# Patient Record
Sex: MALE | Race: WHITE | Employment: OTHER | URBAN - METROPOLITAN AREA
[De-identification: names, ages, dates, MRNs, and addresses within clinical notes are randomized per-mention and may not be internally consistent; named-entity substitution may affect disease eponyms.]

---

## 2017-01-12 RX ORDER — ZOLPIDEM TARTRATE 10 MG/1
TABLET ORAL
Qty: 90 TABLET | Refills: 0 | OUTPATIENT
Start: 2017-01-12

## 2017-01-26 DIAGNOSIS — F51.01 PRIMARY INSOMNIA: ICD-10-CM

## 2017-01-26 DIAGNOSIS — I25.10 CORONARY ARTERY DISEASE INVOLVING NATIVE CORONARY ARTERY OF NATIVE HEART WITHOUT ANGINA PECTORIS: ICD-10-CM

## 2017-01-26 RX ORDER — ISOSORBIDE MONONITRATE 30 MG/1
TABLET, EXTENDED RELEASE ORAL
Qty: 90 TABLET | Refills: 3 | Status: ON HOLD | OUTPATIENT
Start: 2017-01-26 | End: 2017-04-23 | Stop reason: HOSPADM

## 2017-02-15 ENCOUNTER — OFFICE VISIT (OUTPATIENT)
Dept: NEUROLOGY | Age: 72
End: 2017-02-15

## 2017-02-15 VITALS
BODY MASS INDEX: 32.5 KG/M2 | SYSTOLIC BLOOD PRESSURE: 138 MMHG | WEIGHT: 227 LBS | HEIGHT: 70 IN | OXYGEN SATURATION: 95 % | DIASTOLIC BLOOD PRESSURE: 89 MMHG | HEART RATE: 74 BPM

## 2017-02-15 DIAGNOSIS — E78.2 MIXED HYPERLIPIDEMIA: ICD-10-CM

## 2017-02-15 DIAGNOSIS — E11.42 DIABETIC POLYNEUROPATHY ASSOCIATED WITH TYPE 2 DIABETES MELLITUS (HCC): ICD-10-CM

## 2017-02-15 DIAGNOSIS — I10 ESSENTIAL HYPERTENSION: ICD-10-CM

## 2017-02-15 DIAGNOSIS — G25.81 RESTLESS LEGS SYNDROME (RLS): Primary | ICD-10-CM

## 2017-02-15 DIAGNOSIS — F51.01 PRIMARY INSOMNIA: ICD-10-CM

## 2017-02-15 PROCEDURE — 99214 OFFICE O/P EST MOD 30 MIN: CPT | Performed by: PSYCHIATRY & NEUROLOGY

## 2017-02-15 RX ORDER — PRAMIPEXOLE DIHYDROCHLORIDE 1 MG/1
1 TABLET ORAL NIGHTLY
Qty: 90 TABLET | Refills: 2 | Status: ON HOLD | OUTPATIENT
Start: 2017-02-15 | End: 2017-04-23

## 2017-02-16 ENCOUNTER — OFFICE VISIT (OUTPATIENT)
Dept: FAMILY MEDICINE CLINIC | Age: 72
End: 2017-02-16

## 2017-02-16 VITALS
BODY MASS INDEX: 32.5 KG/M2 | HEART RATE: 64 BPM | WEIGHT: 227 LBS | HEIGHT: 70 IN | SYSTOLIC BLOOD PRESSURE: 120 MMHG | OXYGEN SATURATION: 96 % | DIASTOLIC BLOOD PRESSURE: 78 MMHG | TEMPERATURE: 97.4 F

## 2017-02-16 DIAGNOSIS — J20.9 ACUTE BRONCHITIS, UNSPECIFIED ORGANISM: Primary | ICD-10-CM

## 2017-02-16 DIAGNOSIS — R45.89 DEPRESSED AFFECT: ICD-10-CM

## 2017-02-16 PROCEDURE — 99213 OFFICE O/P EST LOW 20 MIN: CPT | Performed by: FAMILY MEDICINE

## 2017-02-16 RX ORDER — AZITHROMYCIN 250 MG/1
TABLET, FILM COATED ORAL
Qty: 6 TABLET | Refills: 0 | Status: SHIPPED | OUTPATIENT
Start: 2017-02-16 | End: 2017-02-26

## 2017-02-16 ASSESSMENT — ENCOUNTER SYMPTOMS
GASTROINTESTINAL NEGATIVE: 1
COUGH: 1

## 2017-02-20 DIAGNOSIS — J30.9 ALLERGIC RHINITIS: ICD-10-CM

## 2017-02-20 RX ORDER — MONTELUKAST SODIUM 10 MG/1
TABLET ORAL
Qty: 90 TABLET | Refills: 3 | Status: ON HOLD | OUTPATIENT
Start: 2017-02-20 | End: 2017-04-23 | Stop reason: HOSPADM

## 2017-02-20 RX ORDER — MESALAMINE 1.2 G/1
TABLET, DELAYED RELEASE ORAL
Qty: 270 TABLET | Refills: 1 | Status: SHIPPED | OUTPATIENT
Start: 2017-02-20 | End: 2018-02-01 | Stop reason: SDUPTHER

## 2017-03-03 RX ORDER — ZOLPIDEM TARTRATE 10 MG/1
TABLET ORAL
Qty: 90 TABLET | Refills: 0 | Status: SHIPPED | OUTPATIENT
Start: 2017-03-03 | End: 2017-07-27 | Stop reason: SDUPTHER

## 2017-04-17 DIAGNOSIS — E11.9 CONTROLLED TYPE 2 DIABETES MELLITUS WITHOUT COMPLICATION, WITHOUT LONG-TERM CURRENT USE OF INSULIN (HCC): ICD-10-CM

## 2017-04-17 DIAGNOSIS — R45.89 DEPRESSED AFFECT: Primary | ICD-10-CM

## 2017-04-17 PROBLEM — G25.81 RESTLESS LEGS SYNDROME (RLS): Chronic | Status: ACTIVE | Noted: 2017-02-15
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
## 2017-04-17 PROBLEM — F32.A DEPRESSION: Chronic | Status: ACTIVE | Noted: 2017-02-16

## 2017-04-17 PROBLEM — S22.089A T12 VERTEBRAL FRACTURE (HCC): Status: ACTIVE | Noted: 2017-04-17
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
## 2017-04-17 RX ORDER — SERTRALINE HYDROCHLORIDE 100 MG/1
100 TABLET, FILM COATED ORAL DAILY
Qty: 30 TABLET | Refills: 3 | Status: ON HOLD | OUTPATIENT
Start: 2017-04-17 | End: 2017-04-23

## 2017-04-18 ENCOUNTER — TELEPHONE (OUTPATIENT)
Dept: ORTHOPEDIC SURGERY | Age: 72
End: 2017-04-18

## 2017-04-18 PROBLEM — Z87.891 FORMER SMOKER: Chronic | Status: ACTIVE | Noted: 2017-04-18

## 2017-04-18 PROBLEM — J96.01 ACUTE HYPOXEMIC RESPIRATORY FAILURE (HCC): Status: ACTIVE | Noted: 2017-04-18

## 2017-04-25 ENCOUNTER — TELEPHONE (OUTPATIENT)
Dept: FAMILY MEDICINE CLINIC | Age: 72
End: 2017-04-25

## 2017-04-27 ENCOUNTER — OFFICE VISIT (OUTPATIENT)
Dept: FAMILY MEDICINE CLINIC | Age: 72
End: 2017-04-27

## 2017-04-27 VITALS
BODY MASS INDEX: 32.28 KG/M2 | HEART RATE: 65 BPM | SYSTOLIC BLOOD PRESSURE: 140 MMHG | DIASTOLIC BLOOD PRESSURE: 80 MMHG | OXYGEN SATURATION: 98 % | WEIGHT: 225 LBS

## 2017-04-27 DIAGNOSIS — I50.9 ACUTE CONGESTIVE HEART FAILURE, UNSPECIFIED CONGESTIVE HEART FAILURE TYPE: ICD-10-CM

## 2017-04-27 DIAGNOSIS — J43.9 PULMONARY EMPHYSEMA, UNSPECIFIED EMPHYSEMA TYPE (HCC): Primary | ICD-10-CM

## 2017-04-27 DIAGNOSIS — S22.081A: ICD-10-CM

## 2017-04-27 PROCEDURE — 99214 OFFICE O/P EST MOD 30 MIN: CPT | Performed by: FAMILY MEDICINE

## 2017-04-27 RX ORDER — OXYCODONE AND ACETAMINOPHEN 10; 325 MG/1; MG/1
1 TABLET ORAL EVERY 4 HOURS PRN
Qty: 100 TABLET | Refills: 0 | Status: SHIPPED | OUTPATIENT
Start: 2017-04-27 | End: 2017-05-04

## 2017-04-27 RX ORDER — CYCLOBENZAPRINE HCL 10 MG
10 TABLET ORAL 3 TIMES DAILY PRN
Qty: 90 TABLET | Refills: 2 | Status: SHIPPED | OUTPATIENT
Start: 2017-04-27 | End: 2017-12-14 | Stop reason: SDUPTHER

## 2017-04-27 ASSESSMENT — ENCOUNTER SYMPTOMS
BACK PAIN: 1
GASTROINTESTINAL NEGATIVE: 1
SHORTNESS OF BREATH: 1

## 2017-05-01 ENCOUNTER — TELEPHONE (OUTPATIENT)
Dept: FAMILY MEDICINE CLINIC | Age: 72
End: 2017-05-01

## 2017-05-01 DIAGNOSIS — E11.9 CONTROLLED TYPE 2 DIABETES MELLITUS WITHOUT COMPLICATION, WITHOUT LONG-TERM CURRENT USE OF INSULIN (HCC): ICD-10-CM

## 2017-05-01 RX ORDER — GLUCOSAM/CHON-MSM1/C/MANG/BOSW 500-416.6
1 TABLET ORAL DAILY
Qty: 100 EACH | Refills: 3 | Status: SHIPPED | OUTPATIENT
Start: 2017-05-01 | End: 2017-05-01 | Stop reason: SDUPTHER

## 2017-05-01 RX ORDER — GLUCOSAM/CHON-MSM1/C/MANG/BOSW 500-416.6
1 TABLET ORAL DAILY
Qty: 100 EACH | Refills: 3 | Status: SHIPPED | OUTPATIENT
Start: 2017-05-01

## 2017-05-23 ENCOUNTER — TELEPHONE (OUTPATIENT)
Dept: FAMILY MEDICINE CLINIC | Age: 72
End: 2017-05-23

## 2017-05-25 ENCOUNTER — TELEPHONE (OUTPATIENT)
Dept: FAMILY MEDICINE CLINIC | Age: 72
End: 2017-05-25

## 2017-06-01 DIAGNOSIS — S22.081D CLOSED STABLE BURST FRACTURE OF TWELFTH THORACIC VERTEBRA WITH ROUTINE HEALING, SUBSEQUENT ENCOUNTER: Primary | ICD-10-CM

## 2017-06-02 DIAGNOSIS — J44.9 COPD, SEVERE (HCC): Primary | Chronic | ICD-10-CM

## 2017-06-02 DIAGNOSIS — E11.9 CONTROLLED TYPE 2 DIABETES MELLITUS WITHOUT COMPLICATION, WITHOUT LONG-TERM CURRENT USE OF INSULIN (HCC): ICD-10-CM

## 2017-06-02 DIAGNOSIS — K21.9 GASTROESOPHAGEAL REFLUX DISEASE WITHOUT ESOPHAGITIS: ICD-10-CM

## 2017-06-02 DIAGNOSIS — J30.89 PERENNIAL ALLERGIC RHINITIS, UNSPECIFIED ALLERGIC RHINITIS TRIGGER: ICD-10-CM

## 2017-06-02 RX ORDER — MONTELUKAST SODIUM 10 MG/1
10 TABLET ORAL NIGHTLY
Qty: 90 TABLET | Refills: 3 | Status: SHIPPED | OUTPATIENT
Start: 2017-06-02 | End: 2018-02-01 | Stop reason: SDUPTHER

## 2017-06-02 RX ORDER — GUAIFENESIN 600 MG/1
1200 TABLET, EXTENDED RELEASE ORAL 2 TIMES DAILY
Qty: 60 TABLET | Refills: 3 | Status: SHIPPED | OUTPATIENT
Start: 2017-06-02 | End: 2017-11-27

## 2017-06-02 RX ORDER — OMEPRAZOLE 40 MG/1
40 CAPSULE, DELAYED RELEASE ORAL DAILY
Qty: 90 CAPSULE | Refills: 3 | Status: SHIPPED | OUTPATIENT
Start: 2017-06-02 | End: 2018-05-16 | Stop reason: SDUPTHER

## 2017-06-14 ENCOUNTER — HOSPITAL ENCOUNTER (OUTPATIENT)
Dept: OTHER | Age: 72
Discharge: HOME | End: 2017-06-14
Attending: NEUROLOGICAL SURGERY | Admitting: NEUROLOGICAL SURGERY

## 2017-06-14 DIAGNOSIS — S22.081D CLOSED STABLE BURST FRACTURE OF ELEVENTH THORACIC VERTEBRA WITH ROUTINE HEALING, SUBSEQUENT ENCOUNTER: ICD-10-CM

## 2017-06-19 ENCOUNTER — HOSPITAL ENCOUNTER (OUTPATIENT)
Dept: OTHER | Age: 72
Discharge: HOME | End: 2017-06-19
Attending: NEUROLOGICAL SURGERY | Admitting: NEUROLOGICAL SURGERY

## 2017-06-19 DIAGNOSIS — E11.9 CONTROLLED TYPE 2 DIABETES MELLITUS WITHOUT COMPLICATION, WITHOUT LONG-TERM CURRENT USE OF INSULIN (HCC): ICD-10-CM

## 2017-06-19 DIAGNOSIS — S22.081D: ICD-10-CM

## 2017-06-22 DIAGNOSIS — E11.9 CONTROLLED TYPE 2 DIABETES MELLITUS WITHOUT COMPLICATION, WITHOUT LONG-TERM CURRENT USE OF INSULIN (HCC): ICD-10-CM

## 2017-06-27 ENCOUNTER — OFFICE VISIT (OUTPATIENT)
Dept: FAMILY MEDICINE CLINIC | Age: 72
End: 2017-06-27

## 2017-06-27 VITALS
WEIGHT: 217 LBS | SYSTOLIC BLOOD PRESSURE: 124 MMHG | DIASTOLIC BLOOD PRESSURE: 70 MMHG | BODY MASS INDEX: 31.07 KG/M2 | HEIGHT: 70 IN | OXYGEN SATURATION: 97 % | HEART RATE: 68 BPM

## 2017-06-27 DIAGNOSIS — I48.3 TYPICAL ATRIAL FLUTTER (HCC): ICD-10-CM

## 2017-06-27 DIAGNOSIS — I20.0 INTERMEDIATE CORONARY SYNDROME (HCC): ICD-10-CM

## 2017-06-27 DIAGNOSIS — R07.81 RIB PAIN ON RIGHT SIDE: Primary | ICD-10-CM

## 2017-06-27 DIAGNOSIS — Z87.19 HISTORY OF PANCREATITIS: ICD-10-CM

## 2017-06-27 LAB
A/G RATIO: 1.4 (ref 1.1–2.2)
ALBUMIN SERPL-MCNC: 4.6 G/DL (ref 3.4–5)
ALP BLD-CCNC: 93 U/L (ref 40–129)
ALT SERPL-CCNC: 15 U/L (ref 10–40)
ANION GAP SERPL CALCULATED.3IONS-SCNC: 18 MMOL/L (ref 3–16)
AST SERPL-CCNC: 33 U/L (ref 15–37)
BILIRUB SERPL-MCNC: 0.9 MG/DL (ref 0–1)
BUN BLDV-MCNC: 23 MG/DL (ref 7–20)
CALCIUM SERPL-MCNC: 10.3 MG/DL (ref 8.3–10.6)
CHLORIDE BLD-SCNC: 99 MMOL/L (ref 99–110)
CO2: 20 MMOL/L (ref 21–32)
CREAT SERPL-MCNC: 1.1 MG/DL (ref 0.8–1.3)
GFR AFRICAN AMERICAN: >60
GFR NON-AFRICAN AMERICAN: >60
GLOBULIN: 3.3 G/DL
GLUCOSE BLD-MCNC: 101 MG/DL (ref 70–99)
LIPASE: 42 U/L (ref 13–60)
POTASSIUM SERPL-SCNC: 5 MMOL/L (ref 3.5–5.1)
SODIUM BLD-SCNC: 137 MMOL/L (ref 136–145)
TOTAL PROTEIN: 7.9 G/DL (ref 6.4–8.2)

## 2017-06-27 PROCEDURE — 99213 OFFICE O/P EST LOW 20 MIN: CPT | Performed by: FAMILY MEDICINE

## 2017-06-27 ASSESSMENT — ENCOUNTER SYMPTOMS: ABDOMINAL PAIN: 0

## 2017-07-12 ENCOUNTER — OFFICE VISIT (OUTPATIENT)
Dept: FAMILY MEDICINE CLINIC | Age: 72
End: 2017-07-12

## 2017-07-12 VITALS
HEIGHT: 70 IN | WEIGHT: 212 LBS | SYSTOLIC BLOOD PRESSURE: 135 MMHG | OXYGEN SATURATION: 97 % | HEART RATE: 69 BPM | DIASTOLIC BLOOD PRESSURE: 80 MMHG | BODY MASS INDEX: 30.35 KG/M2

## 2017-07-12 DIAGNOSIS — R41.3 SHORT-TERM MEMORY LOSS: ICD-10-CM

## 2017-07-12 DIAGNOSIS — F51.01 PRIMARY INSOMNIA: Primary | ICD-10-CM

## 2017-07-12 LAB
BASOPHILS ABSOLUTE: 0 K/UL (ref 0–0.2)
BASOPHILS RELATIVE PERCENT: 0.5 %
EOSINOPHILS ABSOLUTE: 0.2 K/UL (ref 0–0.6)
EOSINOPHILS RELATIVE PERCENT: 4.1 %
FOLATE: 16.38 NG/ML (ref 4.78–24.2)
HCT VFR BLD CALC: 42.4 % (ref 40.5–52.5)
HEMOGLOBIN: 14.2 G/DL (ref 13.5–17.5)
LYMPHOCYTES ABSOLUTE: 1.5 K/UL (ref 1–5.1)
LYMPHOCYTES RELATIVE PERCENT: 28.1 %
MCH RBC QN AUTO: 31.2 PG (ref 26–34)
MCHC RBC AUTO-ENTMCNC: 33.4 G/DL (ref 31–36)
MCV RBC AUTO: 93.4 FL (ref 80–100)
MONOCYTES ABSOLUTE: 0.5 K/UL (ref 0–1.3)
MONOCYTES RELATIVE PERCENT: 8.7 %
NEUTROPHILS ABSOLUTE: 3.2 K/UL (ref 1.7–7.7)
NEUTROPHILS RELATIVE PERCENT: 58.6 %
PDW BLD-RTO: 17.2 % (ref 12.4–15.4)
PLATELET # BLD: 170 K/UL (ref 135–450)
PMV BLD AUTO: 8.6 FL (ref 5–10.5)
RBC # BLD: 4.54 M/UL (ref 4.2–5.9)
TSH SERPL DL<=0.05 MIU/L-ACNC: 1.07 UIU/ML (ref 0.27–4.2)
VITAMIN B-12: 297 PG/ML (ref 211–911)
WBC # BLD: 5.5 K/UL (ref 4–11)

## 2017-07-12 PROCEDURE — 99214 OFFICE O/P EST MOD 30 MIN: CPT | Performed by: FAMILY MEDICINE

## 2017-07-12 PROCEDURE — 36415 COLL VENOUS BLD VENIPUNCTURE: CPT | Performed by: FAMILY MEDICINE

## 2017-07-12 ASSESSMENT — ENCOUNTER SYMPTOMS
RESPIRATORY NEGATIVE: 1
GASTROINTESTINAL NEGATIVE: 1

## 2017-07-18 ENCOUNTER — HOSPITAL ENCOUNTER (OUTPATIENT)
Dept: OTHER | Age: 72
Discharge: HOME | End: 2017-07-18
Attending: NEUROLOGICAL SURGERY | Admitting: NEUROLOGICAL SURGERY

## 2017-07-18 ENCOUNTER — HOSPITAL ENCOUNTER (OUTPATIENT)
Dept: MRI IMAGING | Age: 72
Discharge: HOME | End: 2017-07-18
Attending: FAMILY MEDICINE | Admitting: FAMILY MEDICINE

## 2017-07-18 DIAGNOSIS — S22.081D BURST FRACTURE OF T12 VERTEBRA WITH ROUTINE HEALING: ICD-10-CM

## 2017-07-18 DIAGNOSIS — R41.3 SHORT-TERM MEMORY LOSS: ICD-10-CM

## 2017-07-18 DIAGNOSIS — R41.3 OTHER AMNESIA: ICD-10-CM

## 2017-07-18 LAB
AMPHETAMINE SCREEN, URINE: ABNORMAL
BARBITURATE SCREEN URINE: ABNORMAL
BENZODIAZEPINE SCREEN, URINE: ABNORMAL
CANNABINOID SCREEN URINE: ABNORMAL
COCAINE METABOLITE SCREEN URINE: ABNORMAL
Lab: ABNORMAL
METHADONE SCREEN, URINE: ABNORMAL
OPIATE SCREEN URINE: ABNORMAL
OXYCODONE URINE: POSITIVE
PH UA: 7
PHENCYCLIDINE SCREEN URINE: ABNORMAL
PROPOXYPHENE SCREEN: ABNORMAL

## 2017-07-27 DIAGNOSIS — R45.89 DEPRESSED AFFECT: ICD-10-CM

## 2017-07-27 RX ORDER — ZOLPIDEM TARTRATE 10 MG/1
TABLET ORAL
Qty: 90 TABLET | Refills: 0 | Status: SHIPPED | OUTPATIENT
Start: 2017-07-27 | End: 2017-10-20 | Stop reason: SDUPTHER

## 2017-07-27 RX ORDER — SERTRALINE HYDROCHLORIDE 100 MG/1
TABLET, FILM COATED ORAL
Qty: 90 TABLET | Refills: 3 | Status: SHIPPED | OUTPATIENT
Start: 2017-07-27 | End: 2018-05-16 | Stop reason: SDUPTHER

## 2017-08-08 ENCOUNTER — TELEPHONE (OUTPATIENT)
Dept: PULMONOLOGY | Age: 72
End: 2017-08-08

## 2017-09-14 RX ORDER — DONEPEZIL HYDROCHLORIDE 5 MG/1
5 TABLET, FILM COATED ORAL NIGHTLY
Qty: 30 TABLET | Refills: 0 | Status: SHIPPED | OUTPATIENT
Start: 2017-09-14 | End: 2017-12-14 | Stop reason: SDUPTHER

## 2017-09-20 RX ORDER — ATORVASTATIN CALCIUM 10 MG/1
10 TABLET, FILM COATED ORAL NIGHTLY
Qty: 90 TABLET | Refills: 1 | Status: SHIPPED | OUTPATIENT
Start: 2017-09-20 | End: 2017-12-14 | Stop reason: SDUPTHER

## 2017-09-27 ENCOUNTER — OFFICE VISIT (OUTPATIENT)
Dept: NEUROLOGY | Age: 72
End: 2017-09-27

## 2017-09-27 VITALS
SYSTOLIC BLOOD PRESSURE: 90 MMHG | WEIGHT: 207 LBS | DIASTOLIC BLOOD PRESSURE: 58 MMHG | HEIGHT: 70 IN | HEART RATE: 116 BPM | BODY MASS INDEX: 29.63 KG/M2 | OXYGEN SATURATION: 95 %

## 2017-09-27 DIAGNOSIS — I10 ESSENTIAL HYPERTENSION: ICD-10-CM

## 2017-09-27 DIAGNOSIS — E11.42 DIABETIC POLYNEUROPATHY ASSOCIATED WITH TYPE 2 DIABETES MELLITUS (HCC): ICD-10-CM

## 2017-09-27 DIAGNOSIS — F34.1 DYSTHYMIA: ICD-10-CM

## 2017-09-27 DIAGNOSIS — E78.5 DYSLIPIDEMIA: ICD-10-CM

## 2017-09-27 DIAGNOSIS — R41.3 MEMORY LOSS: ICD-10-CM

## 2017-09-27 DIAGNOSIS — F51.01 PRIMARY INSOMNIA: ICD-10-CM

## 2017-09-27 DIAGNOSIS — G25.81 RESTLESS LEGS SYNDROME (RLS): Primary | ICD-10-CM

## 2017-09-27 PROCEDURE — 99214 OFFICE O/P EST MOD 30 MIN: CPT | Performed by: PSYCHIATRY & NEUROLOGY

## 2017-09-27 ASSESSMENT — ENCOUNTER SYMPTOMS
BACK PAIN: 1
HEMOPTYSIS: 0
VOMITING: 0
DOUBLE VISION: 0
NAUSEA: 0
BLURRED VISION: 0
PHOTOPHOBIA: 0
COUGH: 0

## 2017-09-28 ENCOUNTER — OFFICE VISIT (OUTPATIENT)
Dept: FAMILY MEDICINE CLINIC | Age: 72
End: 2017-09-28

## 2017-09-28 VITALS
BODY MASS INDEX: 29.27 KG/M2 | TEMPERATURE: 97.8 F | WEIGHT: 204 LBS | DIASTOLIC BLOOD PRESSURE: 62 MMHG | HEART RATE: 102 BPM | OXYGEN SATURATION: 96 % | SYSTOLIC BLOOD PRESSURE: 110 MMHG

## 2017-09-28 DIAGNOSIS — R19.7 DIARRHEA, UNSPECIFIED TYPE: Primary | ICD-10-CM

## 2017-09-28 LAB — GLUCOSE BLD-MCNC: 115 MG/DL

## 2017-09-28 PROCEDURE — 99213 OFFICE O/P EST LOW 20 MIN: CPT | Performed by: FAMILY MEDICINE

## 2017-09-28 PROCEDURE — 82962 GLUCOSE BLOOD TEST: CPT | Performed by: FAMILY MEDICINE

## 2017-09-29 ASSESSMENT — ENCOUNTER SYMPTOMS
VOMITING: 0
RECTAL PAIN: 0
ANAL BLEEDING: 0
BLOATING: 0
ABDOMINAL PAIN: 0
ABDOMINAL DISTENTION: 0
CONSTIPATION: 0
DIARRHEA: 1
NAUSEA: 0
BLOOD IN STOOL: 0

## 2017-10-11 ENCOUNTER — OFFICE VISIT (OUTPATIENT)
Dept: PULMONOLOGY | Age: 72
End: 2017-10-11

## 2017-10-11 VITALS
OXYGEN SATURATION: 97 % | HEART RATE: 77 BPM | BODY MASS INDEX: 29.78 KG/M2 | HEIGHT: 70 IN | WEIGHT: 208 LBS | DIASTOLIC BLOOD PRESSURE: 68 MMHG | RESPIRATION RATE: 16 BRPM | TEMPERATURE: 98 F | SYSTOLIC BLOOD PRESSURE: 110 MMHG

## 2017-10-11 DIAGNOSIS — J44.9 STAGE 3 SEVERE COPD BY GOLD CLASSIFICATION (HCC): Primary | ICD-10-CM

## 2017-10-11 DIAGNOSIS — R06.02 SOB (SHORTNESS OF BREATH): ICD-10-CM

## 2017-10-11 PROCEDURE — 99204 OFFICE O/P NEW MOD 45 MIN: CPT | Performed by: INTERNAL MEDICINE

## 2017-10-11 NOTE — PROGRESS NOTES
Prescriptions:     atorvastatin (LIPITOR) 10 MG tablet, Take 1 tablet by mouth nightly, Disp: 90 tablet, Rfl: 1    donepezil (ARICEPT) 5 MG tablet, Take 1 tablet by mouth nightly, Disp: 30 tablet, Rfl: 0    sertraline (ZOLOFT) 100 MG tablet, TAKE 1 TABLET EVERY DAY, Disp: 90 tablet, Rfl: 3    zolpidem (AMBIEN) 10 MG tablet, TAKE 1 TABLET EVERY NIGHT AT BEDTIME AS NEEDED FOR SLEEP, Disp: 90 tablet, Rfl: 0    glucose blood VI test strips (ASCENSIA AUTODISC VI;ONE TOUCH ULTRA TEST VI) strip, [employer] True Metrix Air Test Strips. FSBS daily.  DX E11.9, Disp: 100 strip, Rfl: 3    montelukast (SINGULAIR) 10 MG tablet, Take 1 tablet by mouth nightly, Disp: 90 tablet, Rfl: 3    guaiFENesin (MUCINEX) 600 MG extended release tablet, Take 2 tablets by mouth 2 times daily, Disp: 60 tablet, Rfl: 3    metFORMIN (GLUCOPHAGE) 500 MG tablet, Take 1 tablet by mouth 2 times daily, Disp: 180 tablet, Rfl: 3    omeprazole (PRILOSEC) 40 MG delayed release capsule, Take 1 capsule by mouth daily, Disp: 90 capsule, Rfl: 3    TRUEPLUS LANCETS 28G MISC, 1 each by Does not apply route daily E11.9 Test FBS daily--NEED 33 GAUGE, Disp: 100 each, Rfl: 3    cyclobenzaprine (FLEXERIL) 10 MG tablet, Take 1 tablet by mouth 3 times daily as needed for Muscle spasms, Disp: 90 tablet, Rfl: 2    b complex vitamins capsule, Take 1 capsule by mouth daily, Disp: 30 capsule, Rfl: 3    albuterol sulfate (PROAIR RESPICLICK) 447 (90 BASE) MCG/ACT aerosol powder inhalation, 2 puffs q4-6 hrs prn SOB,wheezing, Disp: 1 Inhaler, Rfl: 5    amLODIPine (NORVASC) 5 MG tablet, TAKE ONE TABLET BY MOUTH DAILY, Disp: 90 tablet, Rfl: 1    aspirin (ASPIRIN CHILDRENS) 81 MG chewable tablet, Take 1 tablet by mouth daily, Disp: 30 tablet, Rfl: 0    budesonide-formoterol (SYMBICORT) 160-4.5 MCG/ACT AERO, Inhale 2 puffs into the lungs 2 times daily, Disp: 1 Inhaler, Rfl: 3    cholestyramine (QUESTRAN) 4 G packet, Take 1 packet by mouth daily, Disp: 90 packet, Rfl: No lower extremity edema. Skin: Warm and dry. No nodules on exposed extremities. No rash on exposed extremities. Musc: No clubbing. No cyanosis. No synovitis or joint deformity in digits. Psych: Oriented x 3. Mood and affect normal. Intact judgment and insight. LABS:  The recent relevant labs were reviewed    PFTs 8/16/17  FVC  (%) FEV1 1.35 (42%) FEV1/FVC ratio 43   TLC  (%)  RV  (176%)   DLCO (70%) Bronchodilator response: +    IMAGING:  I personally reviewed and interpreted the following imaging today in the office:   CXR:   Chest CT:    ASSESSMENT:  · Severe COPD  · SOB  · H/o aflutter and diastolic CHF    PLAN:   · Pulmonary rehab referral discussed but pt is not interested  · Start Anoro (combined LABA/LAMA)  · Continue PRN albuterol  The Pneumovax 23 today. The Flu Vaccine today  F/u in 2-3 months.

## 2017-10-20 DIAGNOSIS — I10 ESSENTIAL HYPERTENSION: Chronic | ICD-10-CM

## 2017-10-23 RX ORDER — COLESEVELAM HYDROCHLORIDE 625 MG/1
TABLET, FILM COATED ORAL
Qty: 540 TABLET | Refills: 0 | Status: SHIPPED | OUTPATIENT
Start: 2017-10-23 | End: 2018-02-06 | Stop reason: SDUPTHER

## 2017-10-23 RX ORDER — ZOLPIDEM TARTRATE 10 MG/1
TABLET ORAL
Qty: 90 TABLET | Refills: 0 | Status: SHIPPED | OUTPATIENT
Start: 2017-10-23 | End: 2018-02-01 | Stop reason: SDUPTHER

## 2017-10-23 RX ORDER — AMLODIPINE BESYLATE 5 MG/1
TABLET ORAL
Qty: 90 TABLET | Refills: 0 | Status: SHIPPED | OUTPATIENT
Start: 2017-10-23 | End: 2018-02-06 | Stop reason: SDUPTHER

## 2017-11-06 ENCOUNTER — HOSPITAL ENCOUNTER (OUTPATIENT)
Dept: OTHER | Age: 72
Discharge: HOME | End: 2017-11-30
Attending: INTERNAL MEDICINE | Admitting: INTERNAL MEDICINE

## 2017-11-22 ENCOUNTER — HOSPITAL ENCOUNTER (OUTPATIENT)
Dept: CT IMAGING | Age: 72
Discharge: HOME | End: 2017-11-22
Attending: NEUROLOGICAL SURGERY | Admitting: NEUROLOGICAL SURGERY

## 2017-11-22 DIAGNOSIS — S22.081D: ICD-10-CM

## 2017-11-22 DIAGNOSIS — S22.081D BURST FRACTURE OF T12 VERTEBRA WITH ROUTINE HEALING: ICD-10-CM

## 2017-11-27 ENCOUNTER — OFFICE VISIT (OUTPATIENT)
Dept: FAMILY MEDICINE CLINIC | Age: 72
End: 2017-11-27

## 2017-11-27 VITALS
HEIGHT: 70 IN | TEMPERATURE: 97.4 F | HEART RATE: 72 BPM | OXYGEN SATURATION: 96 % | BODY MASS INDEX: 30.06 KG/M2 | DIASTOLIC BLOOD PRESSURE: 76 MMHG | WEIGHT: 210 LBS | SYSTOLIC BLOOD PRESSURE: 134 MMHG

## 2017-11-27 DIAGNOSIS — F33.42 RECURRENT MAJOR DEPRESSIVE EPISODES, IN FULL REMISSION (HCC): ICD-10-CM

## 2017-11-27 DIAGNOSIS — Z23 FLU VACCINE NEED: ICD-10-CM

## 2017-11-27 DIAGNOSIS — R35.1 NOCTURIA: ICD-10-CM

## 2017-11-27 DIAGNOSIS — R10.9 FLANK PAIN: ICD-10-CM

## 2017-11-27 DIAGNOSIS — F33.0 MILD EPISODE OF RECURRENT MAJOR DEPRESSIVE DISORDER (HCC): Chronic | ICD-10-CM

## 2017-11-27 DIAGNOSIS — I10 ESSENTIAL HYPERTENSION: ICD-10-CM

## 2017-11-27 DIAGNOSIS — R41.3 SHORT-TERM MEMORY LOSS: ICD-10-CM

## 2017-11-27 DIAGNOSIS — E11.9 CONTROLLED TYPE 2 DIABETES MELLITUS WITHOUT COMPLICATION, WITHOUT LONG-TERM CURRENT USE OF INSULIN (HCC): Primary | Chronic | ICD-10-CM

## 2017-11-27 LAB
BILIRUBIN, POC: NORMAL
BLOOD URINE, POC: NORMAL
CLARITY, POC: CLEAR
COLOR, POC: YELLOW
CREATININE URINE POCT: 300
GLUCOSE URINE, POC: 250
HBA1C MFR BLD: 5.5 %
KETONES, POC: NORMAL
LEUKOCYTE EST, POC: NORMAL
MICROALBUMIN/CREAT 24H UR: 80 MG/G{CREAT}
MICROALBUMIN/CREAT UR-RTO: NORMAL
NITRITE, POC: NORMAL
PH, POC: 5
PROTEIN, POC: NORMAL
SPECIFIC GRAVITY, POC: 1.02
UROBILINOGEN, POC: 0.2

## 2017-11-27 PROCEDURE — 90662 IIV NO PRSV INCREASED AG IM: CPT | Performed by: NURSE PRACTITIONER

## 2017-11-27 PROCEDURE — 99213 OFFICE O/P EST LOW 20 MIN: CPT | Performed by: NURSE PRACTITIONER

## 2017-11-27 PROCEDURE — 83036 HEMOGLOBIN GLYCOSYLATED A1C: CPT | Performed by: NURSE PRACTITIONER

## 2017-11-27 PROCEDURE — 81002 URINALYSIS NONAUTO W/O SCOPE: CPT | Performed by: NURSE PRACTITIONER

## 2017-11-27 PROCEDURE — G0008 ADMIN INFLUENZA VIRUS VAC: HCPCS | Performed by: NURSE PRACTITIONER

## 2017-11-27 PROCEDURE — 3017F COLORECTAL CA SCREEN DOC REV: CPT | Performed by: NURSE PRACTITIONER

## 2017-11-27 PROCEDURE — 3044F HG A1C LEVEL LT 7.0%: CPT | Performed by: NURSE PRACTITIONER

## 2017-11-27 PROCEDURE — 4040F PNEUMOC VAC/ADMIN/RCVD: CPT | Performed by: NURSE PRACTITIONER

## 2017-11-27 PROCEDURE — 1036F TOBACCO NON-USER: CPT | Performed by: NURSE PRACTITIONER

## 2017-11-27 PROCEDURE — G8484 FLU IMMUNIZE NO ADMIN: HCPCS | Performed by: NURSE PRACTITIONER

## 2017-11-27 PROCEDURE — 82044 UR ALBUMIN SEMIQUANTITATIVE: CPT | Performed by: NURSE PRACTITIONER

## 2017-11-27 PROCEDURE — 1123F ACP DISCUSS/DSCN MKR DOCD: CPT | Performed by: NURSE PRACTITIONER

## 2017-11-27 PROCEDURE — G8427 DOCREV CUR MEDS BY ELIG CLIN: HCPCS | Performed by: NURSE PRACTITIONER

## 2017-11-27 PROCEDURE — G8417 CALC BMI ABV UP PARAM F/U: HCPCS | Performed by: NURSE PRACTITIONER

## 2017-11-27 PROCEDURE — G8598 ASA/ANTIPLAT THER USED: HCPCS | Performed by: NURSE PRACTITIONER

## 2017-11-27 RX ORDER — TAMSULOSIN HYDROCHLORIDE 0.4 MG/1
0.4 CAPSULE ORAL DAILY
Qty: 30 CAPSULE | Refills: 3 | Status: SHIPPED | OUTPATIENT
Start: 2017-11-27 | End: 2018-02-01 | Stop reason: SDUPTHER

## 2017-11-27 RX ORDER — TAMSULOSIN HYDROCHLORIDE 0.4 MG/1
0.4 CAPSULE ORAL DAILY
Qty: 30 CAPSULE | Refills: 3 | Status: SHIPPED | OUTPATIENT
Start: 2017-11-27 | End: 2017-11-27

## 2017-11-27 ASSESSMENT — ENCOUNTER SYMPTOMS
CHEST TIGHTNESS: 0
VOMITING: 0
BACK PAIN: 0
COUGH: 0
NAUSEA: 0

## 2017-11-27 ASSESSMENT — PATIENT HEALTH QUESTIONNAIRE - PHQ9
SUM OF ALL RESPONSES TO PHQ QUESTIONS 1-9: 1
SUM OF ALL RESPONSES TO PHQ9 QUESTIONS 1 & 2: 1
2. FEELING DOWN, DEPRESSED OR HOPELESS: 0
1. LITTLE INTEREST OR PLEASURE IN DOING THINGS: 1

## 2017-11-28 PROBLEM — R35.1 NOCTURIA: Status: ACTIVE | Noted: 2017-11-28

## 2017-11-28 PROBLEM — J96.01 ACUTE HYPOXEMIC RESPIRATORY FAILURE (HCC): Status: RESOLVED | Noted: 2017-04-18 | Resolved: 2017-11-28

## 2017-11-28 NOTE — PROGRESS NOTES
visit:    Controlled type 2 diabetes mellitus without complication, without long-term current use of insulin ([facility])  -      DIABETES FOOT EXAM  -     POCT glycosylated hemoglobin (Hb A1C)  -     POCT microalbumin    Flank pain  -     POCT Urinalysis no Micro    Flu vaccine need  -     INFLUENZA, HIGH DOSE, 65 YRS +, IM, PF, PREFILL SYR, 0.5ML (FLUZONE HD)    Nocturia  -     Discontinue: tamsulosin (FLOMAX) 0.4 MG capsule; Take 1 capsule by mouth daily    Recurrent major depressive episodes, in full remission ([facility] [address]    Other orders  -     tamsulosin (FLOMAX) 0.4 MG capsule;  Take 1 capsule by mouth daily

## 2017-12-01 ENCOUNTER — HOSPITAL ENCOUNTER (OUTPATIENT)
Dept: OTHER | Age: 72
Discharge: HOME | End: 2017-12-31
Attending: INTERNAL MEDICINE | Admitting: INTERNAL MEDICINE

## 2017-12-11 ENCOUNTER — OFFICE VISIT (OUTPATIENT)
Dept: CARDIOLOGY CLINIC | Age: 72
End: 2017-12-11

## 2017-12-11 VITALS
SYSTOLIC BLOOD PRESSURE: 108 MMHG | HEART RATE: 95 BPM | HEIGHT: 70 IN | BODY MASS INDEX: 30.21 KG/M2 | DIASTOLIC BLOOD PRESSURE: 68 MMHG | WEIGHT: 211 LBS

## 2017-12-11 DIAGNOSIS — I10 ESSENTIAL HYPERTENSION: Chronic | ICD-10-CM

## 2017-12-11 DIAGNOSIS — I25.89 CARDIAC MICROVASCULAR DISEASE: ICD-10-CM

## 2017-12-11 DIAGNOSIS — E11.9 CONTROLLED TYPE 2 DIABETES MELLITUS WITHOUT COMPLICATION, WITHOUT LONG-TERM CURRENT USE OF INSULIN (HCC): ICD-10-CM

## 2017-12-11 DIAGNOSIS — R06.09 DYSPNEA ON EXERTION: ICD-10-CM

## 2017-12-11 DIAGNOSIS — E78.2 MIXED HYPERLIPIDEMIA: Chronic | ICD-10-CM

## 2017-12-11 DIAGNOSIS — R42 POSTURAL DIZZINESS: Primary | ICD-10-CM

## 2017-12-11 PROCEDURE — 3017F COLORECTAL CA SCREEN DOC REV: CPT | Performed by: INTERNAL MEDICINE

## 2017-12-11 PROCEDURE — G8598 ASA/ANTIPLAT THER USED: HCPCS | Performed by: INTERNAL MEDICINE

## 2017-12-11 PROCEDURE — 3044F HG A1C LEVEL LT 7.0%: CPT | Performed by: INTERNAL MEDICINE

## 2017-12-11 PROCEDURE — G8484 FLU IMMUNIZE NO ADMIN: HCPCS | Performed by: INTERNAL MEDICINE

## 2017-12-11 PROCEDURE — G8417 CALC BMI ABV UP PARAM F/U: HCPCS | Performed by: INTERNAL MEDICINE

## 2017-12-11 PROCEDURE — 1123F ACP DISCUSS/DSCN MKR DOCD: CPT | Performed by: INTERNAL MEDICINE

## 2017-12-11 PROCEDURE — 1036F TOBACCO NON-USER: CPT | Performed by: INTERNAL MEDICINE

## 2017-12-11 PROCEDURE — G8427 DOCREV CUR MEDS BY ELIG CLIN: HCPCS | Performed by: INTERNAL MEDICINE

## 2017-12-11 PROCEDURE — 4040F PNEUMOC VAC/ADMIN/RCVD: CPT | Performed by: INTERNAL MEDICINE

## 2017-12-11 PROCEDURE — 99213 OFFICE O/P EST LOW 20 MIN: CPT | Performed by: INTERNAL MEDICINE

## 2017-12-11 NOTE — LETTER
[address] [facility] - [address] [place] [address]  Phone: [phone]  Fax: [phone]    Morris Alegria MD        December 18, 2017     Luz Escobedo DO  [address] [place] [address]    Patient: Aga Scruggs  MR Number: [id number]  YOB: 1945  Date of Visit: 12/11/2017    Dear Dr. Juanjose Love: Thank you for the request for consultation for David Lugo to me for the evaluation of dizziness. Below are the relevant portions of my assessment and plan of care. Assessment:  - Postural dizziness likely due to autonomic dysfunction exacerbated by oral nitrates  - Microvascular angina  - Dyspnea due to severe COPD, he had normal EF and filling pressure on cath  - Hypertension. BP: (102-108)/(64-68)    - Hyperlipidemia  - Diabetes     Recommendation:  - I will dicontinue Imdur as it may be exacerbating his dizziness. If that does not help, he will need to consult ENT. He will continue Norvasc and Lisinopril for microvascular angina (ACEI have shown to be helpful in microvascular angina). - He will continue moderate dose Lipitor for underlying CAD. His LDL is at goal. Will repeat his lipids. - I will see him back in one 1 year. If you have questions, please do not hesitate to call me. I look forward to following [facility] along with you.     Sincerely,        Morris Alegria MD

## 2017-12-11 NOTE — PROGRESS NOTES
Take 1 tablet by mouth nightly 30 tablet 0    sertraline (ZOLOFT) 100 MG tablet TAKE 1 TABLET EVERY DAY 90 tablet 3    glucose blood VI test strips (ASCENSIA AUTODISC VI;ONE TOUCH ULTRA TEST VI) strip [employer] True Metrix Air Test Strips. FSBS daily. DX E11.9 100 strip 3    montelukast (SINGULAIR) 10 MG tablet Take 1 tablet by mouth nightly 90 tablet 3    metFORMIN (GLUCOPHAGE) 500 MG tablet Take 1 tablet by mouth 2 times daily 180 tablet 3    omeprazole (PRILOSEC) 40 MG delayed release capsule Take 1 capsule by mouth daily 90 capsule 3    TRUEPLUS LANCETS 28G MISC 1 each by Does not apply route daily E11.9 Test FBS daily--NEED 33 GAUGE 100 each 3    cyclobenzaprine (FLEXERIL) 10 MG tablet Take 1 tablet by mouth 3 times daily as needed for Muscle spasms 90 tablet 2    b complex vitamins capsule Take 1 capsule by mouth daily 30 capsule 3    aspirin (ASPIRIN CHILDRENS) 81 MG chewable tablet Take 1 tablet by mouth daily 30 tablet 0    ferrous sulfate 325 (65 FE) MG tablet Take 1 tablet by mouth 2 times daily (with meals) 180 tablet 3    isosorbide mononitrate (IMDUR) 30 MG extended release tablet Take 1 tablet by mouth daily 30 tablet 3    lisinopril (PRINIVIL;ZESTRIL) 20 MG tablet Take 1 tablet by mouth daily 90 tablet 3    metoprolol tartrate (LOPRESSOR) 25 MG tablet Take 0.5 tablets by mouth 2 times daily 60 tablet 3    pramipexole (MIRAPEX) 1 MG tablet Take 1 tablet by mouth nightly 90 tablet 2    LIALDA 1.2 G EC tablet TAKE 1 TABLET THREE TIMES DAILY BY MOUTH 270 tablet 1     No current facility-administered medications for this visit. ROS:  14 point ROS completed and pertinent positives are mentioned in HPI.     Vital Signs:                                                 /68 (Site: Left Arm, Position: Standing)   Pulse 95   Ht 5' 10\" (1.778 m)   Wt 211 lb (95.7 kg)   BMI 30.28 kg/m²  Weight: 211 lb (95.7 kg)       Respiratory:  · Resp Assessment: Normal respiratory effort  · Resp Auscultation: Clear to auscultation bilaterally     Cardiovascular:  · Auscultation: regular rhythm and normal rate, normal S1S2, no murmur, rub or gallop  · JVP:  normal  · Extremities: No Edema  · No bruit     Abdomen:  · Soft, non-tender  · Normal bowel sounds  · No masses  · No organomegaly     Extremities:  ·  No Cyanosis or Clubbing  · No edema  · No deformities of hands or feet     Neurological/Psychiatric:  · Oriented to time, place, and person  · Non-anxious    Skin Warm and dry. Normal turgor. No rash or lesions. Lab Data:  CBC:   Lab Results   Component Value Date    WBC 5.5 07/12/2017    HGB 14.2 07/12/2017    HCT 42.4 07/12/2017    MCV 93.4 07/12/2017     07/12/2017     BMP:   Lab Results   Component Value Date     06/27/2017    K 5.0 06/27/2017    CL 99 06/27/2017    CO2 20 06/27/2017    PHOS 2.4 04/23/2017    BUN 23 06/27/2017    CREATININE 1.1 06/27/2017    CALCIUM 10.3 06/27/2017    GFRAA >60 06/27/2017    MG 1.90 04/23/2017     LFTS:   Lab Results   Component Value Date    ALT 15 06/27/2017    AST 33 06/27/2017    ALKPHOS 93 06/27/2017    PROT 7.9 06/27/2017    AGRATIO 1.4 06/27/2017    BILITOT 0.9 06/27/2017     PT/INR:   No components found for: PTPATIENT,  PTINR  LIPID PANEL:   No components found for: CHLPL  Lab Results   Component Value Date    TRIG 212 (H) 12/31/2015    TRIG 167 (H) 04/26/2015     Lab Results   Component Value Date    HDL 44 12/31/2015    HDL 41 04/26/2015     Lab Results   Component Value Date    LDLCALC 24 12/31/2015    [address] 91 04/26/2015     TSH:   Lab Results   Component Value Date    TSH 1.07 07/12/2017     FREET4:   No results found for: Minus Montrell:   No components found for: FREET3  BNP:   No results found for: BNP    Data Review:  I have reviewed the below testing personally and my interpretation is below. EKG on April 25 revealed sinus rhythm with no acute ST changes.       Chest x-ray from April 25 revealed no gross acute cardiopulmonary disease. There were multiple dense pulmonary nodules. CT abdomen on April 26 revealed hepatic steatosis with findings suggestive of  chronic pancreatitis as well as diverticulosis. Cardiac cath: 04/27/15  IMPRESSION:  1. Mild coronary artery disease of the mid left anterior descending and  luminal irregularities of circumflex and right coronary artery. 2.  Normal left ventricular systolic function with an ejection fraction of  55% to 60%. 3.  Normal left ventricular end-diastolic pressure, 6 mmHg. RECOMMENDATIONS:  The patient has no epicardial disease that can explain his  chest pain. He still may have underlying small vessel disease versus  endothelial dysfunction responsible for his chest pain as his pain improved  with sublingual nitroglycerin. We will therefore start him on oral  nitrates. He will continue with asa, statin and beta blocker therapy. Assessment:  - Postural dizziness likely due to autonomic dysfunction exacerbated by oral nitrates  - Microvascular angina  - Dyspnea due to severe COPD, he had normal EF and filling pressure on cath  - Hypertension. BP: (102-108)/(64-68)    - Hyperlipidemia  - Diabetes    Recommendation:  - I will dicontinue Imdur as it may be exacerbating his dizziness. If that does not help, he will need to consult ENT. He will continue Norvasc and Lisinopril for microvascular angina (ACEI have shown to be helpful in microvascular angina). - He will continue moderate dose Lipitor for underlying CAD. His LDL is at goal. Will repeat his lipids. - I will see him back in one 1 year. If you have questions, please do not hesitate to call me. I look forward to following [facility] along with you.       Michelle Godfrey MD, [facility] [place],  [facility]  [phone] [facility] office  [phone] [facility]  12/11/2017 4:24 PM

## 2017-12-14 DIAGNOSIS — I10 ESSENTIAL HYPERTENSION: Chronic | ICD-10-CM

## 2017-12-14 RX ORDER — CYCLOBENZAPRINE HCL 10 MG
10 TABLET ORAL 3 TIMES DAILY PRN
Qty: 270 TABLET | Refills: 0 | Status: SHIPPED | OUTPATIENT
Start: 2017-12-14 | End: 2018-02-01 | Stop reason: SDUPTHER

## 2017-12-14 RX ORDER — LISINOPRIL 20 MG/1
20 TABLET ORAL DAILY
Qty: 90 TABLET | Refills: 0 | Status: SHIPPED | OUTPATIENT
Start: 2017-12-14 | End: 2018-05-16 | Stop reason: SDUPTHER

## 2017-12-14 RX ORDER — DONEPEZIL HYDROCHLORIDE 5 MG/1
5 TABLET, FILM COATED ORAL NIGHTLY
Qty: 90 TABLET | Refills: 0 | Status: SHIPPED | OUTPATIENT
Start: 2017-12-14 | End: 2018-02-01 | Stop reason: SDUPTHER

## 2017-12-14 RX ORDER — ATORVASTATIN CALCIUM 10 MG/1
10 TABLET, FILM COATED ORAL NIGHTLY
Qty: 90 TABLET | Refills: 0 | Status: SHIPPED | OUTPATIENT
Start: 2017-12-14 | End: 2018-02-01 | Stop reason: SDUPTHER

## 2017-12-18 NOTE — COMMUNICATION BODY
Assessment:  - Postural dizziness likely due to autonomic dysfunction exacerbated by oral nitrates  - Microvascular angina  - Dyspnea due to severe COPD, he had normal EF and filling pressure on cath  - Hypertension. BP: (102-108)/(64-68)    - Hyperlipidemia  - Diabetes     Recommendation:  - I will dicontinue Imdur as it may be exacerbating his dizziness. If that does not help, he will need to consult ENT. He will continue Norvasc and Lisinopril for microvascular angina (ACEI have shown to be helpful in microvascular angina). - He will continue moderate dose Lipitor for underlying CAD. His LDL is at goal. Will repeat his lipids. - I will see him back in one 1 year.

## 2018-01-01 ENCOUNTER — APPOINTMENT (OUTPATIENT)
Dept: GENERAL RADIOLOGY | Age: 73
DRG: 308 | End: 2018-01-01
Payer: MEDICARE

## 2018-01-01 ENCOUNTER — TELEPHONE (OUTPATIENT)
Dept: CARDIOLOGY CLINIC | Age: 73
End: 2018-01-01

## 2018-01-01 ENCOUNTER — CARE COORDINATION (OUTPATIENT)
Dept: CASE MANAGEMENT | Age: 73
End: 2018-01-01

## 2018-01-01 ENCOUNTER — OFFICE VISIT (OUTPATIENT)
Dept: CARDIOLOGY CLINIC | Age: 73
End: 2018-01-01
Payer: MEDICARE

## 2018-01-01 ENCOUNTER — CARE COORDINATION (OUTPATIENT)
Dept: CARE COORDINATION | Age: 73
End: 2018-01-01

## 2018-01-01 ENCOUNTER — HOSPITAL ENCOUNTER (OUTPATIENT)
Age: 73
Discharge: HOME | DRG: 308 | End: 2018-12-20
Payer: MEDICARE

## 2018-01-01 ENCOUNTER — HOSPITAL ENCOUNTER (INPATIENT)
Age: 73
LOS: 7 days | Discharge: HOME | DRG: 308 | End: 2018-12-28
Attending: EMERGENCY MEDICINE | Admitting: INTERNAL MEDICINE
Payer: MEDICARE

## 2018-01-01 VITALS
OXYGEN SATURATION: 97 % | WEIGHT: 221.4 LBS | HEIGHT: 70 IN | HEART RATE: 79 BPM | DIASTOLIC BLOOD PRESSURE: 50 MMHG | BODY MASS INDEX: 31.7 KG/M2 | SYSTOLIC BLOOD PRESSURE: 108 MMHG

## 2018-01-01 VITALS
HEIGHT: 70 IN | WEIGHT: 215.9 LBS | DIASTOLIC BLOOD PRESSURE: 69 MMHG | SYSTOLIC BLOOD PRESSURE: 127 MMHG | HEART RATE: 93 BPM | BODY MASS INDEX: 30.91 KG/M2 | RESPIRATION RATE: 18 BRPM | OXYGEN SATURATION: 94 % | TEMPERATURE: 97.5 F

## 2018-01-01 VITALS
DIASTOLIC BLOOD PRESSURE: 68 MMHG | OXYGEN SATURATION: 97 % | SYSTOLIC BLOOD PRESSURE: 104 MMHG | WEIGHT: 227 LBS | HEIGHT: 70 IN | BODY MASS INDEX: 32.5 KG/M2 | HEART RATE: 65 BPM

## 2018-01-01 DIAGNOSIS — I50.32 CHRONIC DIASTOLIC HEART FAILURE (HCC): Primary | ICD-10-CM

## 2018-01-01 DIAGNOSIS — I50.32 CHRONIC DIASTOLIC HEART FAILURE (HCC): ICD-10-CM

## 2018-01-01 DIAGNOSIS — D50.9 IRON DEFICIENCY ANEMIA, UNSPECIFIED IRON DEFICIENCY ANEMIA TYPE: ICD-10-CM

## 2018-01-01 DIAGNOSIS — E87.1 HYPONATREMIA: ICD-10-CM

## 2018-01-01 DIAGNOSIS — I48.0 PAROXYSMAL ATRIAL FIBRILLATION (HCC): ICD-10-CM

## 2018-01-01 DIAGNOSIS — R00.1 BRADYCARDIA: ICD-10-CM

## 2018-01-01 DIAGNOSIS — E78.00 PURE HYPERCHOLESTEROLEMIA: Chronic | ICD-10-CM

## 2018-01-01 DIAGNOSIS — I95.9 HYPOTENSION, UNSPECIFIED HYPOTENSION TYPE: ICD-10-CM

## 2018-01-01 DIAGNOSIS — I25.10 CORONARY ARTERY DISEASE INVOLVING NATIVE CORONARY ARTERY OF NATIVE HEART WITHOUT ANGINA PECTORIS: Chronic | ICD-10-CM

## 2018-01-01 DIAGNOSIS — D64.9 ANEMIA, UNSPECIFIED TYPE: ICD-10-CM

## 2018-01-01 DIAGNOSIS — J44.9 COPD, SEVERE (HCC): Chronic | ICD-10-CM

## 2018-01-01 DIAGNOSIS — N17.9 AKI (ACUTE KIDNEY INJURY) (HCC): ICD-10-CM

## 2018-01-01 DIAGNOSIS — I10 ESSENTIAL HYPERTENSION: ICD-10-CM

## 2018-01-01 DIAGNOSIS — E87.5 HYPERKALEMIA: Primary | ICD-10-CM

## 2018-01-01 DIAGNOSIS — R06.02 SHORTNESS OF BREATH: ICD-10-CM

## 2018-01-01 LAB
A/G RATIO: 1 (ref 1.1–2.2)
A/G RATIO: 1.1 (ref 1.1–2.2)
A/G RATIO: 1.2 (ref 1.1–2.2)
A/G RATIO: 1.2 (ref 1.1–2.2)
A/G RATIO: 1.3 (ref 1.1–2.2)
A/G RATIO: 1.4 (ref 1.1–2.2)
A/G RATIO: 1.4 (ref 1.1–2.2)
ABO/RH: NORMAL
ALBUMIN SERPL-MCNC: 3 G/DL (ref 3.4–5)
ALBUMIN SERPL-MCNC: 3 G/DL (ref 3.4–5)
ALBUMIN SERPL-MCNC: 3.1 G/DL (ref 3.4–5)
ALBUMIN SERPL-MCNC: 3.2 G/DL (ref 3.4–5)
ALBUMIN SERPL-MCNC: 3.2 G/DL (ref 3.4–5)
ALBUMIN SERPL-MCNC: 3.3 G/DL (ref 3.4–5)
ALBUMIN SERPL-MCNC: 3.4 G/DL (ref 3.4–5)
ALBUMIN SERPL-MCNC: 3.5 G/DL (ref 3.4–5)
ALBUMIN SERPL-MCNC: 3.5 G/DL (ref 3.4–5)
ALBUMIN SERPL-MCNC: 3.8 G/DL (ref 3.4–5)
ALBUMIN SERPL-MCNC: 3.9 G/DL (ref 3.4–5)
ALBUMIN SERPL-MCNC: 3.9 G/DL (ref 3.4–5)
ALBUMIN SERPL-MCNC: 4 G/DL (ref 3.4–5)
ALP BLD-CCNC: 54 U/L (ref 40–129)
ALP BLD-CCNC: 55 U/L (ref 40–129)
ALP BLD-CCNC: 58 U/L (ref 40–129)
ALP BLD-CCNC: 59 U/L (ref 40–129)
ALP BLD-CCNC: 59 U/L (ref 40–129)
ALP BLD-CCNC: 60 U/L (ref 40–129)
ALP BLD-CCNC: 60 U/L (ref 40–129)
ALP BLD-CCNC: 61 U/L (ref 40–129)
ALP BLD-CCNC: 62 U/L (ref 40–129)
ALP BLD-CCNC: 62 U/L (ref 40–129)
ALP BLD-CCNC: 65 U/L (ref 40–129)
ALP BLD-CCNC: 66 U/L (ref 40–129)
ALP BLD-CCNC: 71 U/L (ref 40–129)
ALP BLD-CCNC: 73 U/L (ref 40–129)
ALP BLD-CCNC: 88 U/L (ref 40–129)
ALT SERPL-CCNC: 6 U/L (ref 10–40)
ALT SERPL-CCNC: 6 U/L (ref 10–40)
ALT SERPL-CCNC: 7 U/L (ref 10–40)
ALT SERPL-CCNC: 8 U/L (ref 10–40)
ALT SERPL-CCNC: 9 U/L (ref 10–40)
ALT SERPL-CCNC: 9 U/L (ref 10–40)
AMIODARONE LEVEL: 0.3 UG/ML (ref 0.5–2)
ANION GAP SERPL CALCULATED.3IONS-SCNC: 10 MMOL/L (ref 3–16)
ANION GAP SERPL CALCULATED.3IONS-SCNC: 11 MMOL/L (ref 3–16)
ANION GAP SERPL CALCULATED.3IONS-SCNC: 12 MMOL/L (ref 3–16)
ANION GAP SERPL CALCULATED.3IONS-SCNC: 12 MMOL/L (ref 3–16)
ANION GAP SERPL CALCULATED.3IONS-SCNC: 13 MMOL/L (ref 3–16)
ANION GAP SERPL CALCULATED.3IONS-SCNC: 14 MMOL/L (ref 3–16)
ANION GAP SERPL CALCULATED.3IONS-SCNC: 15 MMOL/L (ref 3–16)
ANION GAP SERPL CALCULATED.3IONS-SCNC: 7 MMOL/L (ref 3–16)
ANION GAP SERPL CALCULATED.3IONS-SCNC: 8 MMOL/L (ref 3–16)
ANION GAP SERPL CALCULATED.3IONS-SCNC: 9 MMOL/L (ref 3–16)
ANION GAP SERPL CALCULATED.3IONS-SCNC: 9 MMOL/L (ref 3–16)
ANTIBODY SCREEN: NORMAL
APTT: 25.3 SEC (ref 26–36)
APTT: 28.3 SEC (ref 26–36)
APTT: 29.1 SEC (ref 26–36)
APTT: 29.3 SEC (ref 26–36)
AST SERPL-CCNC: 18 U/L (ref 15–37)
AST SERPL-CCNC: 18 U/L (ref 15–37)
AST SERPL-CCNC: 19 U/L (ref 15–37)
AST SERPL-CCNC: 19 U/L (ref 15–37)
AST SERPL-CCNC: 20 U/L (ref 15–37)
AST SERPL-CCNC: 20 U/L (ref 15–37)
AST SERPL-CCNC: 21 U/L (ref 15–37)
AST SERPL-CCNC: 22 U/L (ref 15–37)
AST SERPL-CCNC: 22 U/L (ref 15–37)
AST SERPL-CCNC: 24 U/L (ref 15–37)
AST SERPL-CCNC: 24 U/L (ref 15–37)
AST SERPL-CCNC: 27 U/L (ref 15–37)
AST SERPL-CCNC: 28 U/L (ref 15–37)
BACTERIA: ABNORMAL /HPF
BACTERIA: ABNORMAL /HPF
BASE EXCESS ARTERIAL: 0 (ref -3–3)
BASE EXCESS ARTERIAL: 1.6 MMOL/L (ref -3–3)
BASE EXCESS ARTERIAL: 5.7 MMOL/L (ref -3–3)
BASOPHILS ABSOLUTE: 0 K/UL (ref 0–0.2)
BASOPHILS RELATIVE PERCENT: 0.3 %
BASOPHILS RELATIVE PERCENT: 0.4 %
BASOPHILS RELATIVE PERCENT: 0.5 %
BILIRUB SERPL-MCNC: 0.5 MG/DL (ref 0–1)
BILIRUB SERPL-MCNC: 0.6 MG/DL (ref 0–1)
BILIRUB SERPL-MCNC: 0.7 MG/DL (ref 0–1)
BILIRUB SERPL-MCNC: 0.8 MG/DL (ref 0–1)
BILIRUB SERPL-MCNC: 0.8 MG/DL (ref 0–1)
BILIRUBIN URINE: NEGATIVE
BILIRUBIN URINE: NEGATIVE
BLOOD BANK DISPENSE STATUS: NORMAL
BLOOD BANK DISPENSE STATUS: NORMAL
BLOOD BANK PRODUCT CODE: NORMAL
BLOOD BANK PRODUCT CODE: NORMAL
BLOOD CULTURE, ROUTINE: NORMAL
BLOOD, URINE: ABNORMAL
BLOOD, URINE: NEGATIVE
BPU ID: NORMAL
BPU ID: NORMAL
BUN BLDV-MCNC: 14 MG/DL (ref 7–20)
BUN BLDV-MCNC: 14 MG/DL (ref 7–20)
BUN BLDV-MCNC: 15 MG/DL (ref 7–20)
BUN BLDV-MCNC: 15 MG/DL (ref 7–20)
BUN BLDV-MCNC: 16 MG/DL (ref 7–20)
BUN BLDV-MCNC: 16 MG/DL (ref 7–20)
BUN BLDV-MCNC: 17 MG/DL (ref 7–20)
BUN BLDV-MCNC: 17 MG/DL (ref 7–20)
BUN BLDV-MCNC: 18 MG/DL (ref 7–20)
BUN BLDV-MCNC: 18 MG/DL (ref 7–20)
BUN BLDV-MCNC: 19 MG/DL (ref 7–20)
BUN BLDV-MCNC: 22 MG/DL (ref 7–20)
BUN BLDV-MCNC: 26 MG/DL (ref 7–20)
BUN BLDV-MCNC: 29 MG/DL (ref 7–20)
BUN BLDV-MCNC: 30 MG/DL (ref 7–20)
BUN BLDV-MCNC: 35 MG/DL (ref 7–20)
BUN BLDV-MCNC: 43 MG/DL (ref 7–20)
BUN BLDV-MCNC: 47 MG/DL (ref 7–20)
BUN BLDV-MCNC: 53 MG/DL (ref 7–20)
BUN BLDV-MCNC: 56 MG/DL (ref 7–20)
CALCIUM IONIZED: 1.09 MMOL/L (ref 1.12–1.32)
CALCIUM IONIZED: 1.15 MMOL/L (ref 1.12–1.32)
CALCIUM IONIZED: 1.16 MMOL/L (ref 1.12–1.32)
CALCIUM IONIZED: 1.17 MMOL/L (ref 1.12–1.32)
CALCIUM IONIZED: 1.17 MMOL/L (ref 1.12–1.32)
CALCIUM IONIZED: 1.18 MMOL/L (ref 1.12–1.32)
CALCIUM IONIZED: 1.19 MMOL/L (ref 1.12–1.32)
CALCIUM IONIZED: 1.21 MMOL/L (ref 1.12–1.32)
CALCIUM IONIZED: 1.23 MMOL/L (ref 1.12–1.32)
CALCIUM SERPL-MCNC: 10 MG/DL (ref 8.3–10.6)
CALCIUM SERPL-MCNC: 10.2 MG/DL (ref 8.3–10.6)
CALCIUM SERPL-MCNC: 10.3 MG/DL (ref 8.3–10.6)
CALCIUM SERPL-MCNC: 11.2 MG/DL (ref 8.3–10.6)
CALCIUM SERPL-MCNC: 7.7 MG/DL (ref 8.3–10.6)
CALCIUM SERPL-MCNC: 7.8 MG/DL (ref 8.3–10.6)
CALCIUM SERPL-MCNC: 8.4 MG/DL (ref 8.3–10.6)
CALCIUM SERPL-MCNC: 8.6 MG/DL (ref 8.3–10.6)
CALCIUM SERPL-MCNC: 8.7 MG/DL (ref 8.3–10.6)
CALCIUM SERPL-MCNC: 8.7 MG/DL (ref 8.3–10.6)
CALCIUM SERPL-MCNC: 8.8 MG/DL (ref 8.3–10.6)
CALCIUM SERPL-MCNC: 8.8 MG/DL (ref 8.3–10.6)
CALCIUM SERPL-MCNC: 8.9 MG/DL (ref 8.3–10.6)
CALCIUM SERPL-MCNC: 9 MG/DL (ref 8.3–10.6)
CALCIUM SERPL-MCNC: 9.2 MG/DL (ref 8.3–10.6)
CALCIUM SERPL-MCNC: 9.4 MG/DL (ref 8.3–10.6)
CALCIUM SERPL-MCNC: 9.7 MG/DL (ref 8.3–10.6)
CARBOXYHEMOGLOBIN ARTERIAL: 0.4 % (ref 0–1.5)
CARBOXYHEMOGLOBIN ARTERIAL: 0.8 % (ref 0–1.5)
CHLORIDE BLD-SCNC: 100 MMOL/L (ref 99–110)
CHLORIDE BLD-SCNC: 100 MMOL/L (ref 99–110)
CHLORIDE BLD-SCNC: 101 MMOL/L (ref 99–110)
CHLORIDE BLD-SCNC: 102 MMOL/L (ref 99–110)
CHLORIDE BLD-SCNC: 102 MMOL/L (ref 99–110)
CHLORIDE BLD-SCNC: 95 MMOL/L (ref 99–110)
CHLORIDE BLD-SCNC: 97 MMOL/L (ref 99–110)
CHLORIDE BLD-SCNC: 98 MMOL/L (ref 99–110)
CHLORIDE BLD-SCNC: 99 MMOL/L (ref 99–110)
CLARITY: CLEAR
CLARITY: CLEAR
CO2: 14 MMOL/L (ref 21–32)
CO2: 15 MMOL/L (ref 21–32)
CO2: 17 MMOL/L (ref 21–32)
CO2: 18 MMOL/L (ref 21–32)
CO2: 22 MMOL/L (ref 21–32)
CO2: 24 MMOL/L (ref 21–32)
CO2: 25 MMOL/L (ref 21–32)
CO2: 25 MMOL/L (ref 21–32)
CO2: 27 MMOL/L (ref 21–32)
CO2: 28 MMOL/L (ref 21–32)
CO2: 29 MMOL/L (ref 21–32)
CO2: 29 MMOL/L (ref 21–32)
CO2: 31 MMOL/L (ref 21–32)
CO2: 31 MMOL/L (ref 21–32)
CO2: 32 MMOL/L (ref 21–32)
COLOR: YELLOW
COLOR: YELLOW
CREAT SERPL-MCNC: 0.7 MG/DL (ref 0.8–1.3)
CREAT SERPL-MCNC: 0.8 MG/DL (ref 0.8–1.3)
CREAT SERPL-MCNC: 0.9 MG/DL (ref 0.8–1.3)
CREAT SERPL-MCNC: 1 MG/DL (ref 0.8–1.3)
CREAT SERPL-MCNC: 1.2 MG/DL (ref 0.8–1.3)
CREAT SERPL-MCNC: 1.5 MG/DL (ref 0.8–1.3)
CREAT SERPL-MCNC: 1.6 MG/DL (ref 0.8–1.3)
CREAT SERPL-MCNC: 1.7 MG/DL (ref 0.8–1.3)
CREAT SERPL-MCNC: 1.8 MG/DL (ref 0.8–1.3)
CREAT SERPL-MCNC: 2 MG/DL (ref 0.8–1.3)
CREAT SERPL-MCNC: 2.5 MG/DL (ref 0.8–1.3)
CREAT SERPL-MCNC: 3.3 MG/DL (ref 0.8–1.3)
CREAT SERPL-MCNC: 3.4 MG/DL (ref 0.8–1.3)
CULTURE, BLOOD 2: NORMAL
DES-AMIOD: 0.4 UG/ML
DESCRIPTION BLOOD BANK: NORMAL
DESCRIPTION BLOOD BANK: NORMAL
EKG ATRIAL RATE: 133 BPM
EKG ATRIAL RATE: 33 BPM
EKG DIAGNOSIS: NORMAL
EKG DIAGNOSIS: NORMAL
EKG P AXIS: 91 DEGREES
EKG P-R INTERVAL: 186 MS
EKG Q-T INTERVAL: 328 MS
EKG Q-T INTERVAL: 414 MS
EKG QRS DURATION: 90 MS
EKG QRS DURATION: 94 MS
EKG QTC CALCULATION (BAZETT): 306 MS
EKG QTC CALCULATION (BAZETT): 453 MS
EKG R AXIS: 33 DEGREES
EKG R AXIS: 79 DEGREES
EKG T AXIS: 62 DEGREES
EKG T AXIS: 77 DEGREES
EKG VENTRICULAR RATE: 115 BPM
EKG VENTRICULAR RATE: 33 BPM
EOSINOPHILS ABSOLUTE: 0 K/UL (ref 0–0.6)
EOSINOPHILS ABSOLUTE: 0.1 K/UL (ref 0–0.6)
EOSINOPHILS ABSOLUTE: 0.1 K/UL (ref 0–0.6)
EOSINOPHILS RELATIVE PERCENT: 0.3 %
EOSINOPHILS RELATIVE PERCENT: 1 %
EOSINOPHILS RELATIVE PERCENT: 1.5 %
FERRITIN: 18.8 NG/ML (ref 30–400)
GFR AFRICAN AMERICAN: 22
GFR AFRICAN AMERICAN: 22
GFR AFRICAN AMERICAN: 31
GFR AFRICAN AMERICAN: 40
GFR AFRICAN AMERICAN: 45
GFR AFRICAN AMERICAN: 48
GFR AFRICAN AMERICAN: 51
GFR AFRICAN AMERICAN: 55
GFR AFRICAN AMERICAN: >60
GFR NON-AFRICAN AMERICAN: 18
GFR NON-AFRICAN AMERICAN: 18
GFR NON-AFRICAN AMERICAN: 25
GFR NON-AFRICAN AMERICAN: 33
GFR NON-AFRICAN AMERICAN: 37
GFR NON-AFRICAN AMERICAN: 40
GFR NON-AFRICAN AMERICAN: 42
GFR NON-AFRICAN AMERICAN: 46
GFR NON-AFRICAN AMERICAN: 59
GFR NON-AFRICAN AMERICAN: >60
GLOBULIN: 2.5 G/DL
GLOBULIN: 2.5 G/DL
GLOBULIN: 2.6 G/DL
GLOBULIN: 2.6 G/DL
GLOBULIN: 2.7 G/DL
GLOBULIN: 2.8 G/DL
GLOBULIN: 2.8 G/DL
GLOBULIN: 2.9 G/DL
GLOBULIN: 3.4 G/DL
GLOBULIN: 3.5 G/DL
GLUCOSE BLD-MCNC: 100 MG/DL (ref 70–99)
GLUCOSE BLD-MCNC: 100 MG/DL (ref 70–99)
GLUCOSE BLD-MCNC: 103 MG/DL (ref 70–99)
GLUCOSE BLD-MCNC: 103 MG/DL (ref 70–99)
GLUCOSE BLD-MCNC: 104 MG/DL (ref 70–99)
GLUCOSE BLD-MCNC: 105 MG/DL (ref 70–99)
GLUCOSE BLD-MCNC: 105 MG/DL (ref 70–99)
GLUCOSE BLD-MCNC: 107 MG/DL (ref 70–99)
GLUCOSE BLD-MCNC: 108 MG/DL (ref 70–99)
GLUCOSE BLD-MCNC: 109 MG/DL (ref 70–99)
GLUCOSE BLD-MCNC: 109 MG/DL (ref 70–99)
GLUCOSE BLD-MCNC: 110 MG/DL (ref 70–99)
GLUCOSE BLD-MCNC: 113 MG/DL (ref 70–99)
GLUCOSE BLD-MCNC: 114 MG/DL (ref 70–99)
GLUCOSE BLD-MCNC: 114 MG/DL (ref 70–99)
GLUCOSE BLD-MCNC: 121 MG/DL (ref 70–99)
GLUCOSE BLD-MCNC: 124 MG/DL (ref 70–99)
GLUCOSE BLD-MCNC: 124 MG/DL (ref 70–99)
GLUCOSE BLD-MCNC: 127 MG/DL (ref 70–99)
GLUCOSE BLD-MCNC: 127 MG/DL (ref 70–99)
GLUCOSE BLD-MCNC: 130 MG/DL (ref 70–99)
GLUCOSE BLD-MCNC: 131 MG/DL (ref 70–99)
GLUCOSE BLD-MCNC: 132 MG/DL (ref 70–99)
GLUCOSE BLD-MCNC: 132 MG/DL (ref 70–99)
GLUCOSE BLD-MCNC: 137 MG/DL (ref 70–99)
GLUCOSE BLD-MCNC: 138 MG/DL (ref 70–99)
GLUCOSE BLD-MCNC: 140 MG/DL (ref 70–99)
GLUCOSE BLD-MCNC: 144 MG/DL (ref 70–99)
GLUCOSE BLD-MCNC: 144 MG/DL (ref 70–99)
GLUCOSE BLD-MCNC: 147 MG/DL (ref 70–99)
GLUCOSE BLD-MCNC: 148 MG/DL (ref 70–99)
GLUCOSE BLD-MCNC: 148 MG/DL (ref 70–99)
GLUCOSE BLD-MCNC: 151 MG/DL (ref 70–99)
GLUCOSE BLD-MCNC: 153 MG/DL (ref 70–99)
GLUCOSE BLD-MCNC: 154 MG/DL (ref 70–99)
GLUCOSE BLD-MCNC: 191 MG/DL (ref 70–99)
GLUCOSE BLD-MCNC: 93 MG/DL (ref 70–99)
GLUCOSE URINE: NEGATIVE MG/DL
GLUCOSE URINE: NEGATIVE MG/DL
HBV SURFACE AB TITR SER: >1000 MIU/ML
HCO3 ARTERIAL: 25.3 MMOL/L (ref 21–29)
HCO3 ARTERIAL: 26.9 MMOL/L (ref 21–29)
HCO3 ARTERIAL: 30.8 MMOL/L (ref 21–29)
HCT VFR BLD CALC: 19.2 % (ref 40.5–52.5)
HCT VFR BLD CALC: 21.6 % (ref 40.5–52.5)
HCT VFR BLD CALC: 22 % (ref 40.5–52.5)
HCT VFR BLD CALC: 22.1 % (ref 40.5–52.5)
HCT VFR BLD CALC: 23.5 % (ref 40.5–52.5)
HCT VFR BLD CALC: 25.4 % (ref 40.5–52.5)
HCT VFR BLD CALC: 26.2 % (ref 40.5–52.5)
HCT VFR BLD CALC: 26.2 % (ref 40.5–52.5)
HCT VFR BLD CALC: 26.6 % (ref 40.5–52.5)
HCT VFR BLD CALC: 29.8 % (ref 40.5–52.5)
HCT VFR BLD CALC: 31.7 % (ref 40.5–52.5)
HEMOGLOBIN, ART, EXTENDED: 6.6 G/DL (ref 13.5–17.5)
HEMOGLOBIN, ART, EXTENDED: 7.7 G/DL (ref 13.5–17.5)
HEMOGLOBIN: 6 G/DL (ref 13.5–17.5)
HEMOGLOBIN: 6.9 G/DL (ref 13.5–17.5)
HEMOGLOBIN: 7 G/DL (ref 13.5–17.5)
HEMOGLOBIN: 7 G/DL (ref 13.5–17.5)
HEMOGLOBIN: 7.4 G/DL (ref 13.5–17.5)
HEMOGLOBIN: 8 G/DL (ref 13.5–17.5)
HEMOGLOBIN: 8.3 G/DL (ref 13.5–17.5)
HEMOGLOBIN: 8.5 G/DL (ref 13.5–17.5)
HEMOGLOBIN: 8.6 G/DL (ref 13.5–17.5)
HEMOGLOBIN: 9.1 G/DL (ref 13.5–17.5)
HEMOGLOBIN: 9.8 G/DL (ref 13.5–17.5)
HEPATITIS B SURFACE ANTIGEN INTERPRETATION: NORMAL
HEPATITIS C ANTIBODY INTERPRETATION: NORMAL
IRON SATURATION: 5 % (ref 20–50)
IRON: 31 UG/DL (ref 59–158)
KETONES, URINE: ABNORMAL MG/DL
KETONES, URINE: NEGATIVE MG/DL
LACTIC ACID: 1.2 MMOL/L (ref 0.4–2)
LEUKOCYTE ESTERASE, URINE: ABNORMAL
LEUKOCYTE ESTERASE, URINE: NEGATIVE
LV EF: 55 %
LVEF MODALITY: NORMAL
LYMPHOCYTES ABSOLUTE: 0.8 K/UL (ref 1–5.1)
LYMPHOCYTES ABSOLUTE: 0.9 K/UL (ref 1–5.1)
LYMPHOCYTES ABSOLUTE: 1.3 K/UL (ref 1–5.1)
LYMPHOCYTES RELATIVE PERCENT: 13.9 %
LYMPHOCYTES RELATIVE PERCENT: 17 %
LYMPHOCYTES RELATIVE PERCENT: 8.8 %
MAGNESIUM: 1.5 MG/DL (ref 1.8–2.4)
MAGNESIUM: 1.6 MG/DL (ref 1.8–2.4)
MAGNESIUM: 1.7 MG/DL (ref 1.8–2.4)
MAGNESIUM: 1.7 MG/DL (ref 1.8–2.4)
MAGNESIUM: 1.8 MG/DL (ref 1.8–2.4)
MAGNESIUM: 1.9 MG/DL (ref 1.8–2.4)
MAGNESIUM: 2 MG/DL (ref 1.8–2.4)
MAGNESIUM: 2.1 MG/DL (ref 1.8–2.4)
MAGNESIUM: 2.1 MG/DL (ref 1.8–2.4)
MAGNESIUM: 2.3 MG/DL (ref 1.8–2.4)
MCH RBC QN AUTO: 24.2 PG (ref 26–34)
MCH RBC QN AUTO: 24.4 PG (ref 26–34)
MCH RBC QN AUTO: 24.4 PG (ref 26–34)
MCH RBC QN AUTO: 24.5 PG (ref 26–34)
MCH RBC QN AUTO: 24.6 PG (ref 26–34)
MCH RBC QN AUTO: 24.8 PG (ref 26–34)
MCH RBC QN AUTO: 25.2 PG (ref 26–34)
MCH RBC QN AUTO: 25.5 PG (ref 26–34)
MCH RBC QN AUTO: 25.8 PG (ref 26–34)
MCH RBC QN AUTO: 26.1 PG (ref 26–34)
MCH RBC QN AUTO: 26.2 PG (ref 26–34)
MCHC RBC AUTO-ENTMCNC: 30.5 G/DL (ref 31–36)
MCHC RBC AUTO-ENTMCNC: 30.8 G/DL (ref 31–36)
MCHC RBC AUTO-ENTMCNC: 31.4 G/DL (ref 31–36)
MCHC RBC AUTO-ENTMCNC: 31.4 G/DL (ref 31–36)
MCHC RBC AUTO-ENTMCNC: 31.5 G/DL (ref 31–36)
MCHC RBC AUTO-ENTMCNC: 31.5 G/DL (ref 31–36)
MCHC RBC AUTO-ENTMCNC: 31.8 G/DL (ref 31–36)
MCHC RBC AUTO-ENTMCNC: 31.9 G/DL (ref 31–36)
MCHC RBC AUTO-ENTMCNC: 32.8 G/DL (ref 31–36)
MCV RBC AUTO: 77.7 FL (ref 80–100)
MCV RBC AUTO: 77.9 FL (ref 80–100)
MCV RBC AUTO: 78.3 FL (ref 80–100)
MCV RBC AUTO: 78.5 FL (ref 80–100)
MCV RBC AUTO: 78.6 FL (ref 80–100)
MCV RBC AUTO: 79.2 FL (ref 80–100)
MCV RBC AUTO: 79.9 FL (ref 80–100)
MCV RBC AUTO: 80 FL (ref 80–100)
MCV RBC AUTO: 80.3 FL (ref 80–100)
MCV RBC AUTO: 80.8 FL (ref 80–100)
MCV RBC AUTO: 82.2 FL (ref 80–100)
METHEMOGLOBIN ARTERIAL: 0.6 %
METHEMOGLOBIN ARTERIAL: 1.1 %
MICROSCOPIC EXAMINATION: YES
MICROSCOPIC EXAMINATION: YES
MONOCYTES ABSOLUTE: 0.5 K/UL (ref 0–1.3)
MONOCYTES ABSOLUTE: 0.7 K/UL (ref 0–1.3)
MONOCYTES ABSOLUTE: 0.7 K/UL (ref 0–1.3)
MONOCYTES RELATIVE PERCENT: 7.6 %
MONOCYTES RELATIVE PERCENT: 8 %
MONOCYTES RELATIVE PERCENT: 8.5 %
NEUTROPHILS ABSOLUTE: 5 K/UL (ref 1.7–7.7)
NEUTROPHILS ABSOLUTE: 5.7 K/UL (ref 1.7–7.7)
NEUTROPHILS ABSOLUTE: 8 K/UL (ref 1.7–7.7)
NEUTROPHILS RELATIVE PERCENT: 73.1 %
NEUTROPHILS RELATIVE PERCENT: 76.1 %
NEUTROPHILS RELATIVE PERCENT: 83 %
NITRITE, URINE: NEGATIVE
NITRITE, URINE: NEGATIVE
O2 CONTENT ARTERIAL: 10 ML/DL
O2 CONTENT ARTERIAL: 9 ML/DL
O2 SAT, ARTERIAL: 91 % (ref 93–100)
O2 SAT, ARTERIAL: 92.4 %
O2 SAT, ARTERIAL: 94.9 %
O2 THERAPY: ABNORMAL
O2 THERAPY: ABNORMAL
ORGANISM: ABNORMAL
PCO2 ARTERIAL: 44.3 MM HG (ref 35–45)
PCO2 ARTERIAL: 46.1 MMHG (ref 35–45)
PCO2 ARTERIAL: 49 MMHG (ref 35–45)
PDW BLD-RTO: 22.2 % (ref 12.4–15.4)
PDW BLD-RTO: 22.3 % (ref 12.4–15.4)
PDW BLD-RTO: 22.4 % (ref 12.4–15.4)
PDW BLD-RTO: 22.5 % (ref 12.4–15.4)
PDW BLD-RTO: 22.6 % (ref 12.4–15.4)
PDW BLD-RTO: 22.7 % (ref 12.4–15.4)
PDW BLD-RTO: 22.8 % (ref 12.4–15.4)
PDW BLD-RTO: 23 % (ref 12.4–15.4)
PDW BLD-RTO: 23.1 % (ref 12.4–15.4)
PDW BLD-RTO: 23.1 % (ref 12.4–15.4)
PDW BLD-RTO: 23.5 % (ref 12.4–15.4)
PERFORMED ON: ABNORMAL
PH ARTERIAL: 7.37 (ref 7.35–7.45)
PH ARTERIAL: 7.38 (ref 7.35–7.45)
PH ARTERIAL: 7.42 (ref 7.35–7.45)
PH UA: 5
PH UA: 7
PH VENOUS: 7.24 (ref 7.35–7.45)
PH VENOUS: 7.26 (ref 7.35–7.45)
PH VENOUS: 7.26 (ref 7.35–7.45)
PH VENOUS: 7.37 (ref 7.35–7.45)
PH VENOUS: 7.39 (ref 7.35–7.45)
PH VENOUS: 7.41 (ref 7.35–7.45)
PH VENOUS: 7.42 (ref 7.35–7.45)
PH VENOUS: 7.43 (ref 7.35–7.45)
PH VENOUS: 7.43 (ref 7.35–7.45)
PH VENOUS: 7.45 (ref 7.35–7.45)
PH VENOUS: 7.46 (ref 7.35–7.45)
PHOSPHORUS: 1.6 MG/DL (ref 2.5–4.9)
PHOSPHORUS: 1.7 MG/DL (ref 2.5–4.9)
PHOSPHORUS: 1.8 MG/DL (ref 2.5–4.9)
PHOSPHORUS: 1.9 MG/DL (ref 2.5–4.9)
PHOSPHORUS: 2.3 MG/DL (ref 2.5–4.9)
PHOSPHORUS: 2.4 MG/DL (ref 2.5–4.9)
PHOSPHORUS: 2.4 MG/DL (ref 2.5–4.9)
PHOSPHORUS: 2.5 MG/DL (ref 2.5–4.9)
PHOSPHORUS: 2.5 MG/DL (ref 2.5–4.9)
PHOSPHORUS: 2.7 MG/DL (ref 2.5–4.9)
PHOSPHORUS: 2.9 MG/DL (ref 2.5–4.9)
PHOSPHORUS: 3.3 MG/DL (ref 2.5–4.9)
PLATELET # BLD: 100 K/UL (ref 135–450)
PLATELET # BLD: 102 K/UL (ref 135–450)
PLATELET # BLD: 125 K/UL (ref 135–450)
PLATELET # BLD: 129 K/UL (ref 135–450)
PLATELET # BLD: 148 K/UL (ref 135–450)
PLATELET # BLD: 153 K/UL (ref 135–450)
PLATELET # BLD: 155 K/UL (ref 135–450)
PLATELET # BLD: 233 K/UL (ref 135–450)
PLATELET # BLD: 235 K/UL (ref 135–450)
PLATELET # BLD: 85 K/UL (ref 135–450)
PLATELET # BLD: 89 K/UL (ref 135–450)
PLATELET SLIDE REVIEW: ABNORMAL
PMV BLD AUTO: 7.3 FL (ref 5–10.5)
PMV BLD AUTO: 7.7 FL (ref 5–10.5)
PMV BLD AUTO: 7.8 FL (ref 5–10.5)
PMV BLD AUTO: 7.8 FL (ref 5–10.5)
PMV BLD AUTO: 8.1 FL (ref 5–10.5)
PMV BLD AUTO: 8.2 FL (ref 5–10.5)
PMV BLD AUTO: 8.5 FL (ref 5–10.5)
PO2 ARTERIAL: 64.2 MM HG (ref 75–108)
PO2 ARTERIAL: 68.8 MMHG (ref 75–108)
PO2 ARTERIAL: 81.3 MMHG (ref 75–108)
POC SAMPLE TYPE: ABNORMAL
POTASSIUM REFLEX MAGNESIUM: 3.8 MMOL/L (ref 3.5–5.1)
POTASSIUM REFLEX MAGNESIUM: 5 MMOL/L (ref 3.5–5.1)
POTASSIUM REFLEX MAGNESIUM: 5 MMOL/L (ref 3.5–5.1)
POTASSIUM SERPL-SCNC: 3.3 MMOL/L (ref 3.5–5.1)
POTASSIUM SERPL-SCNC: 3.3 MMOL/L (ref 3.5–5.1)
POTASSIUM SERPL-SCNC: 3.8 MMOL/L (ref 3.5–5.1)
POTASSIUM SERPL-SCNC: 3.9 MMOL/L (ref 3.5–5.1)
POTASSIUM SERPL-SCNC: 3.9 MMOL/L (ref 3.5–5.1)
POTASSIUM SERPL-SCNC: 4 MMOL/L (ref 3.5–5.1)
POTASSIUM SERPL-SCNC: 4.1 MMOL/L (ref 3.5–5.1)
POTASSIUM SERPL-SCNC: 4.3 MMOL/L (ref 3.5–5.1)
POTASSIUM SERPL-SCNC: 4.7 MMOL/L (ref 3.5–5.1)
POTASSIUM SERPL-SCNC: 4.8 MMOL/L (ref 3.5–5.1)
POTASSIUM SERPL-SCNC: 5.1 MMOL/L (ref 3.5–5.1)
POTASSIUM SERPL-SCNC: 5.4 MMOL/L (ref 3.5–5.1)
POTASSIUM SERPL-SCNC: 5.4 MMOL/L (ref 3.5–5.1)
POTASSIUM SERPL-SCNC: 5.9 MMOL/L (ref 3.5–5.1)
POTASSIUM SERPL-SCNC: 6.4 MMOL/L (ref 3.5–5.1)
POTASSIUM SERPL-SCNC: 6.9 MMOL/L (ref 3.5–5.1)
POTASSIUM SERPL-SCNC: 7.2 MMOL/L (ref 3.5–5.1)
PRO-BNP: 122 PG/ML (ref 0–124)
PRO-BNP: 191 PG/ML (ref 0–124)
PROTEIN UA: 30 MG/DL
PROTEIN UA: NEGATIVE MG/DL
RBC # BLD: 2.45 M/UL (ref 4.2–5.9)
RBC # BLD: 2.72 M/UL (ref 4.2–5.9)
RBC # BLD: 2.75 M/UL (ref 4.2–5.9)
RBC # BLD: 2.81 M/UL (ref 4.2–5.9)
RBC # BLD: 3.02 M/UL (ref 4.2–5.9)
RBC # BLD: 3.19 M/UL (ref 4.2–5.9)
RBC # BLD: 3.26 M/UL (ref 4.2–5.9)
RBC # BLD: 3.28 M/UL (ref 4.2–5.9)
RBC # BLD: 3.29 M/UL (ref 4.2–5.9)
RBC # BLD: 3.73 M/UL (ref 4.2–5.9)
RBC # BLD: 4.04 M/UL (ref 4.2–5.9)
RBC UA: >100 /HPF (ref 0–2)
RBC UA: ABNORMAL /HPF (ref 0–2)
SLIDE REVIEW: ABNORMAL
SODIUM BLD-SCNC: 128 MMOL/L (ref 136–145)
SODIUM BLD-SCNC: 130 MMOL/L (ref 136–145)
SODIUM BLD-SCNC: 132 MMOL/L (ref 136–145)
SODIUM BLD-SCNC: 134 MMOL/L (ref 136–145)
SODIUM BLD-SCNC: 135 MMOL/L (ref 136–145)
SODIUM BLD-SCNC: 136 MMOL/L (ref 136–145)
SODIUM BLD-SCNC: 137 MMOL/L (ref 136–145)
SODIUM BLD-SCNC: 137 MMOL/L (ref 136–145)
SPECIFIC GRAVITY UA: 1.01
SPECIFIC GRAVITY UA: 1.02
TCO2 ARTERIAL: 27 MMOL/L
TCO2 ARTERIAL: 28.3 MMOL/L
TCO2 ARTERIAL: 32.3 MMOL/L
TOTAL IRON BINDING CAPACITY: 578 UG/DL (ref 260–445)
TOTAL PROTEIN: 5.7 G/DL (ref 6.4–8.2)
TOTAL PROTEIN: 5.8 G/DL (ref 6.4–8.2)
TOTAL PROTEIN: 5.9 G/DL (ref 6.4–8.2)
TOTAL PROTEIN: 6 G/DL (ref 6.4–8.2)
TOTAL PROTEIN: 6.2 G/DL (ref 6.4–8.2)
TOTAL PROTEIN: 6.2 G/DL (ref 6.4–8.2)
TOTAL PROTEIN: 6.6 G/DL (ref 6.4–8.2)
TOTAL PROTEIN: 6.8 G/DL (ref 6.4–8.2)
TOTAL PROTEIN: 7.3 G/DL (ref 6.4–8.2)
TOTAL PROTEIN: 7.5 G/DL (ref 6.4–8.2)
TROPONIN: 0.01 NG/ML
URINE CULTURE, ROUTINE: ABNORMAL
URINE CULTURE, ROUTINE: ABNORMAL
URINE REFLEX TO CULTURE: YES
URINE TYPE: ABNORMAL
URINE TYPE: ABNORMAL
UROBILINOGEN, URINE: 0.2 E.U./DL
UROBILINOGEN, URINE: 0.2 E.U./DL
WBC # BLD: 5.8 K/UL (ref 4–11)
WBC # BLD: 5.9 K/UL (ref 4–11)
WBC # BLD: 5.9 K/UL (ref 4–11)
WBC # BLD: 6.1 K/UL (ref 4–11)
WBC # BLD: 6.1 K/UL (ref 4–11)
WBC # BLD: 6.6 K/UL (ref 4–11)
WBC # BLD: 7.4 K/UL (ref 4–11)
WBC # BLD: 7.7 K/UL (ref 4–11)
WBC # BLD: 7.8 K/UL (ref 4–11)
WBC # BLD: 9.7 K/UL (ref 4–11)
WBC # BLD: 9.9 K/UL (ref 4–11)
WBC UA: ABNORMAL /HPF (ref 0–5)
WBC UA: ABNORMAL /HPF (ref 0–5)

## 2018-01-01 PROCEDURE — G8978 MOBILITY CURRENT STATUS: HCPCS

## 2018-01-01 PROCEDURE — 6360000002 HC RX W HCPCS

## 2018-01-01 PROCEDURE — 6370000000 HC RX 637 (ALT 250 FOR IP): Performed by: INTERNAL MEDICINE

## 2018-01-01 PROCEDURE — 94664 DEMO&/EVAL PT USE INHALER: CPT

## 2018-01-01 PROCEDURE — 85027 COMPLETE CBC AUTOMATED: CPT

## 2018-01-01 PROCEDURE — 6360000002 HC RX W HCPCS: Performed by: INTERNAL MEDICINE

## 2018-01-01 PROCEDURE — 6370000000 HC RX 637 (ALT 250 FOR IP): Performed by: EMERGENCY MEDICINE

## 2018-01-01 PROCEDURE — 6360000002 HC RX W HCPCS: Performed by: EMERGENCY MEDICINE

## 2018-01-01 PROCEDURE — 51702 INSERT TEMP BLADDER CATH: CPT

## 2018-01-01 PROCEDURE — G8598 ASA/ANTIPLAT THER USED: HCPCS | Performed by: NURSE PRACTITIONER

## 2018-01-01 PROCEDURE — 2700000000 HC OXYGEN THERAPY PER DAY

## 2018-01-01 PROCEDURE — 80048 BASIC METABOLIC PNL TOTAL CA: CPT

## 2018-01-01 PROCEDURE — 94640 AIRWAY INHALATION TREATMENT: CPT

## 2018-01-01 PROCEDURE — 37799 UNLISTED PX VASCULAR SURGERY: CPT

## 2018-01-01 PROCEDURE — 99152 MOD SED SAME PHYS/QHP 5/>YRS: CPT | Performed by: INTERNAL MEDICINE

## 2018-01-01 PROCEDURE — 6370000000 HC RX 637 (ALT 250 FOR IP): Performed by: NURSE PRACTITIONER

## 2018-01-01 PROCEDURE — 92953 TEMPORARY EXTERNAL PACING: CPT

## 2018-01-01 PROCEDURE — 2580000003 HC RX 258: Performed by: INTERNAL MEDICINE

## 2018-01-01 PROCEDURE — 93010 ELECTROCARDIOGRAM REPORT: CPT | Performed by: INTERNAL MEDICINE

## 2018-01-01 PROCEDURE — 82728 ASSAY OF FERRITIN: CPT

## 2018-01-01 PROCEDURE — 84100 ASSAY OF PHOSPHORUS: CPT

## 2018-01-01 PROCEDURE — 99291 CRITICAL CARE FIRST HOUR: CPT | Performed by: INTERNAL MEDICINE

## 2018-01-01 PROCEDURE — 83550 IRON BINDING TEST: CPT

## 2018-01-01 PROCEDURE — 85730 THROMBOPLASTIN TIME PARTIAL: CPT

## 2018-01-01 PROCEDURE — 2500000003 HC RX 250 WO HCPCS

## 2018-01-01 PROCEDURE — G8988 SELF CARE GOAL STATUS: HCPCS

## 2018-01-01 PROCEDURE — 99292 CRITICAL CARE ADDL 30 MIN: CPT | Performed by: INTERNAL MEDICINE

## 2018-01-01 PROCEDURE — 82330 ASSAY OF CALCIUM: CPT

## 2018-01-01 PROCEDURE — G8484 FLU IMMUNIZE NO ADMIN: HCPCS | Performed by: INTERNAL MEDICINE

## 2018-01-01 PROCEDURE — 85025 COMPLETE CBC W/AUTO DIFF WBC: CPT

## 2018-01-01 PROCEDURE — 83735 ASSAY OF MAGNESIUM: CPT

## 2018-01-01 PROCEDURE — 86923 COMPATIBILITY TEST ELECTRIC: CPT

## 2018-01-01 PROCEDURE — 2060000000 HC ICU INTERMEDIATE R&B

## 2018-01-01 PROCEDURE — 80053 COMPREHEN METABOLIC PANEL: CPT

## 2018-01-01 PROCEDURE — 2000000000 HC ICU R&B

## 2018-01-01 PROCEDURE — 81001 URINALYSIS AUTO W/SCOPE: CPT

## 2018-01-01 PROCEDURE — 5A1223Z PERFORMANCE OF CARDIAC PACING, CONTINUOUS: ICD-10-PCS | Performed by: INTERNAL MEDICINE

## 2018-01-01 PROCEDURE — 83540 ASSAY OF IRON: CPT

## 2018-01-01 PROCEDURE — 96374 THER/PROPH/DIAG INJ IV PUSH: CPT

## 2018-01-01 PROCEDURE — 36415 COLL VENOUS BLD VENIPUNCTURE: CPT

## 2018-01-01 PROCEDURE — 93306 TTE W/DOPPLER COMPLETE: CPT

## 2018-01-01 PROCEDURE — 99214 OFFICE O/P EST MOD 30 MIN: CPT | Performed by: NURSE PRACTITIONER

## 2018-01-01 PROCEDURE — G8979 MOBILITY GOAL STATUS: HCPCS

## 2018-01-01 PROCEDURE — 82947 ASSAY GLUCOSE BLOOD QUANT: CPT

## 2018-01-01 PROCEDURE — G8417 CALC BMI ABV UP PARAM F/U: HCPCS | Performed by: NURSE PRACTITIONER

## 2018-01-01 PROCEDURE — 94761 N-INVAS EAR/PLS OXIMETRY MLT: CPT

## 2018-01-01 PROCEDURE — 93005 ELECTROCARDIOGRAM TRACING: CPT | Performed by: NURSE PRACTITIONER

## 2018-01-01 PROCEDURE — 1036F TOBACCO NON-USER: CPT | Performed by: NURSE PRACTITIONER

## 2018-01-01 PROCEDURE — 97162 PT EVAL MOD COMPLEX 30 MIN: CPT

## 2018-01-01 PROCEDURE — 84484 ASSAY OF TROPONIN QUANT: CPT

## 2018-01-01 PROCEDURE — C1769 GUIDE WIRE: HCPCS

## 2018-01-01 PROCEDURE — 2500000003 HC RX 250 WO HCPCS: Performed by: INTERNAL MEDICINE

## 2018-01-01 PROCEDURE — 97530 THERAPEUTIC ACTIVITIES: CPT

## 2018-01-01 PROCEDURE — 2580000003 HC RX 258

## 2018-01-01 PROCEDURE — 80299 QUANTITATIVE ASSAY DRUG: CPT

## 2018-01-01 PROCEDURE — 2580000003 HC RX 258: Performed by: NURSE PRACTITIONER

## 2018-01-01 PROCEDURE — G8926 SPIRO NO PERF OR DOC: HCPCS | Performed by: INTERNAL MEDICINE

## 2018-01-01 PROCEDURE — 71045 X-RAY EXAM CHEST 1 VIEW: CPT

## 2018-01-01 PROCEDURE — 33210 INSERT ELECTRD/PM CATH SNGL: CPT | Performed by: INTERNAL MEDICINE

## 2018-01-01 PROCEDURE — 99291 CRITICAL CARE FIRST HOUR: CPT

## 2018-01-01 PROCEDURE — 6360000002 HC RX W HCPCS: Performed by: NURSE PRACTITIONER

## 2018-01-01 PROCEDURE — 2500000003 HC RX 250 WO HCPCS: Performed by: NURSE PRACTITIONER

## 2018-01-01 PROCEDURE — 3017F COLORECTAL CA SCREEN DOC REV: CPT | Performed by: INTERNAL MEDICINE

## 2018-01-01 PROCEDURE — G8987 SELF CARE CURRENT STATUS: HCPCS

## 2018-01-01 PROCEDURE — 36592 COLLECT BLOOD FROM PICC: CPT

## 2018-01-01 PROCEDURE — 90945 DIALYSIS ONE EVALUATION: CPT

## 2018-01-01 PROCEDURE — 99214 OFFICE O/P EST MOD 30 MIN: CPT | Performed by: INTERNAL MEDICINE

## 2018-01-01 PROCEDURE — 99232 SBSQ HOSP IP/OBS MODERATE 35: CPT | Performed by: INTERNAL MEDICINE

## 2018-01-01 PROCEDURE — 86706 HEP B SURFACE ANTIBODY: CPT

## 2018-01-01 PROCEDURE — APPNB60 APP NON BILLABLE TIME 46-60 MINS: Performed by: NURSE PRACTITIONER

## 2018-01-01 PROCEDURE — 3017F COLORECTAL CA SCREEN DOC REV: CPT | Performed by: NURSE PRACTITIONER

## 2018-01-01 PROCEDURE — G8427 DOCREV CUR MEDS BY ELIG CLIN: HCPCS | Performed by: INTERNAL MEDICINE

## 2018-01-01 PROCEDURE — 1101F PT FALLS ASSESS-DOCD LE1/YR: CPT | Performed by: INTERNAL MEDICINE

## 2018-01-01 PROCEDURE — 87340 HEPATITIS B SURFACE AG IA: CPT

## 2018-01-01 PROCEDURE — 97166 OT EVAL MOD COMPLEX 45 MIN: CPT

## 2018-01-01 PROCEDURE — 99233 SBSQ HOSP IP/OBS HIGH 50: CPT | Performed by: INTERNAL MEDICINE

## 2018-01-01 PROCEDURE — 82803 BLOOD GASES ANY COMBINATION: CPT

## 2018-01-01 PROCEDURE — 3023F SPIROM DOC REV: CPT | Performed by: INTERNAL MEDICINE

## 2018-01-01 PROCEDURE — 99232 SBSQ HOSP IP/OBS MODERATE 35: CPT | Performed by: NURSE PRACTITIONER

## 2018-01-01 PROCEDURE — 83880 ASSAY OF NATRIURETIC PEPTIDE: CPT

## 2018-01-01 PROCEDURE — 36620 INSERTION CATHETER ARTERY: CPT

## 2018-01-01 PROCEDURE — 1101F PT FALLS ASSESS-DOCD LE1/YR: CPT | Performed by: NURSE PRACTITIONER

## 2018-01-01 PROCEDURE — 74018 RADEX ABDOMEN 1 VIEW: CPT

## 2018-01-01 PROCEDURE — 51798 US URINE CAPACITY MEASURE: CPT

## 2018-01-01 PROCEDURE — 1200000000 HC SEMI PRIVATE

## 2018-01-01 PROCEDURE — 97116 GAIT TRAINING THERAPY: CPT

## 2018-01-01 PROCEDURE — 36430 TRANSFUSION BLD/BLD COMPNT: CPT

## 2018-01-01 PROCEDURE — 96375 TX/PRO/DX INJ NEW DRUG ADDON: CPT

## 2018-01-01 PROCEDURE — 5A1D90Z PERFORMANCE OF URINARY FILTRATION, CONTINUOUS, GREATER THAN 18 HOURS PER DAY: ICD-10-PCS | Performed by: INTERNAL MEDICINE

## 2018-01-01 PROCEDURE — 97535 SELF CARE MNGMENT TRAINING: CPT

## 2018-01-01 PROCEDURE — 86901 BLOOD TYPING SEROLOGIC RH(D): CPT

## 2018-01-01 PROCEDURE — 87077 CULTURE AEROBIC IDENTIFY: CPT

## 2018-01-01 PROCEDURE — G8417 CALC BMI ABV UP PARAM F/U: HCPCS | Performed by: INTERNAL MEDICINE

## 2018-01-01 PROCEDURE — 2580000003 HC RX 258: Performed by: PEDIATRICS

## 2018-01-01 PROCEDURE — 2580000003 HC RX 258: Performed by: EMERGENCY MEDICINE

## 2018-01-01 PROCEDURE — 71046 X-RAY EXAM CHEST 2 VIEWS: CPT

## 2018-01-01 PROCEDURE — 86900 BLOOD TYPING SEROLOGIC ABO: CPT

## 2018-01-01 PROCEDURE — 1123F ACP DISCUSS/DSCN MKR DOCD: CPT | Performed by: NURSE PRACTITIONER

## 2018-01-01 PROCEDURE — 86803 HEPATITIS C AB TEST: CPT

## 2018-01-01 PROCEDURE — 2720000010 HC SURG SUPPLY STERILE

## 2018-01-01 PROCEDURE — 87186 SC STD MICRODIL/AGAR DIL: CPT

## 2018-01-01 PROCEDURE — 96361 HYDRATE IV INFUSION ADD-ON: CPT

## 2018-01-01 PROCEDURE — 2500000003 HC RX 250 WO HCPCS: Performed by: PEDIATRICS

## 2018-01-01 PROCEDURE — 96376 TX/PRO/DX INJ SAME DRUG ADON: CPT

## 2018-01-01 PROCEDURE — 86850 RBC ANTIBODY SCREEN: CPT

## 2018-01-01 PROCEDURE — 87086 URINE CULTURE/COLONY COUNT: CPT

## 2018-01-01 PROCEDURE — 4040F PNEUMOC VAC/ADMIN/RCVD: CPT | Performed by: NURSE PRACTITIONER

## 2018-01-01 PROCEDURE — 99233 SBSQ HOSP IP/OBS HIGH 50: CPT | Performed by: NURSE PRACTITIONER

## 2018-01-01 PROCEDURE — 93000 ELECTROCARDIOGRAM COMPLETE: CPT | Performed by: NURSE PRACTITIONER

## 2018-01-01 PROCEDURE — 87040 BLOOD CULTURE FOR BACTERIA: CPT

## 2018-01-01 PROCEDURE — P9016 RBC LEUKOCYTES REDUCED: HCPCS

## 2018-01-01 PROCEDURE — 02HV33Z INSERTION OF INFUSION DEVICE INTO SUPERIOR VENA CAVA, PERCUTANEOUS APPROACH: ICD-10-PCS | Performed by: NURSE PRACTITIONER

## 2018-01-01 PROCEDURE — 83605 ASSAY OF LACTIC ACID: CPT

## 2018-01-01 PROCEDURE — G8484 FLU IMMUNIZE NO ADMIN: HCPCS | Performed by: NURSE PRACTITIONER

## 2018-01-01 PROCEDURE — C1894 INTRO/SHEATH, NON-LASER: HCPCS

## 2018-01-01 PROCEDURE — 36556 INSERT NON-TUNNEL CV CATH: CPT | Performed by: NURSE PRACTITIONER

## 2018-01-01 PROCEDURE — G8427 DOCREV CUR MEDS BY ELIG CLIN: HCPCS | Performed by: NURSE PRACTITIONER

## 2018-01-01 RX ORDER — 0.9 % SODIUM CHLORIDE 0.9 %
1000 INTRAVENOUS SOLUTION INTRAVENOUS ONCE
Status: COMPLETED | OUTPATIENT
Start: 2018-01-01 | End: 2018-01-01

## 2018-01-01 RX ORDER — IPRATROPIUM BROMIDE AND ALBUTEROL SULFATE 2.5; .5 MG/3ML; MG/3ML
1 SOLUTION RESPIRATORY (INHALATION) ONCE
Status: DISCONTINUED | OUTPATIENT
Start: 2018-01-01 | End: 2018-01-01

## 2018-01-01 RX ORDER — NICOTINE POLACRILEX 4 MG
15 LOZENGE BUCCAL PRN
Status: DISCONTINUED | OUTPATIENT
Start: 2018-01-01 | End: 2018-01-01 | Stop reason: HOSPADM

## 2018-01-01 RX ORDER — SPIRONOLACTONE 25 MG/1
25 TABLET ORAL DAILY
Qty: 90 TABLET | Refills: 3 | Status: SHIPPED | OUTPATIENT
Start: 2018-01-01 | End: 2018-01-01 | Stop reason: SDUPTHER

## 2018-01-01 RX ORDER — SODIUM CHLORIDE 0.9 % (FLUSH) 0.9 %
10 SYRINGE (ML) INJECTION EVERY 12 HOURS SCHEDULED
Status: DISCONTINUED | OUTPATIENT
Start: 2018-01-01 | End: 2018-01-01 | Stop reason: HOSPADM

## 2018-01-01 RX ORDER — ATROPINE SULFATE 0.4 MG/ML
0.4 AMPUL (ML) INJECTION ONCE
Status: COMPLETED | OUTPATIENT
Start: 2018-01-01 | End: 2018-01-01

## 2018-01-01 RX ORDER — SODIUM CHLORIDE 9 MG/ML
INJECTION, SOLUTION INTRAVENOUS
Status: COMPLETED
Start: 2018-01-01 | End: 2018-01-01

## 2018-01-01 RX ORDER — HEPARIN SODIUM 5000 [USP'U]/ML
2000 INJECTION, SOLUTION INTRAVENOUS; SUBCUTANEOUS ONCE
Status: COMPLETED | OUTPATIENT
Start: 2018-01-01 | End: 2018-01-01

## 2018-01-01 RX ORDER — HEPARIN SODIUM 5000 [USP'U]/ML
5000 INJECTION, SOLUTION INTRAVENOUS; SUBCUTANEOUS EVERY 8 HOURS SCHEDULED
Status: DISCONTINUED | OUTPATIENT
Start: 2018-01-01 | End: 2018-01-01

## 2018-01-01 RX ORDER — FERROUS SULFATE 325(65) MG
325 TABLET ORAL
Qty: 90 TABLET | Refills: 2 | Status: SHIPPED | OUTPATIENT
Start: 2018-01-01 | End: 2019-01-01 | Stop reason: SDUPTHER

## 2018-01-01 RX ORDER — ALBUTEROL SULFATE 2.5 MG/3ML
2.5 SOLUTION RESPIRATORY (INHALATION) EVERY 6 HOURS PRN
Status: DISCONTINUED | OUTPATIENT
Start: 2018-01-01 | End: 2018-01-01

## 2018-01-01 RX ORDER — SODIUM POLYSTYRENE SULFONATE 15 G/60ML
15 SUSPENSION ORAL; RECTAL ONCE
Status: DISCONTINUED | OUTPATIENT
Start: 2018-01-01 | End: 2018-01-01

## 2018-01-01 RX ORDER — ONDANSETRON 2 MG/ML
4 INJECTION INTRAMUSCULAR; INTRAVENOUS ONCE
Status: COMPLETED | OUTPATIENT
Start: 2018-01-01 | End: 2018-01-01

## 2018-01-01 RX ORDER — ATROPINE SULFATE 0.1 MG/ML
INJECTION INTRAVENOUS
Status: DISPENSED
Start: 2018-01-01 | End: 2018-01-01

## 2018-01-01 RX ORDER — METOPROLOL SUCCINATE 25 MG/1
25 TABLET, EXTENDED RELEASE ORAL DAILY
Qty: 30 TABLET | Refills: 3 | Status: ON HOLD | OUTPATIENT
Start: 2018-01-01 | End: 2018-01-01 | Stop reason: HOSPADM

## 2018-01-01 RX ORDER — SODIUM CHLORIDE 9 MG/ML
INJECTION, SOLUTION INTRAVENOUS CONTINUOUS
Status: DISCONTINUED | OUTPATIENT
Start: 2018-01-01 | End: 2018-01-01

## 2018-01-01 RX ORDER — HYDROXYZINE HYDROCHLORIDE 10 MG/1
25 TABLET, FILM COATED ORAL ONCE
Status: COMPLETED | OUTPATIENT
Start: 2018-01-01 | End: 2018-01-01

## 2018-01-01 RX ORDER — OXYCODONE AND ACETAMINOPHEN 10; 325 MG/1; MG/1
1 TABLET ORAL EVERY 6 HOURS PRN
Status: DISCONTINUED | OUTPATIENT
Start: 2018-01-01 | End: 2018-01-01 | Stop reason: HOSPADM

## 2018-01-01 RX ORDER — DEXTROSE MONOHYDRATE 25 G/50ML
12.5 INJECTION, SOLUTION INTRAVENOUS PRN
Status: DISCONTINUED | OUTPATIENT
Start: 2018-01-01 | End: 2018-01-01 | Stop reason: HOSPADM

## 2018-01-01 RX ORDER — QUETIAPINE FUMARATE 25 MG/1
25 TABLET, FILM COATED ORAL ONCE
Status: DISCONTINUED | OUTPATIENT
Start: 2018-01-01 | End: 2018-01-01

## 2018-01-01 RX ORDER — MAGNESIUM SULFATE 1 G/100ML
1 INJECTION INTRAVENOUS ONCE
Status: COMPLETED | OUTPATIENT
Start: 2018-01-01 | End: 2018-01-01

## 2018-01-01 RX ORDER — 0.9 % SODIUM CHLORIDE 0.9 %
250 INTRAVENOUS SOLUTION INTRAVENOUS ONCE
Status: COMPLETED | OUTPATIENT
Start: 2018-01-01 | End: 2018-01-01

## 2018-01-01 RX ORDER — POTASSIUM CHLORIDE 29.8 MG/ML
20 INJECTION INTRAVENOUS PRN
Status: DISCONTINUED | OUTPATIENT
Start: 2018-01-01 | End: 2018-01-01 | Stop reason: HOSPADM

## 2018-01-01 RX ORDER — ONDANSETRON 2 MG/ML
4 INJECTION INTRAMUSCULAR; INTRAVENOUS EVERY 6 HOURS PRN
Status: DISCONTINUED | OUTPATIENT
Start: 2018-01-01 | End: 2018-01-01 | Stop reason: HOSPADM

## 2018-01-01 RX ORDER — PHENTOLAMINE MESYLATE 5 MG/1
5 INJECTION INTRAMUSCULAR; INTRAVENOUS ONCE
Status: COMPLETED | OUTPATIENT
Start: 2018-01-01 | End: 2018-01-01

## 2018-01-01 RX ORDER — FUROSEMIDE 40 MG/1
30 TABLET ORAL DAILY
Qty: 30 TABLET | Refills: 3 | Status: SHIPPED | OUTPATIENT
Start: 2018-01-01 | End: 2019-01-01 | Stop reason: SDUPTHER

## 2018-01-01 RX ORDER — SPIRONOLACTONE 25 MG/1
25 TABLET ORAL DAILY
Qty: 90 TABLET | Refills: 3 | Status: SHIPPED | OUTPATIENT
Start: 2018-01-01 | End: 2018-01-01 | Stop reason: SINTOL

## 2018-01-01 RX ORDER — DILTIAZEM HYDROCHLORIDE 120 MG/1
120 CAPSULE, COATED, EXTENDED RELEASE ORAL DAILY
Status: DISCONTINUED | OUTPATIENT
Start: 2018-01-01 | End: 2018-01-01 | Stop reason: HOSPADM

## 2018-01-01 RX ORDER — SENNA PLUS 8.6 MG/1
2 TABLET ORAL 2 TIMES DAILY PRN
Status: DISCONTINUED | OUTPATIENT
Start: 2018-01-01 | End: 2018-01-01 | Stop reason: HOSPADM

## 2018-01-01 RX ORDER — DILTIAZEM HYDROCHLORIDE 5 MG/ML
10 INJECTION INTRAVENOUS ONCE
Status: COMPLETED | OUTPATIENT
Start: 2018-01-01 | End: 2018-01-01

## 2018-01-01 RX ORDER — IPRATROPIUM BROMIDE AND ALBUTEROL SULFATE 2.5; .5 MG/3ML; MG/3ML
1 SOLUTION RESPIRATORY (INHALATION)
Status: DISCONTINUED | OUTPATIENT
Start: 2018-01-01 | End: 2018-01-01 | Stop reason: HOSPADM

## 2018-01-01 RX ORDER — MESALAMINE 400 MG/1
400 CAPSULE, DELAYED RELEASE ORAL 3 TIMES DAILY
Status: DISCONTINUED | OUTPATIENT
Start: 2018-01-01 | End: 2018-01-01 | Stop reason: HOSPADM

## 2018-01-01 RX ORDER — MAGNESIUM SULFATE 1 G/100ML
1 INJECTION INTRAVENOUS PRN
Status: DISCONTINUED | OUTPATIENT
Start: 2018-01-01 | End: 2018-01-01 | Stop reason: HOSPADM

## 2018-01-01 RX ORDER — DEXTROSE MONOHYDRATE 50 MG/ML
100 INJECTION, SOLUTION INTRAVENOUS PRN
Status: DISCONTINUED | OUTPATIENT
Start: 2018-01-01 | End: 2018-01-01 | Stop reason: HOSPADM

## 2018-01-01 RX ORDER — MONTELUKAST SODIUM 10 MG/1
10 TABLET ORAL NIGHTLY
Status: DISCONTINUED | OUTPATIENT
Start: 2018-01-01 | End: 2018-01-01 | Stop reason: HOSPADM

## 2018-01-01 RX ORDER — DILTIAZEM HYDROCHLORIDE 60 MG/1
30 TABLET, FILM COATED ORAL EVERY 6 HOURS SCHEDULED
Status: DISCONTINUED | OUTPATIENT
Start: 2018-01-01 | End: 2018-01-01

## 2018-01-01 RX ORDER — 0.9 % SODIUM CHLORIDE 0.9 %
250 INTRAVENOUS SOLUTION INTRAVENOUS ONCE
Status: DISCONTINUED | OUTPATIENT
Start: 2018-01-01 | End: 2018-01-01

## 2018-01-01 RX ORDER — DOPAMINE HYDROCHLORIDE 160 MG/100ML
5 INJECTION, SOLUTION INTRAVENOUS CONTINUOUS
Status: DISCONTINUED | OUTPATIENT
Start: 2018-01-01 | End: 2018-01-01

## 2018-01-01 RX ORDER — SODIUM CHLORIDE 9 MG/ML
INJECTION, SOLUTION INTRAVENOUS
Status: DISPENSED
Start: 2018-01-01 | End: 2018-01-01

## 2018-01-01 RX ORDER — DEXTROSE MONOHYDRATE 25 G/50ML
50 INJECTION, SOLUTION INTRAVENOUS ONCE
Status: COMPLETED | OUTPATIENT
Start: 2018-01-01 | End: 2018-01-01

## 2018-01-01 RX ORDER — 0.9 % SODIUM CHLORIDE 0.9 %
500 INTRAVENOUS SOLUTION INTRAVENOUS ONCE
Status: COMPLETED | OUTPATIENT
Start: 2018-01-01 | End: 2018-01-01

## 2018-01-01 RX ORDER — OXYCODONE AND ACETAMINOPHEN 10; 325 MG/1; MG/1
1 TABLET ORAL EVERY 4 HOURS PRN
Status: DISCONTINUED | OUTPATIENT
Start: 2018-01-01 | End: 2018-01-01 | Stop reason: HOSPADM

## 2018-01-01 RX ORDER — PRAMIPEXOLE DIHYDROCHLORIDE 1 MG/1
1 TABLET ORAL NIGHTLY
Status: DISCONTINUED | OUTPATIENT
Start: 2018-01-01 | End: 2018-01-01 | Stop reason: HOSPADM

## 2018-01-01 RX ORDER — CALCIUM GLUCONATE 94 MG/ML
1 INJECTION, SOLUTION INTRAVENOUS ONCE
Status: COMPLETED | OUTPATIENT
Start: 2018-01-01 | End: 2018-01-01

## 2018-01-01 RX ORDER — CALCIUM GLUCONATE 94 MG/ML
2 INJECTION, SOLUTION INTRAVENOUS ONCE
Status: COMPLETED | OUTPATIENT
Start: 2018-01-01 | End: 2018-01-01

## 2018-01-01 RX ORDER — SODIUM CHLORIDE 0.9 % (FLUSH) 0.9 %
10 SYRINGE (ML) INJECTION PRN
Status: DISCONTINUED | OUTPATIENT
Start: 2018-01-01 | End: 2018-01-01 | Stop reason: HOSPADM

## 2018-01-01 RX ORDER — DILTIAZEM HYDROCHLORIDE 120 MG/1
120 CAPSULE, COATED, EXTENDED RELEASE ORAL DAILY
Qty: 30 CAPSULE | Refills: 3 | Status: SHIPPED | OUTPATIENT
Start: 2018-01-01 | End: 2019-01-01 | Stop reason: SDUPTHER

## 2018-01-01 RX ORDER — ONDANSETRON 2 MG/ML
INJECTION INTRAMUSCULAR; INTRAVENOUS
Status: COMPLETED
Start: 2018-01-01 | End: 2018-01-01

## 2018-01-01 RX ORDER — 0.9 % SODIUM CHLORIDE 0.9 %
250 INTRAVENOUS SOLUTION INTRAVENOUS ONCE
Status: DISCONTINUED | OUTPATIENT
Start: 2018-01-01 | End: 2018-01-01 | Stop reason: HOSPADM

## 2018-01-01 RX ORDER — DONEPEZIL HYDROCHLORIDE 5 MG/1
10 TABLET, FILM COATED ORAL NIGHTLY
Status: DISCONTINUED | OUTPATIENT
Start: 2018-01-01 | End: 2018-01-01 | Stop reason: HOSPADM

## 2018-01-01 RX ORDER — POTASSIUM CHLORIDE 20 MEQ/1
40 TABLET, EXTENDED RELEASE ORAL ONCE
Status: COMPLETED | OUTPATIENT
Start: 2018-01-01 | End: 2018-01-01

## 2018-01-01 RX ORDER — CIPROFLOXACIN 500 MG/1
500 TABLET, FILM COATED ORAL 2 TIMES DAILY
Qty: 10 TABLET | Refills: 0 | Status: SHIPPED | OUTPATIENT
Start: 2018-01-01 | End: 2019-01-01

## 2018-01-01 RX ORDER — FINASTERIDE 5 MG/1
5 TABLET, FILM COATED ORAL DAILY
Status: DISCONTINUED | OUTPATIENT
Start: 2018-01-01 | End: 2018-01-01 | Stop reason: HOSPADM

## 2018-01-01 RX ORDER — LORAZEPAM 2 MG/ML
1 INJECTION INTRAMUSCULAR EVERY 4 HOURS PRN
Status: DISCONTINUED | OUTPATIENT
Start: 2018-01-01 | End: 2018-01-01 | Stop reason: HOSPADM

## 2018-01-01 RX ORDER — CYCLOBENZAPRINE HCL 10 MG
10 TABLET ORAL 3 TIMES DAILY PRN
Status: DISCONTINUED | OUTPATIENT
Start: 2018-01-01 | End: 2018-01-01 | Stop reason: HOSPADM

## 2018-01-01 RX ORDER — CALCIUM GLUCONATE 94 MG/ML
1 INJECTION, SOLUTION INTRAVENOUS ONCE
Status: DISCONTINUED | OUTPATIENT
Start: 2018-01-01 | End: 2018-01-01 | Stop reason: SDUPTHER

## 2018-01-01 RX ORDER — FUROSEMIDE 40 MG/1
40 TABLET ORAL DAILY
Status: DISCONTINUED | OUTPATIENT
Start: 2018-01-01 | End: 2018-01-01 | Stop reason: HOSPADM

## 2018-01-01 RX ORDER — FERROUS GLUCONATE 324(37.5)
324 TABLET ORAL 2 TIMES DAILY
Status: DISCONTINUED | OUTPATIENT
Start: 2018-01-01 | End: 2018-01-01 | Stop reason: HOSPADM

## 2018-01-01 RX ORDER — 0.9 % SODIUM CHLORIDE 0.9 %
VIAL (ML) INJECTION
Status: COMPLETED
Start: 2018-01-01 | End: 2018-01-01

## 2018-01-01 RX ORDER — FUROSEMIDE 10 MG/ML
40 INJECTION INTRAMUSCULAR; INTRAVENOUS 2 TIMES DAILY
Status: DISCONTINUED | OUTPATIENT
Start: 2018-01-01 | End: 2018-01-01

## 2018-01-01 RX ORDER — FUROSEMIDE 40 MG/1
40 TABLET ORAL 2 TIMES DAILY
Status: DISCONTINUED | OUTPATIENT
Start: 2018-01-01 | End: 2018-01-01

## 2018-01-01 RX ORDER — HEPARIN SODIUM 1000 [USP'U]/ML
1000 INJECTION, SOLUTION INTRAVENOUS; SUBCUTANEOUS ONCE
Status: COMPLETED | OUTPATIENT
Start: 2018-01-01 | End: 2018-01-01

## 2018-01-01 RX ADMIN — ONDANSETRON 4 MG: 2 INJECTION INTRAMUSCULAR; INTRAVENOUS at 12:03

## 2018-01-01 RX ADMIN — OXYCODONE HYDROCHLORIDE AND ACETAMINOPHEN 1 TABLET: 10; 325 TABLET ORAL at 23:58

## 2018-01-01 RX ADMIN — DILTIAZEM HYDROCHLORIDE 10 MG: 5 INJECTION INTRAVENOUS at 07:10

## 2018-01-01 RX ADMIN — FINASTERIDE 5 MG: 5 TABLET, FILM COATED ORAL at 08:46

## 2018-01-01 RX ADMIN — MAGNESIUM SULFATE HEPTAHYDRATE 1 G: 1 INJECTION, SOLUTION INTRAVENOUS at 09:23

## 2018-01-01 RX ADMIN — Medication: at 22:10

## 2018-01-01 RX ADMIN — LORAZEPAM 1 MG: 2 INJECTION, SOLUTION INTRAMUSCULAR; INTRAVENOUS at 03:14

## 2018-01-01 RX ADMIN — PRAMIPEXOLE DIHYDROCHLORIDE 1 MG: 1 TABLET ORAL at 20:45

## 2018-01-01 RX ADMIN — FERROUS GLUCONATE TAB 324 MG (37.5 MG ELEMENTAL IRON) 324 MG: 324 (37.5 FE) TAB at 08:51

## 2018-01-01 RX ADMIN — IPRATROPIUM BROMIDE AND ALBUTEROL SULFATE 1 AMPULE: .5; 3 SOLUTION RESPIRATORY (INHALATION) at 11:29

## 2018-01-01 RX ADMIN — DONEPEZIL HYDROCHLORIDE 10 MG: 5 TABLET, FILM COATED ORAL at 21:23

## 2018-01-01 RX ADMIN — DILTIAZEM HYDROCHLORIDE 30 MG: 60 TABLET, FILM COATED ORAL at 05:25

## 2018-01-01 RX ADMIN — HEPARIN SODIUM: 1000 INJECTION INTRAVENOUS; SUBCUTANEOUS at 00:05

## 2018-01-01 RX ADMIN — INSULIN LISPRO 1 UNITS: 100 INJECTION, SOLUTION INTRAVENOUS; SUBCUTANEOUS at 18:24

## 2018-01-01 RX ADMIN — Medication 10 ML: at 08:47

## 2018-01-01 RX ADMIN — SODIUM CHLORIDE: 9 INJECTION, SOLUTION INTRAVENOUS at 23:13

## 2018-01-01 RX ADMIN — DONEPEZIL HYDROCHLORIDE 10 MG: 5 TABLET, FILM COATED ORAL at 20:45

## 2018-01-01 RX ADMIN — IPRATROPIUM BROMIDE AND ALBUTEROL SULFATE 1 AMPULE: .5; 3 SOLUTION RESPIRATORY (INHALATION) at 12:03

## 2018-01-01 RX ADMIN — Medication 10 ML: at 09:54

## 2018-01-01 RX ADMIN — OXYCODONE HYDROCHLORIDE AND ACETAMINOPHEN 1 TABLET: 10; 325 TABLET ORAL at 19:50

## 2018-01-01 RX ADMIN — SENNOSIDES 17.2 MG: 8.6 TABLET, FILM COATED ORAL at 16:50

## 2018-01-01 RX ADMIN — HEPARIN SODIUM 2000 UNITS: 5000 INJECTION INTRAVENOUS; SUBCUTANEOUS at 23:31

## 2018-01-01 RX ADMIN — MESALAMINE 400 MG: 400 CAPSULE, DELAYED RELEASE ORAL at 20:45

## 2018-01-01 RX ADMIN — CALCIUM GLUCONATE 1 G: 94 INJECTION, SOLUTION INTRAVENOUS at 11:47

## 2018-01-01 RX ADMIN — FERROUS GLUCONATE TAB 324 MG (37.5 MG ELEMENTAL IRON) 324 MG: 324 (37.5 FE) TAB at 22:19

## 2018-01-01 RX ADMIN — MUPIROCIN: 20 OINTMENT TOPICAL at 20:46

## 2018-01-01 RX ADMIN — Medication: at 08:49

## 2018-01-01 RX ADMIN — DONEPEZIL HYDROCHLORIDE 10 MG: 5 TABLET, FILM COATED ORAL at 20:46

## 2018-01-01 RX ADMIN — ATROPINE SULFATE 0.4 MG: 0.4 INJECTION, SOLUTION INTRAMUSCULAR; INTRAVENOUS; SUBCUTANEOUS at 11:56

## 2018-01-01 RX ADMIN — Medication: at 11:49

## 2018-01-01 RX ADMIN — Medication: at 20:02

## 2018-01-01 RX ADMIN — SODIUM CHLORIDE 1000 ML: 9 INJECTION, SOLUTION INTRAVENOUS at 08:17

## 2018-01-01 RX ADMIN — Medication 10 ML: at 22:39

## 2018-01-01 RX ADMIN — MESALAMINE 400 MG: 400 CAPSULE, DELAYED RELEASE ORAL at 10:24

## 2018-01-01 RX ADMIN — MESALAMINE 400 MG: 400 CAPSULE, DELAYED RELEASE ORAL at 10:02

## 2018-01-01 RX ADMIN — SODIUM CHLORIDE: 9 INJECTION, SOLUTION INTRAVENOUS at 14:29

## 2018-01-01 RX ADMIN — FERROUS GLUCONATE TAB 324 MG (37.5 MG ELEMENTAL IRON) 324 MG: 324 (37.5 FE) TAB at 20:30

## 2018-01-01 RX ADMIN — Medication: at 03:50

## 2018-01-01 RX ADMIN — DEXTROSE MONOHYDRATE 50 ML: 25 INJECTION, SOLUTION INTRAVENOUS at 12:24

## 2018-01-01 RX ADMIN — OXYCODONE HYDROCHLORIDE AND ACETAMINOPHEN 1 TABLET: 10; 325 TABLET ORAL at 23:31

## 2018-01-01 RX ADMIN — SERTRALINE HYDROCHLORIDE 100 MG: 50 TABLET ORAL at 10:24

## 2018-01-01 RX ADMIN — FERROUS GLUCONATE TAB 324 MG (37.5 MG ELEMENTAL IRON) 324 MG: 324 (37.5 FE) TAB at 20:56

## 2018-01-01 RX ADMIN — CALCIUM GLUCONATE 1 G: 98 INJECTION, SOLUTION INTRAVENOUS at 18:46

## 2018-01-01 RX ADMIN — Medication 10 ML: at 10:23

## 2018-01-01 RX ADMIN — SODIUM CHLORIDE: 9 INJECTION, SOLUTION INTRAVENOUS at 23:14

## 2018-01-01 RX ADMIN — SODIUM CHLORIDE 250 ML: 9 INJECTION, SOLUTION INTRAVENOUS at 15:54

## 2018-01-01 RX ADMIN — DILTIAZEM HYDROCHLORIDE 30 MG: 60 TABLET, FILM COATED ORAL at 23:32

## 2018-01-01 RX ADMIN — MAGNESIUM SULFATE HEPTAHYDRATE 1 G: 1 INJECTION, SOLUTION INTRAVENOUS at 09:12

## 2018-01-01 RX ADMIN — MESALAMINE 400 MG: 400 CAPSULE, DELAYED RELEASE ORAL at 22:26

## 2018-01-01 RX ADMIN — IPRATROPIUM BROMIDE AND ALBUTEROL SULFATE 1 AMPULE: .5; 3 SOLUTION RESPIRATORY (INHALATION) at 09:16

## 2018-01-01 RX ADMIN — OXYCODONE HYDROCHLORIDE AND ACETAMINOPHEN 1 TABLET: 10; 325 TABLET ORAL at 10:49

## 2018-01-01 RX ADMIN — MESALAMINE 400 MG: 400 CAPSULE, DELAYED RELEASE ORAL at 15:51

## 2018-01-01 RX ADMIN — FERROUS GLUCONATE TAB 324 MG (37.5 MG ELEMENTAL IRON) 324 MG: 324 (37.5 FE) TAB at 22:32

## 2018-01-01 RX ADMIN — MESALAMINE 400 MG: 400 CAPSULE, DELAYED RELEASE ORAL at 09:39

## 2018-01-01 RX ADMIN — FUROSEMIDE 40 MG: 10 INJECTION, SOLUTION INTRAMUSCULAR; INTRAVENOUS at 17:33

## 2018-01-01 RX ADMIN — Medication: at 17:25

## 2018-01-01 RX ADMIN — FUROSEMIDE 40 MG: 10 INJECTION, SOLUTION INTRAMUSCULAR; INTRAVENOUS at 18:30

## 2018-01-01 RX ADMIN — LORAZEPAM 1 MG: 2 INJECTION, SOLUTION INTRAMUSCULAR; INTRAVENOUS at 23:20

## 2018-01-01 RX ADMIN — IPRATROPIUM BROMIDE AND ALBUTEROL SULFATE 1 AMPULE: .5; 3 SOLUTION RESPIRATORY (INHALATION) at 11:48

## 2018-01-01 RX ADMIN — Medication 10 ML: at 20:56

## 2018-01-01 RX ADMIN — CYCLOBENZAPRINE HYDROCHLORIDE 10 MG: 10 TABLET, FILM COATED ORAL at 23:58

## 2018-01-01 RX ADMIN — SODIUM GLYCOLATE 6 MMOL: 216 INJECTION, SOLUTION INTRAVENOUS at 09:07

## 2018-01-01 RX ADMIN — Medication: at 22:33

## 2018-01-01 RX ADMIN — OXYCODONE HYDROCHLORIDE AND ACETAMINOPHEN 1 TABLET: 10; 325 TABLET ORAL at 04:28

## 2018-01-01 RX ADMIN — MAGNESIUM SULFATE HEPTAHYDRATE 1 G: 1 INJECTION, SOLUTION INTRAVENOUS at 16:18

## 2018-01-01 RX ADMIN — Medication 10 ML: at 10:28

## 2018-01-01 RX ADMIN — OXYCODONE HYDROCHLORIDE AND ACETAMINOPHEN 1 TABLET: 10; 325 TABLET ORAL at 22:32

## 2018-01-01 RX ADMIN — MUPIROCIN: 20 OINTMENT TOPICAL at 20:51

## 2018-01-01 RX ADMIN — IPRATROPIUM BROMIDE AND ALBUTEROL SULFATE 1 AMPULE: .5; 3 SOLUTION RESPIRATORY (INHALATION) at 08:26

## 2018-01-01 RX ADMIN — MAGNESIUM SULFATE HEPTAHYDRATE 1 G: 1 INJECTION, SOLUTION INTRAVENOUS at 10:25

## 2018-01-01 RX ADMIN — LORAZEPAM 1 MG: 2 INJECTION, SOLUTION INTRAMUSCULAR; INTRAVENOUS at 21:36

## 2018-01-01 RX ADMIN — DILTIAZEM HYDROCHLORIDE 5 MG/HR: 5 INJECTION INTRAVENOUS at 07:22

## 2018-01-01 RX ADMIN — INSULIN LISPRO 1 UNITS: 100 INJECTION, SOLUTION INTRAVENOUS; SUBCUTANEOUS at 18:39

## 2018-01-01 RX ADMIN — INSULIN HUMAN 5 UNITS: 100 INJECTION, SOLUTION PARENTERAL at 12:25

## 2018-01-01 RX ADMIN — PRAMIPEXOLE DIHYDROCHLORIDE 1 MG: 1 TABLET ORAL at 22:26

## 2018-01-01 RX ADMIN — DILTIAZEM HYDROCHLORIDE 30 MG: 60 TABLET, FILM COATED ORAL at 05:42

## 2018-01-01 RX ADMIN — DILTIAZEM HYDROCHLORIDE 30 MG: 60 TABLET, FILM COATED ORAL at 01:08

## 2018-01-01 RX ADMIN — Medication 10 ML: at 20:46

## 2018-01-01 RX ADMIN — SERTRALINE HYDROCHLORIDE 100 MG: 50 TABLET ORAL at 09:08

## 2018-01-01 RX ADMIN — MONTELUKAST SODIUM 10 MG: 10 TABLET, COATED ORAL at 20:45

## 2018-01-01 RX ADMIN — DILTIAZEM HYDROCHLORIDE 30 MG: 60 TABLET, FILM COATED ORAL at 12:33

## 2018-01-01 RX ADMIN — SERTRALINE HYDROCHLORIDE 100 MG: 50 TABLET ORAL at 08:29

## 2018-01-01 RX ADMIN — FERROUS GLUCONATE TAB 324 MG (37.5 MG ELEMENTAL IRON) 324 MG: 324 (37.5 FE) TAB at 12:18

## 2018-01-01 RX ADMIN — MUPIROCIN: 20 OINTMENT TOPICAL at 09:52

## 2018-01-01 RX ADMIN — Medication 10 ML: at 08:29

## 2018-01-01 RX ADMIN — OXYCODONE HYDROCHLORIDE AND ACETAMINOPHEN 1 TABLET: 10; 325 TABLET ORAL at 23:10

## 2018-01-01 RX ADMIN — Medication 10 ML: at 21:00

## 2018-01-01 RX ADMIN — Medication 10 ML: at 21:35

## 2018-01-01 RX ADMIN — FINASTERIDE 5 MG: 5 TABLET, FILM COATED ORAL at 09:40

## 2018-01-01 RX ADMIN — SERTRALINE HYDROCHLORIDE 100 MG: 50 TABLET ORAL at 09:53

## 2018-01-01 RX ADMIN — SERTRALINE HYDROCHLORIDE 100 MG: 50 TABLET ORAL at 10:50

## 2018-01-01 RX ADMIN — HEPARIN SODIUM 3400 UNITS: 1000 INJECTION INTRAVENOUS; SUBCUTANEOUS at 16:51

## 2018-01-01 RX ADMIN — CYCLOBENZAPRINE HYDROCHLORIDE 10 MG: 10 TABLET, FILM COATED ORAL at 23:31

## 2018-01-01 RX ADMIN — SODIUM BICARBONATE 50 MEQ: 84 INJECTION INTRAVENOUS at 12:24

## 2018-01-01 RX ADMIN — MAGNESIUM SULFATE HEPTAHYDRATE 1 G: 1 INJECTION, SOLUTION INTRAVENOUS at 18:42

## 2018-01-01 RX ADMIN — Medication 10 ML: at 08:30

## 2018-01-01 RX ADMIN — PRAMIPEXOLE DIHYDROCHLORIDE 1 MG: 1 TABLET ORAL at 20:55

## 2018-01-01 RX ADMIN — OXYCODONE HYDROCHLORIDE AND ACETAMINOPHEN 1 TABLET: 10; 325 TABLET ORAL at 10:12

## 2018-01-01 RX ADMIN — FERROUS GLUCONATE TAB 324 MG (37.5 MG ELEMENTAL IRON) 324 MG: 324 (37.5 FE) TAB at 20:45

## 2018-01-01 RX ADMIN — MONTELUKAST SODIUM 10 MG: 10 TABLET, COATED ORAL at 20:30

## 2018-01-01 RX ADMIN — Medication 10 ML: at 22:05

## 2018-01-01 RX ADMIN — MESALAMINE 400 MG: 400 CAPSULE, DELAYED RELEASE ORAL at 09:08

## 2018-01-01 RX ADMIN — CYCLOBENZAPRINE HYDROCHLORIDE 10 MG: 10 TABLET, FILM COATED ORAL at 08:29

## 2018-01-01 RX ADMIN — IPRATROPIUM BROMIDE AND ALBUTEROL SULFATE 1 AMPULE: .5; 3 SOLUTION RESPIRATORY (INHALATION) at 15:52

## 2018-01-01 RX ADMIN — Medication 10 ML: at 23:14

## 2018-01-01 RX ADMIN — INSULIN LISPRO 1 UNITS: 100 INJECTION, SOLUTION INTRAVENOUS; SUBCUTANEOUS at 17:33

## 2018-01-01 RX ADMIN — SODIUM BICARBONATE: 84 INJECTION, SOLUTION INTRAVENOUS at 18:18

## 2018-01-01 RX ADMIN — IPRATROPIUM BROMIDE AND ALBUTEROL SULFATE 1 AMPULE: .5; 3 SOLUTION RESPIRATORY (INHALATION) at 15:24

## 2018-01-01 RX ADMIN — OXYCODONE HYDROCHLORIDE AND ACETAMINOPHEN 1 TABLET: 10; 325 TABLET ORAL at 07:08

## 2018-01-01 RX ADMIN — IPRATROPIUM BROMIDE AND ALBUTEROL SULFATE 1 AMPULE: .5; 3 SOLUTION RESPIRATORY (INHALATION) at 19:27

## 2018-01-01 RX ADMIN — INSULIN LISPRO 1 UNITS: 100 INJECTION, SOLUTION INTRAVENOUS; SUBCUTANEOUS at 23:31

## 2018-01-01 RX ADMIN — SODIUM CHLORIDE 250 ML: 9 INJECTION, SOLUTION INTRAVENOUS at 09:53

## 2018-01-01 RX ADMIN — FINASTERIDE 5 MG: 5 TABLET, FILM COATED ORAL at 09:52

## 2018-01-01 RX ADMIN — SERTRALINE HYDROCHLORIDE 100 MG: 50 TABLET ORAL at 08:46

## 2018-01-01 RX ADMIN — LORAZEPAM 1 MG: 2 INJECTION, SOLUTION INTRAMUSCULAR; INTRAVENOUS at 04:28

## 2018-01-01 RX ADMIN — MONTELUKAST SODIUM 10 MG: 10 TABLET, COATED ORAL at 21:23

## 2018-01-01 RX ADMIN — Medication 10 ML: at 10:56

## 2018-01-01 RX ADMIN — DONEPEZIL HYDROCHLORIDE 10 MG: 5 TABLET, FILM COATED ORAL at 20:30

## 2018-01-01 RX ADMIN — Medication: at 18:18

## 2018-01-01 RX ADMIN — SODIUM GLYCOLATE 12 MMOL: 216 INJECTION, SOLUTION INTRAVENOUS at 20:30

## 2018-01-01 RX ADMIN — OXYCODONE HYDROCHLORIDE AND ACETAMINOPHEN 1 TABLET: 10; 325 TABLET ORAL at 19:44

## 2018-01-01 RX ADMIN — LORAZEPAM 1 MG: 2 INJECTION, SOLUTION INTRAMUSCULAR; INTRAVENOUS at 04:13

## 2018-01-01 RX ADMIN — SODIUM CHLORIDE 1000 ML: 9 INJECTION, SOLUTION INTRAVENOUS at 11:43

## 2018-01-01 RX ADMIN — Medication: at 04:36

## 2018-01-01 RX ADMIN — IPRATROPIUM BROMIDE AND ALBUTEROL SULFATE 1 AMPULE: .5; 3 SOLUTION RESPIRATORY (INHALATION) at 20:37

## 2018-01-01 RX ADMIN — MESALAMINE 400 MG: 400 CAPSULE, DELAYED RELEASE ORAL at 13:59

## 2018-01-01 RX ADMIN — IPRATROPIUM BROMIDE AND ALBUTEROL SULFATE 1 AMPULE: .5; 3 SOLUTION RESPIRATORY (INHALATION) at 11:22

## 2018-01-01 RX ADMIN — CYCLOBENZAPRINE HYDROCHLORIDE 10 MG: 10 TABLET, FILM COATED ORAL at 19:50

## 2018-01-01 RX ADMIN — FERROUS GLUCONATE TAB 324 MG (37.5 MG ELEMENTAL IRON) 324 MG: 324 (37.5 FE) TAB at 10:02

## 2018-01-01 RX ADMIN — MUPIROCIN: 20 OINTMENT TOPICAL at 08:46

## 2018-01-01 RX ADMIN — Medication: at 02:26

## 2018-01-01 RX ADMIN — CALCIUM GLUCONATE 2 G: 98 INJECTION, SOLUTION INTRAVENOUS at 12:30

## 2018-01-01 RX ADMIN — POTASSIUM CHLORIDE 40 MEQ: 20 TABLET, EXTENDED RELEASE ORAL at 10:50

## 2018-01-01 RX ADMIN — OXYCODONE HYDROCHLORIDE AND ACETAMINOPHEN 1 TABLET: 10; 325 TABLET ORAL at 04:13

## 2018-01-01 RX ADMIN — PRAMIPEXOLE DIHYDROCHLORIDE 1 MG: 1 TABLET ORAL at 21:23

## 2018-01-01 RX ADMIN — Medication 10 ML: at 09:10

## 2018-01-01 RX ADMIN — FERROUS GLUCONATE TAB 324 MG (37.5 MG ELEMENTAL IRON) 324 MG: 324 (37.5 FE) TAB at 10:16

## 2018-01-01 RX ADMIN — IPRATROPIUM BROMIDE AND ALBUTEROL SULFATE 1 AMPULE: .5; 3 SOLUTION RESPIRATORY (INHALATION) at 19:17

## 2018-01-01 RX ADMIN — HEPARIN SODIUM 5000 UNITS: 5000 INJECTION INTRAVENOUS; SUBCUTANEOUS at 06:34

## 2018-01-01 RX ADMIN — MONTELUKAST SODIUM 10 MG: 10 TABLET, COATED ORAL at 22:26

## 2018-01-01 RX ADMIN — SODIUM CHLORIDE: 900 INJECTION, SOLUTION INTRAVENOUS at 06:41

## 2018-01-01 RX ADMIN — Medication: at 09:50

## 2018-01-01 RX ADMIN — IPRATROPIUM BROMIDE AND ALBUTEROL SULFATE 1 AMPULE: .5; 3 SOLUTION RESPIRATORY (INHALATION) at 07:18

## 2018-01-01 RX ADMIN — MESALAMINE 400 MG: 400 CAPSULE, DELAYED RELEASE ORAL at 15:20

## 2018-01-01 RX ADMIN — INSULIN LISPRO 1 UNITS: 100 INJECTION, SOLUTION INTRAVENOUS; SUBCUTANEOUS at 12:28

## 2018-01-01 RX ADMIN — SODIUM CHLORIDE 1000 ML: 9 INJECTION, SOLUTION INTRAVENOUS at 16:29

## 2018-01-01 RX ADMIN — SODIUM CHLORIDE 250 ML: 9 INJECTION, SOLUTION INTRAVENOUS at 08:46

## 2018-01-01 RX ADMIN — MESALAMINE 400 MG: 400 CAPSULE, DELAYED RELEASE ORAL at 10:49

## 2018-01-01 RX ADMIN — CYCLOBENZAPRINE HYDROCHLORIDE 10 MG: 10 TABLET, FILM COATED ORAL at 09:53

## 2018-01-01 RX ADMIN — Medication: at 03:42

## 2018-01-01 RX ADMIN — MUPIROCIN: 20 OINTMENT TOPICAL at 22:25

## 2018-01-01 RX ADMIN — IPRATROPIUM BROMIDE AND ALBUTEROL SULFATE 1 AMPULE: .5; 3 SOLUTION RESPIRATORY (INHALATION) at 15:40

## 2018-01-01 RX ADMIN — IPRATROPIUM BROMIDE AND ALBUTEROL SULFATE 1 AMPULE: .5; 3 SOLUTION RESPIRATORY (INHALATION) at 16:06

## 2018-01-01 RX ADMIN — Medication 10 ML: at 09:46

## 2018-01-01 RX ADMIN — Medication 10 ML: at 08:48

## 2018-01-01 RX ADMIN — OXYCODONE HYDROCHLORIDE AND ACETAMINOPHEN 1 TABLET: 10; 325 TABLET ORAL at 21:03

## 2018-01-01 RX ADMIN — OXYCODONE HYDROCHLORIDE AND ACETAMINOPHEN 1 TABLET: 10; 325 TABLET ORAL at 13:32

## 2018-01-01 RX ADMIN — MUPIROCIN: 20 OINTMENT TOPICAL at 08:29

## 2018-01-01 RX ADMIN — FERROUS GLUCONATE TAB 324 MG (37.5 MG ELEMENTAL IRON) 324 MG: 324 (37.5 FE) TAB at 10:23

## 2018-01-01 RX ADMIN — CYCLOBENZAPRINE HYDROCHLORIDE 10 MG: 10 TABLET, FILM COATED ORAL at 10:57

## 2018-01-01 RX ADMIN — HEPARIN SODIUM: 1000 INJECTION INTRAVENOUS; SUBCUTANEOUS at 19:32

## 2018-01-01 RX ADMIN — FERROUS GLUCONATE TAB 324 MG (37.5 MG ELEMENTAL IRON) 324 MG: 324 (37.5 FE) TAB at 08:29

## 2018-01-01 RX ADMIN — FINASTERIDE 5 MG: 5 TABLET, FILM COATED ORAL at 09:08

## 2018-01-01 RX ADMIN — HEPARIN SODIUM 5000 UNITS: 5000 INJECTION INTRAVENOUS; SUBCUTANEOUS at 20:30

## 2018-01-01 RX ADMIN — MESALAMINE 400 MG: 400 CAPSULE, DELAYED RELEASE ORAL at 13:32

## 2018-01-01 RX ADMIN — HYDROXYZINE HYDROCHLORIDE 25 MG: 10 TABLET, FILM COATED ORAL at 21:02

## 2018-01-01 RX ADMIN — OXYCODONE HYDROCHLORIDE AND ACETAMINOPHEN 1 TABLET: 10; 325 TABLET ORAL at 13:59

## 2018-01-01 RX ADMIN — IPRATROPIUM BROMIDE AND ALBUTEROL SULFATE 1 AMPULE: .5; 3 SOLUTION RESPIRATORY (INHALATION) at 20:18

## 2018-01-01 RX ADMIN — Medication 10 ML: at 10:31

## 2018-01-01 RX ADMIN — POTASSIUM CHLORIDE 20 MEQ: 400 INJECTION, SOLUTION INTRAVENOUS at 18:31

## 2018-01-01 RX ADMIN — HEPARIN SODIUM: 1000 INJECTION INTRAVENOUS; SUBCUTANEOUS at 09:17

## 2018-01-01 RX ADMIN — DONEPEZIL HYDROCHLORIDE 10 MG: 5 TABLET, FILM COATED ORAL at 20:55

## 2018-01-01 RX ADMIN — MESALAMINE 400 MG: 400 CAPSULE, DELAYED RELEASE ORAL at 20:46

## 2018-01-01 RX ADMIN — FINASTERIDE 5 MG: 5 TABLET, FILM COATED ORAL at 12:17

## 2018-01-01 RX ADMIN — MAGNESIUM HYDROXIDE 30 ML: 400 SUSPENSION ORAL at 08:29

## 2018-01-01 RX ADMIN — HEPARIN SODIUM 5000 UNITS: 5000 INJECTION INTRAVENOUS; SUBCUTANEOUS at 06:33

## 2018-01-01 RX ADMIN — FUROSEMIDE 40 MG: 10 INJECTION, SOLUTION INTRAMUSCULAR; INTRAVENOUS at 09:08

## 2018-01-01 RX ADMIN — MESALAMINE 400 MG: 400 CAPSULE, DELAYED RELEASE ORAL at 20:56

## 2018-01-01 RX ADMIN — MONTELUKAST SODIUM 10 MG: 10 TABLET, COATED ORAL at 20:46

## 2018-01-01 RX ADMIN — Medication 10 ML: at 22:02

## 2018-01-01 RX ADMIN — DOPAMINE HYDROCHLORIDE IN DEXTROSE 5 MCG/KG/MIN: 1.6 INJECTION, SOLUTION INTRAVENOUS at 13:54

## 2018-01-01 RX ADMIN — SODIUM GLYCOLATE 12 MMOL: 216 INJECTION, SOLUTION INTRAVENOUS at 01:38

## 2018-01-01 RX ADMIN — IPRATROPIUM BROMIDE AND ALBUTEROL SULFATE 1 AMPULE: .5; 3 SOLUTION RESPIRATORY (INHALATION) at 09:46

## 2018-01-01 RX ADMIN — MESALAMINE 400 MG: 400 CAPSULE, DELAYED RELEASE ORAL at 16:34

## 2018-01-01 RX ADMIN — PHENTOLAMINE MESYLATE 5 MG: 5 INJECTION, POWDER, FOR SOLUTION INTRAMUSCULAR; INTRAVENOUS at 15:59

## 2018-01-01 RX ADMIN — Medication: at 07:12

## 2018-01-01 RX ADMIN — MESALAMINE 400 MG: 400 CAPSULE, DELAYED RELEASE ORAL at 08:29

## 2018-01-01 RX ADMIN — IPRATROPIUM BROMIDE AND ALBUTEROL SULFATE 1 AMPULE: .5; 3 SOLUTION RESPIRATORY (INHALATION) at 11:53

## 2018-01-01 RX ADMIN — SODIUM CHLORIDE 20 ML: 9 INJECTION, SOLUTION INTRAMUSCULAR; INTRAVENOUS; SUBCUTANEOUS at 17:04

## 2018-01-01 RX ADMIN — DILTIAZEM HYDROCHLORIDE 30 MG: 60 TABLET, FILM COATED ORAL at 18:17

## 2018-01-01 RX ADMIN — OXYCODONE HYDROCHLORIDE AND ACETAMINOPHEN 1 TABLET: 10; 325 TABLET ORAL at 16:08

## 2018-01-01 RX ADMIN — SODIUM GLYCOLATE 12 MMOL: 216 INJECTION, SOLUTION INTRAVENOUS at 06:55

## 2018-01-01 RX ADMIN — POTASSIUM CHLORIDE 20 MEQ: 400 INJECTION, SOLUTION INTRAVENOUS at 19:54

## 2018-01-01 RX ADMIN — DOPAMINE HYDROCHLORIDE IN DEXTROSE 10 MCG/KG/MIN: 1.6 INJECTION, SOLUTION INTRAVENOUS at 02:38

## 2018-01-01 RX ADMIN — SODIUM CHLORIDE 1000 ML: 9 INJECTION, SOLUTION INTRAVENOUS at 12:31

## 2018-01-01 RX ADMIN — CYCLOBENZAPRINE HYDROCHLORIDE 10 MG: 10 TABLET, FILM COATED ORAL at 16:08

## 2018-01-01 RX ADMIN — FERROUS GLUCONATE TAB 324 MG (37.5 MG ELEMENTAL IRON) 324 MG: 324 (37.5 FE) TAB at 23:11

## 2018-01-01 RX ADMIN — MESALAMINE 400 MG: 400 CAPSULE, DELAYED RELEASE ORAL at 08:51

## 2018-01-01 RX ADMIN — NOREPINEPHRINE BITARTRATE 2 MCG/MIN: 1 INJECTION INTRAVENOUS at 16:14

## 2018-01-01 RX ADMIN — CEFTRIAXONE SODIUM 1 G: 1 INJECTION, POWDER, FOR SOLUTION INTRAMUSCULAR; INTRAVENOUS at 17:01

## 2018-01-01 RX ADMIN — PRAMIPEXOLE DIHYDROCHLORIDE 1 MG: 1 TABLET ORAL at 20:46

## 2018-01-01 RX ADMIN — Medication 10 ML: at 09:45

## 2018-01-01 RX ADMIN — FUROSEMIDE 40 MG: 10 INJECTION, SOLUTION INTRAMUSCULAR; INTRAVENOUS at 18:24

## 2018-01-01 RX ADMIN — MESALAMINE 400 MG: 400 CAPSULE, DELAYED RELEASE ORAL at 22:32

## 2018-01-01 RX ADMIN — FUROSEMIDE 40 MG: 40 TABLET ORAL at 10:30

## 2018-01-01 RX ADMIN — Medication: at 05:54

## 2018-01-01 RX ADMIN — INSULIN LISPRO 1 UNITS: 100 INJECTION, SOLUTION INTRAVENOUS; SUBCUTANEOUS at 17:09

## 2018-01-01 RX ADMIN — MESALAMINE 400 MG: 400 CAPSULE, DELAYED RELEASE ORAL at 22:19

## 2018-01-01 RX ADMIN — Medication: at 10:02

## 2018-01-01 RX ADMIN — Medication: at 10:08

## 2018-01-01 RX ADMIN — FERROUS GLUCONATE TAB 324 MG (37.5 MG ELEMENTAL IRON) 324 MG: 324 (37.5 FE) TAB at 22:26

## 2018-01-01 RX ADMIN — SODIUM CHLORIDE: 9 INJECTION, SOLUTION INTRAVENOUS at 21:49

## 2018-01-01 RX ADMIN — DILTIAZEM HYDROCHLORIDE 30 MG: 60 TABLET, FILM COATED ORAL at 18:24

## 2018-01-01 RX ADMIN — Medication: at 00:46

## 2018-01-01 RX ADMIN — FUROSEMIDE 40 MG: 10 INJECTION, SOLUTION INTRAMUSCULAR; INTRAVENOUS at 10:50

## 2018-01-01 RX ADMIN — DOPAMINE HYDROCHLORIDE IN DEXTROSE 10 MCG/KG/MIN: 1.6 INJECTION, SOLUTION INTRAVENOUS at 19:23

## 2018-01-01 RX ADMIN — FINASTERIDE 5 MG: 5 TABLET, FILM COATED ORAL at 10:23

## 2018-01-01 RX ADMIN — OXYCODONE HYDROCHLORIDE AND ACETAMINOPHEN 1 TABLET: 10; 325 TABLET ORAL at 09:53

## 2018-01-01 RX ADMIN — FINASTERIDE 5 MG: 5 TABLET, FILM COATED ORAL at 08:29

## 2018-01-01 RX ADMIN — HEPARIN SODIUM 5000 UNITS: 5000 INJECTION INTRAVENOUS; SUBCUTANEOUS at 20:45

## 2018-01-01 RX ADMIN — ALBUTEROL SULFATE 10 MG: 2.5 SOLUTION RESPIRATORY (INHALATION) at 11:26

## 2018-01-01 RX ADMIN — MESALAMINE 400 MG: 400 CAPSULE, DELAYED RELEASE ORAL at 20:30

## 2018-01-01 RX ADMIN — PRAMIPEXOLE DIHYDROCHLORIDE 1 MG: 1 TABLET ORAL at 20:30

## 2018-01-01 RX ADMIN — IPRATROPIUM BROMIDE AND ALBUTEROL SULFATE 1 AMPULE: .5; 3 SOLUTION RESPIRATORY (INHALATION) at 08:42

## 2018-01-01 RX ADMIN — FUROSEMIDE 40 MG: 40 TABLET ORAL at 09:40

## 2018-01-01 RX ADMIN — OXYCODONE HYDROCHLORIDE AND ACETAMINOPHEN 1 TABLET: 10; 325 TABLET ORAL at 00:14

## 2018-01-01 RX ADMIN — MUPIROCIN: 20 OINTMENT TOPICAL at 09:09

## 2018-01-01 RX ADMIN — SODIUM CHLORIDE 500 ML: 9 INJECTION, SOLUTION INTRAVENOUS at 03:45

## 2018-01-01 RX ADMIN — IPRATROPIUM BROMIDE AND ALBUTEROL SULFATE 1 AMPULE: .5; 3 SOLUTION RESPIRATORY (INHALATION) at 20:09

## 2018-01-01 RX ADMIN — IPRATROPIUM BROMIDE AND ALBUTEROL SULFATE 1 AMPULE: .5; 3 SOLUTION RESPIRATORY (INHALATION) at 16:33

## 2018-01-01 RX ADMIN — MUPIROCIN: 20 OINTMENT TOPICAL at 22:34

## 2018-01-01 RX ADMIN — SODIUM CHLORIDE: 9 INJECTION, SOLUTION INTRAVENOUS at 02:59

## 2018-01-01 RX ADMIN — SERTRALINE HYDROCHLORIDE 100 MG: 50 TABLET ORAL at 09:39

## 2018-01-01 RX ADMIN — Medication 50 MEQ: at 12:24

## 2018-01-01 RX ADMIN — Medication 10 ML: at 09:09

## 2018-01-01 RX ADMIN — Medication 10 ML: at 21:23

## 2018-01-01 RX ADMIN — CEFTRIAXONE SODIUM 1 G: 1 INJECTION, POWDER, FOR SOLUTION INTRAMUSCULAR; INTRAVENOUS at 16:16

## 2018-01-01 RX ADMIN — OXYCODONE HYDROCHLORIDE AND ACETAMINOPHEN 1 TABLET: 10; 325 TABLET ORAL at 20:58

## 2018-01-01 RX ADMIN — CYCLOBENZAPRINE HYDROCHLORIDE 10 MG: 10 TABLET, FILM COATED ORAL at 23:10

## 2018-01-01 RX ADMIN — FERROUS GLUCONATE TAB 324 MG (37.5 MG ELEMENTAL IRON) 324 MG: 324 (37.5 FE) TAB at 09:39

## 2018-01-01 RX ADMIN — POTASSIUM CHLORIDE 40 MEQ: 20 TABLET, EXTENDED RELEASE ORAL at 10:25

## 2018-01-01 RX ADMIN — ATROPINE SULFATE 0.4 MG: 0.4 INJECTION, SOLUTION INTRAMUSCULAR; INTRAVENOUS; SUBCUTANEOUS at 11:42

## 2018-01-01 RX ADMIN — MONTELUKAST SODIUM 10 MG: 10 TABLET, COATED ORAL at 20:56

## 2018-01-01 RX ADMIN — DILTIAZEM HYDROCHLORIDE 30 MG: 60 TABLET, FILM COATED ORAL at 12:17

## 2018-01-01 RX ADMIN — DILTIAZEM HYDROCHLORIDE 30 MG: 60 TABLET, FILM COATED ORAL at 12:06

## 2018-01-01 ASSESSMENT — PAIN SCALES - GENERAL
PAINLEVEL_OUTOF10: 9
PAINLEVEL_OUTOF10: 9
PAINLEVEL_OUTOF10: 2
PAINLEVEL_OUTOF10: 8
PAINLEVEL_OUTOF10: 0
PAINLEVEL_OUTOF10: 8
PAINLEVEL_OUTOF10: 5
PAINLEVEL_OUTOF10: 2
PAINLEVEL_OUTOF10: 8
PAINLEVEL_OUTOF10: 0
PAINLEVEL_OUTOF10: 8
PAINLEVEL_OUTOF10: 8
PAINLEVEL_OUTOF10: 0
PAINLEVEL_OUTOF10: 8
PAINLEVEL_OUTOF10: 0
PAINLEVEL_OUTOF10: 9
PAINLEVEL_OUTOF10: 7
PAINLEVEL_OUTOF10: 8
PAINLEVEL_OUTOF10: 0
PAINLEVEL_OUTOF10: 8
PAINLEVEL_OUTOF10: 0
PAINLEVEL_OUTOF10: 0
PAINLEVEL_OUTOF10: 6
PAINLEVEL_OUTOF10: 8
PAINLEVEL_OUTOF10: 0
PAINLEVEL_OUTOF10: 8
PAINLEVEL_OUTOF10: 0
PAINLEVEL_OUTOF10: 7
PAINLEVEL_OUTOF10: 0
PAINLEVEL_OUTOF10: 8
PAINLEVEL_OUTOF10: 7
PAINLEVEL_OUTOF10: 8
PAINLEVEL_OUTOF10: 0
PAINLEVEL_OUTOF10: 8
PAINLEVEL_OUTOF10: 8
PAINLEVEL_OUTOF10: 7
PAINLEVEL_OUTOF10: 8

## 2018-01-01 ASSESSMENT — PAIN DESCRIPTION - PAIN TYPE
TYPE: CHRONIC PAIN
TYPE_2: ACUTE PAIN
TYPE: CHRONIC PAIN

## 2018-01-01 ASSESSMENT — PAIN DESCRIPTION - ORIENTATION
ORIENTATION: MID
ORIENTATION: LOWER
ORIENTATION: LOWER
ORIENTATION: MID
ORIENTATION: MID
ORIENTATION: LOWER

## 2018-01-01 ASSESSMENT — PAIN DESCRIPTION - DESCRIPTORS
DESCRIPTORS: ACHING
DESCRIPTORS_2: CONSTANT

## 2018-01-01 ASSESSMENT — PAIN DESCRIPTION - LOCATION
LOCATION: BACK
LOCATION_2: BACK
LOCATION: BACK

## 2018-01-01 ASSESSMENT — PAIN DESCRIPTION - INTENSITY: RATING_2: 9

## 2018-01-01 ASSESSMENT — PAIN DESCRIPTION - FREQUENCY: FREQUENCY: CONTINUOUS

## 2018-01-02 DIAGNOSIS — M54.50 CHRONIC BILATERAL LOW BACK PAIN WITHOUT SCIATICA: Primary | ICD-10-CM

## 2018-01-02 DIAGNOSIS — G89.29 CHRONIC BILATERAL LOW BACK PAIN WITHOUT SCIATICA: Primary | ICD-10-CM

## 2018-01-04 ENCOUNTER — TELEPHONE (OUTPATIENT)
Dept: FAMILY MEDICINE CLINIC | Age: 73
End: 2018-01-04

## 2018-01-05 DIAGNOSIS — G89.29 CHRONIC BILATERAL LOW BACK PAIN WITHOUT SCIATICA: Primary | ICD-10-CM

## 2018-01-05 DIAGNOSIS — M54.50 CHRONIC BILATERAL LOW BACK PAIN WITHOUT SCIATICA: Primary | ICD-10-CM

## 2018-01-05 RX ORDER — OXYCODONE AND ACETAMINOPHEN 10; 325 MG/1; MG/1
1 TABLET ORAL 2 TIMES DAILY PRN
Qty: 30 TABLET | Refills: 0 | Status: SHIPPED | OUTPATIENT
Start: 2018-01-05 | End: 2018-01-20

## 2018-01-29 ENCOUNTER — TELEPHONE (OUTPATIENT)
Dept: PULMONOLOGY | Age: 73
End: 2018-01-29

## 2018-01-29 DIAGNOSIS — J44.9 COPD, SEVERE (HCC): Primary | Chronic | ICD-10-CM

## 2018-02-01 DIAGNOSIS — J44.9 COPD, SEVERE (HCC): Chronic | ICD-10-CM

## 2018-02-01 DIAGNOSIS — J30.89 PERENNIAL ALLERGIC RHINITIS: ICD-10-CM

## 2018-02-01 RX ORDER — MONTELUKAST SODIUM 10 MG/1
TABLET ORAL
Qty: 90 TABLET | Refills: 3 | Status: SHIPPED | OUTPATIENT
Start: 2018-02-01 | End: 2018-05-16 | Stop reason: SDUPTHER

## 2018-02-01 RX ORDER — MESALAMINE 1.2 G/1
TABLET, DELAYED RELEASE ORAL
Qty: 270 TABLET | Refills: 1 | Status: SHIPPED | OUTPATIENT
Start: 2018-02-01 | End: 2018-02-09 | Stop reason: SDUPTHER

## 2018-02-01 RX ORDER — CYCLOBENZAPRINE HCL 10 MG
10 TABLET ORAL 3 TIMES DAILY PRN
Qty: 270 TABLET | Refills: 0 | Status: SHIPPED | OUTPATIENT
Start: 2018-02-01 | End: 2018-05-16 | Stop reason: SDUPTHER

## 2018-02-01 RX ORDER — ZOLPIDEM TARTRATE 10 MG/1
TABLET ORAL
Qty: 90 TABLET | Refills: 0 | Status: SHIPPED | OUTPATIENT
Start: 2018-02-01 | End: 2018-05-02

## 2018-02-01 RX ORDER — DONEPEZIL HYDROCHLORIDE 5 MG/1
TABLET, FILM COATED ORAL
Qty: 90 TABLET | Refills: 0 | Status: SHIPPED | OUTPATIENT
Start: 2018-02-01 | End: 2018-05-16 | Stop reason: SDUPTHER

## 2018-02-01 RX ORDER — TAMSULOSIN HYDROCHLORIDE 0.4 MG/1
CAPSULE ORAL
Qty: 90 CAPSULE | Refills: 3 | Status: SHIPPED | OUTPATIENT
Start: 2018-02-01 | End: 2018-08-27

## 2018-02-01 RX ORDER — ATORVASTATIN CALCIUM 10 MG/1
TABLET, FILM COATED ORAL
Qty: 90 TABLET | Refills: 0 | Status: SHIPPED | OUTPATIENT
Start: 2018-02-01 | End: 2018-05-16 | Stop reason: SDUPTHER

## 2018-02-06 DIAGNOSIS — I10 ESSENTIAL HYPERTENSION: Chronic | ICD-10-CM

## 2018-02-07 RX ORDER — AMLODIPINE BESYLATE 5 MG/1
TABLET ORAL
Qty: 90 TABLET | Refills: 0 | Status: SHIPPED | OUTPATIENT
Start: 2018-02-07 | End: 2018-05-16 | Stop reason: SDUPTHER

## 2018-02-07 RX ORDER — COLESEVELAM HYDROCHLORIDE 625 MG/1
TABLET, FILM COATED ORAL
Qty: 540 TABLET | Refills: 0 | Status: SHIPPED | OUTPATIENT
Start: 2018-02-07 | End: 2018-04-30 | Stop reason: SDUPTHER

## 2018-02-09 RX ORDER — MESALAMINE 1.2 G/1
TABLET, DELAYED RELEASE ORAL
Qty: 270 TABLET | Refills: 0 | Status: SHIPPED | OUTPATIENT
Start: 2018-02-09 | End: 2018-05-16 | Stop reason: SDUPTHER

## 2018-02-23 ENCOUNTER — TELEPHONE (OUTPATIENT)
Dept: FAMILY MEDICINE CLINIC | Age: 73
End: 2018-02-23

## 2018-03-08 ENCOUNTER — HOSPITAL ENCOUNTER (OUTPATIENT)
Dept: PULMONOLOGY | Age: 73
Discharge: HOME | End: 2018-03-08
Attending: INTERNAL MEDICINE | Admitting: INTERNAL MEDICINE

## 2018-03-08 ENCOUNTER — OFFICE VISIT (OUTPATIENT)
Dept: PULMONOLOGY | Age: 73
End: 2018-03-08

## 2018-03-08 VITALS
HEIGHT: 70 IN | BODY MASS INDEX: 31.35 KG/M2 | DIASTOLIC BLOOD PRESSURE: 70 MMHG | SYSTOLIC BLOOD PRESSURE: 110 MMHG | WEIGHT: 219 LBS | HEART RATE: 63 BPM | TEMPERATURE: 97.8 F | OXYGEN SATURATION: 94 % | RESPIRATION RATE: 18 BRPM

## 2018-03-08 DIAGNOSIS — R06.02 SOB (SHORTNESS OF BREATH): ICD-10-CM

## 2018-03-08 DIAGNOSIS — J44.9 CHRONIC OBSTRUCTIVE PULMONARY DISEASE (HCC): ICD-10-CM

## 2018-03-08 DIAGNOSIS — J44.9 COPD, SEVERE (HCC): Primary | ICD-10-CM

## 2018-03-08 PROCEDURE — G8417 CALC BMI ABV UP PARAM F/U: HCPCS | Performed by: INTERNAL MEDICINE

## 2018-03-08 PROCEDURE — G8926 SPIRO NO PERF OR DOC: HCPCS | Performed by: INTERNAL MEDICINE

## 2018-03-08 PROCEDURE — G8427 DOCREV CUR MEDS BY ELIG CLIN: HCPCS | Performed by: INTERNAL MEDICINE

## 2018-03-08 PROCEDURE — 1123F ACP DISCUSS/DSCN MKR DOCD: CPT | Performed by: INTERNAL MEDICINE

## 2018-03-08 PROCEDURE — G8482 FLU IMMUNIZE ORDER/ADMIN: HCPCS | Performed by: INTERNAL MEDICINE

## 2018-03-08 PROCEDURE — 4040F PNEUMOC VAC/ADMIN/RCVD: CPT | Performed by: INTERNAL MEDICINE

## 2018-03-08 PROCEDURE — 3017F COLORECTAL CA SCREEN DOC REV: CPT | Performed by: INTERNAL MEDICINE

## 2018-03-08 PROCEDURE — G8599 NO ASA/ANTIPLAT THER USE RNG: HCPCS | Performed by: INTERNAL MEDICINE

## 2018-03-08 PROCEDURE — 1036F TOBACCO NON-USER: CPT | Performed by: INTERNAL MEDICINE

## 2018-03-08 PROCEDURE — 99213 OFFICE O/P EST LOW 20 MIN: CPT | Performed by: INTERNAL MEDICINE

## 2018-03-08 PROCEDURE — 3023F SPIROM DOC REV: CPT | Performed by: INTERNAL MEDICINE

## 2018-03-08 RX ORDER — ALBUTEROL SULFATE 2.5 MG/3ML
2.5 SOLUTION RESPIRATORY (INHALATION) ONCE
Status: COMPLETED | OUTPATIENT
Start: 2018-03-08 | End: 2018-03-08

## 2018-03-08 RX ADMIN — ALBUTEROL SULFATE 2.5 MG: 2.5 SOLUTION RESPIRATORY (INHALATION) at 10:41

## 2018-03-08 NOTE — PROGRESS NOTES
TEST VI) strip, [employer] True Metrix Air Test Strips. FSBS daily. DX E11.9, Disp: 100 strip, Rfl: 3    metFORMIN (GLUCOPHAGE) 500 MG tablet, Take 1 tablet by mouth 2 times daily, Disp: 180 tablet, Rfl: 3    omeprazole (PRILOSEC) 40 MG delayed release capsule, Take 1 capsule by mouth daily, Disp: 90 capsule, Rfl: 3    TRUEPLUS LANCETS 28G MISC, 1 each by Does not apply route daily E11.9 Test FBS daily--NEED 33 GAUGE, Disp: 100 each, Rfl: 3    b complex vitamins capsule, Take 1 capsule by mouth daily, Disp: 30 capsule, Rfl: 3    aspirin (ASPIRIN CHILDRENS) 81 MG chewable tablet, Take 1 tablet by mouth daily, Disp: 30 tablet, Rfl: 0    ferrous sulfate 325 (65 FE) MG tablet, Take 1 tablet by mouth 2 times daily (with meals), Disp: 180 tablet, Rfl: 3    pramipexole (MIRAPEX) 1 MG tablet, Take 1 tablet by mouth nightly, Disp: 90 tablet, Rfl: 2    Objective:   PHYSICAL EXAM: /70 (Site: Right Arm, Position: Sitting, Cuff Size: Large Adult)   Pulse 63   Temp 97.8 °F (36.6 °C) (Oral)   Resp 18   Ht 5' 10\" (1.778 m)   Wt 219 lb (99.3 kg)   SpO2 94% Comment: RA  BMI 31.42 kg/m²    Constitutional:  No acute distress. Eyes: PERRL. Conjunctivae anicteric. ENT: Normal nose. Normal tongue. Oropharynx clear. Neck:  Trachea is midline. No thyroid tenderness. Respiratory: No accessory muscle usage. decreased breath sounds. No wheezes. No rales. No Rhonchi. Cardiovascular: Normal S1S2. No digit clubbing. No digit cyanosis. No LE edema. Psychiatric: No anxiety or Agitation. Alert and Oriented to person, place and time. LABS:  Reviewed any pertinent new labs that are available.     PFTs   8/16/17  FVC  (%) FEV1 1.35 (42%) FEV1/FVC ratio 43   TLC  (%)  RV  (176%)   DLCO (70%) Bronchodilator response: +  3/8/18  FVC  (82%) FEV1 1.84 (59%) FEV1/FVC ratio 0.52  TLC  (99%)  RV  (128%)   DLCO (69%) Bronchodilator response: no    6MWT: 860 ft, 91%    IMAGING:  I personally reviewed and interpreted the following today in the office:   CXR:      Chest CT:    ASSESSMENT:  · Moderately Severe COPD  · SOB  · H/o aflutter and diastolic CHF     PLAN:   · Pulmonary rehab referral discussed but pt is not interested  · Start Anoro (combined LABA/LAMA)  · Continue PRN albuterol  · The Pneumovax 23 today.  The Flu Vaccine today  · F/u in 6 months

## 2018-03-09 NOTE — PROCEDURES
[address]                  [address] [facility]                                PULMONARY FUNCTION    PATIENT NAME: Cori Gore                    :        1945  MED REC NO:   [id number]                          ROOM:  ACCOUNT NO:   [de-identified]                          ADMIT DATE: 2018  PROVIDER:     Martin Ng MD    DATE OF PROCEDURE:  2018    ORDERING PHYSICIAN:  Kimberlee Michel MD    GIVEN DIAGNOSIS:  COPD. FINDINGS:  1. Spirometry: The FEV1 is 1.84 L or 59% predicted. The FVC is 3.55,  which is 82% predicted. The FEV1/FVC ratio is 52%. There is no  demonstrated response to inhaled bronchodilators. 2.  Plethysmography:  The total lung capacity is 6.96 L or 99% predicted. 3.  Diffusing capacity:  Diffusing capacity of carbon monoxide is 19.82  mL/min/mmHg, which is 69% predicted. 4.  Flow volume loop:  Tracings showed an obstructive defect pattern. IMPRESSION:  1. There is a moderate obstructive lung defect without demonstrative  response to inhaled bronchodilators. 2.  There is no evidence of restrictive ventilatory defect. 3.  There is a mild reduction in diffusing capacity. 4.  Overall, his pulmonary function tests are consistent with mild COPD. SIX-MINUTE WALK TEST:  The patient performed a six-minute walk test  according to [address] protocol. At baseline, the  patient's oxygen saturation was 93% with the heart rate of 60. The patient  was able to complete the study, walking total distance of 860 feet. The  lowest oxygen saturation throughout the study was 90%. IMPRESSION:  The patient completed a six-minute walk test, walking 860 feet  and demonstrated mild arterial oxygen desaturation but did not require  supplemental oxygen.         Gustavo Iverson MD    D: 2018 12:56:16       T: 2018 14:23:59     [email or web address]  Job#: [id number]     Doc#: [id number]    CC:

## 2018-04-24 ENCOUNTER — HOSPITAL ENCOUNTER (OUTPATIENT)
Dept: PHYSICAL THERAPY | Age: 73
Discharge: HOME | End: 2018-04-24
Admitting: ANESTHESIOLOGY

## 2018-04-24 NOTE — FLOWSHEET NOTE
moderate disc   height loss posteriorly at C3-C4 and C5.  There is mild multilevel posterior   endplate hypertrophic spurring.  Prevertebral soft tissues are without acute   process.           Impression   No acute intracranial abnormality.       Mild atrophy and white matter changes consistent with chronic small vessel   ischemic disease.       Multilevel degenerative changes in the cervical spine.  No acute osseous   abnormality.              FINDINGS:  Thoracic CT  4/17/17   BONES/ALIGNMENT: Acute burst type fracture of T12 with moderate central   height loss.  Axially and coronally oriented fracture planes are seen with   mild comminution.  There is 2-3 mm dorsal osseous retropulsion at the   inferior endplate level.  No evidence of fracture plane extension into the   pedicles.       No additional acute fracture in the thoracic spine is demonstrated. Remainder of the thoracic vertebral bodies maintain normal height.  There is   no spondylolisthesis.       DEGENERATIVE CHANGES: No gross spinal canal stenosis or bony neural foraminal   narrowing of the thoracic spine.       SOFT TISSUES: No paraspinal mass is seen.           Impression   Acute burst type fracture of T12 with moderate height loss and mild inferior   endplate dorsal osseous retropulsion.              FINDINGS:  Lumbar CT scan   4/17/17   BONES/ALIGNMENT: There is a burst type fracture at T12, incompletely   evaluated.  Please see separate CT thoracic spine report for further   evaluation.  There is no acute fracture of the lumbar spine identified. There is normal alignment.  There is no spondylolysis.       DEGENERATIVE CHANGES: Vacuum disc phenomenon is present at L5-S1.  Disc   spaces are otherwise preserved.  Disc bulges are noted at L4-L5 and L5-S1.    There is however no significant spinal canal stenosis or neural foraminal   narrowing present.       SOFT TISSUES/RETROPERITONEUM: There is no paraspinal mass identified.           Impression

## 2018-04-24 NOTE — PROGRESS NOTES
infarct. Gary Hasten is no evidence of hydrocephalus.       ORBITS: The visualized portion of the orbits demonstrate no acute abnormality.       SINUSES:  There is partial opacification of the right sphenoid sinus and   minimal opacification of the right maxillary sinus.  The visualized paranasal   sinuses and mastoid air cells demonstrate no acute abnormality.       SOFT TISSUES/SKULL:  No acute abnormality of the visualized skull or soft   tissues.       Cervical spine: There is normal alignment at the craniocervical junction. Occipital condyles are unremarkable.  There is slight reversal the normal   cervical lordosis.  Cervical vertebra otherwise demonstrate normal height and   alignment.  No fracture identified.  There is severe disc height loss at   C4-C5 with adjacent degenerative endplate changes.  There is moderate disc   height loss posteriorly at C3-C4 and C5.  There is mild multilevel posterior   endplate hypertrophic spurring.  Prevertebral soft tissues are without acute   process.           Impression   No acute intracranial abnormality.       Mild atrophy and white matter changes consistent with chronic small vessel   ischemic disease.       Multilevel degenerative changes in the cervical spine.  No acute osseous   abnormality.            FINDINGS:  Thoracic CT  4/17/17   BONES/ALIGNMENT: Acute burst type fracture of T12 with moderate central   height loss.  Axially and coronally oriented fracture planes are seen with   mild comminution.  There is 2-3 mm dorsal osseous retropulsion at the   inferior endplate level.  No evidence of fracture plane extension into the   pedicles.       No additional acute fracture in the thoracic spine is demonstrated.    Remainder of the thoracic vertebral bodies maintain normal height.  There is   no spondylolisthesis.       DEGENERATIVE CHANGES: No gross spinal canal stenosis or bony neural foraminal   narrowing of the thoracic spine.       SOFT TISSUES: No paraspinal mass to rafi on cars, doesn't ride the [employer] so much- too bouncy- 11 acres  PLOF: has been much more limited in the past year since the T12 fx. Pt. Sleep affected? :   Only sleeps 2-3 hours. Gets stabbing pain when he turns over or moves. Patient goal for therapy: sleep better, have less pain, be more active. OBJECTIVE FINDINGS      Posture  :  Stands with level pelvis, mod FHP, ppost rot cranium      Gait/Steps/Balance       Deviations on a level linoleum surface include - short steps, decr HS and TO, careful and deliberate    Quick Tests         SI Tests         Toe Walk (S2):able in place     March Test:  Heel Walk (L5):able in place     Forward Flexion Test:                    Cigarette Butt Test:    Palpation/Observation   :  Increased cervical region resting tone with guarding. Increased lumbar resting tone with gurading. Range of Motion ( lumbar)     Range Tested AROM PROM MMT/Resisted Tests   Flexion decr 50%*  Strain with R   Extension decr 75%*  \"         \"   Sidebending Left decr 30%*  neg   Sidebending Right decr30%*  neg   Rotation Left decr 25%*,more than R  neg   Rotation Right  decr25%*  neg   Comments:    Cervical:    decr 40% bilat, no pain. fl[facility] and no pain. Has left cervical pain with SB left. SB both directions is limited 90%                     No pain with resisted tests. Negative foraminal test.    UE Strength   bilat shoulder AROM is WNL. 5/5 strength for flz and abd.      Shoulder  Left Right Scapula Left Right   Flexion /5 /5 Upper Trapezius /5 /5   Abduction  /5 /5 Middle Trapezius /5 /5   Adduction  /5 /5 Lower Trapezius  /5 /5   Extension /5 /5 Rhomboid /5 /5   Internal Rotation /5 /5 Serratus Anterior   /5 /5   External Rotation /5 /5 Latissimus Dorsi /5 /5       t UE Dermatomes/Myotomes   LT intact bilat UE  Dermatomes Left Right Myotomes Left Right   Posterior Head (C2)   Cervical Flexion (C1-2)     Lateral Upper Neck (C3)   Cervical Sidebend (C3) Findings SI Special Test Findings   Straight Leg Raise neg Sit up Test    Crams neg 90/90 Test    Dural Tension  Oscillation    Distraction  Ant/Post Rot Prov    Compression  Other        Specific Mobility Testing     Lumbar:     Decreased flx L1-L5    Other:         Functional Outcome Measure    Measure used: Mod oswestry(LB)          Neck disability            Score:  28                                                   Score:20  % Disability:  54%                                  % disability:  40%                                                      ASSESSMENT   Presents with arthrogenic thoracolumbar pain with hypomobility of his spine- cerv/thoracic and lumbar. PT should be very helpful. GCode:    CK  walk    Problems          Decreased  cervical ROM   Decreased trunk ROM   Decreased strength   Decreased joint mobility   Increased pain   Poor posture   Decreased functional status   Decreased flexibility               Rehabilitation Potential:  Good for goals listed below. Strengths for achieving goals include: willing and motivated  Limitations for achieving goals include:severity of his condition    Prognosis: [x]    Good []    Fair []    Poor    GOALS   GCode:   CJ  Short Term Goals ( 4   weeks) Long Term Goals ( 8  weeks)   1). establishHEP 1). (I) HEP   2). decr pain  50% 2). decr pain  75%   3). hips to 4+    Abdominals to 4- 3). hips to 5/5    Abdominals to 4/5   4). improve cervical rot 10-20% bilat 4). cerv strength to 4+ to 5   5). increase activity level 25% 5). increase activity level 50%   6). 6). PLAN OF CARE     To see patient 2 x/week for  4-8  weeks for the following treatment interventions:     Therapeutic Exercise   Progressive Resistive Exercise   Modalities of Choice (Heat/Cold/US/EStim/Ionto)   Home Exercise Program   Manual Techniques/Mobilization   Postural Reeducation    Thank you for the referral of this patient.       Timed Code Treatment Minutes:

## 2018-04-25 ENCOUNTER — HOSPITAL ENCOUNTER (OUTPATIENT)
Dept: PHYSICAL THERAPY | Age: 73
Discharge: HOME | End: 2018-04-26
Admitting: ANESTHESIOLOGY

## 2018-04-30 RX ORDER — COLESEVELAM 180 1/1
TABLET ORAL
Qty: 540 TABLET | Refills: 1 | Status: SHIPPED | OUTPATIENT
Start: 2018-04-30 | End: 2018-05-16 | Stop reason: SDUPTHER

## 2018-05-01 ENCOUNTER — HOSPITAL ENCOUNTER (OUTPATIENT)
Dept: OTHER | Age: 73
Discharge: HOME | End: 2018-05-31
Attending: ANESTHESIOLOGY | Admitting: ANESTHESIOLOGY

## 2018-05-02 ENCOUNTER — HOSPITAL ENCOUNTER (OUTPATIENT)
Dept: PHYSICAL THERAPY | Age: 73
Discharge: HOME | End: 2018-05-03
Admitting: ANESTHESIOLOGY

## 2018-05-07 ENCOUNTER — HOSPITAL ENCOUNTER (OUTPATIENT)
Dept: PHYSICAL THERAPY | Age: 73
Discharge: HOME | End: 2018-05-08
Admitting: ANESTHESIOLOGY

## 2018-05-10 ENCOUNTER — OFFICE VISIT (OUTPATIENT)
Dept: FAMILY MEDICINE CLINIC | Age: 73
End: 2018-05-10

## 2018-05-10 VITALS
HEIGHT: 70 IN | SYSTOLIC BLOOD PRESSURE: 128 MMHG | OXYGEN SATURATION: 97 % | BODY MASS INDEX: 32.35 KG/M2 | HEART RATE: 76 BPM | DIASTOLIC BLOOD PRESSURE: 66 MMHG | WEIGHT: 226 LBS

## 2018-05-10 DIAGNOSIS — Z23 NEED FOR PROPHYLACTIC VACCINATION AGAINST STREPTOCOCCUS PNEUMONIAE (PNEUMOCOCCUS): ICD-10-CM

## 2018-05-10 DIAGNOSIS — Z00.00 ROUTINE GENERAL MEDICAL EXAMINATION AT A HEALTH CARE FACILITY: ICD-10-CM

## 2018-05-10 PROCEDURE — 4040F PNEUMOC VAC/ADMIN/RCVD: CPT | Performed by: FAMILY MEDICINE

## 2018-05-10 PROCEDURE — G8599 NO ASA/ANTIPLAT THER USE RNG: HCPCS | Performed by: FAMILY MEDICINE

## 2018-05-10 PROCEDURE — 90732 PPSV23 VACC 2 YRS+ SUBQ/IM: CPT | Performed by: FAMILY MEDICINE

## 2018-05-10 PROCEDURE — G0438 PPPS, INITIAL VISIT: HCPCS | Performed by: FAMILY MEDICINE

## 2018-05-10 PROCEDURE — G0009 ADMIN PNEUMOCOCCAL VACCINE: HCPCS | Performed by: FAMILY MEDICINE

## 2018-05-10 ASSESSMENT — LIFESTYLE VARIABLES: HOW OFTEN DO YOU HAVE A DRINK CONTAINING ALCOHOL: 0

## 2018-05-10 ASSESSMENT — PATIENT HEALTH QUESTIONNAIRE - PHQ9: SUM OF ALL RESPONSES TO PHQ QUESTIONS 1-9: 1

## 2018-05-16 ENCOUNTER — PATIENT MESSAGE (OUTPATIENT)
Dept: FAMILY MEDICINE CLINIC | Age: 73
End: 2018-05-16

## 2018-05-16 ENCOUNTER — HOSPITAL ENCOUNTER (OUTPATIENT)
Dept: PHYSICAL THERAPY | Age: 73
Discharge: HOME | End: 2018-05-17
Admitting: ANESTHESIOLOGY

## 2018-05-16 DIAGNOSIS — I10 ESSENTIAL HYPERTENSION: Chronic | ICD-10-CM

## 2018-05-16 DIAGNOSIS — R45.89 DEPRESSED AFFECT: ICD-10-CM

## 2018-05-16 DIAGNOSIS — J30.89 PERENNIAL ALLERGIC RHINITIS: ICD-10-CM

## 2018-05-16 DIAGNOSIS — E11.9 CONTROLLED TYPE 2 DIABETES MELLITUS WITHOUT COMPLICATION, WITHOUT LONG-TERM CURRENT USE OF INSULIN (HCC): ICD-10-CM

## 2018-05-16 DIAGNOSIS — J44.9 COPD, SEVERE (HCC): Chronic | ICD-10-CM

## 2018-05-16 DIAGNOSIS — K21.9 GASTROESOPHAGEAL REFLUX DISEASE WITHOUT ESOPHAGITIS: ICD-10-CM

## 2018-05-16 RX ORDER — MONTELUKAST SODIUM 10 MG/1
10 TABLET ORAL NIGHTLY
Qty: 90 TABLET | Refills: 3 | Status: SHIPPED | OUTPATIENT
Start: 2018-05-16 | End: 2019-01-01 | Stop reason: SDUPTHER

## 2018-05-16 RX ORDER — SERTRALINE HYDROCHLORIDE 100 MG/1
100 TABLET, FILM COATED ORAL DAILY
Qty: 90 TABLET | Refills: 3 | Status: SHIPPED | OUTPATIENT
Start: 2018-05-16 | End: 2019-01-01 | Stop reason: SDUPTHER

## 2018-05-16 RX ORDER — MESALAMINE 1.2 G/1
TABLET, DELAYED RELEASE ORAL
Qty: 270 TABLET | Refills: 1 | Status: SHIPPED | OUTPATIENT
Start: 2018-05-16 | End: 2019-01-01 | Stop reason: SDUPTHER

## 2018-05-16 RX ORDER — OMEPRAZOLE 40 MG/1
40 CAPSULE, DELAYED RELEASE ORAL DAILY
Qty: 90 CAPSULE | Refills: 1 | Status: SHIPPED | OUTPATIENT
Start: 2018-05-16 | End: 2019-01-01 | Stop reason: SDUPTHER

## 2018-05-16 RX ORDER — LISINOPRIL 20 MG/1
20 TABLET ORAL DAILY
Qty: 90 TABLET | Refills: 0 | Status: SHIPPED | OUTPATIENT
Start: 2018-05-16 | End: 2018-07-16 | Stop reason: SDUPTHER

## 2018-05-16 RX ORDER — CYCLOBENZAPRINE HCL 10 MG
10 TABLET ORAL 3 TIMES DAILY PRN
Qty: 270 TABLET | Refills: 0 | Status: SHIPPED | OUTPATIENT
Start: 2018-05-16 | End: 2019-01-01 | Stop reason: SDUPTHER

## 2018-05-16 RX ORDER — COLESEVELAM 180 1/1
TABLET ORAL
Qty: 540 TABLET | Refills: 1 | Status: SHIPPED | OUTPATIENT
Start: 2018-05-16 | End: 2019-01-01 | Stop reason: SDUPTHER

## 2018-05-16 RX ORDER — DONEPEZIL HYDROCHLORIDE 5 MG/1
5 TABLET, FILM COATED ORAL NIGHTLY
Qty: 90 TABLET | Refills: 3 | Status: SHIPPED | OUTPATIENT
Start: 2018-05-16 | End: 2018-09-19

## 2018-05-16 RX ORDER — AMLODIPINE BESYLATE 5 MG/1
5 TABLET ORAL DAILY
Qty: 90 TABLET | Refills: 3 | Status: ON HOLD | OUTPATIENT
Start: 2018-05-16 | End: 2018-10-22 | Stop reason: HOSPADM

## 2018-05-16 RX ORDER — ATORVASTATIN CALCIUM 10 MG/1
10 TABLET, FILM COATED ORAL DAILY
Qty: 90 TABLET | Refills: 3 | Status: SHIPPED | OUTPATIENT
Start: 2018-05-16 | End: 2018-10-22

## 2018-05-21 ENCOUNTER — HOSPITAL ENCOUNTER (OUTPATIENT)
Dept: PHYSICAL THERAPY | Age: 73
Discharge: HOME | End: 2018-05-22
Admitting: ANESTHESIOLOGY

## 2018-05-23 ENCOUNTER — HOSPITAL ENCOUNTER (OUTPATIENT)
Dept: PHYSICAL THERAPY | Age: 73
Discharge: HOME | End: 2018-04-30
Admitting: ANESTHESIOLOGY

## 2018-05-23 ENCOUNTER — HOSPITAL ENCOUNTER (OUTPATIENT)
Dept: PHYSICAL THERAPY | Age: 73
Discharge: HOME | End: 2018-05-24
Admitting: ANESTHESIOLOGY

## 2018-06-01 ENCOUNTER — HOSPITAL ENCOUNTER (OUTPATIENT)
Dept: OTHER | Age: 73
Discharge: HOME | End: 2018-06-11
Attending: ANESTHESIOLOGY | Admitting: ANESTHESIOLOGY

## 2018-07-16 DIAGNOSIS — J44.9 COPD, SEVERE (HCC): Primary | Chronic | ICD-10-CM

## 2018-07-16 RX ORDER — UMECLIDINIUM BROMIDE AND VILANTEROL TRIFENATATE 62.5; 25 UG/1; UG/1
1 POWDER RESPIRATORY (INHALATION) DAILY
Qty: 60 EACH | Refills: 1 | Status: SHIPPED | OUTPATIENT
Start: 2018-07-16 | End: 2018-01-01

## 2018-08-27 ENCOUNTER — OFFICE VISIT (OUTPATIENT)
Dept: FAMILY MEDICINE CLINIC | Age: 73
End: 2018-08-27

## 2018-08-27 VITALS
OXYGEN SATURATION: 98 % | HEART RATE: 68 BPM | TEMPERATURE: 97.6 F | DIASTOLIC BLOOD PRESSURE: 80 MMHG | BODY MASS INDEX: 32.21 KG/M2 | SYSTOLIC BLOOD PRESSURE: 140 MMHG | HEIGHT: 70 IN | WEIGHT: 225 LBS

## 2018-08-27 DIAGNOSIS — E53.8 VITAMIN B12 DEFICIENCY: ICD-10-CM

## 2018-08-27 DIAGNOSIS — Z13.29 THYROID DISORDER SCREEN: ICD-10-CM
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
## 2018-08-27 DIAGNOSIS — R35.0 URINARY FREQUENCY: ICD-10-CM

## 2018-08-27 DIAGNOSIS — N40.1 BPH WITH OBSTRUCTION/LOWER URINARY TRACT SYMPTOMS: Primary | ICD-10-CM

## 2018-08-27 DIAGNOSIS — N39.41 URGE INCONTINENCE OF URINE: ICD-10-CM

## 2018-08-27 DIAGNOSIS — R41.0 CONFUSION: ICD-10-CM

## 2018-08-27 DIAGNOSIS — N13.8 BPH WITH OBSTRUCTION/LOWER URINARY TRACT SYMPTOMS: Primary | ICD-10-CM

## 2018-08-27 LAB
BILIRUBIN, POC: NORMAL
BLOOD URINE, POC: NORMAL
CLARITY, POC: CLEAR
COLOR, POC: YELLOW
FOLATE: 11.35 NG/ML (ref 4.78–24.2)
GLUCOSE URINE, POC: 250
KETONES, POC: NORMAL
LEUKOCYTE EST, POC: NORMAL
NITRITE, POC: NORMAL
PH, POC: 5.5
PROTEIN, POC: NORMAL
SPECIFIC GRAVITY, POC: 1.02
T4 FREE: 1 NG/DL (ref 0.9–1.8)
TSH SERPL DL<=0.05 MIU/L-ACNC: 0.98 UIU/ML (ref 0.27–4.2)
UROBILINOGEN, POC: 0.2
VITAMIN B-12: 337 PG/ML (ref 211–911)

## 2018-08-27 PROCEDURE — 1036F TOBACCO NON-USER: CPT | Performed by: FAMILY MEDICINE

## 2018-08-27 PROCEDURE — 1123F ACP DISCUSS/DSCN MKR DOCD: CPT | Performed by: FAMILY MEDICINE

## 2018-08-27 PROCEDURE — 3017F COLORECTAL CA SCREEN DOC REV: CPT | Performed by: FAMILY MEDICINE

## 2018-08-27 PROCEDURE — 99214 OFFICE O/P EST MOD 30 MIN: CPT | Performed by: FAMILY MEDICINE

## 2018-08-27 PROCEDURE — G8599 NO ASA/ANTIPLAT THER USE RNG: HCPCS | Performed by: FAMILY MEDICINE

## 2018-08-27 PROCEDURE — G8427 DOCREV CUR MEDS BY ELIG CLIN: HCPCS | Performed by: FAMILY MEDICINE

## 2018-08-27 PROCEDURE — G8417 CALC BMI ABV UP PARAM F/U: HCPCS | Performed by: FAMILY MEDICINE

## 2018-08-27 PROCEDURE — 81002 URINALYSIS NONAUTO W/O SCOPE: CPT | Performed by: FAMILY MEDICINE

## 2018-08-27 PROCEDURE — 1101F PT FALLS ASSESS-DOCD LE1/YR: CPT | Performed by: FAMILY MEDICINE

## 2018-08-27 PROCEDURE — 4040F PNEUMOC VAC/ADMIN/RCVD: CPT | Performed by: FAMILY MEDICINE

## 2018-08-27 RX ORDER — TAMSULOSIN HYDROCHLORIDE 0.4 MG/1
0.4 CAPSULE ORAL DAILY
Qty: 30 CAPSULE | Refills: 3 | Status: SHIPPED | OUTPATIENT
Start: 2018-08-27 | End: 2019-01-01 | Stop reason: SDUPTHER

## 2018-08-27 RX ORDER — SULFAMETHOXAZOLE AND TRIMETHOPRIM 800; 160 MG/1; MG/1
1 TABLET ORAL 2 TIMES DAILY
Qty: 20 TABLET | Refills: 0 | Status: SHIPPED | OUTPATIENT
Start: 2018-08-27 | End: 2018-09-06

## 2018-08-27 NOTE — PROGRESS NOTES
tablet by mouth daily, Taking? Yes, Authorizing Provider Ryan Love, DO    Medication montelukast (SINGULAIR) 10 MG tablet, Sig Take 1 tablet by mouth nightly, Taking? Yes, Authorizing Provider Ryan Love, DO    Medication amLODIPine (NORVASC) 5 MG tablet, Sig Take 1 tablet by mouth daily, Taking? Yes, Authorizing Provider Ryan Love, DO    Medication colesevelam (WELCHOL) 625 MG tablet, Sig TAKE 3 TABLETS TWICE DAILY, Taking? Yes, Authorizing Provider Ryan Love, DO    Medication tamsulosin (FLOMAX) 0.4 MG capsule, Sig TAKE 1 CAPSULE EVERY DAY, Taking? Yes, Authorizing Provider Ryan Love, DO    Medication TRUEPLUS LANCETS 28G MISC, Sig 1 each by Does not apply route daily E11.9 Test FBS daily--NEED 33 GAUGE, Taking? Yes, Authorizing Provider Madeleine Love, DO    Medication b complex vitamins capsule, Sig Take 1 capsule by mouth daily, Taking? Yes, Authorizing Provider SHAYY Moore CNP    Medication aspirin (ASPIRIN CHILDRENS) 81 MG chewable tablet, Sig Take 1 tablet by mouth daily, Taking? Yes, Authorizing Provider SHAYY Moore CNP    Medication ferrous sulfate 325 (65 FE) MG tablet, Sig Take 1 tablet by mouth 2 times daily (with meals), Taking? Yes, Authorizing Provider SHAYY Moore CNP      Past Medical History:  No date: Cancer [facility])      Comment: skin  No date: Chronic pancreatitis ([facility]  5/08/2015: Colitis  No date: Diabetes mellitus ([facility]  No date: Esophagitis  No date: Gastritis  No date: Hyperlipidemia  No date: Hypertension          Review of Systems   Constitutional: Negative for chills and fever. Genitourinary: Positive for difficulty urinating, frequency and urgency. Negative for hematuria. Objective:   Physical Exam   Genitourinary:   Genitourinary Comments: Lower 1/3 of prostate enlarged       Assessment:      1. BPH with obstruction/lower urinary tract symptoms    2. Urinary frequency    3. Urge incontinence of urine    4.  Fall, initial

## 2018-08-29 LAB — URINE CULTURE, ROUTINE: NORMAL

## 2018-08-31 ENCOUNTER — HOSPITAL ENCOUNTER (OUTPATIENT)
Age: 73
Discharge: HOME | End: 2018-08-31
Payer: MEDICARE

## 2018-08-31 ENCOUNTER — HOSPITAL ENCOUNTER (OUTPATIENT)
Dept: MRI IMAGING | Age: 73
Discharge: HOME | End: 2018-08-31
Payer: MEDICARE

## 2018-08-31 DIAGNOSIS — I69.30 MULTI-INFARCT STATE: ICD-10-CM

## 2018-08-31 DIAGNOSIS — R41.0 CONFUSION: ICD-10-CM

## 2018-08-31 DIAGNOSIS — R41.3 EPISODIC MEMORY LOSS: Primary | ICD-10-CM

## 2018-08-31 LAB — PROSTATE SPECIFIC ANTIGEN: 6.53 NG/ML (ref 0–4)

## 2018-08-31 PROCEDURE — 36415 COLL VENOUS BLD VENIPUNCTURE: CPT

## 2018-08-31 PROCEDURE — 70551 MRI BRAIN STEM W/O DYE: CPT

## 2018-08-31 PROCEDURE — 84153 ASSAY OF PSA TOTAL: CPT

## 2018-09-10 ENCOUNTER — OFFICE VISIT (OUTPATIENT)
Dept: FAMILY MEDICINE CLINIC | Age: 73
End: 2018-09-10

## 2018-09-10 VITALS
SYSTOLIC BLOOD PRESSURE: 120 MMHG | WEIGHT: 220 LBS | HEART RATE: 61 BPM | BODY MASS INDEX: 31.5 KG/M2 | HEIGHT: 70 IN | DIASTOLIC BLOOD PRESSURE: 62 MMHG | OXYGEN SATURATION: 95 %

## 2018-09-10 DIAGNOSIS — Z01.818 PRE-OP EXAMINATION: Primary | ICD-10-CM

## 2018-09-10 PROCEDURE — G8427 DOCREV CUR MEDS BY ELIG CLIN: HCPCS | Performed by: FAMILY MEDICINE

## 2018-09-10 PROCEDURE — 4040F PNEUMOC VAC/ADMIN/RCVD: CPT | Performed by: FAMILY MEDICINE

## 2018-09-10 PROCEDURE — 1123F ACP DISCUSS/DSCN MKR DOCD: CPT | Performed by: FAMILY MEDICINE

## 2018-09-10 PROCEDURE — G8417 CALC BMI ABV UP PARAM F/U: HCPCS | Performed by: FAMILY MEDICINE

## 2018-09-10 PROCEDURE — 1036F TOBACCO NON-USER: CPT | Performed by: FAMILY MEDICINE

## 2018-09-10 PROCEDURE — 1101F PT FALLS ASSESS-DOCD LE1/YR: CPT | Performed by: FAMILY MEDICINE

## 2018-09-10 PROCEDURE — G8599 NO ASA/ANTIPLAT THER USE RNG: HCPCS | Performed by: FAMILY MEDICINE

## 2018-09-10 PROCEDURE — 3017F COLORECTAL CA SCREEN DOC REV: CPT | Performed by: FAMILY MEDICINE

## 2018-09-10 PROCEDURE — 99214 OFFICE O/P EST MOD 30 MIN: CPT | Performed by: FAMILY MEDICINE

## 2018-09-10 NOTE — PROGRESS NOTES
vertebral burst fracture    Colitis    Former smoker    Typical atrial flutter ([facility]    Acute diastolic congestive heart failure (HCC)    Gastroesophageal reflux disease without esophagitis    Short-term memory loss    Nocturia       Past Medical History:   Diagnosis Date    Cancer ([facility]     skin    Chronic pancreatitis ([facility]     Colitis 5/08/2015    Diabetes mellitus ([facility]     Esophagitis     Gastritis     Hyperlipidemia     Hypertension      Past Surgical History:   Procedure Laterality Date    CATARACT REMOVAL WITH IMPLANT Right 05/2016    COLONOSCOPY  5/08/2015    colitis-mild gastritis    HERNIA REPAIR Right 2012    inguinal    SHOULDER SURGERY Left 2001    Rotator cuff    SKIN CANCER EXCISION Left 5/2016    melanoma    TESTICLE REMOVAL Left 2012    UPPER GASTROINTESTINAL ENDOSCOPY  05/08/2015    Gastritis    UPPER GASTROINTESTINAL ENDOSCOPY N/A 09/08/2016    gastritis-Bx pending     Family History   Problem Relation Age of Onset    Diabetes Mother     Early Death Mother     Cancer Mother         stomach    Other Father     Diabetes Son      Social History     Social History    Marital status:      Spouse name: N/A    Number of children: N/A    Years of education: N/A     Occupational History    Not on file. Social History Main Topics    Smoking status: Former Smoker     Packs/day: 1.50     Years: 20.00     Types: Cigarettes     Quit date: 1/10/2000    Smokeless tobacco: Never Used    Alcohol use No    Drug use: No    Sexual activity: Yes     Partners: Female     Other Topics Concern    Not on file     Social History Narrative    No narrative on file       Review of Systems  A comprehensive review of systems was negative except for what was noted in the HPI. Physical Exam   Constitutional: He is oriented to person, place, and time. He appears well-developed and well-nourished. No distress. HENT:   Head: Normocephalic and atraumatic.    Mouth/Throat: Uvula heart failure, history of cerebrovascular disease, DM, or renal insufficiency    Routine administration of higher-dose, long-acting metoprolol in beta-blockernaïve patients on the day of surgery, and in the absence of dose titration is associated with an overall increase in mortality. Beta-blockers should be started days to weeks prior to surgery and titrated to pulse < 70.  4. Deep vein thrombosis prophylaxis: regimen to be chosen by surgical team  5. Satisf condition for planned anesthesia with increased risk due to above medical issues-cleared for surgery.

## 2018-09-10 NOTE — PROGRESS NOTES
Subjective:      Patient ID: Paulino Angeles is a 68 y.o. male.     xxx        Review of Systems   Constitutional:        Xxx       Objective:   Physical Exam   Constitutional:   xxx       Assessment:      xxx      Plan:      xxx        Trina Brown DO

## 2018-09-11 ENCOUNTER — OFFICE VISIT (OUTPATIENT)
Dept: PULMONOLOGY | Age: 73
End: 2018-09-11

## 2018-09-11 VITALS
DIASTOLIC BLOOD PRESSURE: 61 MMHG | TEMPERATURE: 97.8 F | SYSTOLIC BLOOD PRESSURE: 101 MMHG | HEART RATE: 70 BPM | WEIGHT: 222 LBS | OXYGEN SATURATION: 97 % | RESPIRATION RATE: 16 BRPM | BODY MASS INDEX: 31.78 KG/M2 | HEIGHT: 70 IN

## 2018-09-11 DIAGNOSIS — J30.2 SEASONAL ALLERGIC RHINITIS, UNSPECIFIED TRIGGER: ICD-10-CM

## 2018-09-11 DIAGNOSIS — J44.9 MODERATE COPD (CHRONIC OBSTRUCTIVE PULMONARY DISEASE) (HCC): Primary | ICD-10-CM

## 2018-09-11 DIAGNOSIS — R06.02 SOB (SHORTNESS OF BREATH): ICD-10-CM

## 2018-09-11 DIAGNOSIS — J44.9 COPD, SEVERE (HCC): ICD-10-CM

## 2018-09-11 PROCEDURE — 1123F ACP DISCUSS/DSCN MKR DOCD: CPT | Performed by: INTERNAL MEDICINE

## 2018-09-11 PROCEDURE — G8417 CALC BMI ABV UP PARAM F/U: HCPCS | Performed by: INTERNAL MEDICINE

## 2018-09-11 PROCEDURE — 4040F PNEUMOC VAC/ADMIN/RCVD: CPT | Performed by: INTERNAL MEDICINE

## 2018-09-11 PROCEDURE — 3023F SPIROM DOC REV: CPT | Performed by: INTERNAL MEDICINE

## 2018-09-11 PROCEDURE — 1101F PT FALLS ASSESS-DOCD LE1/YR: CPT | Performed by: INTERNAL MEDICINE

## 2018-09-11 PROCEDURE — G8599 NO ASA/ANTIPLAT THER USE RNG: HCPCS | Performed by: INTERNAL MEDICINE

## 2018-09-11 PROCEDURE — G8427 DOCREV CUR MEDS BY ELIG CLIN: HCPCS | Performed by: INTERNAL MEDICINE

## 2018-09-11 PROCEDURE — 1036F TOBACCO NON-USER: CPT | Performed by: INTERNAL MEDICINE

## 2018-09-11 PROCEDURE — 3017F COLORECTAL CA SCREEN DOC REV: CPT | Performed by: INTERNAL MEDICINE

## 2018-09-11 PROCEDURE — G8926 SPIRO NO PERF OR DOC: HCPCS | Performed by: INTERNAL MEDICINE

## 2018-09-11 PROCEDURE — 99214 OFFICE O/P EST MOD 30 MIN: CPT | Performed by: INTERNAL MEDICINE

## 2018-09-11 RX ORDER — ALBUTEROL SULFATE 2.5 MG/3ML
2.5 SOLUTION RESPIRATORY (INHALATION) EVERY 6 HOURS PRN
Qty: 120 EACH | Refills: 3 | Status: ON HOLD | OUTPATIENT
Start: 2018-09-11 | End: 2019-01-01 | Stop reason: HOSPADM

## 2018-09-11 ASSESSMENT — SLEEP AND FATIGUE QUESTIONNAIRES
HOW LIKELY ARE YOU TO NOD OFF OR FALL ASLEEP WHILE WATCHING TV: 3
HOW LIKELY ARE YOU TO NOD OFF OR FALL ASLEEP WHILE SITTING INACTIVE IN A PUBLIC PLACE: 0
NECK CIRCUMFERENCE (INCHES): 16.5
HOW LIKELY ARE YOU TO NOD OFF OR FALL ASLEEP WHILE SITTING AND TALKING TO SOMEONE: 0
HOW LIKELY ARE YOU TO NOD OFF OR FALL ASLEEP IN A CAR, WHILE STOPPED FOR A FEW MINUTES IN TRAFFIC: 0
HOW LIKELY ARE YOU TO NOD OFF OR FALL ASLEEP WHILE SITTING QUIETLY AFTER LUNCH WITHOUT ALCOHOL: 0
HOW LIKELY ARE YOU TO NOD OFF OR FALL ASLEEP WHEN YOU ARE A PASSENGER IN A CAR FOR AN HOUR WITHOUT A BREAK: 0
HOW LIKELY ARE YOU TO NOD OFF OR FALL ASLEEP WHILE SITTING AND READING: 3
ESS TOTAL SCORE: 6
HOW LIKELY ARE YOU TO NOD OFF OR FALL ASLEEP WHILE LYING DOWN TO REST IN THE AFTERNOON WHEN CIRCUMSTANCES PERMIT: 0

## 2018-09-11 NOTE — PROGRESS NOTES
[facility]    Outpatient Follow Up Note    Chief Complaint: COPD  Consulting provider: Elia Booker DO    Interval History: 68 y.o. male SOB is worse. He has has stable intermittent wheezing and cough productive of clear sputum. Has to rest after 20 ft. Using his rescue inhaler often. Initial HPI:The patient has a history of DM2, diastolic heart failure, dementia, COPD. The patient does have SOB and VILLARREAL. Exercise tolerance on level ground is limited by back pain more than SOB. Probably 1-2 blocks. Can go up a flight of stairs. No cough. +wheezes intermittently. The patient has smoked 1.5 PPD for 30 years. Quit in 2000. Current Outpatient Prescriptions:     tamsulosin (FLOMAX) 0.4 MG capsule, Take 1 capsule by mouth daily, Disp: 30 capsule, Rfl: 3    zolpidem (AMBIEN) 10 MG tablet, TAKE 1 TABLET AT BEDTIME AS NEEDED FOR SLEEP, Disp: 90 tablet, Rfl: 0    lisinopril (PRINIVIL;ZESTRIL) 20 MG tablet, TAKE 1 TABLET EVERY DAY, Disp: 90 tablet, Rfl: 0    ANORO ELLIPTA 62.5-25 MCG/INH AEPB inhaler, INHALE 1 PUFF INTO THE LUNGS DAILY, Disp: 60 each, Rfl: 1    pramipexole (MIRAPEX) 1 MG tablet, Take 1 tablet by mouth nightly, Disp: 90 tablet, Rfl: 3    mesalamine (LIALDA) 1.2 g EC tablet, TAKE 1 TABLET THREE TIMES DAILY, Disp: 270 tablet, Rfl: 1    metFORMIN (GLUCOPHAGE) 500 MG tablet, Take 1 tablet by mouth 2 times daily, Disp: 180 tablet, Rfl: 1    omeprazole (PRILOSEC) 40 MG delayed release capsule, Take 1 capsule by mouth daily, Disp: 90 capsule, Rfl: 1    glucose blood VI test strips (ASCENSIA AUTODISC VI;ONE TOUCH ULTRA TEST VI) strip, [employer] True [employer]rix Air Test Strips. FSBS daily.  DX E11.9, Disp: 100 strip, Rfl: 3    sertraline (ZOLOFT) 100 MG tablet, Take 1 tablet by mouth daily, Disp: 90 tablet, Rfl: 3    donepezil (ARICEPT) 5 MG tablet, Take 1 tablet by mouth nightly, Disp: 90 tablet, Rfl: 3    cyclobenzaprine (FLEXERIL) 10 MG tablet, Take 1 tablet by mouth 3 times daily as needed for Muscle spasms, Disp: 270 tablet, Rfl: 0    atorvastatin (LIPITOR) 10 MG tablet, Take 1 tablet by mouth daily, Disp: 90 tablet, Rfl: 3    montelukast (SINGULAIR) 10 MG tablet, Take 1 tablet by mouth nightly, Disp: 90 tablet, Rfl: 3    amLODIPine (NORVASC) 5 MG tablet, Take 1 tablet by mouth daily, Disp: 90 tablet, Rfl: 3    colesevelam (WELCHOL) 625 MG tablet, TAKE 3 TABLETS TWICE DAILY, Disp: 540 tablet, Rfl: 1    TRUEPLUS LANCETS 28G MISC, 1 each by Does not apply route daily E11.9 Test FBS daily--NEED 33 GAUGE, Disp: 100 each, Rfl: 3    b complex vitamins capsule, Take 1 capsule by mouth daily, Disp: 30 capsule, Rfl: 3    aspirin (ASPIRIN CHILDRENS) 81 MG chewable tablet, Take 1 tablet by mouth daily, Disp: 30 tablet, Rfl: 0    ferrous sulfate 325 (65 FE) MG tablet, Take 1 tablet by mouth 2 times daily (with meals), Disp: 180 tablet, Rfl: 3    Objective:   PHYSICAL EXAM: /61 (Site: Left Upper Arm, Position: Sitting, Cuff Size: Medium Adult)   Pulse 70   Temp 97.8 °F (36.6 °C) (Oral)   Resp 16   Ht 5' 10\" (1.778 m)   Wt 222 lb (100.7 kg)   SpO2 97% Comment: RA  BMI 31.85 kg/m²    Constitutional:  No acute distress. Eyes: PERRL. Conjunctivae anicteric. ENT: Normal nose. Normal tongue. Oropharynx clear. Neck:  Trachea is midline. No thyroid tenderness. Respiratory: No accessory muscle usage. decreased breath sounds. No wheezes. No rales. No Rhonchi. Cardiovascular: Normal S1S2. No digit clubbing. No digit cyanosis. No LE edema. Psychiatric: No anxiety or Agitation. Alert and Oriented to person, place and time. LABS:  Reviewed any pertinent new labs that are available.     PFTs   8/16/17  FVC  (%) FEV1 1.35 (42%) FEV1/FVC ratio 43   TLC  (%)  RV  (176%)   DLCO (70%) Bronchodilator response: +  3/8/18  FVC  (82%) FEV1 1.84 (59%) FEV1/FVC ratio 0.52  TLC  (99%)  RV  (128%)   DLCO (69%) Bronchodilator response: no    6MWT: 860 ft, 91%    IMAGING:  I personally reviewed and interpreted the following today in the office:   CXR:      Chest CT:    ASSESSMENT:  · Moderately Severe COPD - worse SOB  · Seasonal Allergic Rhinitis  · H/o aflutter and diastolic CHF  · CAD  · DM2     PLAN:   · Continue Anoro   · Continue PRN albuterol  · Start PRN albuterol nebs  · The Pneumovax 23 today.  The Flu Vaccine today  · Pulm rehab declined again  · Call if he becomes worse  · F/u in 6 months

## 2018-09-19 RX ORDER — DONEPEZIL HYDROCHLORIDE 10 MG/1
10 TABLET, FILM COATED ORAL NIGHTLY
Qty: 90 TABLET | Refills: 1 | Status: SHIPPED | OUTPATIENT
Start: 2018-09-19 | End: 2019-01-01 | Stop reason: SDUPTHER

## 2018-09-20 ENCOUNTER — ANESTHESIA EVENT (OUTPATIENT)
Dept: OPERATING ROOM | Age: 73
End: 2018-09-20
Payer: MEDICARE

## 2018-09-21 ENCOUNTER — HOSPITAL ENCOUNTER (OUTPATIENT)
Age: 73
Setting detail: OUTPATIENT SURGERY
Discharge: HOME | End: 2018-09-21
Attending: UROLOGY | Admitting: UROLOGY
Payer: MEDICARE

## 2018-09-21 ENCOUNTER — ANESTHESIA (OUTPATIENT)
Dept: OPERATING ROOM | Age: 73
End: 2018-09-21
Payer: MEDICARE

## 2018-09-21 VITALS
RESPIRATION RATE: 10 BRPM | SYSTOLIC BLOOD PRESSURE: 114 MMHG | DIASTOLIC BLOOD PRESSURE: 71 MMHG | OXYGEN SATURATION: 100 %

## 2018-09-21 VITALS
HEART RATE: 56 BPM | RESPIRATION RATE: 17 BRPM | TEMPERATURE: 98.6 F | OXYGEN SATURATION: 99 % | BODY MASS INDEX: 31.5 KG/M2 | DIASTOLIC BLOOD PRESSURE: 76 MMHG | SYSTOLIC BLOOD PRESSURE: 131 MMHG | WEIGHT: 220 LBS | HEIGHT: 70 IN

## 2018-09-21 DIAGNOSIS — N13.8 BPH WITH OBSTRUCTION/LOWER URINARY TRACT SYMPTOMS: Primary | ICD-10-CM

## 2018-09-21 DIAGNOSIS — N40.1 BPH WITH OBSTRUCTION/LOWER URINARY TRACT SYMPTOMS: Primary | ICD-10-CM

## 2018-09-21 LAB
ANION GAP SERPL CALCULATED.3IONS-SCNC: 11 MMOL/L (ref 3–16)
BUN BLDV-MCNC: 22 MG/DL (ref 7–20)
CALCIUM SERPL-MCNC: 10.7 MG/DL (ref 8.3–10.6)
CHLORIDE BLD-SCNC: 101 MMOL/L (ref 99–110)
CO2: 22 MMOL/L (ref 21–32)
CREAT SERPL-MCNC: 0.9 MG/DL (ref 0.8–1.3)
GFR AFRICAN AMERICAN: >60
GFR NON-AFRICAN AMERICAN: >60
GLUCOSE BLD-MCNC: 91 MG/DL (ref 70–99)
GLUCOSE BLD-MCNC: 92 MG/DL (ref 70–99)
GLUCOSE BLD-MCNC: 95 MG/DL (ref 70–99)
PERFORMED ON: NORMAL
PERFORMED ON: NORMAL
POTASSIUM SERPL-SCNC: 5 MMOL/L (ref 3.5–5.1)
SODIUM BLD-SCNC: 134 MMOL/L (ref 136–145)

## 2018-09-21 PROCEDURE — 93005 ELECTROCARDIOGRAM TRACING: CPT | Performed by: ANESTHESIOLOGY

## 2018-09-21 PROCEDURE — 3700000000 HC ANESTHESIA ATTENDED CARE: Performed by: UROLOGY

## 2018-09-21 PROCEDURE — 2709999900 HC NON-CHARGEABLE SUPPLY: Performed by: UROLOGY

## 2018-09-21 PROCEDURE — 80048 BASIC METABOLIC PNL TOTAL CA: CPT

## 2018-09-21 PROCEDURE — 7100000000 HC PACU RECOVERY - FIRST 15 MIN: Performed by: UROLOGY

## 2018-09-21 PROCEDURE — 7100000001 HC PACU RECOVERY - ADDTL 15 MIN: Performed by: UROLOGY

## 2018-09-21 PROCEDURE — 2500000003 HC RX 250 WO HCPCS: Performed by: NURSE ANESTHETIST, CERTIFIED REGISTERED

## 2018-09-21 PROCEDURE — 2500000003 HC RX 250 WO HCPCS: Performed by: ANESTHESIOLOGY

## 2018-09-21 PROCEDURE — 3600000004 HC SURGERY LEVEL 4 BASE: Performed by: UROLOGY

## 2018-09-21 PROCEDURE — 7100000010 HC PHASE II RECOVERY - FIRST 15 MIN: Performed by: UROLOGY

## 2018-09-21 PROCEDURE — 36415 COLL VENOUS BLD VENIPUNCTURE: CPT

## 2018-09-21 PROCEDURE — 93010 ELECTROCARDIOGRAM REPORT: CPT | Performed by: INTERNAL MEDICINE

## 2018-09-21 PROCEDURE — 3600000014 HC SURGERY LEVEL 4 ADDTL 15MIN: Performed by: UROLOGY

## 2018-09-21 PROCEDURE — 6360000002 HC RX W HCPCS: Performed by: UROLOGY

## 2018-09-21 PROCEDURE — 6360000002 HC RX W HCPCS: Performed by: NURSE ANESTHETIST, CERTIFIED REGISTERED

## 2018-09-21 PROCEDURE — 6370000000 HC RX 637 (ALT 250 FOR IP): Performed by: UROLOGY

## 2018-09-21 PROCEDURE — 2580000003 HC RX 258: Performed by: ANESTHESIOLOGY

## 2018-09-21 PROCEDURE — 7100000011 HC PHASE II RECOVERY - ADDTL 15 MIN: Performed by: UROLOGY

## 2018-09-21 PROCEDURE — 3700000001 HC ADD 15 MINUTES (ANESTHESIA): Performed by: UROLOGY

## 2018-09-21 PROCEDURE — 2580000003 HC RX 258: Performed by: UROLOGY

## 2018-09-21 RX ORDER — DIPHENHYDRAMINE HYDROCHLORIDE 50 MG/ML
12.5 INJECTION INTRAMUSCULAR; INTRAVENOUS
Status: DISCONTINUED | OUTPATIENT
Start: 2018-09-21 | End: 2018-09-21 | Stop reason: HOSPADM

## 2018-09-21 RX ORDER — HYDROMORPHONE HCL 110MG/55ML
0.5 PATIENT CONTROLLED ANALGESIA SYRINGE INTRAVENOUS EVERY 5 MIN PRN
Status: DISCONTINUED | OUTPATIENT
Start: 2018-09-21 | End: 2018-09-21 | Stop reason: HOSPADM

## 2018-09-21 RX ORDER — SODIUM CHLORIDE 0.9 % (FLUSH) 0.9 %
10 SYRINGE (ML) INJECTION PRN
Status: DISCONTINUED | OUTPATIENT
Start: 2018-09-21 | End: 2018-09-21 | Stop reason: HOSPADM

## 2018-09-21 RX ORDER — OXYCODONE HYDROCHLORIDE AND ACETAMINOPHEN 5; 325 MG/1; MG/1
2 TABLET ORAL PRN
Status: DISCONTINUED | OUTPATIENT
Start: 2018-09-21 | End: 2018-09-21 | Stop reason: HOSPADM

## 2018-09-21 RX ORDER — SODIUM CHLORIDE, SODIUM LACTATE, POTASSIUM CHLORIDE, CALCIUM CHLORIDE 600; 310; 30; 20 MG/100ML; MG/100ML; MG/100ML; MG/100ML
INJECTION, SOLUTION INTRAVENOUS CONTINUOUS
Status: DISCONTINUED | OUTPATIENT
Start: 2018-09-21 | End: 2018-09-21 | Stop reason: HOSPADM

## 2018-09-21 RX ORDER — FENTANYL CITRATE 50 UG/ML
INJECTION, SOLUTION INTRAMUSCULAR; INTRAVENOUS PRN
Status: DISCONTINUED | OUTPATIENT
Start: 2018-09-21 | End: 2018-09-21 | Stop reason: SDUPTHER

## 2018-09-21 RX ORDER — ULTRASOUND COUPLING MEDIUM
GEL (GRAM) TOPICAL PRN
Status: DISCONTINUED | OUTPATIENT
Start: 2018-09-21 | End: 2018-09-21 | Stop reason: HOSPADM

## 2018-09-21 RX ORDER — OXYCODONE HYDROCHLORIDE AND ACETAMINOPHEN 5; 325 MG/1; MG/1
1 TABLET ORAL PRN
Status: DISCONTINUED | OUTPATIENT
Start: 2018-09-21 | End: 2018-09-21 | Stop reason: HOSPADM

## 2018-09-21 RX ORDER — MAGNESIUM HYDROXIDE 1200 MG/15ML
LIQUID ORAL PRN
Status: DISCONTINUED | OUTPATIENT
Start: 2018-09-21 | End: 2018-09-21 | Stop reason: HOSPADM

## 2018-09-21 RX ORDER — MEPERIDINE HYDROCHLORIDE 25 MG/ML
12.5 INJECTION INTRAMUSCULAR; INTRAVENOUS; SUBCUTANEOUS EVERY 5 MIN PRN
Status: DISCONTINUED | OUTPATIENT
Start: 2018-09-21 | End: 2018-09-21 | Stop reason: HOSPADM

## 2018-09-21 RX ORDER — MORPHINE SULFATE 10 MG/ML
1 INJECTION, SOLUTION INTRAMUSCULAR; INTRAVENOUS EVERY 5 MIN PRN
Status: DISCONTINUED | OUTPATIENT
Start: 2018-09-21 | End: 2018-09-21 | Stop reason: HOSPADM

## 2018-09-21 RX ORDER — FINASTERIDE 5 MG/1
5 TABLET, FILM COATED ORAL DAILY
Qty: 30 TABLET | Refills: 5 | Status: ON HOLD | OUTPATIENT
Start: 2018-09-21 | End: 2019-01-01 | Stop reason: HOSPADM

## 2018-09-21 RX ORDER — SODIUM CHLORIDE 0.9 % (FLUSH) 0.9 %
10 SYRINGE (ML) INJECTION EVERY 12 HOURS SCHEDULED
Status: DISCONTINUED | OUTPATIENT
Start: 2018-09-21 | End: 2018-09-21 | Stop reason: HOSPADM

## 2018-09-21 RX ORDER — HYDROMORPHONE HCL 110MG/55ML
0.25 PATIENT CONTROLLED ANALGESIA SYRINGE INTRAVENOUS EVERY 5 MIN PRN
Status: DISCONTINUED | OUTPATIENT
Start: 2018-09-21 | End: 2018-09-21 | Stop reason: HOSPADM

## 2018-09-21 RX ORDER — DEXAMETHASONE SODIUM PHOSPHATE 4 MG/ML
INJECTION, SOLUTION INTRA-ARTICULAR; INTRALESIONAL; INTRAMUSCULAR; INTRAVENOUS; SOFT TISSUE PRN
Status: DISCONTINUED | OUTPATIENT
Start: 2018-09-21 | End: 2018-09-21 | Stop reason: SDUPTHER

## 2018-09-21 RX ORDER — MORPHINE SULFATE 10 MG/ML
2 INJECTION, SOLUTION INTRAMUSCULAR; INTRAVENOUS EVERY 5 MIN PRN
Status: DISCONTINUED | OUTPATIENT
Start: 2018-09-21 | End: 2018-09-21 | Stop reason: HOSPADM

## 2018-09-21 RX ORDER — LIDOCAINE HYDROCHLORIDE 10 MG/ML
1 INJECTION, SOLUTION EPIDURAL; INFILTRATION; INTRACAUDAL; PERINEURAL
Status: COMPLETED | OUTPATIENT
Start: 2018-09-21 | End: 2018-09-21

## 2018-09-21 RX ORDER — PROMETHAZINE HYDROCHLORIDE 25 MG/ML
6.25 INJECTION, SOLUTION INTRAMUSCULAR; INTRAVENOUS
Status: DISCONTINUED | OUTPATIENT
Start: 2018-09-21 | End: 2018-09-21 | Stop reason: HOSPADM

## 2018-09-21 RX ORDER — PROPOFOL 10 MG/ML
INJECTION, EMULSION INTRAVENOUS PRN
Status: DISCONTINUED | OUTPATIENT
Start: 2018-09-21 | End: 2018-09-21 | Stop reason: SDUPTHER

## 2018-09-21 RX ORDER — ONDANSETRON 2 MG/ML
4 INJECTION INTRAMUSCULAR; INTRAVENOUS
Status: DISCONTINUED | OUTPATIENT
Start: 2018-09-21 | End: 2018-09-21 | Stop reason: HOSPADM

## 2018-09-21 RX ORDER — LIDOCAINE HYDROCHLORIDE 20 MG/ML
INJECTION, SOLUTION INFILTRATION; PERINEURAL PRN
Status: DISCONTINUED | OUTPATIENT
Start: 2018-09-21 | End: 2018-09-21 | Stop reason: SDUPTHER

## 2018-09-21 RX ORDER — LABETALOL HYDROCHLORIDE 5 MG/ML
5 INJECTION, SOLUTION INTRAVENOUS EVERY 10 MIN PRN
Status: DISCONTINUED | OUTPATIENT
Start: 2018-09-21 | End: 2018-09-21 | Stop reason: HOSPADM

## 2018-09-21 RX ORDER — HYDRALAZINE HYDROCHLORIDE 20 MG/ML
5 INJECTION INTRAMUSCULAR; INTRAVENOUS EVERY 10 MIN PRN
Status: DISCONTINUED | OUTPATIENT
Start: 2018-09-21 | End: 2018-09-21 | Stop reason: HOSPADM

## 2018-09-21 RX ADMIN — LIDOCAINE HYDROCHLORIDE 50 MG: 20 INJECTION, SOLUTION INFILTRATION; PERINEURAL at 14:24

## 2018-09-21 RX ADMIN — LIDOCAINE HYDROCHLORIDE 1 ML: 10 INJECTION, SOLUTION EPIDURAL; INFILTRATION; INTRACAUDAL; PERINEURAL at 13:42

## 2018-09-21 RX ADMIN — DEXAMETHASONE SODIUM PHOSPHATE 7 MG: 4 INJECTION, SOLUTION INTRAMUSCULAR; INTRAVENOUS at 14:28

## 2018-09-21 RX ADMIN — Medication 2 G: at 14:18

## 2018-09-21 RX ADMIN — FENTANYL CITRATE 50 MCG: 50 INJECTION INTRAMUSCULAR; INTRAVENOUS at 14:24

## 2018-09-21 RX ADMIN — PROPOFOL 120 MG: 10 INJECTION, EMULSION INTRAVENOUS at 14:24

## 2018-09-21 RX ADMIN — SODIUM CHLORIDE, POTASSIUM CHLORIDE, SODIUM LACTATE AND CALCIUM CHLORIDE: 600; 310; 30; 20 INJECTION, SOLUTION INTRAVENOUS at 13:42

## 2018-09-21 ASSESSMENT — PULMONARY FUNCTION TESTS
PIF_VALUE: 0
PIF_VALUE: 3
PIF_VALUE: 7
PIF_VALUE: 2
PIF_VALUE: 0
PIF_VALUE: 3
PIF_VALUE: 1
PIF_VALUE: 4
PIF_VALUE: 3
PIF_VALUE: 0
PIF_VALUE: 4
PIF_VALUE: 4
PIF_VALUE: 1

## 2018-09-21 NOTE — PROGRESS NOTES
Assessment unchanged from previous. Verbalizes understanding of discharge instructions and written instructions also given to patient. Questions and concerns addressed at this time. Pt's wife at bedside. Denies pain or any needs at this time. IV d/c'd. Pt discharged via wheelchair to car in good condition. All personal belongings returned to pt.

## 2018-09-21 NOTE — ANESTHESIA PRE PROCEDURE
for Muscle spasms 5/16/18  Yes Ryan Love DO   atorvastatin (LIPITOR) 10 MG tablet Take 1 tablet by mouth daily 5/16/18  Yes Ryan Love DO   montelukast (SINGULAIR) 10 MG tablet Take 1 tablet by mouth nightly 5/16/18  Yes Ryan Love DO   amLODIPine (NORVASC) 5 MG tablet Take 1 tablet by mouth daily 5/16/18  Yes Maria Dolores Love DO   colesevelam (WELCHOL) 625 MG tablet TAKE 3 TABLETS TWICE DAILY 5/16/18  Yes Maria Dolores Love DO   TRUEPLUS LANCETS 28G MISC 1 each by Does not apply route daily E11.9 Test FBS daily--NEED 33 GAUGE 5/1/17  Yes Maria Dolores Love DO   b complex vitamins capsule Take 1 capsule by mouth daily 4/23/17  Yes SHAYY Odonnell CNP   aspirin (ASPIRIN CHILDRENS) 81 MG chewable tablet Take 1 tablet by mouth daily 4/23/17  Yes SHAYY Odonnell CNP       Current medications:    Current Facility-Administered Medications   Medication Dose Route Frequency Provider Last Rate Last Dose    ceFAZolin (ANCEF) 2 g in sterile water 20 mL IV syringe  2 g Intravenous Once Neelima Blake MD        lactated ringers infusion   Intravenous Continuous Giovanny Wu  mL/hr at 09/21/18 1342      sodium chloride flush 0.9 % injection 10 mL  10 mL Intravenous 2 times per day Giovanny Wu MD        sodium chloride flush 0.9 % injection 10 mL  10 mL Intravenous PRN Giovanny Wu MD           Allergies:     Allergies   Allergen Reactions    Lisinopril Other (See Comments)     cough       Problem List:    Patient Active Problem List   Diagnosis Code    DM II (diabetes mellitus, type II), controlled ([address] E11.9    HTN (hypertension) I10    HLD (hyperlipidemia) E78.5    Unstable angina ([facility] I20.0    Diabetic neuropathy ([facility] E11.40    RLS (restless legs syndrome) G25.81    Insomnia G47.00    Coronary artery disease involving native coronary artery of native heart without angina pectoris I25.10    Seasonal allergic rhinitis J30.2    Controlled type 2 diabetes

## 2018-09-21 NOTE — ANESTHESIA POSTPROCEDURE EVALUATION
Department of Anesthesiology  Postprocedure Note    Patient: Joon Marr  MRN: [id number]  YOB: 1945  Date of evaluation: 9/21/2018  Time:  4:45 PM     Procedure Summary     Date:  09/21/18 Room / Location:  [place] [address] / [facility]    Anesthesia Start:  6896 Anesthesia Stop:  7177    Procedure:  CYSTOSCOPY, DIRECT VISION INTERAL URETHROTOMY (N/A Bladder) Diagnosis:  (URETHRAL STRICTURE)    Surgeon:  Keanu Horton MD Responsible Provider:  Luisa Encarnacion MD    Anesthesia Type:  general ASA Status:  3          Anesthesia Type: general    Josselyn Phase I: Josselyn Score: 10    Josselyn Phase II: Josselyn Score: 10    Last vitals: Reviewed and per EMR flowsheets.        Anesthesia Post Evaluation    Patient location during evaluation: PACU  Patient participation: complete - patient participated  Level of consciousness: awake and alert  Airway patency: patent  Nausea & Vomiting: no nausea and no vomiting  Complications: no  Cardiovascular status: blood pressure returned to baseline  Respiratory status: acceptable  Hydration status: euvolemic

## 2018-09-24 LAB
EKG ATRIAL RATE: 61 BPM
EKG DIAGNOSIS: NORMAL
EKG P AXIS: 71 DEGREES
EKG P-R INTERVAL: 154 MS
EKG Q-T INTERVAL: 376 MS
EKG QRS DURATION: 88 MS
EKG QTC CALCULATION (BAZETT): 378 MS
EKG R AXIS: 56 DEGREES
EKG T AXIS: 78 DEGREES
EKG VENTRICULAR RATE: 61 BPM

## 2018-10-17 NOTE — PROGRESS NOTES
Obstructive Sleep Apnea (HOLLAND) Screening     Patient:  Angie Brooks    YOB: 1945      Medical Record #:  [id number]                     Date:  10/17/2018     1. Are you a loud and/or regular snorer? []  Yes       [x] No    2. Have you been observed to gasp or stop breathing during sleep? []  Yes       [x] No    3. Do you feel tired or groggy upon awakening or do you awaken with a headache?           []  Yes       [x] No    4. Are you often tired or fatigued during the wake time hours? []  Yes       [x] No    5. Do you fall asleep sitting, reading, watching TV or driving? []  Yes       [x] No    6. Do you often have problems with memory or concentration? []  Yes       [x] No    **If patient's score is ? 3 they are considered high risk for HOLLAND. Notify the anesthesiologist of the high risk and document in focus note. Note:  If the patient's BMI is more than 35 kg m¯² , has neck circumference > 40 cm, and/or high blood pressure the risk is greater (© American [employer]nea Association, 2006).

## 2018-10-18 ENCOUNTER — ANESTHESIA EVENT (OUTPATIENT)
Dept: OPERATING ROOM | Age: 73
DRG: 309 | End: 2018-10-18
Payer: MEDICARE

## 2018-10-18 NOTE — ANESTHESIA PRE PROCEDURE
Department of Anesthesiology  Preprocedure Note       Name:  Jhon Acevedo   Age:  68 y.o.  :  1945                                          MRN:  [id number]         Date:  10/18/2018      Surgeon: Edwin Adorno):  Maximus Wlikinson MD    Procedure: CYSTOSCOPY TRANSURETHRAL RESECTION PROSTATE (N/A Bladder)    Medications prior to admission:   Prior to Admission medications    Medication Sig Start Date End Date Taking? Authorizing Provider   finasteride (PROSCAR) 5 MG tablet Take 1 tablet by mouth daily Will shrink prostate;   Affects PSA number 9/21/18 10/21/18 Yes Maximus Wilkinson MD   donepezil (ARICEPT) 10 MG tablet Take 1 tablet by mouth nightly 18  Yes Dell Love DO   albuterol (PROVENTIL) (2.5 MG/3ML) 0.083% nebulizer solution Take 3 mLs by nebulization every 6 hours as needed for Wheezing 18  Yes Antonio Mccormack MD   tamsulosin (FLOMAX) 0.4 MG capsule Take 1 capsule by mouth daily 18  Yes Ryan Love DO   lisinopril (PRINIVIL;ZESTRIL) 20 MG tablet TAKE 1 TABLET EVERY DAY 18  Yes Ryan Love DO   ANORO ELLIPTA 62.5-25 MCG/INH AEPB inhaler INHALE 1 PUFF INTO THE LUNGS DAILY 18  Yes Antonio Mccormack MD   pramipexole (MIRAPEX) 1 MG tablet Take 1 tablet by mouth nightly 18  Yes Dell Love DO   mesalamine (LIALDA) 1.2 g EC tablet TAKE 1 TABLET THREE TIMES DAILY 18  Yes Dell Love DO   metFORMIN (GLUCOPHAGE) 500 MG tablet Take 1 tablet by mouth 2 times daily 18  Yes Ryan Love DO   omeprazole (PRILOSEC) 40 MG delayed release capsule Take 1 capsule by mouth daily 18  Yes Ryan Love DO   sertraline (ZOLOFT) 100 MG tablet Take 1 tablet by mouth daily 18  Yes Ryan Love DO   cyclobenzaprine (FLEXERIL) 10 MG tablet Take 1 tablet by mouth 3 times daily as needed for Muscle spasms 18  Yes Ryan Love,    atorvastatin (LIPITOR) 10 MG tablet Take 1 tablet by mouth daily 18  Yes Ryan Love DO   montelukast (SINGULAIR) 10 MG is 30.42 kg/m². CBC:   Lab Results   Component Value Date    WBC 5.5 07/12/2017    RBC 4.54 07/12/2017    HGB 14.2 07/12/2017    HCT 42.4 07/12/2017    MCV 93.4 07/12/2017    RDW 17.2 07/12/2017     07/12/2017       CMP:   Lab Results   Component Value Date     09/21/2018    K 5.0 09/21/2018     09/21/2018    CO2 22 09/21/2018    BUN 22 09/21/2018    CREATININE 0.9 09/21/2018    GFRAA >60 09/21/2018    AGRATIO 1.4 06/27/2017    LABGLOM >60 09/21/2018    GLUCOSE 95 09/21/2018    PROT 7.9 06/27/2017    CALCIUM 10.7 09/21/2018    BILITOT 0.9 06/27/2017    ALKPHOS 93 06/27/2017    AST 33 06/27/2017    ALT 15 06/27/2017       POC Tests: No results for input(s): POCGLU, POCNA, POCK, POCCL, POCBUN, POCHEMO, POCHCT in the last 72 hours. Coags:   Lab Results   Component Value Date    PROTIME 12.3 04/18/2017    INR 1.09 04/18/2017    APTT 23.9 04/18/2017       HCG (If Applicable): No results found for: PREGTESTUR, PREGSERUM, HCG, HCGQUANT     ABGs: No results found for: PHART, PO2ART, KNQ4SBS, QDB2KJV, BEART, [id number]     Type & Screen (If Applicable):  No results found for: [facility]    Anesthesia Evaluation  Patient summary reviewed and Nursing notes reviewed no history of anesthetic complications:   Airway: Mallampati: II     Neck ROM: full   Dental:    (+) lower dentures and upper dentures      Pulmonary:Negative Pulmonary ROS and normal exam    (+) COPD:  sleep apnea:                             Cardiovascular:Negative CV ROS    (+) hypertension:, CAD:, CHF:, hyperlipidemia    (-)  angina                Neuro/Psych:   Negative Neuro/Psych ROS  (+) psychiatric history:depression/anxiety             GI/Hepatic/Renal: Neg GI/Hepatic/Renal ROS  (+) GERD: well controlled,      (-) hiatal hernia       Endo/Other: Negative Endo/Other ROS   (+) Diabetes, .                  Abdominal:           Vascular:                                      Anesthesia Plan      general     ASA 3     (I discussed with

## 2018-10-19 ENCOUNTER — ANESTHESIA (OUTPATIENT)
Dept: OPERATING ROOM | Age: 73
DRG: 309 | End: 2018-10-19
Payer: MEDICARE

## 2018-10-19 ENCOUNTER — HOSPITAL ENCOUNTER (INPATIENT)
Age: 73
LOS: 3 days | Discharge: HOME | DRG: 309 | End: 2018-10-22
Attending: UROLOGY | Admitting: INTERNAL MEDICINE
Payer: MEDICARE

## 2018-10-19 VITALS — OXYGEN SATURATION: 100 % | DIASTOLIC BLOOD PRESSURE: 86 MMHG | SYSTOLIC BLOOD PRESSURE: 128 MMHG

## 2018-10-19 PROBLEM — I48.92 ATRIAL FLUTTER (HCC): Status: ACTIVE | Noted: 2018-10-19

## 2018-10-19 PROBLEM — I48.91 ATRIAL FIBRILLATION WITH RAPID VENTRICULAR RESPONSE (HCC): Status: ACTIVE | Noted: 2018-10-19

## 2018-10-19 LAB
ANION GAP SERPL CALCULATED.3IONS-SCNC: 10 MMOL/L (ref 3–16)
ANION GAP SERPL CALCULATED.3IONS-SCNC: 10 MMOL/L (ref 3–16)
BASOPHILS ABSOLUTE: 0.1 K/UL (ref 0–0.2)
BASOPHILS RELATIVE PERCENT: 0.7 %
BUN BLDV-MCNC: 11 MG/DL (ref 7–20)
BUN BLDV-MCNC: 13 MG/DL (ref 7–20)
CALCIUM SERPL-MCNC: 9.7 MG/DL (ref 8.3–10.6)
CALCIUM SERPL-MCNC: 9.9 MG/DL (ref 8.3–10.6)
CHLORIDE BLD-SCNC: 100 MMOL/L (ref 99–110)
CHLORIDE BLD-SCNC: 99 MMOL/L (ref 99–110)
CO2: 23 MMOL/L (ref 21–32)
CO2: 23 MMOL/L (ref 21–32)
CREAT SERPL-MCNC: 0.8 MG/DL (ref 0.8–1.3)
CREAT SERPL-MCNC: 0.8 MG/DL (ref 0.8–1.3)
EKG ATRIAL RATE: 163 BPM
EKG DIAGNOSIS: NORMAL
EKG Q-T INTERVAL: 304 MS
EKG QRS DURATION: 76 MS
EKG QTC CALCULATION (BAZETT): 465 MS
EKG R AXIS: 50 DEGREES
EKG T AXIS: 69 DEGREES
EKG VENTRICULAR RATE: 141 BPM
EOSINOPHILS ABSOLUTE: 0.5 K/UL (ref 0–0.6)
EOSINOPHILS RELATIVE PERCENT: 6.8 %
GFR AFRICAN AMERICAN: >60
GFR AFRICAN AMERICAN: >60
GFR NON-AFRICAN AMERICAN: >60
GFR NON-AFRICAN AMERICAN: >60
GLUCOSE BLD-MCNC: 101 MG/DL (ref 70–99)
GLUCOSE BLD-MCNC: 102 MG/DL (ref 70–99)
GLUCOSE BLD-MCNC: 109 MG/DL (ref 70–99)
GLUCOSE BLD-MCNC: 88 MG/DL (ref 70–99)
HCT VFR BLD CALC: 33.1 % (ref 40.5–52.5)
HCT VFR BLD CALC: 33.8 % (ref 40.5–52.5)
HEMOGLOBIN: 10.7 G/DL (ref 13.5–17.5)
HEMOGLOBIN: 10.9 G/DL (ref 13.5–17.5)
LYMPHOCYTES ABSOLUTE: 1.4 K/UL (ref 1–5.1)
LYMPHOCYTES RELATIVE PERCENT: 20.1 %
MCH RBC QN AUTO: 27.8 PG (ref 26–34)
MCH RBC QN AUTO: 27.9 PG (ref 26–34)
MCHC RBC AUTO-ENTMCNC: 32.4 G/DL (ref 31–36)
MCHC RBC AUTO-ENTMCNC: 32.4 G/DL (ref 31–36)
MCV RBC AUTO: 85.9 FL (ref 80–100)
MCV RBC AUTO: 86 FL (ref 80–100)
MONOCYTES ABSOLUTE: 0.6 K/UL (ref 0–1.3)
MONOCYTES RELATIVE PERCENT: 8.9 %
NEUTROPHILS ABSOLUTE: 4.5 K/UL (ref 1.7–7.7)
NEUTROPHILS RELATIVE PERCENT: 63.5 %
PDW BLD-RTO: 15.9 % (ref 12.4–15.4)
PDW BLD-RTO: 16 % (ref 12.4–15.4)
PERFORMED ON: ABNORMAL
PERFORMED ON: NORMAL
PLATELET # BLD: 185 K/UL (ref 135–450)
PLATELET # BLD: 189 K/UL (ref 135–450)
PMV BLD AUTO: 7.6 FL (ref 5–10.5)
PMV BLD AUTO: 8.1 FL (ref 5–10.5)
POTASSIUM REFLEX MAGNESIUM: 4.5 MMOL/L (ref 3.5–5.1)
POTASSIUM SERPL-SCNC: 4.8 MMOL/L (ref 3.5–5.1)
RBC # BLD: 3.85 M/UL (ref 4.2–5.9)
RBC # BLD: 3.94 M/UL (ref 4.2–5.9)
SODIUM BLD-SCNC: 132 MMOL/L (ref 136–145)
SODIUM BLD-SCNC: 133 MMOL/L (ref 136–145)
WBC # BLD: 6.5 K/UL (ref 4–11)
WBC # BLD: 7.1 K/UL (ref 4–11)

## 2018-10-19 PROCEDURE — 3600000014 HC SURGERY LEVEL 4 ADDTL 15MIN: Performed by: UROLOGY

## 2018-10-19 PROCEDURE — 2700000000 HC OXYGEN THERAPY PER DAY

## 2018-10-19 PROCEDURE — 2580000003 HC RX 258: Performed by: INTERNAL MEDICINE

## 2018-10-19 PROCEDURE — 2500000003 HC RX 250 WO HCPCS: Performed by: INTERNAL MEDICINE

## 2018-10-19 PROCEDURE — 3600000004 HC SURGERY LEVEL 4 BASE: Performed by: UROLOGY

## 2018-10-19 PROCEDURE — 36415 COLL VENOUS BLD VENIPUNCTURE: CPT

## 2018-10-19 PROCEDURE — 7100000001 HC PACU RECOVERY - ADDTL 15 MIN: Performed by: UROLOGY

## 2018-10-19 PROCEDURE — 85027 COMPLETE CBC AUTOMATED: CPT

## 2018-10-19 PROCEDURE — 94664 DEMO&/EVAL PT USE INHALER: CPT

## 2018-10-19 PROCEDURE — 6370000000 HC RX 637 (ALT 250 FOR IP): Performed by: INTERNAL MEDICINE

## 2018-10-19 PROCEDURE — 2060000000 HC ICU INTERMEDIATE R&B

## 2018-10-19 PROCEDURE — 2500000003 HC RX 250 WO HCPCS: Performed by: ANESTHESIOLOGY

## 2018-10-19 PROCEDURE — 7100000000 HC PACU RECOVERY - FIRST 15 MIN: Performed by: UROLOGY

## 2018-10-19 PROCEDURE — 6360000002 HC RX W HCPCS: Performed by: UROLOGY

## 2018-10-19 PROCEDURE — 2709999900 HC NON-CHARGEABLE SUPPLY: Performed by: UROLOGY

## 2018-10-19 PROCEDURE — 85025 COMPLETE CBC W/AUTO DIFF WBC: CPT

## 2018-10-19 PROCEDURE — 2500000003 HC RX 250 WO HCPCS: Performed by: NURSE ANESTHETIST, CERTIFIED REGISTERED

## 2018-10-19 PROCEDURE — 6360000002 HC RX W HCPCS: Performed by: NURSE ANESTHETIST, CERTIFIED REGISTERED

## 2018-10-19 PROCEDURE — 93005 ELECTROCARDIOGRAM TRACING: CPT | Performed by: ANESTHESIOLOGY

## 2018-10-19 PROCEDURE — 2580000003 HC RX 258: Performed by: ANESTHESIOLOGY

## 2018-10-19 PROCEDURE — 80048 BASIC METABOLIC PNL TOTAL CA: CPT

## 2018-10-19 PROCEDURE — 93010 ELECTROCARDIOGRAM REPORT: CPT | Performed by: INTERNAL MEDICINE

## 2018-10-19 RX ORDER — SODIUM CHLORIDE, SODIUM LACTATE, POTASSIUM CHLORIDE, CALCIUM CHLORIDE 600; 310; 30; 20 MG/100ML; MG/100ML; MG/100ML; MG/100ML
INJECTION, SOLUTION INTRAVENOUS CONTINUOUS
Status: DISCONTINUED | OUTPATIENT
Start: 2018-10-19 | End: 2018-10-19

## 2018-10-19 RX ORDER — PROMETHAZINE HYDROCHLORIDE 25 MG/ML
6.25 INJECTION, SOLUTION INTRAMUSCULAR; INTRAVENOUS
Status: DISCONTINUED | OUTPATIENT
Start: 2018-10-19 | End: 2018-10-19 | Stop reason: HOSPADM

## 2018-10-19 RX ORDER — SODIUM CHLORIDE 0.9 % (FLUSH) 0.9 %
10 SYRINGE (ML) INJECTION PRN
Status: DISCONTINUED | OUTPATIENT
Start: 2018-10-19 | End: 2018-10-19 | Stop reason: HOSPADM

## 2018-10-19 RX ORDER — FENTANYL CITRATE 50 UG/ML
INJECTION, SOLUTION INTRAMUSCULAR; INTRAVENOUS PRN
Status: DISCONTINUED | OUTPATIENT
Start: 2018-10-19 | End: 2018-10-19 | Stop reason: SDUPTHER

## 2018-10-19 RX ORDER — ASPIRIN 81 MG/1
81 TABLET, CHEWABLE ORAL DAILY
Status: DISCONTINUED | OUTPATIENT
Start: 2018-10-19 | End: 2018-10-22 | Stop reason: HOSPADM

## 2018-10-19 RX ORDER — LABETALOL HYDROCHLORIDE 5 MG/ML
5 INJECTION, SOLUTION INTRAVENOUS EVERY 10 MIN PRN
Status: DISCONTINUED | OUTPATIENT
Start: 2018-10-19 | End: 2018-10-19 | Stop reason: HOSPADM

## 2018-10-19 RX ORDER — OXYCODONE AND ACETAMINOPHEN 10; 325 MG/1; MG/1
1 TABLET ORAL EVERY 6 HOURS PRN
COMMUNITY

## 2018-10-19 RX ORDER — TAMSULOSIN HYDROCHLORIDE 0.4 MG/1
0.4 CAPSULE ORAL DAILY
Status: DISCONTINUED | OUTPATIENT
Start: 2018-10-19 | End: 2018-10-22 | Stop reason: HOSPADM

## 2018-10-19 RX ORDER — PANTOPRAZOLE SODIUM 40 MG/1
40 TABLET, DELAYED RELEASE ORAL
Status: DISCONTINUED | OUTPATIENT
Start: 2018-10-20 | End: 2018-10-22 | Stop reason: HOSPADM

## 2018-10-19 RX ORDER — DONEPEZIL HYDROCHLORIDE 5 MG/1
10 TABLET, FILM COATED ORAL NIGHTLY
Status: DISCONTINUED | OUTPATIENT
Start: 2018-10-19 | End: 2018-10-22 | Stop reason: HOSPADM

## 2018-10-19 RX ORDER — DIPHENHYDRAMINE HYDROCHLORIDE 50 MG/ML
12.5 INJECTION INTRAMUSCULAR; INTRAVENOUS
Status: DISCONTINUED | OUTPATIENT
Start: 2018-10-19 | End: 2018-10-19 | Stop reason: HOSPADM

## 2018-10-19 RX ORDER — OXYCODONE HYDROCHLORIDE AND ACETAMINOPHEN 5; 325 MG/1; MG/1
2 TABLET ORAL PRN
Status: DISCONTINUED | OUTPATIENT
Start: 2018-10-19 | End: 2018-10-19 | Stop reason: HOSPADM

## 2018-10-19 RX ORDER — ONDANSETRON 2 MG/ML
4 INJECTION INTRAMUSCULAR; INTRAVENOUS EVERY 6 HOURS PRN
Status: DISCONTINUED | OUTPATIENT
Start: 2018-10-19 | End: 2018-10-22 | Stop reason: HOSPADM

## 2018-10-19 RX ORDER — SODIUM CHLORIDE 0.9 % (FLUSH) 0.9 %
10 SYRINGE (ML) INJECTION EVERY 12 HOURS SCHEDULED
Status: DISCONTINUED | OUTPATIENT
Start: 2018-10-19 | End: 2018-10-19 | Stop reason: HOSPADM

## 2018-10-19 RX ORDER — MESALAMINE 400 MG/1
400 CAPSULE, DELAYED RELEASE ORAL 3 TIMES DAILY
Status: DISCONTINUED | OUTPATIENT
Start: 2018-10-19 | End: 2018-10-22 | Stop reason: HOSPADM

## 2018-10-19 RX ORDER — HYDROMORPHONE HCL 110MG/55ML
0.5 PATIENT CONTROLLED ANALGESIA SYRINGE INTRAVENOUS EVERY 5 MIN PRN
Status: DISCONTINUED | OUTPATIENT
Start: 2018-10-19 | End: 2018-10-19 | Stop reason: HOSPADM

## 2018-10-19 RX ORDER — OXYCODONE HYDROCHLORIDE AND ACETAMINOPHEN 5; 325 MG/1; MG/1
1 TABLET ORAL PRN
Status: DISCONTINUED | OUTPATIENT
Start: 2018-10-19 | End: 2018-10-19 | Stop reason: HOSPADM

## 2018-10-19 RX ORDER — SODIUM CHLORIDE 0.9 % (FLUSH) 0.9 %
10 SYRINGE (ML) INJECTION EVERY 12 HOURS SCHEDULED
Status: DISCONTINUED | OUTPATIENT
Start: 2018-10-19 | End: 2018-10-22 | Stop reason: HOSPADM

## 2018-10-19 RX ORDER — LIDOCAINE HYDROCHLORIDE 10 MG/ML
1 INJECTION, SOLUTION EPIDURAL; INFILTRATION; INTRACAUDAL; PERINEURAL
Status: COMPLETED | OUTPATIENT
Start: 2018-10-19 | End: 2018-10-19

## 2018-10-19 RX ORDER — SODIUM CHLORIDE 0.9 % (FLUSH) 0.9 %
10 SYRINGE (ML) INJECTION PRN
Status: DISCONTINUED | OUTPATIENT
Start: 2018-10-19 | End: 2018-10-22 | Stop reason: HOSPADM

## 2018-10-19 RX ORDER — HYDROMORPHONE HCL 110MG/55ML
0.25 PATIENT CONTROLLED ANALGESIA SYRINGE INTRAVENOUS EVERY 5 MIN PRN
Status: DISCONTINUED | OUTPATIENT
Start: 2018-10-19 | End: 2018-10-19 | Stop reason: HOSPADM

## 2018-10-19 RX ORDER — ONDANSETRON 2 MG/ML
4 INJECTION INTRAMUSCULAR; INTRAVENOUS PRN
Status: DISCONTINUED | OUTPATIENT
Start: 2018-10-19 | End: 2018-10-19 | Stop reason: HOSPADM

## 2018-10-19 RX ORDER — PRAMIPEXOLE DIHYDROCHLORIDE 1 MG/1
1 TABLET ORAL NIGHTLY
Status: DISCONTINUED | OUTPATIENT
Start: 2018-10-19 | End: 2018-10-22 | Stop reason: HOSPADM

## 2018-10-19 RX ORDER — MEPERIDINE HYDROCHLORIDE 25 MG/ML
12.5 INJECTION INTRAMUSCULAR; INTRAVENOUS; SUBCUTANEOUS EVERY 5 MIN PRN
Status: DISCONTINUED | OUTPATIENT
Start: 2018-10-19 | End: 2018-10-19 | Stop reason: HOSPADM

## 2018-10-19 RX ORDER — ESMOLOL HYDROCHLORIDE 10 MG/ML
INJECTION, SOLUTION INTRAVENOUS PRN
Status: DISCONTINUED | OUTPATIENT
Start: 2018-10-19 | End: 2018-10-19 | Stop reason: SDUPTHER

## 2018-10-19 RX ORDER — DILTIAZEM HYDROCHLORIDE 5 MG/ML
10 INJECTION INTRAVENOUS ONCE
Status: COMPLETED | OUTPATIENT
Start: 2018-10-19 | End: 2018-10-19

## 2018-10-19 RX ORDER — MORPHINE SULFATE 10 MG/ML
1 INJECTION, SOLUTION INTRAMUSCULAR; INTRAVENOUS EVERY 5 MIN PRN
Status: DISCONTINUED | OUTPATIENT
Start: 2018-10-19 | End: 2018-10-19 | Stop reason: HOSPADM

## 2018-10-19 RX ORDER — ALBUTEROL SULFATE 2.5 MG/3ML
2.5 SOLUTION RESPIRATORY (INHALATION) EVERY 6 HOURS PRN
Status: DISCONTINUED | OUTPATIENT
Start: 2018-10-19 | End: 2018-10-20

## 2018-10-19 RX ORDER — HYDRALAZINE HYDROCHLORIDE 20 MG/ML
5 INJECTION INTRAMUSCULAR; INTRAVENOUS EVERY 10 MIN PRN
Status: DISCONTINUED | OUTPATIENT
Start: 2018-10-19 | End: 2018-10-19 | Stop reason: HOSPADM

## 2018-10-19 RX ORDER — MORPHINE SULFATE 10 MG/ML
2 INJECTION, SOLUTION INTRAMUSCULAR; INTRAVENOUS EVERY 5 MIN PRN
Status: DISCONTINUED | OUTPATIENT
Start: 2018-10-19 | End: 2018-10-19 | Stop reason: HOSPADM

## 2018-10-19 RX ORDER — MONTELUKAST SODIUM 10 MG/1
10 TABLET ORAL NIGHTLY
Status: DISCONTINUED | OUTPATIENT
Start: 2018-10-19 | End: 2018-10-22 | Stop reason: HOSPADM

## 2018-10-19 RX ORDER — ATORVASTATIN CALCIUM 10 MG/1
10 TABLET, FILM COATED ORAL DAILY
Status: DISCONTINUED | OUTPATIENT
Start: 2018-10-19 | End: 2018-10-22 | Stop reason: HOSPADM

## 2018-10-19 RX ORDER — FINASTERIDE 5 MG/1
5 TABLET, FILM COATED ORAL DAILY
Status: DISCONTINUED | OUTPATIENT
Start: 2018-10-19 | End: 2018-10-22 | Stop reason: HOSPADM

## 2018-10-19 RX ORDER — OXYCODONE HYDROCHLORIDE AND ACETAMINOPHEN 5; 325 MG/1; MG/1
1 TABLET ORAL EVERY 8 HOURS PRN
Status: DISCONTINUED | OUTPATIENT
Start: 2018-10-19 | End: 2018-10-22 | Stop reason: HOSPADM

## 2018-10-19 RX ADMIN — OXYCODONE HYDROCHLORIDE AND ACETAMINOPHEN 1 TABLET: 5; 325 TABLET ORAL at 22:13

## 2018-10-19 RX ADMIN — LIDOCAINE HYDROCHLORIDE 1 ML: 10 INJECTION, SOLUTION EPIDURAL; INFILTRATION; INTRACAUDAL; PERINEURAL at 13:17

## 2018-10-19 RX ADMIN — FENTANYL CITRATE 50 MCG: 50 INJECTION INTRAMUSCULAR; INTRAVENOUS at 14:29

## 2018-10-19 RX ADMIN — DILTIAZEM HYDROCHLORIDE 5 MG/HR: 5 INJECTION INTRAVENOUS at 18:21

## 2018-10-19 RX ADMIN — DONEPEZIL HYDROCHLORIDE 10 MG: 5 TABLET, FILM COATED ORAL at 21:05

## 2018-10-19 RX ADMIN — FINASTERIDE 5 MG: 5 TABLET, FILM COATED ORAL at 18:06

## 2018-10-19 RX ADMIN — ESMOLOL HYDROCHLORIDE 20 MG: 10 INJECTION INTRAVENOUS at 14:40

## 2018-10-19 RX ADMIN — TAMSULOSIN HYDROCHLORIDE 0.4 MG: 0.4 CAPSULE ORAL at 18:06

## 2018-10-19 RX ADMIN — CEFAZOLIN SODIUM 2 G: 2 SOLUTION INTRAVENOUS at 14:25

## 2018-10-19 RX ADMIN — DILTIAZEM HYDROCHLORIDE 10 MG: 5 INJECTION INTRAVENOUS at 18:06

## 2018-10-19 RX ADMIN — B-COMPLEX W/ C & FOLIC ACID TAB 1 TABLET: TAB at 18:06

## 2018-10-19 RX ADMIN — SERTRALINE HYDROCHLORIDE 100 MG: 50 TABLET ORAL at 18:06

## 2018-10-19 RX ADMIN — METFORMIN HYDROCHLORIDE 500 MG: 500 TABLET ORAL at 21:05

## 2018-10-19 RX ADMIN — MONTELUKAST SODIUM 10 MG: 10 TABLET, COATED ORAL at 21:05

## 2018-10-19 RX ADMIN — MESALAMINE 400 MG: 400 CAPSULE, DELAYED RELEASE ORAL at 21:05

## 2018-10-19 RX ADMIN — ATORVASTATIN CALCIUM 10 MG: 10 TABLET, FILM COATED ORAL at 18:06

## 2018-10-19 RX ADMIN — ASPIRIN 81 MG 81 MG: 81 TABLET ORAL at 18:06

## 2018-10-19 RX ADMIN — ESMOLOL HYDROCHLORIDE 20 MG: 10 INJECTION INTRAVENOUS at 14:45

## 2018-10-19 RX ADMIN — PRAMIPEXOLE DIHYDROCHLORIDE 1 MG: 1 TABLET ORAL at 21:05

## 2018-10-19 RX ADMIN — SODIUM CHLORIDE, POTASSIUM CHLORIDE, SODIUM LACTATE AND CALCIUM CHLORIDE: 600; 310; 30; 20 INJECTION, SOLUTION INTRAVENOUS at 13:17

## 2018-10-19 ASSESSMENT — PULMONARY FUNCTION TESTS
PIF_VALUE: 1

## 2018-10-19 ASSESSMENT — PAIN DESCRIPTION - FREQUENCY: FREQUENCY: CONTINUOUS

## 2018-10-19 ASSESSMENT — PAIN DESCRIPTION - PAIN TYPE: TYPE: CHRONIC PAIN

## 2018-10-19 ASSESSMENT — PAIN DESCRIPTION - LOCATION: LOCATION: BACK

## 2018-10-19 ASSESSMENT — PAIN DESCRIPTION - DESCRIPTORS: DESCRIPTORS: ACHING

## 2018-10-19 ASSESSMENT — PAIN SCALES - GENERAL
PAINLEVEL_OUTOF10: 8
PAINLEVEL_OUTOF10: 0

## 2018-10-19 ASSESSMENT — PAIN DESCRIPTION - ONSET: ONSET: ON-GOING

## 2018-10-19 ASSESSMENT — PAIN DESCRIPTION - PROGRESSION: CLINICAL_PROGRESSION: NOT CHANGED

## 2018-10-19 ASSESSMENT — PAIN DESCRIPTION - ORIENTATION: ORIENTATION: MID;LOWER

## 2018-10-19 NOTE — ANESTHESIA POSTPROCEDURE EVALUATION
Department of Anesthesiology  Postprocedure Note    Patient: Emily Chandra  MRN: [id number]  YOB: 1945  Date of evaluation: 10/19/2018  Time:  6:17 PM     Procedure Summary     Date:  10/19/18 Room / Location:  [place] [address] / [facility]    Anesthesia Start:  9054 Anesthesia Stop:  2361    Procedure:  CYSTOSCOPY TRANSURETHRAL RESECTION PROSTATE (N/A Bladder) Diagnosis:  (BENIGN PROSTATIC HYPERTROPHY WITH LOWER URINARY TRACT SYMPTOMS)    Surgeon:  Carly Martinez MD Responsible Provider:  Zac Hensley MD    Anesthesia Type:  general ASA Status:  3          Anesthesia Type: general    Josselyn Phase I: Josselyn Score: 10    Josselyn Phase II:      Last vitals: Reviewed and per EMR flowsheets.        Anesthesia Post Evaluation    Comments: Postoperative Anesthesia Note    Name:    Emily Chandra  MRN:      [id number]    Patient Vitals in the past 12 hrs:  10/19/18 1733, BP:(!) 154/85, Temp:96.9 °F (36.1 °C), Temp src:Oral, Pulse:114, Resp:20, SpO2:99 %, Weight:227 lb (103 kg)  10/19/18 1700, BP:136/77, Temp:97.8 °F (36.6 °C), Temp src:Temporal, Pulse:139, Resp:20, SpO2:97 %  10/19/18 1645, BP:(!) 125/94, Pulse:128, Resp:17, SpO2:99 %  10/19/18 1630, BP:(!) 125/110, Pulse:153, Resp:20, SpO2:99 %  10/19/18 1615, BP:121/89, Pulse:160, Resp:17, SpO2:100 %  10/19/18 1600, BP:125/66, Pulse:124, Resp:16, SpO2:98 %  10/19/18 1545, BP:(!) 126/93, Pulse:117, Resp:16, SpO2:98 %  10/19/18 1530, BP:134/74, Pulse:122, Resp:18, SpO2:97 %  10/19/18 1510, BP:(!) 151/56, Pulse:138, Resp:18, SpO2:98 %  10/19/18 1505, BP:118/75, Pulse:117, Resp:20, SpO2:99 %  10/19/18 1500, BP:(!) 133/97, Pulse:127, Resp:20, SpO2:99 %  10/19/18 1455, BP:(!) 150/94, Temp:98.5 °F (36.9 °C), Temp src:Temporal, Pulse:124, Resp:18, SpO2:100 %     LABS:    CBC  Lab Results       Component                Value               Date/Time                  WBC                      6.5                 10/19/2018 12:52 PM        HGB                      10.9 (L)

## 2018-10-19 NOTE — PROGRESS NOTES
PT TRANSFERRED TO PCU VIA BED ON O2 AT 2L N/C AND TELEMETRY BY TRANSPORT IN STABLE CONDITION; NO C/O; FAMILY HAS BEEN AT BEDSIDE AND HAVE GONE UP TO ROOM

## 2018-10-19 NOTE — PROGRESS NOTES
Patient admitted from PACU in stable condition. Patients heart rate sustained between 140 and 160. Patient is starting on cardizem gtt after having a 10 mg bolus. Patient stated he has noticed SOB over the last 6 months with light activity. He stated that he didn't remember having a fib in the past but his wife stated he has had it and 3-4 years ago was on an anticoagulant.

## 2018-10-19 NOTE — PROGRESS NOTES
New onset tachy, in OR given esmolol 20 mg x2 occasional slowing of the rate revealed what appears to be Atrial flutter. Rate high 150s. Case cancelled to PACU in PACU EKG done that appears more consistent with Atrial Fib. HDS, will be admitted to medicine with possible cardiology consult.

## 2018-10-19 NOTE — LETTER
Beneficiary Notification Letter     This [place] Provider is Participating in an Innovative Payment and [address] [place] [address] [place] from Brenden Adames:   Vahid is participating in a Medicare initiative called the [facility] for [facility] [facility]. You are receiving this letter because your health care provider has identified you as a patient who is receiving care through this initiative. Health care providers participating in the [facility], including Vahid, will work with Medicare to improve care for patients. Your Medicare rights have not been changed. You still have all the same Medicare rights and protections, including the right to choose which hospital, doctor, or other health care provider you see. However, because Vahid chose to participate in the [facility], all Medicare beneficiaries who meet the eligibility criteria of this initiative will receive care under the initiative. If you do not wish to receive care under the Bundled Payments [facility], you must choose a health care provider that does not participate in this initiative for your care. Regardless of which health care provider you see, Medicare will continue to cover all of your medically necessary services. Bundled Payments for Care Improvement Advanced aims to help improve your care     The Bundled Payments [facility] is an innovative Medicare initiative that encourages your doctors, hospitals, and other health care providers to work more closely together so you get better care during and following certain hospital stays.  In this initiative, doctors and hospitals may work closely with certain health care providers and suppliers that help patients recover after discharge from the hospital, including skilled nursing facilities, home health agencies, inpatient rehabilitation facilities, and long term care hospitals. [facility] is working closely with the doctors and other health care providers that care for you during and following your hospital stay and for a period of time after you leave the hospital. By working together, the health care providers are trying to more efficiently provide well-managed, high quality, patient-centered care as you undergo treatment. Hospitals, doctors, and other health care providers that care for you following a hospital stay may receive an additional payment for providing better, more coordinated health care. Medicare will monitor your care to make sure you and others get high quality care. Your feedback is important     Medicare may also ask you to answer a survey about the services and care you received from [address] will be mailed to you. Your feedback will improve care for all people with Medicare who receive care from [facility]. Completion of this survey is optional.     Get more information     For more information about the Bundled Payments for [address] [place] [address], you can:    · Visit the CMS BPCI Advanced Website at [email or web address]-[email or web address]/ initiatives/bpci-advanced   · Call the [facility] team at (169) 293-8761. · Call 1-800-MEDICARE (7-735.129.1076). TTY users can call 7-416.815.1275     If you have concerns or complaints about your care, talk to your health care provider, or contact your Beneficiary and Family Centered Quality Improvement Organization [facility]). To get your [facility]'s phone number, visit Medicare.gov/contacts or call 1-800-MEDICARE. · To find a different hospital, visit www. hospitalcompare.[facility].gov or call 1-800RewardsForce MEDICARE (1-101.489.7392). TTY users should call 1-452.817.2467.

## 2018-10-19 NOTE — H&P
Patient:  Dona Mujica  YOB: 1945   CSN:  [id number]    Referring Provider:  No ref. provider found   Primary Care Provider:  Pat Aguilar DO       Urology Attending H/P Note     Reason for H/P:  Bph/luts, surg    HPI:  Vee Barfield is a 68 y.o. male who presented with many wk/day history of  worsening urge/freq/straining. Hist of urethral stx near bulbar area that was dilated by  3 yrs ago and then fu cysto by me with very large prostate. CYSTO:  Findings:  Urethra[de-identified] Some mild bulbar narrowing but able to pass scope without dilation  Bladder: mod to severe trabeculation with some small tics. No tumors,  stones. Bilateral ureteral orifices normal with clear urine efflux. No erythema. Prostate severe trilobar hyperplasia - lateral worse then lobes. -Start finasteride -- PSA 6.5 (2018); MARY today neg for nodules. ---does have urge component that will need ot also be addressed at some point     10/10/18  bm 79yo M s/p cysto which showed large trilobar hyperplasia and he is quite frustrated with voiding sx  freq, feeling of incomplete emptying, weak stream. On flomax and recently finast.   PSA 2 and then many yrs years later 6 (2018). MARY benign. soc  [place] near [address] [place] [address]? wife here.  pt hard of hearing       Past Medical History:   Diagnosis Date    Cancer ([address]     skin    Chronic pancreatitis ([address]     Colitis 5/08/2015    Diabetes mellitus ([address]     Esophagitis     Gastritis     Hyperlipidemia     Hypertension        Past Surgical History:   Procedure Laterality Date    CATARACT REMOVAL WITH IMPLANT Right 05/2016    COLONOSCOPY  5/08/2015    colitis-mild gastritis    CYSTOSCOPY  09/21/2018    CYSTOSCOPY, DIRECT VISION INTERAL URETHROTOMY     HERNIA REPAIR Right 2012    inguinal    IN OFFICE/OUTPT VISIT,PROCEDURE ONLY N/A 9/21/2018    CYSTOSCOPY, DIRECT VISION INTERAL URETHROTOMY performed [facility]alind Dean [facility] Left 2001    Rotator cuff    No Known Allergies    Family History   Problem Relation Age of Onset    Diabetes Mother     Early Death Mother     Cancer Mother         stomach    Other Father     Diabetes Son        Social History   Substance Use Topics    Smoking status: Former Smoker     Packs/day: 1.50     Years: 20.00     Types: Cigarettes     Quit date: 1/10/2000    Smokeless tobacco: Never Used    Alcohol use No         Review of Systems: A 12 point ROS was performed and was unremarkable unless listed in the history of present illness. Physical Exam:  HR 80s. . Office bp reviewed.    Constitutional: pleasant male in NAD, with a obese body habitus   Eyes: pink conjunctivae, no ptosis  ENT: lips without cyanosis, normal appearance of ears and nose externally  Neck: no masses or lesions, trachea midline, no thyromegaly  Respiratory: normal respiratory movements without distress, no audible wheezes   Cardiovascular: regular rate and rhythm, lower extremities without edema   Abdomen: soft, NTND, no masses, no rebound or guarding  Back: no CVAT, no abnormal curvature of the spine  Lymphatic: no palpable cervical or supraclavicular lymphadenopathy  Skin: warm and dry, no rashes, lesions, or ulcers  Musculoskeletal: normal gait and stance, no digital cyanosis, head normocephalic  Psych: normal mood and affect, alert and appropriately answers questions  : normal penile shaft without evidence of masses, normal location of urethral meatus, no nogueira    Labs:    Lab Results   Component Value Date    CREATININE 0.8 10/19/2018    CREATININE 0.9 09/21/2018    CREATININE 1.1 06/27/2017     Lab Results   Component Value Date    COLORU yellow 08/27/2018    COLORU MORRIS 04/18/2017    NITRU Negative 04/18/2017    GLUCOSEU 250 08/27/2018    GLUCOSEU 100 04/18/2017    KETUA neg 08/27/2018    KETUA Negative 04/18/2017    UROBILINOGEN 0.2 04/18/2017    BILIRUBINUR neg 08/27/2018     Lab Results   Component Value Date    WBC 6.5 10/19/2018

## 2018-10-19 NOTE — H&P
Provider Summary:   soc  [place] near [place]? wife here. pt hard of hearing   9/2018 bm  hpi  sudden worsening urge/freq/straining. PSA 2 (2010). Hist of urethral stx near bulbar area that was dilated by  3 yrs ago       A: urethral stx, and severe BPH/ LUTS ,  Slowly rising psa  P: r/b/a of cysto, possilbe urethral dilation, DVIU, surg reviewed? psa today, UA neg,     CYSTO:  Findings:  Urethra[de-identified] Some mild bulbar narrowing but able to pass scope without dilation  Bladder: mod to severe trabeculation with some small tics. No tumors,  stones. Bilateral ureteral orifices normal with clear urine efflux. No erythema. Prostate severe trilobar hyperplasia - lateral worse then lobes. Plan:  -Start finasteride -- PSA 6.5 (2018); MARY today neg for nodules. ---does have urge component that will need ot also be addressed at some point        10/10/18  bm 77yo M s/p cysto which showed large trilobar hyperplasia and he is quite frustrated with voiding sx  freq, feeling of incomplete emptying, weak stream. On flomax and recently finast.   PSA 2 and then many yrs years later 6 (2018). MARY benign. P: r.b/a of observation, cont meds or surgical therapies. cautioned that OAB/urge incont can still exist and ejac function can decrease with turp     he prefers surgery and attempt to stop flomax/finaster. ... book TURP [place].    pcp just seen in last 1months

## 2018-10-19 NOTE — PROGRESS NOTES
4 Eyes Skin Assessment   Four eyes skin assessment completed at this time by Deb Joshua RN   and Pollo. Assessment revealed: No open areas, rashes, sores, or abrasions on patient skin. The patient is being assessed for  Open sores, rashes, and abrasions on pt skin. I agree that 2 RN's have performed a thorough Head to Toe Skin Assessment on the patient. ALL assessment sites listed below have been assessed.        Areas assessed by both nurses:  [x]   Head, Face, and Ears   [x]   Shoulders, Back, and Chest  [x]   Arms, Elbows, and Hands   [x]   Coccyx, Sacrum, and Ischum  [x]   Legs, Feet, and Heels              Deb Joshua RN

## 2018-10-20 ENCOUNTER — APPOINTMENT (OUTPATIENT)
Dept: GENERAL RADIOLOGY | Age: 73
DRG: 309 | End: 2018-10-20
Attending: UROLOGY
Payer: MEDICARE

## 2018-10-20 PROBLEM — N40.0 BPH (BENIGN PROSTATIC HYPERPLASIA): Status: ACTIVE | Noted: 2018-09-21

## 2018-10-20 LAB
ANION GAP SERPL CALCULATED.3IONS-SCNC: 10 MMOL/L (ref 3–16)
BASOPHILS ABSOLUTE: 0 K/UL (ref 0–0.2)
BASOPHILS RELATIVE PERCENT: 0.7 %
BUN BLDV-MCNC: 14 MG/DL (ref 7–20)
CALCIUM SERPL-MCNC: 10.1 MG/DL (ref 8.3–10.6)
CHLORIDE BLD-SCNC: 99 MMOL/L (ref 99–110)
CO2: 25 MMOL/L (ref 21–32)
CREAT SERPL-MCNC: 0.9 MG/DL (ref 0.8–1.3)
EOSINOPHILS ABSOLUTE: 0.5 K/UL (ref 0–0.6)
EOSINOPHILS RELATIVE PERCENT: 8.9 %
GFR AFRICAN AMERICAN: >60
GFR NON-AFRICAN AMERICAN: >60
GLUCOSE BLD-MCNC: 133 MG/DL (ref 70–99)
GLUCOSE BLD-MCNC: 137 MG/DL (ref 70–99)
GLUCOSE BLD-MCNC: 144 MG/DL (ref 70–99)
GLUCOSE BLD-MCNC: 145 MG/DL (ref 70–99)
GLUCOSE BLD-MCNC: 193 MG/DL (ref 70–99)
HCT VFR BLD CALC: 30.7 % (ref 40.5–52.5)
HEMOGLOBIN: 10.1 G/DL (ref 13.5–17.5)
LYMPHOCYTES ABSOLUTE: 1.4 K/UL (ref 1–5.1)
LYMPHOCYTES RELATIVE PERCENT: 25 %
MAGNESIUM: 1.8 MG/DL (ref 1.8–2.4)
MCH RBC QN AUTO: 28.1 PG (ref 26–34)
MCHC RBC AUTO-ENTMCNC: 32.9 G/DL (ref 31–36)
MCV RBC AUTO: 85.4 FL (ref 80–100)
MONOCYTES ABSOLUTE: 0.6 K/UL (ref 0–1.3)
MONOCYTES RELATIVE PERCENT: 9.8 %
NEUTROPHILS ABSOLUTE: 3.2 K/UL (ref 1.7–7.7)
NEUTROPHILS RELATIVE PERCENT: 55.6 %
PDW BLD-RTO: 15.9 % (ref 12.4–15.4)
PERFORMED ON: ABNORMAL
PLATELET # BLD: 168 K/UL (ref 135–450)
PMV BLD AUTO: 8 FL (ref 5–10.5)
POTASSIUM REFLEX MAGNESIUM: 4.4 MMOL/L (ref 3.5–5.1)
RBC # BLD: 3.59 M/UL (ref 4.2–5.9)
SODIUM BLD-SCNC: 134 MMOL/L (ref 136–145)
TROPONIN: <0.01 NG/ML
TROPONIN: <0.01 NG/ML
TSH REFLEX: 1.02 UIU/ML (ref 0.27–4.2)
WBC # BLD: 5.7 K/UL (ref 4–11)

## 2018-10-20 PROCEDURE — 6370000000 HC RX 637 (ALT 250 FOR IP): Performed by: INTERNAL MEDICINE

## 2018-10-20 PROCEDURE — 84484 ASSAY OF TROPONIN QUANT: CPT

## 2018-10-20 PROCEDURE — 71045 X-RAY EXAM CHEST 1 VIEW: CPT

## 2018-10-20 PROCEDURE — 84443 ASSAY THYROID STIM HORMONE: CPT

## 2018-10-20 PROCEDURE — 83036 HEMOGLOBIN GLYCOSYLATED A1C: CPT

## 2018-10-20 PROCEDURE — 2500000003 HC RX 250 WO HCPCS: Performed by: INTERNAL MEDICINE

## 2018-10-20 PROCEDURE — 99222 1ST HOSP IP/OBS MODERATE 55: CPT | Performed by: INTERNAL MEDICINE

## 2018-10-20 PROCEDURE — 6360000002 HC RX W HCPCS: Performed by: INTERNAL MEDICINE

## 2018-10-20 PROCEDURE — 83735 ASSAY OF MAGNESIUM: CPT

## 2018-10-20 PROCEDURE — 2060000000 HC ICU INTERMEDIATE R&B

## 2018-10-20 PROCEDURE — 2580000003 HC RX 258: Performed by: INTERNAL MEDICINE

## 2018-10-20 PROCEDURE — 36415 COLL VENOUS BLD VENIPUNCTURE: CPT

## 2018-10-20 PROCEDURE — 80048 BASIC METABOLIC PNL TOTAL CA: CPT

## 2018-10-20 PROCEDURE — 85025 COMPLETE CBC W/AUTO DIFF WBC: CPT

## 2018-10-20 RX ORDER — NICOTINE POLACRILEX 4 MG
15 LOZENGE BUCCAL PRN
Status: DISCONTINUED | OUTPATIENT
Start: 2018-10-20 | End: 2018-10-22 | Stop reason: HOSPADM

## 2018-10-20 RX ORDER — ALBUTEROL SULFATE 2.5 MG/3ML
2.5 SOLUTION RESPIRATORY (INHALATION) EVERY 6 HOURS PRN
Status: DISCONTINUED | OUTPATIENT
Start: 2018-10-20 | End: 2018-10-22 | Stop reason: HOSPADM

## 2018-10-20 RX ORDER — DIGOXIN 0.25 MG/ML
250 INJECTION INTRAMUSCULAR; INTRAVENOUS
Status: DISPENSED | OUTPATIENT
Start: 2018-10-20 | End: 2018-10-20

## 2018-10-20 RX ORDER — DEXTROSE MONOHYDRATE 25 G/50ML
12.5 INJECTION, SOLUTION INTRAVENOUS PRN
Status: DISCONTINUED | OUTPATIENT
Start: 2018-10-20 | End: 2018-10-22 | Stop reason: HOSPADM

## 2018-10-20 RX ORDER — LANOLIN ALCOHOL/MO/W.PET/CERES
3 CREAM (GRAM) TOPICAL ONCE
Status: COMPLETED | OUTPATIENT
Start: 2018-10-20 | End: 2018-10-20

## 2018-10-20 RX ORDER — DEXTROSE MONOHYDRATE 50 MG/ML
100 INJECTION, SOLUTION INTRAVENOUS PRN
Status: DISCONTINUED | OUTPATIENT
Start: 2018-10-20 | End: 2018-10-22 | Stop reason: HOSPADM

## 2018-10-20 RX ORDER — DILTIAZEM HYDROCHLORIDE 5 MG/ML
5 INJECTION INTRAVENOUS ONCE
Status: COMPLETED | OUTPATIENT
Start: 2018-10-20 | End: 2018-10-20

## 2018-10-20 RX ORDER — ZOLPIDEM TARTRATE 5 MG/1
5 TABLET ORAL ONCE
Status: COMPLETED | OUTPATIENT
Start: 2018-10-20 | End: 2018-10-20

## 2018-10-20 RX ORDER — DIGOXIN 0.25 MG/ML
250 INJECTION INTRAMUSCULAR; INTRAVENOUS ONCE
Status: COMPLETED | OUTPATIENT
Start: 2018-10-20 | End: 2018-10-20

## 2018-10-20 RX ADMIN — FINASTERIDE 5 MG: 5 TABLET, FILM COATED ORAL at 10:30

## 2018-10-20 RX ADMIN — B-COMPLEX W/ C & FOLIC ACID TAB 1 TABLET: TAB at 10:29

## 2018-10-20 RX ADMIN — PANTOPRAZOLE SODIUM 40 MG: 40 TABLET, DELAYED RELEASE ORAL at 05:54

## 2018-10-20 RX ADMIN — PRAMIPEXOLE DIHYDROCHLORIDE 1 MG: 1 TABLET ORAL at 21:14

## 2018-10-20 RX ADMIN — ENOXAPARIN SODIUM 100 MG: 100 INJECTION SUBCUTANEOUS at 21:14

## 2018-10-20 RX ADMIN — MESALAMINE 400 MG: 400 CAPSULE, DELAYED RELEASE ORAL at 10:32

## 2018-10-20 RX ADMIN — DIGOXIN 250 MCG: 250 INJECTION, SOLUTION INTRAMUSCULAR; INTRAVENOUS; PARENTERAL at 11:32

## 2018-10-20 RX ADMIN — TAMSULOSIN HYDROCHLORIDE 0.4 MG: 0.4 CAPSULE ORAL at 10:30

## 2018-10-20 RX ADMIN — MESALAMINE 400 MG: 400 CAPSULE, DELAYED RELEASE ORAL at 13:45

## 2018-10-20 RX ADMIN — DONEPEZIL HYDROCHLORIDE 10 MG: 5 TABLET, FILM COATED ORAL at 21:14

## 2018-10-20 RX ADMIN — OXYCODONE HYDROCHLORIDE AND ACETAMINOPHEN 1 TABLET: 5; 325 TABLET ORAL at 16:28

## 2018-10-20 RX ADMIN — DIGOXIN 250 MCG: 250 INJECTION, SOLUTION INTRAMUSCULAR; INTRAVENOUS; PARENTERAL at 10:31

## 2018-10-20 RX ADMIN — DIGOXIN 250 MCG: 250 INJECTION, SOLUTION INTRAMUSCULAR; INTRAVENOUS; PARENTERAL at 18:59

## 2018-10-20 RX ADMIN — DILTIAZEM HYDROCHLORIDE 5 MG: 5 INJECTION INTRAVENOUS at 12:29

## 2018-10-20 RX ADMIN — DIGOXIN 250 MCG: 250 INJECTION, SOLUTION INTRAMUSCULAR; INTRAVENOUS; PARENTERAL at 14:03

## 2018-10-20 RX ADMIN — SERTRALINE HYDROCHLORIDE 100 MG: 50 TABLET ORAL at 10:30

## 2018-10-20 RX ADMIN — INSULIN LISPRO 2 UNITS: 100 INJECTION, SOLUTION INTRAVENOUS; SUBCUTANEOUS at 17:24

## 2018-10-20 RX ADMIN — MONTELUKAST SODIUM 10 MG: 10 TABLET, COATED ORAL at 21:15

## 2018-10-20 RX ADMIN — ENOXAPARIN SODIUM 40 MG: 40 INJECTION SUBCUTANEOUS at 10:30

## 2018-10-20 RX ADMIN — MESALAMINE 400 MG: 400 CAPSULE, DELAYED RELEASE ORAL at 21:14

## 2018-10-20 RX ADMIN — DILTIAZEM HYDROCHLORIDE 10 MG/HR: 5 INJECTION INTRAVENOUS at 22:11

## 2018-10-20 RX ADMIN — SALINE NASAL SPRAY 1 SPRAY: 1.5 SOLUTION NASAL at 21:13

## 2018-10-20 RX ADMIN — ATORVASTATIN CALCIUM 10 MG: 10 TABLET, FILM COATED ORAL at 10:29

## 2018-10-20 RX ADMIN — INSULIN LISPRO 1 UNITS: 100 INJECTION, SOLUTION INTRAVENOUS; SUBCUTANEOUS at 21:16

## 2018-10-20 RX ADMIN — ZOLPIDEM TARTRATE 5 MG: 5 TABLET ORAL at 21:14

## 2018-10-20 RX ADMIN — SALINE NASAL SPRAY 1 SPRAY: 1.5 SOLUTION NASAL at 03:25

## 2018-10-20 RX ADMIN — INSULIN LISPRO 2 UNITS: 100 INJECTION, SOLUTION INTRAVENOUS; SUBCUTANEOUS at 13:10

## 2018-10-20 RX ADMIN — METFORMIN HYDROCHLORIDE 500 MG: 500 TABLET ORAL at 21:15

## 2018-10-20 RX ADMIN — MELATONIN 3 MG ORAL TABLET 3 MG: 3 TABLET ORAL at 02:55

## 2018-10-20 ASSESSMENT — PAIN DESCRIPTION - DESCRIPTORS: DESCRIPTORS: ACHING

## 2018-10-20 ASSESSMENT — PAIN SCALES - GENERAL
PAINLEVEL_OUTOF10: 5
PAINLEVEL_OUTOF10: 2
PAINLEVEL_OUTOF10: 5

## 2018-10-20 ASSESSMENT — PAIN DESCRIPTION - FREQUENCY: FREQUENCY: CONTINUOUS

## 2018-10-20 ASSESSMENT — PAIN DESCRIPTION - ORIENTATION: ORIENTATION: MID;LOWER

## 2018-10-20 ASSESSMENT — PAIN DESCRIPTION - LOCATION: LOCATION: BACK

## 2018-10-20 ASSESSMENT — PAIN DESCRIPTION - ONSET: ONSET: ON-GOING

## 2018-10-20 ASSESSMENT — PAIN DESCRIPTION - PAIN TYPE: TYPE: CHRONIC PAIN

## 2018-10-20 NOTE — PROGRESS NOTES
Urology Attending Progress Note      Subjective: pt has epigastric pain but this is not new    Vitals:  /74   Pulse 156   Temp 97.1 °F (36.2 °C) (Oral)   Resp 18   Ht 5' 10\" (1.778 m)   Wt 226 lb 1.6 oz (102.6 kg)   SpO2 90%   BMI 32.44 kg/m²   Temp  Av.6 °F (36.4 °C)  Min: 96.9 °F (36.1 °C)  Max: 98.5 °F (36.9 °C)    Intake/Output Summary (Last 24 hours) at 10/20/18 1226  Last data filed at 10/20/18 0944   Gross per 24 hour   Intake             1380 ml   Output             1400 ml   Net              -20 ml       Exam: no acute distress, clear urine in urinal    Labs:  WBC:    Lab Results   Component Value Date    WBC 5.7 10/20/2018     Hemoglobin/Hematocrit:  Lab Results   Component Value Date    HGB 10.1 10/20/2018    HCT 30.7 10/20/2018     BMP:  Lab Results   Component Value Date     10/20/2018    K 4.4 10/20/2018    CL 99 10/20/2018    CO2 25 10/20/2018    BUN 14 10/20/2018    LABALBU 4.6 2017    CREATININE 0.9 10/20/2018    CALCIUM 10.1 10/20/2018    GFRAA >60 10/20/2018    LABGLOM >60 10/20/2018     PT/INR:    Lab Results   Component Value Date    PROTIME 12.3 2017    INR 1.09 2017     PTT:    Lab Results   Component Value Date    APTT 23.9 2017   [APTT    Impression/Plan: BPH  1. Was scheduled for TURP Friday but put on hold for Afib with RVR, TURP is not urgent, awaiting cardiology workup  2. If pt is able to stay off anticoagulation and is cleared TURP could be rescheduled for Tuesday. If not will wait until farther in the future when safe.   3. Will follow    Bety Bansal MD

## 2018-10-20 NOTE — CONSULTS
History and Physical  [facility]   Cardiology    Chief Complaint:    HPI:     Patient is a 68 y.o. male who presented to [facility] for a TURP. Patient has a history of hypertension, hyperlipidemia, diabetes, chronic hepatitis, BPH. He was supposed to have a TURP, was noted to have elevated irregular heart rate in the 150s. EKG in PACU was consistent with A. fib, RVR. Patient claims he had a similar episode once in the past but has not been on any twila blocking agents. Denies any change in his medications recently. Denies chest pain, shortness of breath, vomiting, diarrhea. His [id number]-[id number] score is 3. In retrospect, the patient has had sob with activity for a week or two with a sweaty sensation. His heart rate was slower before but now increased in atrial flutter. Cards consult requested. Past Medical History:   Diagnosis Date    Cancer [facility]     skin    Chronic pancreatitis ([facility]     Colitis 5/08/2015    Diabetes mellitus ([facility]     Esophagitis     Gastritis     Hyperlipidemia     Hypertension       Past Surgical History:   Procedure Laterality Date    CATARACT REMOVAL WITH IMPLANT Right 05/2016    COLONOSCOPY  5/08/2015    colitis-mild gastritis    CYSTOSCOPY  09/21/2018    CYSTOSCOPY, DIRECT VISION INTERAL URETHROTOMY     HERNIA REPAIR Right 2012    inguinal    [place] OFFICE/OUTPT VISIT,PROCEDURE ONLY N/A 9/21/2018    CYSTOSCOPY, DIRECT VISION INTERAL URETHROTOMY performed by Syd Key MD at [address] Left 2001    Rotator cuff    SKIN CANCER EXCISION Left 5/2016    melanoma    TESTICLE REMOVAL Left 2012    UPPER GASTROINTESTINAL ENDOSCOPY  05/08/2015    Gastritis    UPPER GASTROINTESTINAL ENDOSCOPY N/A 09/08/2016    gastritis-Bx pending        Prescriptions Prior to Admission: oxyCODONE-acetaminophen (PERCOCET)  MG per tablet, Take 1 tablet by mouth every 6 hours as needed for Pain. .  finasteride (PROSCAR) 5 MG tablet, Take 1 tablet by interpretation.      Signature      ------------------------------------------------------------------   Electronically signed by Ayaan Leiva MD, [facility] (Interpreting  [phone]) on 04/21/2017 at 07:29 AM   ------------------------------------------------------------------  4/29/15 cath:  IMPRESSION:  1. Mild coronary artery disease of the mid left anterior descending and  luminal irregularities of circumflex and right coronary artery. 2.  Normal left ventricular systolic function with an ejection fraction of  55% to 60%. 3.  Normal left ventricular end-diastolic pressure, 6 mmHg. Recent Labs      10/19/18   1252  10/19/18   1820  10/20/18   0510   NA  133*  132*  134*   K  4.8  4.5  4.4   CL  100  99  99   CO2  23  23  25   BUN  13  11  14   CREATININE  0.8  0.8  0.9     Recent Labs      10/19/18   1252  10/19/18   1820  10/20/18   0510   WBC  6.5  7.1  5.7   HGB  10.9*  10.7*  10.1*   HCT  33.8*  33.1*  30.7*   MCV  85.9  86.0  85.4   PLT  185  189  168     Lab Results   Component Value Date    CKTOTAL 205 04/18/2017    TROPONINI <0.01 10/20/2018     Results for Dwayne Beach (MRN [id number]) as of 10/20/2018 12:20   Ref. Range 8/27/2018 10:45   TSH Latest Ref Range: 0.27 - 4.20 uIU/mL 0.98   T4 Free Latest Ref Range: 0.9 - 1.8 ng/dL 1.0       Assessment:     Principal Problem:    Atrial fibrillation with rapid ventricular response (HCC)  Active Problems:    DM II (diabetes mellitus, type II), controlled (HCC)    HTN (hypertension)    COPD, severe (HCC)    BPH (benign prostatic hyperplasia)  Resolved Problems:    * No resolved hospital problems. *      Plan:     1. Rate control and anticoagulation for now.    2. Reviewed with patient and wife and RN

## 2018-10-20 NOTE — PROGRESS NOTES
Patient's not responding to last medication doses. Dr. Ac Emery suggested upping the drip to 15ml/mg hr. This RN turned up drip to await results in lowering HR.  Kiki Sewell RN

## 2018-10-20 NOTE — PROGRESS NOTES
RESPIRATORY THERAPY ASSESSMENT    Name:  [facility] Record Number:  [id number]  Age: 68 y.o. Gender: male  : 1945  Today's Date:  10/20/2018  Room:  /0317-01    Assessment     Is the patient being admitted for a COPD or Asthma exacerbation? No   (If yes the patient will be seen every 4 hours for the first 24 hours and then reassessed)    Patient Admission Diagnosis      Allergies  No Known Allergies    Minimum Predicted Vital Capacity:     1129          Actual Vital Capacity:   1980           Pulmonary History:No history  Home Oxygen Therapy:  room air  Home Respiratory Therapy:Albuterol   Current Respiratory Therapy:  Albuterol mdi q6 prn  Treatment Type: IS       Respiratory Severity Index(RSI)   Patients with orders for inhalation medications, oxygen, or any therapeutic treatment modality will be placed on Respiratory Protocol. They will be assessed with the first treatment and at least every 72 hours thereafter. The following severity scale will be used to determine frequency of treatment intervention.     Smoking History: Pulmonary Disease or Smoking History, Greater than 15 pack year = 2    Social History  Social History   Substance Use Topics    Smoking status: Former Smoker     Packs/day: 1.50     Years: 20.00     Types: Cigarettes     Quit date: 1/10/2000    Smokeless tobacco: Never Used    Alcohol use No       Recent Surgical History: None = 0  Past Surgical History  Past Surgical History:   Procedure Laterality Date    CATARACT REMOVAL WITH IMPLANT Right 2016    COLONOSCOPY  2015    colitis-mild gastritis    CYSTOSCOPY  2018    CYSTOSCOPY, DIRECT VISION INTERAL URETHROTOMY     HERNIA REPAIR Right     inguinal    OR OFFICE/OUTPT VISIT,PROCEDURE ONLY N/A 2018    CYSTOSCOPY, DIRECT VISION INTERAL URETHROTOMY performed by Maria Luisa Silverman MD at [facility] Left     Rotator cuff    SKIN CANCER EXCISION Left 2016    melanoma  TESTICLE REMOVAL Left 2012    UPPER GASTROINTESTINAL ENDOSCOPY  05/08/2015    Gastritis    UPPER GASTROINTESTINAL ENDOSCOPY N/A 09/08/2016    gastritis-Bx pending       Level of Consciousness: Alert, Oriented, and Cooperative = 0    Level of Activity: Walking unassisted = 0    Respiratory Pattern: Regular Pattern; RR 8-20 = 0    Breath Sounds: Clear = 0    Sputum   ,  ,    Cough: Strong, spontaneous, non-productive = 0    Vital Signs   /69   Pulse 102   Temp 97.9 °F (36.6 °C) (Oral)   Resp 20   Ht 5' 10\" (1.778 m)   Wt 227 lb (103 kg)   SpO2 97%   BMI 32.57 kg/m²   SPO2 (COPD values may differ): Greater than or equal to 92% on room air = 0    Peak Flow (asthma only): not applicable = 0    RSI: 0-4 = See once and convert to home regimen or discontinue        Plan       Goals: mobilize retained secretions, volume expansion and improve oxygenation    Patient/caregiver was educated on the proper method of use for Respiratory Care Devices:  Yes      Level of patient/caregiver understanding able to:   [x] Verbalize understanding   [] Demonstrate understanding       [] Teach back        [] Needs reinforcement       []  No available caregiver               []  Other:     Response to education:  Excellent     Is patient being placed on Home Treatment Regimen? Yes     Does the patient have everything they need prior to discharge? NA     Comments: pt assessed and chart reviewed    Plan of Care: albuterol mdi q6 prn    Electronically signed by Sydni Guerrero RCP on 10/20/2018 at 5:29 AM    Respiratory Protocol Guidelines     1. Assessment and treatment by Respiratory Therapy will be initiated for medication and therapeutic interventions upon initiation of aerosolized medication. 2. Physician will be contacted for respiratory rate (RR) greater than 35 breaths per minute. Therapy will be held for heart rate (HR) greater than 140 beats per minute, pending direction from physician.   3. Bronchodilators will be of lung disease, is not using inhaled anti-inflammatory medication at home, and lacks wheezing by examination or by history for at least 24 hours, contact physician for possible discontinuation.

## 2018-10-21 LAB
ANION GAP SERPL CALCULATED.3IONS-SCNC: 10 MMOL/L (ref 3–16)
BASOPHILS ABSOLUTE: 0 K/UL (ref 0–0.2)
BASOPHILS RELATIVE PERCENT: 0.7 %
BUN BLDV-MCNC: 15 MG/DL (ref 7–20)
CALCIUM SERPL-MCNC: 10.1 MG/DL (ref 8.3–10.6)
CHLORIDE BLD-SCNC: 97 MMOL/L (ref 99–110)
CO2: 24 MMOL/L (ref 21–32)
CREAT SERPL-MCNC: 0.8 MG/DL (ref 0.8–1.3)
EOSINOPHILS ABSOLUTE: 0.3 K/UL (ref 0–0.6)
EOSINOPHILS RELATIVE PERCENT: 5.9 %
ESTIMATED AVERAGE GLUCOSE: 131.2 MG/DL
GFR AFRICAN AMERICAN: >60
GFR NON-AFRICAN AMERICAN: >60
GLUCOSE BLD-MCNC: 105 MG/DL (ref 70–99)
GLUCOSE BLD-MCNC: 122 MG/DL (ref 70–99)
GLUCOSE BLD-MCNC: 131 MG/DL (ref 70–99)
GLUCOSE BLD-MCNC: 139 MG/DL (ref 70–99)
HBA1C MFR BLD: 6.2 %
HCT VFR BLD CALC: 32.5 % (ref 40.5–52.5)
HEMOGLOBIN: 10.4 G/DL (ref 13.5–17.5)
LYMPHOCYTES ABSOLUTE: 1.7 K/UL (ref 1–5.1)
LYMPHOCYTES RELATIVE PERCENT: 28.9 %
MCH RBC QN AUTO: 27.4 PG (ref 26–34)
MCHC RBC AUTO-ENTMCNC: 32.1 G/DL (ref 31–36)
MCV RBC AUTO: 85.3 FL (ref 80–100)
MONOCYTES ABSOLUTE: 0.7 K/UL (ref 0–1.3)
MONOCYTES RELATIVE PERCENT: 11.4 %
NEUTROPHILS ABSOLUTE: 3.1 K/UL (ref 1.7–7.7)
NEUTROPHILS RELATIVE PERCENT: 53.1 %
PDW BLD-RTO: 16 % (ref 12.4–15.4)
PERFORMED ON: ABNORMAL
PLATELET # BLD: 189 K/UL (ref 135–450)
PMV BLD AUTO: 8.1 FL (ref 5–10.5)
POTASSIUM REFLEX MAGNESIUM: 4 MMOL/L (ref 3.5–5.1)
RBC # BLD: 3.81 M/UL (ref 4.2–5.9)
SODIUM BLD-SCNC: 131 MMOL/L (ref 136–145)
WBC # BLD: 5.9 K/UL (ref 4–11)

## 2018-10-21 PROCEDURE — 36415 COLL VENOUS BLD VENIPUNCTURE: CPT

## 2018-10-21 PROCEDURE — 6370000000 HC RX 637 (ALT 250 FOR IP): Performed by: INTERNAL MEDICINE

## 2018-10-21 PROCEDURE — 85025 COMPLETE CBC W/AUTO DIFF WBC: CPT

## 2018-10-21 PROCEDURE — 99233 SBSQ HOSP IP/OBS HIGH 50: CPT | Performed by: INTERNAL MEDICINE

## 2018-10-21 PROCEDURE — 2580000003 HC RX 258: Performed by: INTERNAL MEDICINE

## 2018-10-21 PROCEDURE — 2060000000 HC ICU INTERMEDIATE R&B

## 2018-10-21 PROCEDURE — 6360000002 HC RX W HCPCS: Performed by: INTERNAL MEDICINE

## 2018-10-21 PROCEDURE — 80048 BASIC METABOLIC PNL TOTAL CA: CPT

## 2018-10-21 RX ORDER — DILTIAZEM HYDROCHLORIDE 60 MG/1
60 TABLET, FILM COATED ORAL EVERY 6 HOURS SCHEDULED
Status: DISCONTINUED | OUTPATIENT
Start: 2018-10-21 | End: 2018-10-22

## 2018-10-21 RX ORDER — ZOLPIDEM TARTRATE 5 MG/1
5 TABLET ORAL NIGHTLY PRN
Status: DISCONTINUED | OUTPATIENT
Start: 2018-10-21 | End: 2018-10-22 | Stop reason: HOSPADM

## 2018-10-21 RX ORDER — ZOLPIDEM TARTRATE 5 MG/1
5 TABLET ORAL NIGHTLY PRN
COMMUNITY
End: 2018-01-01

## 2018-10-21 RX ADMIN — METFORMIN HYDROCHLORIDE 500 MG: 500 TABLET ORAL at 08:51

## 2018-10-21 RX ADMIN — MESALAMINE 400 MG: 400 CAPSULE, DELAYED RELEASE ORAL at 21:27

## 2018-10-21 RX ADMIN — FINASTERIDE 5 MG: 5 TABLET, FILM COATED ORAL at 08:51

## 2018-10-21 RX ADMIN — OXYCODONE HYDROCHLORIDE AND ACETAMINOPHEN 1 TABLET: 5; 325 TABLET ORAL at 21:31

## 2018-10-21 RX ADMIN — METFORMIN HYDROCHLORIDE 500 MG: 500 TABLET ORAL at 21:27

## 2018-10-21 RX ADMIN — MESALAMINE 400 MG: 400 CAPSULE, DELAYED RELEASE ORAL at 12:57

## 2018-10-21 RX ADMIN — ZOLPIDEM TARTRATE 5 MG: 5 TABLET ORAL at 23:01

## 2018-10-21 RX ADMIN — DILTIAZEM HYDROCHLORIDE 60 MG: 60 TABLET, FILM COATED ORAL at 18:20

## 2018-10-21 RX ADMIN — B-COMPLEX W/ C & FOLIC ACID TAB 1 TABLET: TAB at 08:52

## 2018-10-21 RX ADMIN — PRAMIPEXOLE DIHYDROCHLORIDE 1 MG: 1 TABLET ORAL at 21:27

## 2018-10-21 RX ADMIN — ATORVASTATIN CALCIUM 10 MG: 10 TABLET, FILM COATED ORAL at 08:51

## 2018-10-21 RX ADMIN — PANTOPRAZOLE SODIUM 40 MG: 40 TABLET, DELAYED RELEASE ORAL at 05:35

## 2018-10-21 RX ADMIN — DILTIAZEM HYDROCHLORIDE 60 MG: 60 TABLET, FILM COATED ORAL at 23:54

## 2018-10-21 RX ADMIN — OXYCODONE HYDROCHLORIDE AND ACETAMINOPHEN 1 TABLET: 5; 325 TABLET ORAL at 13:05

## 2018-10-21 RX ADMIN — MONTELUKAST SODIUM 10 MG: 10 TABLET, COATED ORAL at 21:27

## 2018-10-21 RX ADMIN — DONEPEZIL HYDROCHLORIDE 10 MG: 5 TABLET, FILM COATED ORAL at 21:27

## 2018-10-21 RX ADMIN — MESALAMINE 400 MG: 400 CAPSULE, DELAYED RELEASE ORAL at 08:54

## 2018-10-21 RX ADMIN — RIVAROXABAN 20 MG: 20 TABLET, FILM COATED ORAL at 18:20

## 2018-10-21 RX ADMIN — ENOXAPARIN SODIUM 100 MG: 100 INJECTION SUBCUTANEOUS at 08:51

## 2018-10-21 RX ADMIN — Medication 10 ML: at 21:27

## 2018-10-21 RX ADMIN — DILTIAZEM HYDROCHLORIDE 60 MG: 60 TABLET, FILM COATED ORAL at 11:21

## 2018-10-21 RX ADMIN — TAMSULOSIN HYDROCHLORIDE 0.4 MG: 0.4 CAPSULE ORAL at 08:52

## 2018-10-21 RX ADMIN — SERTRALINE HYDROCHLORIDE 100 MG: 50 TABLET ORAL at 08:52

## 2018-10-21 ASSESSMENT — PAIN DESCRIPTION - LOCATION
LOCATION: BACK
LOCATION: BACK

## 2018-10-21 ASSESSMENT — PAIN DESCRIPTION - ONSET
ONSET: ON-GOING
ONSET: ON-GOING

## 2018-10-21 ASSESSMENT — PAIN SCALES - GENERAL
PAINLEVEL_OUTOF10: 5
PAINLEVEL_OUTOF10: 1
PAINLEVEL_OUTOF10: 0
PAINLEVEL_OUTOF10: 6

## 2018-10-21 ASSESSMENT — PAIN DESCRIPTION - FREQUENCY
FREQUENCY: CONTINUOUS
FREQUENCY: CONTINUOUS

## 2018-10-21 ASSESSMENT — PAIN DESCRIPTION - PROGRESSION
CLINICAL_PROGRESSION: NOT CHANGED
CLINICAL_PROGRESSION: NOT CHANGED

## 2018-10-21 ASSESSMENT — PAIN DESCRIPTION - PAIN TYPE
TYPE: CHRONIC PAIN
TYPE: CHRONIC PAIN

## 2018-10-21 ASSESSMENT — PAIN DESCRIPTION - DESCRIPTORS
DESCRIPTORS: DULL;CONSTANT
DESCRIPTORS: ACHING

## 2018-10-21 ASSESSMENT — PAIN DESCRIPTION - ORIENTATION
ORIENTATION: LOWER;MID
ORIENTATION: LOWER;MID

## 2018-10-21 NOTE — PROGRESS NOTES
[facility]              Progress Note      Admit Date 10/19/2018  HPI: feels much better. Good night sleep and less sweating. Rate better control. Interval history:     Mr. Susy Jo denies chest pain, shortness of breath, palpitations      Subjective:       Scheduled Meds:   insulin lispro  0-12 Units Subcutaneous TID WC    insulin lispro  0-6 Units Subcutaneous Nightly    enoxaparin  1 mg/kg Subcutaneous BID    aspirin  81 mg Oral Daily    atorvastatin  10 mg Oral Daily    vitamin B complex w/C  1 tablet Oral Daily    finasteride  5 mg Oral Daily    donepezil  10 mg Oral Nightly    mesalamine  400 mg Oral TID    metFORMIN  500 mg Oral BID    montelukast  10 mg Oral Nightly    pantoprazole  40 mg Oral QAM AC    tamsulosin  0.4 mg Oral Daily    sertraline  100 mg Oral Daily    pramipexole  1 mg Oral Nightly    sodium chloride flush  10 mL Intravenous 2 times per day    influenza virus vaccine  0.5 mL Intramuscular Once     Continuous Infusions:   dextrose      diltiazem (CARDIZEM) 125 mg in dextrose 5% 125 mL infusion 10 mg/hr (10/20/18 2211)     PRN Meds:sodium chloride, glucose, dextrose, glucagon (rDNA), dextrose, albuterol, sodium chloride flush, magnesium hydroxide, ondansetron, oxyCODONE-acetaminophen       Objective:      Wt Readings from Last 3 Encounters:   10/21/18 226 lb (102.5 kg)   18 220 lb (99.8 kg)   18 222 lb (100.7 kg)   Admit weight: Weight: 212 lb (96.2 kg)      Temperature range over 24hrs:   Temp  Av.6 °F (36.4 °C)  Min: 97 °F (36.1 °C)  Max: 98.4 °F (36.9 °C)  Current Respiratory Rate:  Resp: 16  Current Pulse:  Pulse: 88  Current Blood Pressure:  BP: (!) 143/76  24hr Blood Pressure Range:  Systolic (14FYC), TJD:360 , Min:98 , FHD:442   ; Diastolic (15VYK), SHM:61, Min:60, Max:76    Current Pulse Oximetry:  SpO2: 96 %      Intake/Output Summary (Last 24 hours) at 10/21/18 0958  Last data filed at 10/21/18 0900   Gross per 24 hour   Intake

## 2018-10-21 NOTE — PROGRESS NOTES
respiratory effort. Clear to auscultation, bilaterally without Rales/Wheezes/Rhonchi. Cardiovascular:  Irregular rate and rhythm with normal S1/S2  Abdomen: Soft, non-tender, non-distended with normal bowel sounds. Musculoskeletal:  No clubbing, cyanosis or edema bilaterally.  Full range of motion without deformity. Skin: Skin color, texture, turgor normal.  No rashes or lesions. Neurologic:  Neurovascularly intact without any focal sensory/motor deficits. Cranial nerves: II-XII intact, grossly non-focal.  Psychiatric:  Alert and oriented, thought content appropriate, normal insight  Capillary Refill: Brisk,< 3 seconds   Peripheral Pulses: +2 palpable, equal bilaterally     Labs:   Recent Labs      10/19/18   1820  10/20/18   0510  10/21/18   0444   WBC  7.1  5.7  5.9   HGB  10.7*  10.1*  10.4*   HCT  33.1*  30.7*  32.5*   PLT  189  168  189     Recent Labs      10/19/18   1820  10/20/18   0510  10/21/18   0444   NA  132*  134*  131*   K  4.5  4.4  4.0   CL  99  99  97*   CO2  23  25  24   BUN  11  14  15   CREATININE  0.8  0.9  0.8   CALCIUM  9.9  10.1  10.1     No results for input(s): AST, ALT, BILIDIR, BILITOT, ALKPHOS in the last 72 hours. No results for input(s): INR in the last 72 hours. Recent Labs      10/20/18   0628  10/20/18   1209   TROPONINI  <0.01  <0.01       Urinalysis:    Lab Results   Component Value Date    NITRU Negative 04/18/2017    WBCUA 0-2 04/18/2017    BACTERIA Rare 04/18/2017    RBCUA None seen 04/18/2017    BLOODU trace 08/27/2018    BLOODU Negative 04/18/2017    SPECGRAV 1.025 08/27/2018    SPECGRAV >=1.030 04/18/2017    GLUCOSEU 250 08/27/2018    GLUCOSEU 100 04/18/2017       Radiology:  XR CHEST PORTABLE   Final Result   No acute cardiopulmonary disease.                  Assessment/Plan:    Active Hospital Problems    Diagnosis Date Noted    HTN (hypertension) [I10] 04/26/2015     Priority: Medium    DM II (diabetes mellitus, type II), controlled ([address] [E11.9] 04/26/2015

## 2018-10-21 NOTE — PROGRESS NOTES
Bedside nursing handoff to Leatha Roger, [facility].   John Paul Montes RN

## 2018-10-22 ENCOUNTER — APPOINTMENT (OUTPATIENT)
Dept: GENERAL RADIOLOGY | Age: 73
DRG: 309 | End: 2018-10-22
Payer: MEDICARE

## 2018-10-22 ENCOUNTER — HOSPITAL ENCOUNTER (INPATIENT)
Age: 73
LOS: 5 days | Discharge: HOME | DRG: 309 | End: 2018-10-27
Attending: EMERGENCY MEDICINE | Admitting: INTERNAL MEDICINE
Payer: MEDICARE

## 2018-10-22 VITALS
TEMPERATURE: 96.4 F | HEIGHT: 70 IN | SYSTOLIC BLOOD PRESSURE: 133 MMHG | DIASTOLIC BLOOD PRESSURE: 68 MMHG | HEART RATE: 87 BPM | BODY MASS INDEX: 31.48 KG/M2 | OXYGEN SATURATION: 94 % | RESPIRATION RATE: 16 BRPM | WEIGHT: 219.9 LBS

## 2018-10-22 DIAGNOSIS — I48.91 ATRIAL FIBRILLATION WITH RAPID VENTRICULAR RESPONSE (HCC): Primary | ICD-10-CM

## 2018-10-22 LAB
A/G RATIO: 1.3 (ref 1.1–2.2)
ALBUMIN SERPL-MCNC: 4.5 G/DL (ref 3.4–5)
ALP BLD-CCNC: 92 U/L (ref 40–129)
ALT SERPL-CCNC: 17 U/L (ref 10–40)
ANION GAP SERPL CALCULATED.3IONS-SCNC: 12 MMOL/L (ref 3–16)
ANION GAP SERPL CALCULATED.3IONS-SCNC: 13 MMOL/L (ref 3–16)
APTT: 37.3 SEC (ref 26–36)
AST SERPL-CCNC: 56 U/L (ref 15–37)
BASOPHILS ABSOLUTE: 0 K/UL (ref 0–0.2)
BASOPHILS ABSOLUTE: 0 K/UL (ref 0–0.2)
BASOPHILS RELATIVE PERCENT: 0.5 %
BASOPHILS RELATIVE PERCENT: 0.6 %
BILIRUB SERPL-MCNC: 0.6 MG/DL (ref 0–1)
BUN BLDV-MCNC: 19 MG/DL (ref 7–20)
BUN BLDV-MCNC: 26 MG/DL (ref 7–20)
CALCIUM SERPL-MCNC: 10.3 MG/DL (ref 8.3–10.6)
CALCIUM SERPL-MCNC: 9.9 MG/DL (ref 8.3–10.6)
CHLORIDE BLD-SCNC: 101 MMOL/L (ref 99–110)
CHLORIDE BLD-SCNC: 99 MMOL/L (ref 99–110)
CO2: 24 MMOL/L (ref 21–32)
CO2: 24 MMOL/L (ref 21–32)
CREAT SERPL-MCNC: 0.9 MG/DL (ref 0.8–1.3)
CREAT SERPL-MCNC: 1.1 MG/DL (ref 0.8–1.3)
EOSINOPHILS ABSOLUTE: 0.1 K/UL (ref 0–0.6)
EOSINOPHILS ABSOLUTE: 0.2 K/UL (ref 0–0.6)
EOSINOPHILS RELATIVE PERCENT: 1.6 %
EOSINOPHILS RELATIVE PERCENT: 3.5 %
GFR AFRICAN AMERICAN: >60
GFR AFRICAN AMERICAN: >60
GFR NON-AFRICAN AMERICAN: >60
GFR NON-AFRICAN AMERICAN: >60
GLOBULIN: 3.5 G/DL
GLUCOSE BLD-MCNC: 104 MG/DL (ref 70–99)
GLUCOSE BLD-MCNC: 107 MG/DL (ref 70–99)
GLUCOSE BLD-MCNC: 107 MG/DL (ref 70–99)
GLUCOSE BLD-MCNC: 129 MG/DL (ref 70–99)
HCT VFR BLD CALC: 32.2 % (ref 40.5–52.5)
HCT VFR BLD CALC: 32.4 % (ref 40.5–52.5)
HEMOGLOBIN: 10.5 G/DL (ref 13.5–17.5)
HEMOGLOBIN: 10.6 G/DL (ref 13.5–17.5)
INR BLD: 1.79 (ref 0.86–1.14)
LV EF: 68 %
LVEF MODALITY: NORMAL
LYMPHOCYTES ABSOLUTE: 1.7 K/UL (ref 1–5.1)
LYMPHOCYTES ABSOLUTE: 1.9 K/UL (ref 1–5.1)
LYMPHOCYTES RELATIVE PERCENT: 28.7 %
LYMPHOCYTES RELATIVE PERCENT: 33 %
MCH RBC QN AUTO: 27.9 PG (ref 26–34)
MCH RBC QN AUTO: 28 PG (ref 26–34)
MCHC RBC AUTO-ENTMCNC: 32.7 G/DL (ref 31–36)
MCHC RBC AUTO-ENTMCNC: 32.9 G/DL (ref 31–36)
MCV RBC AUTO: 85.2 FL (ref 80–100)
MCV RBC AUTO: 85.2 FL (ref 80–100)
MONOCYTES ABSOLUTE: 0.5 K/UL (ref 0–1.3)
MONOCYTES ABSOLUTE: 0.7 K/UL (ref 0–1.3)
MONOCYTES RELATIVE PERCENT: 10 %
MONOCYTES RELATIVE PERCENT: 10.6 %
NEUTROPHILS ABSOLUTE: 2.7 K/UL (ref 1.7–7.7)
NEUTROPHILS ABSOLUTE: 3.9 K/UL (ref 1.7–7.7)
NEUTROPHILS RELATIVE PERCENT: 52.3 %
NEUTROPHILS RELATIVE PERCENT: 59.2 %
PDW BLD-RTO: 15.4 % (ref 12.4–15.4)
PDW BLD-RTO: 16.2 % (ref 12.4–15.4)
PERFORMED ON: ABNORMAL
PERFORMED ON: ABNORMAL
PLATELET # BLD: 193 K/UL (ref 135–450)
PLATELET # BLD: 222 K/UL (ref 135–450)
PMV BLD AUTO: 7.9 FL (ref 5–10.5)
PMV BLD AUTO: 8 FL (ref 5–10.5)
POTASSIUM REFLEX MAGNESIUM: 4.3 MMOL/L (ref 3.5–5.1)
POTASSIUM REFLEX MAGNESIUM: 4.3 MMOL/L (ref 3.5–5.1)
PRO-BNP: 769 PG/ML (ref 0–124)
PROTHROMBIN TIME: 20.4 SEC (ref 9.8–13)
RBC # BLD: 3.78 M/UL (ref 4.2–5.9)
RBC # BLD: 3.8 M/UL (ref 4.2–5.9)
SODIUM BLD-SCNC: 136 MMOL/L (ref 136–145)
SODIUM BLD-SCNC: 137 MMOL/L (ref 136–145)
TOTAL PROTEIN: 8 G/DL (ref 6.4–8.2)
TROPONIN: <0.01 NG/ML
WBC # BLD: 5.1 K/UL (ref 4–11)
WBC # BLD: 6.7 K/UL (ref 4–11)

## 2018-10-22 PROCEDURE — 84484 ASSAY OF TROPONIN QUANT: CPT

## 2018-10-22 PROCEDURE — 6370000000 HC RX 637 (ALT 250 FOR IP): Performed by: INTERNAL MEDICINE

## 2018-10-22 PROCEDURE — 96365 THER/PROPH/DIAG IV INF INIT: CPT

## 2018-10-22 PROCEDURE — 85025 COMPLETE CBC W/AUTO DIFF WBC: CPT

## 2018-10-22 PROCEDURE — 93306 TTE W/DOPPLER COMPLETE: CPT

## 2018-10-22 PROCEDURE — 85610 PROTHROMBIN TIME: CPT

## 2018-10-22 PROCEDURE — 93005 ELECTROCARDIOGRAM TRACING: CPT | Performed by: EMERGENCY MEDICINE

## 2018-10-22 PROCEDURE — 80053 COMPREHEN METABOLIC PANEL: CPT

## 2018-10-22 PROCEDURE — 2580000003 HC RX 258: Performed by: INTERNAL MEDICINE

## 2018-10-22 PROCEDURE — 99239 HOSP IP/OBS DSCHRG MGMT >30: CPT | Performed by: INTERNAL MEDICINE

## 2018-10-22 PROCEDURE — 36415 COLL VENOUS BLD VENIPUNCTURE: CPT

## 2018-10-22 PROCEDURE — 99232 SBSQ HOSP IP/OBS MODERATE 35: CPT | Performed by: INTERNAL MEDICINE

## 2018-10-22 PROCEDURE — 71045 X-RAY EXAM CHEST 1 VIEW: CPT

## 2018-10-22 PROCEDURE — 99285 EMERGENCY DEPT VISIT HI MDM: CPT

## 2018-10-22 PROCEDURE — 85730 THROMBOPLASTIN TIME PARTIAL: CPT

## 2018-10-22 PROCEDURE — 2500000003 HC RX 250 WO HCPCS: Performed by: EMERGENCY MEDICINE

## 2018-10-22 PROCEDURE — 2580000003 HC RX 258: Performed by: EMERGENCY MEDICINE

## 2018-10-22 PROCEDURE — 2060000000 HC ICU INTERMEDIATE R&B

## 2018-10-22 PROCEDURE — 83880 ASSAY OF NATRIURETIC PEPTIDE: CPT

## 2018-10-22 RX ORDER — ONDANSETRON 2 MG/ML
4 INJECTION INTRAMUSCULAR; INTRAVENOUS EVERY 6 HOURS PRN
Status: DISCONTINUED | OUTPATIENT
Start: 2018-10-22 | End: 2018-10-27 | Stop reason: HOSPADM

## 2018-10-22 RX ORDER — SODIUM CHLORIDE 0.9 % (FLUSH) 0.9 %
10 SYRINGE (ML) INJECTION PRN
Status: DISCONTINUED | OUTPATIENT
Start: 2018-10-22 | End: 2018-10-27 | Stop reason: HOSPADM

## 2018-10-22 RX ORDER — ALBUTEROL SULFATE 2.5 MG/3ML
2.5 SOLUTION RESPIRATORY (INHALATION) EVERY 6 HOURS PRN
Status: DISCONTINUED | OUTPATIENT
Start: 2018-10-22 | End: 2018-10-27 | Stop reason: HOSPADM

## 2018-10-22 RX ORDER — MONTELUKAST SODIUM 10 MG/1
10 TABLET ORAL NIGHTLY
Status: DISCONTINUED | OUTPATIENT
Start: 2018-10-23 | End: 2018-10-27 | Stop reason: HOSPADM

## 2018-10-22 RX ORDER — SODIUM CHLORIDE 0.9 % (FLUSH) 0.9 %
10 SYRINGE (ML) INJECTION EVERY 12 HOURS SCHEDULED
Status: DISCONTINUED | OUTPATIENT
Start: 2018-10-23 | End: 2018-10-27 | Stop reason: HOSPADM

## 2018-10-22 RX ORDER — TAMSULOSIN HYDROCHLORIDE 0.4 MG/1
0.4 CAPSULE ORAL DAILY
Status: DISCONTINUED | OUTPATIENT
Start: 2018-10-23 | End: 2018-10-27 | Stop reason: HOSPADM

## 2018-10-22 RX ORDER — CYCLOBENZAPRINE HCL 10 MG
10 TABLET ORAL 3 TIMES DAILY PRN
Status: DISCONTINUED | OUTPATIENT
Start: 2018-10-22 | End: 2018-10-27 | Stop reason: HOSPADM

## 2018-10-22 RX ORDER — FINASTERIDE 5 MG/1
5 TABLET, FILM COATED ORAL DAILY
Status: DISCONTINUED | OUTPATIENT
Start: 2018-10-23 | End: 2018-10-27 | Stop reason: HOSPADM

## 2018-10-22 RX ORDER — FAMOTIDINE 20 MG/1
20 TABLET, FILM COATED ORAL 2 TIMES DAILY
Status: DISCONTINUED | OUTPATIENT
Start: 2018-10-23 | End: 2018-10-27 | Stop reason: HOSPADM

## 2018-10-22 RX ORDER — DILTIAZEM HYDROCHLORIDE 5 MG/ML
0.25 INJECTION INTRAVENOUS ONCE
Status: COMPLETED | OUTPATIENT
Start: 2018-10-22 | End: 2018-10-22

## 2018-10-22 RX ORDER — ASPIRIN 81 MG/1
81 TABLET, CHEWABLE ORAL DAILY
Status: DISCONTINUED | OUTPATIENT
Start: 2018-10-23 | End: 2018-10-25

## 2018-10-22 RX ORDER — ZOLPIDEM TARTRATE 5 MG/1
5 TABLET ORAL NIGHTLY PRN
Status: DISCONTINUED | OUTPATIENT
Start: 2018-10-23 | End: 2018-10-23

## 2018-10-22 RX ORDER — DILTIAZEM HYDROCHLORIDE 120 MG/1
120 CAPSULE, COATED, EXTENDED RELEASE ORAL DAILY
Status: DISCONTINUED | OUTPATIENT
Start: 2018-10-22 | End: 2018-10-22 | Stop reason: HOSPADM

## 2018-10-22 RX ORDER — PRAMIPEXOLE DIHYDROCHLORIDE 0.25 MG/1
0.75 TABLET ORAL NIGHTLY
Status: DISCONTINUED | OUTPATIENT
Start: 2018-10-23 | End: 2018-10-27 | Stop reason: HOSPADM

## 2018-10-22 RX ORDER — MESALAMINE 400 MG/1
800 CAPSULE, DELAYED RELEASE ORAL 3 TIMES DAILY
Status: DISCONTINUED | OUTPATIENT
Start: 2018-10-23 | End: 2018-10-27 | Stop reason: HOSPADM

## 2018-10-22 RX ORDER — DONEPEZIL HYDROCHLORIDE 5 MG/1
10 TABLET, FILM COATED ORAL NIGHTLY
Status: DISCONTINUED | OUTPATIENT
Start: 2018-10-23 | End: 2018-10-27 | Stop reason: HOSPADM

## 2018-10-22 RX ORDER — OXYCODONE AND ACETAMINOPHEN 10; 325 MG/1; MG/1
1 TABLET ORAL EVERY 6 HOURS PRN
Status: DISCONTINUED | OUTPATIENT
Start: 2018-10-22 | End: 2018-10-27 | Stop reason: HOSPADM

## 2018-10-22 RX ORDER — DILTIAZEM HYDROCHLORIDE 240 MG/1
240 CAPSULE, COATED, EXTENDED RELEASE ORAL DAILY
Qty: 30 CAPSULE | Refills: 2 | Status: ON HOLD | OUTPATIENT
Start: 2018-10-22 | End: 2018-10-27 | Stop reason: HOSPADM

## 2018-10-22 RX ADMIN — DILTIAZEM HYDROCHLORIDE 5 MG/HR: 5 INJECTION, SOLUTION INTRAVENOUS at 22:34

## 2018-10-22 RX ADMIN — DILTIAZEM HYDROCHLORIDE 12.5 MG: 5 INJECTION INTRAVENOUS at 22:25

## 2018-10-22 RX ADMIN — DILTIAZEM HYDROCHLORIDE 60 MG: 60 TABLET, FILM COATED ORAL at 06:20

## 2018-10-22 RX ADMIN — PANTOPRAZOLE SODIUM 40 MG: 40 TABLET, DELAYED RELEASE ORAL at 06:20

## 2018-10-22 RX ADMIN — ATORVASTATIN CALCIUM 10 MG: 10 TABLET, FILM COATED ORAL at 09:09

## 2018-10-22 RX ADMIN — DILTIAZEM HYDROCHLORIDE 120 MG: 120 CAPSULE, COATED, EXTENDED RELEASE ORAL at 11:57

## 2018-10-22 RX ADMIN — B-COMPLEX W/ C & FOLIC ACID TAB 1 TABLET: TAB at 09:09

## 2018-10-22 RX ADMIN — MESALAMINE 400 MG: 400 CAPSULE, DELAYED RELEASE ORAL at 09:09

## 2018-10-22 RX ADMIN — ASPIRIN 81 MG 81 MG: 81 TABLET ORAL at 09:09

## 2018-10-22 RX ADMIN — METFORMIN HYDROCHLORIDE 500 MG: 500 TABLET ORAL at 09:09

## 2018-10-22 RX ADMIN — OXYCODONE HYDROCHLORIDE AND ACETAMINOPHEN 1 TABLET: 5; 325 TABLET ORAL at 09:14

## 2018-10-22 RX ADMIN — FINASTERIDE 5 MG: 5 TABLET, FILM COATED ORAL at 09:09

## 2018-10-22 RX ADMIN — TAMSULOSIN HYDROCHLORIDE 0.4 MG: 0.4 CAPSULE ORAL at 09:08

## 2018-10-22 RX ADMIN — SERTRALINE HYDROCHLORIDE 100 MG: 50 TABLET ORAL at 09:09

## 2018-10-22 RX ADMIN — Medication 10 ML: at 09:09

## 2018-10-22 ASSESSMENT — PAIN DESCRIPTION - PAIN TYPE: TYPE: CHRONIC PAIN

## 2018-10-22 ASSESSMENT — PAIN DESCRIPTION - ORIENTATION: ORIENTATION: LOWER

## 2018-10-22 ASSESSMENT — ENCOUNTER SYMPTOMS
COUGH: 0
EYE DISCHARGE: 0
NAUSEA: 0
BACK PAIN: 0
VOMITING: 0
ABDOMINAL PAIN: 0
SORE THROAT: 0
SHORTNESS OF BREATH: 1
DIARRHEA: 0

## 2018-10-22 ASSESSMENT — PAIN SCALES - GENERAL: PAINLEVEL_OUTOF10: 4

## 2018-10-22 ASSESSMENT — PAIN DESCRIPTION - LOCATION: LOCATION: BACK

## 2018-10-22 NOTE — PROGRESS NOTES
[address] Daily Progress Note      Admit Date:  10/19/2018    Subjective:  Mr. Naina Manuel  Is being seen for new onset AFIB. Today he remains in AFIB with controlled rates. He reports to feeling fine and denies chest pain, shortness of breath, edema, dizziness, palpitations and syncope.      Objective:   /63   Pulse 78   Temp 97.4 °F (36.3 °C) (Oral)   Resp 19   Ht 5' 10\" (1.778 m)   Wt 219 lb 14.4 oz (99.7 kg)   SpO2 94%   BMI 31.55 kg/m²     Intake/Output Summary (Last 24 hours) at 10/22/18 0741  Last data filed at 10/22/18 0441   Gross per 24 hour   Intake              580 ml   Output             1875 ml   Net            -1295 ml       TELEMETRY: AFIB    Physical Exam:  General:  Awake, alert, NAD  Skin:  Warm and dry  Neck:  JVD none  Chest:  Clear to auscultation, respiration easy  Cardiovascular:  +irregularly irregular; S1S2  Abdomen:  Soft NT  Extremities:  no edema    Medications:    diltiazem  60 mg Oral 4 times per day    rivaroxaban  20 mg Oral Daily    insulin lispro  0-12 Units Subcutaneous TID WC    insulin lispro  0-6 Units Subcutaneous Nightly    aspirin  81 mg Oral Daily    atorvastatin  10 mg Oral Daily    vitamin B complex w/C  1 tablet Oral Daily    finasteride  5 mg Oral Daily    donepezil  10 mg Oral Nightly    mesalamine  400 mg Oral TID    metFORMIN  500 mg Oral BID    montelukast  10 mg Oral Nightly    pantoprazole  40 mg Oral QAM AC    tamsulosin  0.4 mg Oral Daily    sertraline  100 mg Oral Daily    pramipexole  1 mg Oral Nightly    sodium chloride flush  10 mL Intravenous 2 times per day    influenza virus vaccine  0.5 mL Intramuscular Once      dextrose      diltiazem (CARDIZEM) 125 mg in dextrose 5% 125 mL infusion 10 mg/hr (10/20/18 2211)     zolpidem, sodium chloride, glucose, dextrose, glucagon (rDNA), dextrose, albuterol, sodium chloride flush, magnesium hydroxide, ondansetron, oxyCODONE-acetaminophen    Lab Data:  CBC: Recent Labs 10/20/18   0510  10/21/18   0444  10/22/18   0611   WBC  5.7  5.9  5.1   HGB  10.1*  10.4*  10.5*   HCT  30.7*  32.5*  32.2*   MCV  85.4  85.3  85.2   PLT  168  189  193     BMP: Recent Labs      10/20/18   0510  10/21/18   0444  10/22/18   0611   NA  134*  131*  137   K  4.4  4.0  4.3   CL  99  97*  101   CO2  25  24  24   BUN  14  15  19   CREATININE  0.9  0.8  0.9     LIVER PROFILE: No results for input(s): AST, ALT, LIPASE, BILIDIR, BILITOT, ALKPHOS in the last 72 hours. Invalid input(s): AMYLASE,  ALB  PT/INR: No results for input(s): PROTIME, INR in the last 72 hours. APTT: No results for input(s): APTT in the last 72 hours. BNP:  No results for input(s): BNP in the last 72 hours. IMAGING:  EKG 10/19/2018  Atrial fibrillation with rapid ventricular responseAbnormal ECGWhen compared with ECG of 21-SEP-2018 13:22,Atrial fibrillation has replaced Sinus rhythmVent. rate has increased BY  80 BPMConfirmed by Kelsie Brooks MD, [address] ([id number]) on 10/19/2018 3:53:09 PM     CXR 10/20/18  FINDINGS: Cardiomediastinal silhouette is within normal limits. No pneumothorax or effusion. No focal consolidation. Linear opacities in the bases likely represent atelectasis. No acute osseous abnormality. ECHO 4/20/2017  Summary   Technically difficult examination. Pt supine secondary to back fracture.   Left ventricular systolic function is normal with the ejection fraction estimated at 55%.   No regional wall motion abnormalities.   Normal left ventricular diastolic filling pressure.   There is mild concentric left ventricular hypertrophy.   No significant valvular heart disease is identified.   Definity contrast was used but images are not adequate for interpretation. CATH 4/29/15  CORONARY ANGIOGRAPHY:  1. Left main:  It was a large-caliber vessel that bifurcated. It was  normal.  2.  Left circumflex: It was a medium-caliber vessel that continued in the  posterior AV groove as a small-caliber vessel.   It gave off 2

## 2018-10-22 NOTE — PLAN OF CARE
Problem: Infection:  Goal: Will remain free from infection  Will remain free from infection   Outcome: Completed Date Met: 10/22/18      Problem: Safety:  Goal: Free from accidental physical injury  Free from accidental physical injury   Outcome: Completed Date Met: 10/22/18    Goal: Free from intentional harm  Free from intentional harm   Outcome: Completed Date Met: 10/22/18      Problem: Daily Care:  Goal: Daily care needs are met  Daily care needs are met   Outcome: Completed Date Met: 10/22/18      Problem: Pain:  Goal: Pain level will decrease  Pain level will decrease   Outcome: Completed Date Met: 10/22/18    Goal: Control of acute pain  Control of acute pain   Outcome: Completed Date Met: 10/22/18    Goal: Control of chronic pain  Control of chronic pain   Outcome: Completed Date Met: 10/22/18      Problem: Discharge Planning:  Goal: Patients continuum of care needs are met  Patients continuum of care needs are met   Outcome: Completed Date Met: 10/22/18      Problem: OXYGENATION/RESPIRATORY FUNCTION  Goal: Patient will achieve/maintain normal respiratory rate/effort  Respiratory rate and effort will be within normal limits for the patient   Outcome: Completed Date Met: 10/22/18      Problem: HEMODYNAMIC STATUS  Goal: Patient has stable vital signs and fluid balance  Outcome: Completed Date Met: 10/22/18

## 2018-10-22 NOTE — PLAN OF CARE
Problem: Pain:  Goal: Patient's pain/discomfort is manageable  Patient's pain/discomfort is manageable   Outcome: Ongoing  Pt able to voice need for pain management. Problem: Skin Integrity:  Goal: Skin integrity will stabilize  Skin integrity will stabilize   Outcome: Ongoing  Pt has no noted skin issues. Educated pt to shift weight while laying in bed to prevent skin breakdown. Comments:   Patient's EF (Ejection Fraction) is greater than 40%    Patient has a past medical history of Cancer ([facility] [address] Chronic pancreatitis ([facility] [address] Colitis; Diabetes mellitus ([facility] [address] Esophagitis; Gastritis; Hyperlipidemia; and Hypertension. Comorbidities reviewed and education provided. Patient and family's stated goal of care: better understand heart failure and disease management prior to discharge    Patient's current functional capacity:  Slight limitation of physical activity. Comfortable at rest. Ordinary physical activity results in fatigue, palpitation, dyspnea. Pt resting in bed at this time on room air. Pt denies shortness of breath. Pt without lower extremity edema. Patient's weights and intake/output reviewed:    Patient Vitals for the past 96 hrs (Last 3 readings):   Weight   10/21/18 0411 226 lb (102.5 kg)   10/20/18 0433 226 lb 1.6 oz (102.6 kg)   10/19/18 1733 227 lb (103 kg)       Intake/Output Summary (Last 24 hours) at 10/22/18 0320  Last data filed at 10/21/18 1814   Gross per 24 hour   Intake              660 ml   Output             1475 ml   Net             -815 ml       Patient provided with education on CHF signs/symptoms, causes, discharge medications, daily weights, low sodium diet, activity, and follow-up. Notified patient to call the doctor post discharge if patient experiences shortness of breath, chest pain, swelling, cough, or weight gain of three pounds in a day/five pounds in a week. Also notified patient to call the doctor with dizziness, increased fatigue, decreased or difficulty urinating. Pt verbalized understanding. No additional questions at this time.     Education Time: 10 Minutes

## 2018-10-22 NOTE — PROGRESS NOTES
Spoke with Dr Eddie Musa regarding pt's HR up to the 150s. Orders to give scheduled cardizem and then d/c pt later this afternoon if HR under 110.

## 2018-10-23 LAB
ANION GAP SERPL CALCULATED.3IONS-SCNC: 10 MMOL/L (ref 3–16)
BUN BLDV-MCNC: 26 MG/DL (ref 7–20)
CALCIUM SERPL-MCNC: 9.7 MG/DL (ref 8.3–10.6)
CHLORIDE BLD-SCNC: 100 MMOL/L (ref 99–110)
CO2: 25 MMOL/L (ref 21–32)
CREAT SERPL-MCNC: 0.9 MG/DL (ref 0.8–1.3)
GFR AFRICAN AMERICAN: >60
GFR NON-AFRICAN AMERICAN: >60
GLUCOSE BLD-MCNC: 114 MG/DL (ref 70–99)
GLUCOSE BLD-MCNC: 116 MG/DL (ref 70–99)
GLUCOSE BLD-MCNC: 126 MG/DL (ref 70–99)
GLUCOSE BLD-MCNC: 126 MG/DL (ref 70–99)
GLUCOSE BLD-MCNC: 155 MG/DL (ref 70–99)
GLUCOSE BLD-MCNC: 170 MG/DL (ref 70–99)
PERFORMED ON: ABNORMAL
POTASSIUM REFLEX MAGNESIUM: 4.1 MMOL/L (ref 3.5–5.1)
SODIUM BLD-SCNC: 135 MMOL/L (ref 136–145)

## 2018-10-23 PROCEDURE — 94150 VITAL CAPACITY TEST: CPT

## 2018-10-23 PROCEDURE — 94664 DEMO&/EVAL PT USE INHALER: CPT

## 2018-10-23 PROCEDURE — 2580000003 HC RX 258: Performed by: INTERNAL MEDICINE

## 2018-10-23 PROCEDURE — 36415 COLL VENOUS BLD VENIPUNCTURE: CPT

## 2018-10-23 PROCEDURE — 6360000002 HC RX W HCPCS: Performed by: INTERNAL MEDICINE

## 2018-10-23 PROCEDURE — 6370000000 HC RX 637 (ALT 250 FOR IP): Performed by: INTERNAL MEDICINE

## 2018-10-23 PROCEDURE — 80048 BASIC METABOLIC PNL TOTAL CA: CPT

## 2018-10-23 PROCEDURE — 99223 1ST HOSP IP/OBS HIGH 75: CPT | Performed by: INTERNAL MEDICINE

## 2018-10-23 PROCEDURE — 94761 N-INVAS EAR/PLS OXIMETRY MLT: CPT

## 2018-10-23 PROCEDURE — 2060000000 HC ICU INTERMEDIATE R&B

## 2018-10-23 PROCEDURE — 93010 ELECTROCARDIOGRAM REPORT: CPT | Performed by: INTERNAL MEDICINE

## 2018-10-23 RX ORDER — DEXTROSE MONOHYDRATE 50 MG/ML
100 INJECTION, SOLUTION INTRAVENOUS PRN
Status: DISCONTINUED | OUTPATIENT
Start: 2018-10-23 | End: 2018-10-27 | Stop reason: HOSPADM

## 2018-10-23 RX ORDER — NICOTINE POLACRILEX 4 MG
15 LOZENGE BUCCAL PRN
Status: DISCONTINUED | OUTPATIENT
Start: 2018-10-23 | End: 2018-10-27 | Stop reason: HOSPADM

## 2018-10-23 RX ORDER — DEXTROSE MONOHYDRATE 25 G/50ML
12.5 INJECTION, SOLUTION INTRAVENOUS PRN
Status: DISCONTINUED | OUTPATIENT
Start: 2018-10-23 | End: 2018-10-27 | Stop reason: HOSPADM

## 2018-10-23 RX ORDER — DIGOXIN 0.25 MG/ML
250 INJECTION INTRAMUSCULAR; INTRAVENOUS EVERY 6 HOURS
Status: COMPLETED | OUTPATIENT
Start: 2018-10-23 | End: 2018-10-24

## 2018-10-23 RX ORDER — DILTIAZEM HYDROCHLORIDE 60 MG/1
60 TABLET, FILM COATED ORAL EVERY 6 HOURS SCHEDULED
Status: DISCONTINUED | OUTPATIENT
Start: 2018-10-23 | End: 2018-10-24

## 2018-10-23 RX ORDER — ZOLPIDEM TARTRATE 5 MG/1
5 TABLET ORAL NIGHTLY PRN
Status: DISCONTINUED | OUTPATIENT
Start: 2018-10-22 | End: 2018-10-27 | Stop reason: HOSPADM

## 2018-10-23 RX ADMIN — ASPIRIN 81 MG 81 MG: 81 TABLET ORAL at 09:08

## 2018-10-23 RX ADMIN — INSULIN LISPRO 2 UNITS: 100 INJECTION, SOLUTION INTRAVENOUS; SUBCUTANEOUS at 17:03

## 2018-10-23 RX ADMIN — Medication 10 ML: at 09:54

## 2018-10-23 RX ADMIN — OXYCODONE HYDROCHLORIDE AND ACETAMINOPHEN 1 TABLET: 10; 325 TABLET ORAL at 00:34

## 2018-10-23 RX ADMIN — DONEPEZIL HYDROCHLORIDE 10 MG: 5 TABLET, FILM COATED ORAL at 00:13

## 2018-10-23 RX ADMIN — OXYCODONE HYDROCHLORIDE AND ACETAMINOPHEN 1 TABLET: 10; 325 TABLET ORAL at 21:05

## 2018-10-23 RX ADMIN — OXYCODONE HYDROCHLORIDE AND ACETAMINOPHEN 1 TABLET: 10; 325 TABLET ORAL at 13:48

## 2018-10-23 RX ADMIN — MONTELUKAST SODIUM 10 MG: 10 TABLET, COATED ORAL at 21:04

## 2018-10-23 RX ADMIN — PRAMIPEXOLE DIHYDROCHLORIDE 0.75 MG: 0.25 TABLET ORAL at 00:13

## 2018-10-23 RX ADMIN — CYCLOBENZAPRINE HYDROCHLORIDE 10 MG: 10 TABLET, FILM COATED ORAL at 21:04

## 2018-10-23 RX ADMIN — RIVAROXABAN 20 MG: 20 TABLET, FILM COATED ORAL at 09:07

## 2018-10-23 RX ADMIN — TAMSULOSIN HYDROCHLORIDE 0.4 MG: 0.4 CAPSULE ORAL at 09:08

## 2018-10-23 RX ADMIN — INSULIN LISPRO 1 UNITS: 100 INJECTION, SOLUTION INTRAVENOUS; SUBCUTANEOUS at 21:07

## 2018-10-23 RX ADMIN — DILTIAZEM HYDROCHLORIDE 60 MG: 60 TABLET, FILM COATED ORAL at 18:15

## 2018-10-23 RX ADMIN — ZOLPIDEM TARTRATE 5 MG: 5 TABLET ORAL at 00:34

## 2018-10-23 RX ADMIN — SERTRALINE HYDROCHLORIDE 100 MG: 50 TABLET ORAL at 09:07

## 2018-10-23 RX ADMIN — DONEPEZIL HYDROCHLORIDE 10 MG: 5 TABLET, FILM COATED ORAL at 21:05

## 2018-10-23 RX ADMIN — ZOLPIDEM TARTRATE 5 MG: 5 TABLET ORAL at 21:06

## 2018-10-23 RX ADMIN — B-COMPLEX W/ C & FOLIC ACID TAB 1 TABLET: TAB at 09:07

## 2018-10-23 RX ADMIN — DIGOXIN 250 MCG: 250 INJECTION, SOLUTION INTRAMUSCULAR; INTRAVENOUS; PARENTERAL at 16:50

## 2018-10-23 RX ADMIN — PRAMIPEXOLE DIHYDROCHLORIDE 0.75 MG: 0.25 TABLET ORAL at 21:04

## 2018-10-23 RX ADMIN — FAMOTIDINE 20 MG: 20 TABLET, FILM COATED ORAL at 00:13

## 2018-10-23 RX ADMIN — FINASTERIDE 5 MG: 5 TABLET, FILM COATED ORAL at 09:07

## 2018-10-23 RX ADMIN — Medication 10 ML: at 21:07

## 2018-10-23 RX ADMIN — FAMOTIDINE 20 MG: 20 TABLET, FILM COATED ORAL at 21:04

## 2018-10-23 RX ADMIN — MESALAMINE 800 MG: 400 CAPSULE, DELAYED RELEASE ORAL at 13:47

## 2018-10-23 RX ADMIN — DIGOXIN 250 MCG: 250 INJECTION, SOLUTION INTRAMUSCULAR; INTRAVENOUS; PARENTERAL at 11:49

## 2018-10-23 RX ADMIN — FAMOTIDINE 20 MG: 20 TABLET, FILM COATED ORAL at 09:07

## 2018-10-23 RX ADMIN — MESALAMINE 800 MG: 400 CAPSULE, DELAYED RELEASE ORAL at 09:54

## 2018-10-23 RX ADMIN — DILTIAZEM HYDROCHLORIDE 60 MG: 60 TABLET, FILM COATED ORAL at 11:49

## 2018-10-23 RX ADMIN — MONTELUKAST SODIUM 10 MG: 10 TABLET, COATED ORAL at 00:13

## 2018-10-23 RX ADMIN — MESALAMINE 800 MG: 400 CAPSULE, DELAYED RELEASE ORAL at 21:05

## 2018-10-23 ASSESSMENT — PAIN SCALES - GENERAL
PAINLEVEL_OUTOF10: 0
PAINLEVEL_OUTOF10: 4
PAINLEVEL_OUTOF10: 7
PAINLEVEL_OUTOF10: 6

## 2018-10-23 ASSESSMENT — PAIN DESCRIPTION - PAIN TYPE: TYPE: CHRONIC PAIN

## 2018-10-23 ASSESSMENT — PAIN DESCRIPTION - FREQUENCY: FREQUENCY: CONTINUOUS

## 2018-10-23 ASSESSMENT — PAIN DESCRIPTION - ORIENTATION: ORIENTATION: MID;LOWER

## 2018-10-23 ASSESSMENT — PAIN DESCRIPTION - LOCATION: LOCATION: BACK

## 2018-10-23 ASSESSMENT — PAIN DESCRIPTION - ONSET: ONSET: ON-GOING

## 2018-10-23 ASSESSMENT — PAIN DESCRIPTION - PROGRESSION: CLINICAL_PROGRESSION: NOT CHANGED

## 2018-10-23 ASSESSMENT — PAIN DESCRIPTION - DESCRIPTORS: DESCRIPTORS: ACHING

## 2018-10-23 NOTE — ED PROVIDER NOTES
and urgency. Musculoskeletal: Negative for back pain and myalgias. Skin: Negative for rash. Neurological: Negative for weakness and headaches. Hematological: Does not bruise/bleed easily. Psychiatric/Behavioral: Negative for confusion. Positives and Pertinent negatives as per HPI. Except as noted abovein the ROS, all other systems were reviewed and negative. PAST MEDICAL HISTORY     Past Medical History:   Diagnosis Date    Cancer ([facility] [address]     skin    Chronic pancreatitis ([facility] [address]     Colitis 5/08/2015    Diabetes mellitus ([facility] [address]     Esophagitis     Gastritis     Hyperlipidemia     Hypertension          SURGICAL HISTORY     Past Surgical History:   Procedure Laterality Date    CATARACT REMOVAL WITH IMPLANT Right 05/2016    COLONOSCOPY  5/08/2015    colitis-mild gastritis    CYSTOSCOPY  09/21/2018    CYSTOSCOPY, DIRECT VISION INTERAL URETHROTOMY     HERNIA REPAIR Right 2012    inguinal    ID OFFICE/OUTPT VISIT,PROCEDURE ONLY N/A 9/21/2018    CYSTOSCOPY, DIRECT VISION INTERAL URETHROTOMY performed by Maximus Wilkinson MD at [facility] Left 2001    Rotator cuff    SKIN CANCER EXCISION Left 5/2016    melanoma    TESTICLE REMOVAL Left 2012    UPPER GASTROINTESTINAL ENDOSCOPY  05/08/2015    Gastritis    UPPER GASTROINTESTINAL ENDOSCOPY N/A 09/08/2016    gastritis-Bx pending         CURRENTMEDICATIONS       Current Discharge Medication List      CONTINUE these medications which have NOT CHANGED    Details   rivaroxaban (XARELTO) 20 MG TABS tablet Take 1 tablet by mouth daily  Qty: 30 tablet, Refills: 2      diltiazem (CARDIZEM CD) 240 MG extended release capsule Take 1 capsule by mouth daily  Qty: 30 capsule, Refills: 2      zolpidem (AMBIEN) 5 MG tablet Take 5 mg by mouth nightly as needed for Sleep. Surekha Salazar oxyCODONE-acetaminophen (PERCOCET)  MG per tablet Take 1 tablet by mouth every 6 hours as needed for Pain. .      finasteride (PROSCAR) 5 MG tablet Take 1 tablet Right Ear: External ear normal.   Left Ear: External ear normal.   Nose: Nose normal.   Mouth/Throat: Oropharynx is clear and moist. No oropharyngeal exudate. Eyes: Pupils are equal, round, and reactive to light. Conjunctivae are normal.   Neck: Normal range of motion. Neck supple. Cardiovascular: Normal heart sounds, intact distal pulses and normal pulses. An irregularly irregular rhythm present. Tachycardia present. Exam reveals no gallop and no friction rub. No murmur heard. Pulmonary/Chest: Effort normal and breath sounds normal. No respiratory distress. He has no wheezes. Abdominal: Soft. Bowel sounds are normal. He exhibits no distension. There is no tenderness. There is no rebound and no guarding. Overweight   Musculoskeletal: Normal range of motion. He exhibits no edema or tenderness. Lymphadenopathy:     He has no cervical adenopathy. Neurological: He is alert and oriented to person, place, and time. No cranial nerve deficit. Skin: Skin is warm and dry. No rash noted. Psychiatric: He has a normal mood and affect. Nursing note and vitals reviewed.       DIAGNOSTIC RESULTS   LABS:    Results for orders placed or performed during the hospital encounter of 10/22/18   CBC Auto Differential   Result Value Ref Range    WBC 6.7 4.0 - 11.0 K/uL    RBC 3.80 (L) 4.20 - 5.90 M/uL    Hemoglobin 10.6 (L) 13.5 - 17.5 g/dL    Hematocrit 32.4 (L) 40.5 - 52.5 %    MCV 85.2 80.0 - 100.0 fL    MCH 28.0 26.0 - 34.0 pg    MCHC 32.9 31.0 - 36.0 g/dL    RDW 16.2 (H) 12.4 - 15.4 %    Platelets 957 553 - 162 K/uL    MPV 8.0 5.0 - 10.5 fL    Neutrophils % 59.2 %    Lymphocytes % 28.7 %    Monocytes % 10.0 %    Eosinophils % 1.6 %    Basophils % 0.5 %    Neutrophils # 3.9 1.7 - 7.7 K/uL    Lymphocytes # 1.9 1.0 - 5.1 K/uL    Monocytes # 0.7 0.0 - 1.3 K/uL    Eosinophils # 0.1 0.0 - 0.6 K/uL    Basophils # 0.0 0.0 - 0.2 K/uL   Comprehensive Metabolic Panel w/ Reflex to MG   Result Value Ref Range    Sodium 136 MEDICATIONS:  Current Discharge Medication List      STOP taking these medications       atorvastatin (LIPITOR) 10 MG tablet Comments:   Reason for Stopping:                      (Please note that portions of this note were completed with a voice recognition program.  Efforts were [facility] edit the dictations but occasionally words are mis-transcribed.)    Stef Casillas MD(electronically signed)      Stef Casillas MD  10/24/18 4322

## 2018-10-23 NOTE — CONSULTS
[address] [facility]  [phone]        Reason for Consultation/Chief Complaint: \"I have been having rapid heart beat and SOB . \"  Consulted for rapid AFIB    History of Present Illness:  Jose M Murcia is a 68 y.o. patient who presented to [facility] on 10/22/18 with c/o rapid heart rate and SOB. Note on 10/19/18 scheduled for TURP and found to be in new-onset rapid afib. No definite hx of afib prior. He has PMH of hypertension, hyperlipidemia, diabetes, chronic hepatitis, BPH. Note 2015 LHC showed 20% LAD and luminal irregularities of CCx/RCA. Note ECHO from 4/16 showed EF=55%; LVH. Started on xarelto and diltiazem gtt for HR control. Diagnosed new-onset rapid afib asymptomatic. He was actually discharged last night and within 4 hours after being home he started feeling bad again with rapid heart rates and feeling SOB. HIs wife phoned on-call doc (myself) and I advised to return to ED. In the ED his EKG revealed SVT vs atrial tachycardia rates 165bpm; nonspecific ST changes. Troponin neg x 1. He received Diltiazem bolus and started on drip at 5mg/hr. Note CXR negative. Today he reports to feeling much better and states he slept wonderfully last night. He remains in AFIB with controlled rates 90's bpm. I have been asked to provide consultation regarding further management and testing. Past Medical History:   has a past medical history of Cancer ([facility] [address] Chronic pancreatitis ([facility] [address] Colitis; Diabetes mellitus ([address] Esophagitis; Gastritis; Hyperlipidemia; and Hypertension. Surgical History:   has a past surgical history that includes Testicle removal (Left, 2012); shoulder surgery (Left, 2001); Upper gastrointestinal endoscopy (05/08/2015); Colonoscopy (5/08/2015); hernia repair (Right, 2012); Skin cancer excision (Left, 5/2016); Cataract removal with implant (Right, 05/2016); Upper gastrointestinal endoscopy (N/A, 09/08/2016);  Cystoscopy (09/21/2018); and pr office/outpt visit,procedure tongue normal   Neck: Supple, symmetrical, trachea midline, no adenopathy, thyroid: not enlarged, symmetric, no tenderness/mass/nodules, no carotid bruit or JVD       Lungs:   Clear to auscultation bilaterally, respirations unlabored   Chest Wall:  No tenderness or deformity   Heart:  +irregularly irregular, S1, S2 normal, no murmur, rub or gallop   Abdomen:   Soft, non-tender, bowel sounds active all four quadrants,  no masses, no organomegaly           Extremities: Extremities normal, atraumatic, no cyanosis or edema   Pulses: 2+ and symmetric   Skin: Skin color, texture, turgor normal, no rashes or lesions   Pysch: Normal mood and affect   Neurologic: Normal gross motor and sensory exam.         Labs  CBC:   Lab Results   Component Value Date    WBC 6.7 10/22/2018    RBC 3.80 10/22/2018    HGB 10.6 10/22/2018    HCT 32.4 10/22/2018    MCV 85.2 10/22/2018    RDW 16.2 10/22/2018     10/22/2018     CMP:    Lab Results   Component Value Date     10/23/2018    K 4.1 10/23/2018     10/23/2018    CO2 25 10/23/2018    BUN 26 10/23/2018    CREATININE 0.9 10/23/2018    GFRAA >60 10/23/2018    AGRATIO 1.3 10/22/2018    LABGLOM >60 10/23/2018    GLUCOSE 126 10/23/2018    PROT 8.0 10/22/2018    CALCIUM 9.7 10/23/2018    BILITOT 0.6 10/22/2018    ALKPHOS 92 10/22/2018    AST 56 10/22/2018    ALT 17 10/22/2018     PT/INR:  No results found for: PTINR  Lab Results   Component Value Date    CKTOTAL 205 04/18/2017    TROPONINI <0.01 10/22/2018       EKG: 10/22/18 I have reviewed EKG with the following interpretation:   Supraventricular tachycardia possible atrial tachycardiaNonspecific ST and T wave abnormalityAbnormal ECGWhen compared with ECG of 22-OCT-2018 16:04, (unconfirmed)SVT replaces afib with RVRConfirmed by YESSI     CXR 10/22/18  Lungs are mildly hyperinflated. There is a band of chronic atelectasis/fibrosis in the lingula with slight tenting of the left heart border.  No acute infiltrate,

## 2018-10-23 NOTE — PROGRESS NOTES
Cardiology consult has been called to Dr. Marina Rick on 10/23/18 through answering service @ 0613.  KALYAN Rome/[place]

## 2018-10-24 LAB
ABO/RH: NORMAL
ANTIBODY SCREEN: NORMAL
GLUCOSE BLD-MCNC: 119 MG/DL (ref 70–99)
GLUCOSE BLD-MCNC: 140 MG/DL (ref 70–99)
GLUCOSE BLD-MCNC: 140 MG/DL (ref 70–99)
GLUCOSE BLD-MCNC: 172 MG/DL (ref 70–99)
HCT VFR BLD CALC: 27 % (ref 40.5–52.5)
HEMOGLOBIN: 8.9 G/DL (ref 13.5–17.5)
PERFORMED ON: ABNORMAL

## 2018-10-24 PROCEDURE — 6370000000 HC RX 637 (ALT 250 FOR IP): Performed by: INTERNAL MEDICINE

## 2018-10-24 PROCEDURE — 99233 SBSQ HOSP IP/OBS HIGH 50: CPT | Performed by: INTERNAL MEDICINE

## 2018-10-24 PROCEDURE — 86900 BLOOD TYPING SEROLOGIC ABO: CPT

## 2018-10-24 PROCEDURE — 36415 COLL VENOUS BLD VENIPUNCTURE: CPT

## 2018-10-24 PROCEDURE — 85014 HEMATOCRIT: CPT

## 2018-10-24 PROCEDURE — 6360000002 HC RX W HCPCS: Performed by: INTERNAL MEDICINE

## 2018-10-24 PROCEDURE — 99232 SBSQ HOSP IP/OBS MODERATE 35: CPT | Performed by: INTERNAL MEDICINE

## 2018-10-24 PROCEDURE — 86850 RBC ANTIBODY SCREEN: CPT

## 2018-10-24 PROCEDURE — 2580000003 HC RX 258: Performed by: INTERNAL MEDICINE

## 2018-10-24 PROCEDURE — 86901 BLOOD TYPING SEROLOGIC RH(D): CPT

## 2018-10-24 PROCEDURE — 85018 HEMOGLOBIN: CPT

## 2018-10-24 PROCEDURE — 2060000000 HC ICU INTERMEDIATE R&B

## 2018-10-24 PROCEDURE — 94761 N-INVAS EAR/PLS OXIMETRY MLT: CPT

## 2018-10-24 RX ORDER — DIGOXIN 125 MCG
125 TABLET ORAL DAILY
Status: DISCONTINUED | OUTPATIENT
Start: 2018-10-24 | End: 2018-10-24

## 2018-10-24 RX ORDER — DILTIAZEM HYDROCHLORIDE 240 MG/1
240 CAPSULE, COATED, EXTENDED RELEASE ORAL DAILY
Status: DISCONTINUED | OUTPATIENT
Start: 2018-10-24 | End: 2018-10-24

## 2018-10-24 RX ORDER — POLYETHYLENE GLYCOL 3350 17 G/17G
238 POWDER, FOR SOLUTION ORAL ONCE
Status: COMPLETED | OUTPATIENT
Start: 2018-10-24 | End: 2018-10-24

## 2018-10-24 RX ORDER — POLYETHYLENE GLYCOL 3350 17 G/17G
119 POWDER, FOR SOLUTION ORAL ONCE
Status: COMPLETED | OUTPATIENT
Start: 2018-10-25 | End: 2018-10-25

## 2018-10-24 RX ORDER — DIGOXIN 125 MCG
125 TABLET ORAL DAILY
Qty: 30 TABLET | Refills: 3 | Status: SHIPPED | OUTPATIENT
Start: 2018-10-24 | End: 2018-10-27 | Stop reason: HOSPADM

## 2018-10-24 RX ADMIN — ASPIRIN 81 MG 81 MG: 81 TABLET ORAL at 08:36

## 2018-10-24 RX ADMIN — DONEPEZIL HYDROCHLORIDE 10 MG: 5 TABLET, FILM COATED ORAL at 21:27

## 2018-10-24 RX ADMIN — OXYCODONE HYDROCHLORIDE AND ACETAMINOPHEN 1 TABLET: 10; 325 TABLET ORAL at 21:46

## 2018-10-24 RX ADMIN — MESALAMINE 800 MG: 400 CAPSULE, DELAYED RELEASE ORAL at 21:47

## 2018-10-24 RX ADMIN — B-COMPLEX W/ C & FOLIC ACID TAB 1 TABLET: TAB at 08:36

## 2018-10-24 RX ADMIN — Medication 10 ML: at 15:01

## 2018-10-24 RX ADMIN — SERTRALINE HYDROCHLORIDE 100 MG: 50 TABLET ORAL at 08:36

## 2018-10-24 RX ADMIN — FINASTERIDE 5 MG: 5 TABLET, FILM COATED ORAL at 08:35

## 2018-10-24 RX ADMIN — AMIODARONE HYDROCHLORIDE 0.5 MG/MIN: 50 INJECTION, SOLUTION INTRAVENOUS at 23:20

## 2018-10-24 RX ADMIN — MONTELUKAST SODIUM 10 MG: 10 TABLET, COATED ORAL at 21:27

## 2018-10-24 RX ADMIN — INSULIN LISPRO 2 UNITS: 100 INJECTION, SOLUTION INTRAVENOUS; SUBCUTANEOUS at 11:49

## 2018-10-24 RX ADMIN — DIGOXIN 250 MCG: 250 INJECTION, SOLUTION INTRAMUSCULAR; INTRAVENOUS; PARENTERAL at 00:13

## 2018-10-24 RX ADMIN — POLYETHYLENE GLYCOL (3350) 238 G: 17 POWDER, FOR SOLUTION ORAL at 21:28

## 2018-10-24 RX ADMIN — Medication 10 ML: at 08:42

## 2018-10-24 RX ADMIN — TAMSULOSIN HYDROCHLORIDE 0.4 MG: 0.4 CAPSULE ORAL at 08:35

## 2018-10-24 RX ADMIN — BISACODYL 20 MG: 5 TABLET, DELAYED RELEASE ORAL at 21:27

## 2018-10-24 RX ADMIN — FAMOTIDINE 20 MG: 20 TABLET, FILM COATED ORAL at 08:36

## 2018-10-24 RX ADMIN — PRAMIPEXOLE DIHYDROCHLORIDE 0.75 MG: 0.25 TABLET ORAL at 21:27

## 2018-10-24 RX ADMIN — ZOLPIDEM TARTRATE 5 MG: 5 TABLET ORAL at 21:46

## 2018-10-24 RX ADMIN — DIGOXIN 125 MCG: 125 TABLET ORAL at 10:25

## 2018-10-24 RX ADMIN — DILTIAZEM HYDROCHLORIDE 60 MG: 60 TABLET, FILM COATED ORAL at 00:13

## 2018-10-24 RX ADMIN — INSULIN LISPRO 2 UNITS: 100 INJECTION, SOLUTION INTRAVENOUS; SUBCUTANEOUS at 16:50

## 2018-10-24 RX ADMIN — DEXTROSE MONOHYDRATE 150 MG: 50 INJECTION, SOLUTION INTRAVENOUS at 15:01

## 2018-10-24 RX ADMIN — MESALAMINE 800 MG: 400 CAPSULE, DELAYED RELEASE ORAL at 08:36

## 2018-10-24 RX ADMIN — AMIODARONE HYDROCHLORIDE 1 MG/MIN: 50 INJECTION, SOLUTION INTRAVENOUS at 15:01

## 2018-10-24 RX ADMIN — DILTIAZEM HYDROCHLORIDE 60 MG: 60 TABLET, FILM COATED ORAL at 06:26

## 2018-10-24 RX ADMIN — RIVAROXABAN 20 MG: 20 TABLET, FILM COATED ORAL at 08:35

## 2018-10-24 RX ADMIN — FAMOTIDINE 20 MG: 20 TABLET, FILM COATED ORAL at 21:27

## 2018-10-24 RX ADMIN — MESALAMINE 800 MG: 400 CAPSULE, DELAYED RELEASE ORAL at 14:22

## 2018-10-24 RX ADMIN — DILTIAZEM HYDROCHLORIDE 240 MG: 240 CAPSULE, COATED, EXTENDED RELEASE ORAL at 11:49

## 2018-10-24 ASSESSMENT — PAIN SCALES - GENERAL
PAINLEVEL_OUTOF10: 7
PAINLEVEL_OUTOF10: 1
PAINLEVEL_OUTOF10: 0
PAINLEVEL_OUTOF10: 0

## 2018-10-24 ASSESSMENT — PAIN DESCRIPTION - PROGRESSION: CLINICAL_PROGRESSION: NOT CHANGED

## 2018-10-24 ASSESSMENT — PAIN DESCRIPTION - FREQUENCY: FREQUENCY: CONTINUOUS

## 2018-10-24 ASSESSMENT — PAIN DESCRIPTION - ORIENTATION: ORIENTATION: MID;LOWER

## 2018-10-24 ASSESSMENT — PAIN DESCRIPTION - PAIN TYPE: TYPE: CHRONIC PAIN

## 2018-10-24 ASSESSMENT — PAIN DESCRIPTION - ONSET: ONSET: ON-GOING

## 2018-10-24 ASSESSMENT — PAIN DESCRIPTION - LOCATION: LOCATION: BACK

## 2018-10-24 ASSESSMENT — PAIN DESCRIPTION - DESCRIPTORS: DESCRIPTORS: ACHING

## 2018-10-24 NOTE — PROGRESS NOTES
Patient's EF (Ejection Fraction) is greater than 40%    Patient has a past medical history of Cancer ([facility] [address] Chronic pancreatitis ([facility] [address] Colitis; Diabetes mellitus ([facility] [address] Esophagitis; Gastritis; Hyperlipidemia; and Hypertension. Comorbidities reviewed and education provided. Patient and family's stated goal of care: be more comfortable prior to discharge    Patient's current functional capacity:  Slight limitation of physical activity. Comfortable at rest. Ordinary physical activity results in fatigue, palpitation, dyspnea. Pt resting in bed at this time on room air. Pt denies shortness of breath. Pt without lower extremity edema. Patient's weights and intake/output reviewed:    Patient Vitals for the past 96 hrs (Last 3 readings):   Weight   10/24/18 0629 220 lb 6.4 oz (100 kg)   10/23/18 0434 224 lb 8 oz (101.8 kg)   10/22/18 2207 219 lb (99.3 kg)       Intake/Output Summary (Last 24 hours) at 10/24/18 9962  Last data filed at 10/24/18 5400   Gross per 24 hour   Intake              240 ml   Output             1600 ml   Net            -1360 ml       Patient provided with education on CHF signs/symptoms, causes, discharge medications, daily weights, low sodium diet, activity, and follow-up. Notified patient to call the doctor post discharge if patient experiences shortness of breath, chest pain, swelling, cough, or weight gain of three pounds in a day/five pounds in a week. Also notified patient to call the doctor with dizziness, increased fatigue, decreased or difficulty urinating. Pt verbalized understanding. No additional questions at this time.     Education Time: 5 Minutes

## 2018-10-24 NOTE — PROGRESS NOTES
[address] Daily Progress Note      Admit Date:  10/22/2018    Subjective:  Mr. Davina Russell  Is being seen today for f/u afib. Today he remains in AFIB with controlled rates. Diltiazem drip was weaned off around 2am  He reports to feeling fine and denies chest pain, shortness of breath, edema, dizziness, palpitations and syncope.      Objective:   /65   Pulse 85   Temp 97 °F (36.1 °C) (Oral)   Resp 20   Ht 5' 10\" (1.778 m)   Wt 220 lb 6.4 oz (100 kg)   SpO2 97%   BMI 31.62 kg/m²       Intake/Output Summary (Last 24 hours) at 10/24/18 0800  Last data filed at 10/24/18 7480   Gross per 24 hour   Intake              240 ml   Output             1600 ml   Net            -1360 ml       TELEMETRY: AFIB    Physical Exam:  General:  Awake, alert, NAD  Skin:  Warm and dry  Neck:  JVD none  Chest:  Clear to auscultation, respiration easy  Cardiovascular:  +irregularly irregular; S1S2  Abdomen:  Soft NT  Extremities:  no edema    Medications:    diltiazem  60 mg Oral 4 times per day    aspirin  81 mg Oral Daily    vitamin B complex w/C  1 tablet Oral Daily    donepezil  10 mg Oral Nightly    finasteride  5 mg Oral Daily    mesalamine  800 mg Oral TID    montelukast  10 mg Oral Nightly    pramipexole  0.75 mg Oral Nightly    rivaroxaban  20 mg Oral Daily    sertraline  100 mg Oral Daily    tamsulosin  0.4 mg Oral Daily    sodium chloride flush  10 mL Intravenous 2 times per day    famotidine  20 mg Oral BID    insulin lispro  0-12 Units Subcutaneous TID     insulin lispro  0-6 Units Subcutaneous Nightly      dextrose      diltiazem (CARDIZEM) 125 mg in dextrose 5% 125 mL infusion Stopped (10/24/18 0153)     glucose, dextrose, glucagon (rDNA), dextrose, zolpidem, albuterol, cyclobenzaprine, oxyCODONE-acetaminophen, sodium chloride flush, magnesium hydroxide, ondansetron    Lab Data:  CBC:   Recent Labs      10/22/18   0611  10/22/18   2207   WBC  5.1  6.7   HGB  10.5*  10.6*   HCT  32.2* Medium    HTN (hypertension) 04/26/2015     Priority: Medium    HLD (hyperlipidemia) 04/26/2015     Priority: Medium    Seasonal allergic rhinitis 09/25/2015     Priority: Low    A-fib ([address] 10/22/2018    Atrial fibrillation with rapid ventricular response ([address] 10/19/2018    BPH (benign prostatic hyperplasia) 09/21/2018    Nocturia 11/28/2017    Short-term memory loss 07/12/2017    Gastroesophageal reflux disease without esophagitis 06/02/2017    Typical atrial flutter (HCC)     Acute diastolic congestive heart failure ([address]     Former smoker 04/18/2017    Fall at home 04/17/2017    T12 vertebral burst fracture 04/17/2017    Depression 02/16/2017    Restless legs syndrome (RLS) 02/15/2017    Mixed hyperlipidemia 02/15/2017    Chronic bilateral low back pain without sciatica 10/06/2016    COPD, severe ([address] 08/17/2016    Essential hypertension 08/10/2016    Controlled type 2 diabetes mellitus without complication, without long-term current use of insulin ([address] 06/09/2016    HOLLAND (obstructive sleep apnea) 06/09/2016    Colitis 05/08/2015    Unstable angina ([address] 04/26/2015       Argentina Young MD 10/24/2018 8:00 AM

## 2018-10-24 NOTE — PROGRESS NOTES
-158 afib with minimal exertion. Dr Ricardo Keller notified, see new orders. Will start amio drip. Pt updated and aware d/c cancelled. Wife at bedside.

## 2018-10-24 NOTE — CARE COORDINATION
DISCHARGE ORDER  Date/Time 10/24/2018 12:40 PM  Completed by: Sarath Whalen, Case Management    Patient Name: Michelle Fuchs    : 1945  Admitting Diagnosis: A-fib ([facility] [address] [I48.91]  A-fib ([facility] [address] [I48.91]  Admit Date/Time: 10/22/2018  9:59 PM    Noted discharge order. Confirmed discharge plan with patient / family (pt): Yes   Discharge Plan: Pt was readmitted for recurrence of Afib within 24 hours. He Has already received his Xarelto from his pharmacy and does not require and financial assistance with RX. Denies any HC or DME needs. Reviewed chart. Role of discharge planner explained and patient verbalized understanding. Discharge order is noted. Pt is being d/c'd to his home today with his spouse. Family to provide transportation via private vehicle. Pt's O2 sats are 96% on RA. Discharge timeout done with [employer]. All discharge needs and concerns addressed.
(bathing/dressing/grooming): Independent  Ability to manage medications: Independent  Ability to prepare food:  Independent  Ability to maintain home (clean home, laundry): Independent  Ability to drive and/or has transportation:  Independent  Ability to do shopping:  Independent  Ability to manage finances: Independent  Is patient able to live independently?:  Yes  Hearing and Vision  Visual Impairment:  None  Hearing Impairment:  None  Care Transitions Interventions       Readmitted a few hours after discharge with same complaints. Patient notified not included in BPCI-A bundle, but care transition following for 24h outreach after discharge.      Follow Up  Future Appointments  Date Time Provider Jerry Real   10/26/2018 10:40 AM GAVIN Stephenson [facility]   11/1/2018 10:00 AM SHAYY Johnson - PRAVEEN QUILES   12/14/2018 8:15 AM Duayne Litten, MD [facility]   3/19/2019 10:00 AM MD Anusha Ross [facility]       Health Maintenance  Health Maintenance Due   Topic Date Due    Lipid screen  12/31/2016       Aissatou Brito, RN

## 2018-10-25 LAB
ANION GAP SERPL CALCULATED.3IONS-SCNC: 11 MMOL/L (ref 3–16)
BASOPHILS ABSOLUTE: 0.1 K/UL (ref 0–0.2)
BASOPHILS RELATIVE PERCENT: 0.6 %
BUN BLDV-MCNC: 23 MG/DL (ref 7–20)
CALCIUM SERPL-MCNC: 10 MG/DL (ref 8.3–10.6)
CHLORIDE BLD-SCNC: 91 MMOL/L (ref 99–110)
CO2: 24 MMOL/L (ref 21–32)
CREAT SERPL-MCNC: 0.9 MG/DL (ref 0.8–1.3)
EOSINOPHILS ABSOLUTE: 0.1 K/UL (ref 0–0.6)
EOSINOPHILS RELATIVE PERCENT: 1.2 %
GFR AFRICAN AMERICAN: >60
GFR NON-AFRICAN AMERICAN: >60
GLUCOSE BLD-MCNC: 130 MG/DL (ref 70–99)
GLUCOSE BLD-MCNC: 133 MG/DL (ref 70–99)
GLUCOSE BLD-MCNC: 153 MG/DL (ref 70–99)
GLUCOSE BLD-MCNC: 157 MG/DL (ref 70–99)
GLUCOSE BLD-MCNC: 184 MG/DL (ref 70–99)
HCT VFR BLD CALC: 27.4 % (ref 40.5–52.5)
HCT VFR BLD CALC: 27.4 % (ref 40.5–52.5)
HEMOGLOBIN: 9 G/DL (ref 13.5–17.5)
HEMOGLOBIN: 9.1 G/DL (ref 13.5–17.5)
LYMPHOCYTES ABSOLUTE: 1.8 K/UL (ref 1–5.1)
LYMPHOCYTES RELATIVE PERCENT: 20.3 %
MCH RBC QN AUTO: 28 PG (ref 26–34)
MCHC RBC AUTO-ENTMCNC: 33.3 G/DL (ref 31–36)
MCV RBC AUTO: 84.1 FL (ref 80–100)
MONOCYTES ABSOLUTE: 0.6 K/UL (ref 0–1.3)
MONOCYTES RELATIVE PERCENT: 7.5 %
NEUTROPHILS ABSOLUTE: 6.1 K/UL (ref 1.7–7.7)
NEUTROPHILS RELATIVE PERCENT: 70.4 %
PDW BLD-RTO: 16.1 % (ref 12.4–15.4)
PERFORMED ON: ABNORMAL
PLATELET # BLD: 213 K/UL (ref 135–450)
PMV BLD AUTO: 7.6 FL (ref 5–10.5)
POTASSIUM SERPL-SCNC: 4.1 MMOL/L (ref 3.5–5.1)
RBC # BLD: 3.26 M/UL (ref 4.2–5.9)
SODIUM BLD-SCNC: 126 MMOL/L (ref 136–145)
WBC # BLD: 8.7 K/UL (ref 4–11)

## 2018-10-25 PROCEDURE — 6370000000 HC RX 637 (ALT 250 FOR IP): Performed by: INTERNAL MEDICINE

## 2018-10-25 PROCEDURE — 3609010600 HC COLONOSCOPY POLYPECTOMY SNARE/COLD BIOPSY: Performed by: INTERNAL MEDICINE

## 2018-10-25 PROCEDURE — 2580000003 HC RX 258: Performed by: INTERNAL MEDICINE

## 2018-10-25 PROCEDURE — 99152 MOD SED SAME PHYS/QHP 5/>YRS: CPT | Performed by: INTERNAL MEDICINE

## 2018-10-25 PROCEDURE — 0DBH8ZZ EXCISION OF CECUM, VIA NATURAL OR ARTIFICIAL OPENING ENDOSCOPIC: ICD-10-PCS | Performed by: INTERNAL MEDICINE

## 2018-10-25 PROCEDURE — 7100000010 HC PHASE II RECOVERY - FIRST 15 MIN: Performed by: INTERNAL MEDICINE

## 2018-10-25 PROCEDURE — 2060000000 HC ICU INTERMEDIATE R&B

## 2018-10-25 PROCEDURE — C1773 RET DEV, INSERTABLE: HCPCS | Performed by: INTERNAL MEDICINE

## 2018-10-25 PROCEDURE — 88305 TISSUE EXAM BY PATHOLOGIST: CPT

## 2018-10-25 PROCEDURE — 99233 SBSQ HOSP IP/OBS HIGH 50: CPT | Performed by: INTERNAL MEDICINE

## 2018-10-25 PROCEDURE — 99232 SBSQ HOSP IP/OBS MODERATE 35: CPT | Performed by: INTERNAL MEDICINE

## 2018-10-25 PROCEDURE — 85025 COMPLETE CBC W/AUTO DIFF WBC: CPT

## 2018-10-25 PROCEDURE — 36415 COLL VENOUS BLD VENIPUNCTURE: CPT

## 2018-10-25 PROCEDURE — 6360000002 HC RX W HCPCS: Performed by: INTERNAL MEDICINE

## 2018-10-25 PROCEDURE — 99153 MOD SED SAME PHYS/QHP EA: CPT | Performed by: INTERNAL MEDICINE

## 2018-10-25 PROCEDURE — 7100000011 HC PHASE II RECOVERY - ADDTL 15 MIN: Performed by: INTERNAL MEDICINE

## 2018-10-25 PROCEDURE — 80048 BASIC METABOLIC PNL TOTAL CA: CPT

## 2018-10-25 PROCEDURE — 0DBK8ZZ EXCISION OF ASCENDING COLON, VIA NATURAL OR ARTIFICIAL OPENING ENDOSCOPIC: ICD-10-PCS | Performed by: INTERNAL MEDICINE

## 2018-10-25 PROCEDURE — 2709999900 HC NON-CHARGEABLE SUPPLY: Performed by: INTERNAL MEDICINE

## 2018-10-25 RX ORDER — FENTANYL CITRATE 50 UG/ML
INJECTION, SOLUTION INTRAMUSCULAR; INTRAVENOUS PRN
Status: DISCONTINUED | OUTPATIENT
Start: 2018-10-25 | End: 2018-10-27 | Stop reason: HOSPADM

## 2018-10-25 RX ORDER — MIDAZOLAM HYDROCHLORIDE 5 MG/ML
INJECTION INTRAMUSCULAR; INTRAVENOUS PRN
Status: DISCONTINUED | OUTPATIENT
Start: 2018-10-25 | End: 2018-10-27 | Stop reason: HOSPADM

## 2018-10-25 RX ADMIN — MESALAMINE 800 MG: 400 CAPSULE, DELAYED RELEASE ORAL at 13:55

## 2018-10-25 RX ADMIN — B-COMPLEX W/ C & FOLIC ACID TAB 1 TABLET: TAB at 13:54

## 2018-10-25 RX ADMIN — MONTELUKAST SODIUM 10 MG: 10 TABLET, COATED ORAL at 21:00

## 2018-10-25 RX ADMIN — POLYETHYLENE GLYCOL (3350) 119 G: 17 POWDER, FOR SOLUTION ORAL at 04:44

## 2018-10-25 RX ADMIN — FINASTERIDE 5 MG: 5 TABLET, FILM COATED ORAL at 13:54

## 2018-10-25 RX ADMIN — OXYCODONE HYDROCHLORIDE AND ACETAMINOPHEN 1 TABLET: 10; 325 TABLET ORAL at 23:03

## 2018-10-25 RX ADMIN — PRAMIPEXOLE DIHYDROCHLORIDE 0.75 MG: 0.25 TABLET ORAL at 21:00

## 2018-10-25 RX ADMIN — INSULIN LISPRO 1 UNITS: 100 INJECTION, SOLUTION INTRAVENOUS; SUBCUTANEOUS at 21:00

## 2018-10-25 RX ADMIN — TAMSULOSIN HYDROCHLORIDE 0.4 MG: 0.4 CAPSULE ORAL at 13:53

## 2018-10-25 RX ADMIN — AMIODARONE HYDROCHLORIDE 0.5 MG/MIN: 50 INJECTION, SOLUTION INTRAVENOUS at 16:09

## 2018-10-25 RX ADMIN — MESALAMINE 800 MG: 400 CAPSULE, DELAYED RELEASE ORAL at 21:01

## 2018-10-25 RX ADMIN — SERTRALINE HYDROCHLORIDE 100 MG: 50 TABLET ORAL at 13:54

## 2018-10-25 RX ADMIN — Medication 10 ML: at 13:55

## 2018-10-25 RX ADMIN — FAMOTIDINE 20 MG: 20 TABLET, FILM COATED ORAL at 13:53

## 2018-10-25 RX ADMIN — OXYCODONE HYDROCHLORIDE AND ACETAMINOPHEN 1 TABLET: 10; 325 TABLET ORAL at 15:05

## 2018-10-25 RX ADMIN — DONEPEZIL HYDROCHLORIDE 10 MG: 5 TABLET, FILM COATED ORAL at 21:00

## 2018-10-25 RX ADMIN — FAMOTIDINE 20 MG: 20 TABLET, FILM COATED ORAL at 21:00

## 2018-10-25 RX ADMIN — ZOLPIDEM TARTRATE 5 MG: 5 TABLET ORAL at 23:03

## 2018-10-25 RX ADMIN — INSULIN LISPRO 2 UNITS: 100 INJECTION, SOLUTION INTRAVENOUS; SUBCUTANEOUS at 16:11

## 2018-10-25 RX ADMIN — Medication 10 ML: at 21:00

## 2018-10-25 ASSESSMENT — PAIN DESCRIPTION - PAIN TYPE
TYPE: CHRONIC PAIN
TYPE: CHRONIC PAIN

## 2018-10-25 ASSESSMENT — PAIN DESCRIPTION - ONSET: ONSET: ON-GOING

## 2018-10-25 ASSESSMENT — PAIN SCALES - GENERAL
PAINLEVEL_OUTOF10: 5
PAINLEVEL_OUTOF10: 4
PAINLEVEL_OUTOF10: 7
PAINLEVEL_OUTOF10: 0

## 2018-10-25 ASSESSMENT — PAIN DESCRIPTION - PROGRESSION: CLINICAL_PROGRESSION: NOT CHANGED

## 2018-10-25 ASSESSMENT — PAIN DESCRIPTION - LOCATION
LOCATION: BACK
LOCATION: BACK

## 2018-10-25 ASSESSMENT — PAIN DESCRIPTION - DESCRIPTORS
DESCRIPTORS: ACHING
DESCRIPTORS: ACHING

## 2018-10-25 ASSESSMENT — PAIN - FUNCTIONAL ASSESSMENT: PAIN_FUNCTIONAL_ASSESSMENT: 0-10

## 2018-10-25 ASSESSMENT — PAIN DESCRIPTION - ORIENTATION: ORIENTATION: MID;LOWER

## 2018-10-25 ASSESSMENT — PAIN DESCRIPTION - FREQUENCY: FREQUENCY: CONTINUOUS

## 2018-10-25 NOTE — PROGRESS NOTES
Progress Note    Admit Date:  10/22/2018    Subjective:  Mr. Susy Jo was discharged from the hospital after admission for rapid a fib. His HR was controlled and he was sent home. He returned to the ER with rapid heart rate and was readmitted. Cardiology has since started him on Digoxin. This am he was doing fine. His HR was controlled. He was still in a fib. The plan was to discharge him. His HR however went back up to 158. He also had a bloody BM. Discharge was cancelled. He was started on an Amiodarone drip. The blood in stool was not large and no further bleeding reported. No chest pain. 10/25-  He is being prepped for a colonoscopy. Had rectal bleeding yesterday and with prep. Colonoscopy today. He is still in a fib. On Amiodarone. Objective:   /78   Pulse 80   Temp 97.4 °F (36.3 °C) (Oral)   Resp 16   Ht 5' 10\" (1.778 m)   Wt 223 lb 6.4 oz (101.3 kg)   SpO2 97%   BMI 32.05 kg/m²            Intake/Output Summary (Last 24 hours) at 10/25/18 1011  Last data filed at 10/25/18 1006   Gross per 24 hour   Intake              585 ml   Output              625 ml   Net              -40 ml       Physical Exam:    General appearance: alert, appears stated age and cooperative  Head: Normocephalic, without obvious abnormality, atraumatic  Eyes: conjunctivae/corneas clear. PERRL, EOM's intact.   Neck: no adenopathy, no carotid bruit, no JVD, supple, symmetrical, trachea midline and thyroid not enlarged, symmetric, no tenderness/mass/nodules  Lungs: clear to auscultation bilaterally  Heart: irregular rate and rhythm, S1, S2 normal, no murmur, click, rub or gallop  Abdomen: soft, non-tender; bowel sounds normal; no masses,  no organomegaly  Extremities: extremities normal, atraumatic, no cyanosis or edema  Pulses: 2+ and symmetric  Skin: Skin color, texture, turgor normal. No rashes or lesions  Neurologic: Grossly normal    Scheduled Meds:   vitamin B complex w/C  1 tablet Oral Daily    donepezil  10 mg Oral Nightly    finasteride  5 mg Oral Daily    mesalamine  800 mg Oral TID    montelukast  10 mg Oral Nightly    pramipexole  0.75 mg Oral Nightly    sertraline  100 mg Oral Daily    tamsulosin  0.4 mg Oral Daily    sodium chloride flush  10 mL Intravenous 2 times per day    famotidine  20 mg Oral BID    insulin lispro  0-12 Units Subcutaneous TID WC    insulin lispro  0-6 Units Subcutaneous Nightly       Continuous Infusions:   amiodarone 450mg/250ml D5W infusion 0.5 mg/min (10/24/18 2320)    dextrose         PRN Meds:  glucose, dextrose, glucagon (rDNA), dextrose, zolpidem, albuterol, cyclobenzaprine, oxyCODONE-acetaminophen, sodium chloride flush, magnesium hydroxide, ondansetron      Data:  CBC:   Recent Labs      10/22/18   2207  10/24/18   2058  10/24/18   2358  10/25/18   0809   WBC  6.7   --    --   8.7   HGB  10.6*  8.9*  9.0*  9.1*   HCT  32.4*  27.0*  27.4*  27.4*   MCV  85.2   --    --   84.1   PLT  222   --    --   213     BMP:   Recent Labs      10/22/18   2207  10/23/18   0353  10/25/18   0809   NA  136  135*  126*   K  4.3  4.1  4.1   CL  99  100  91*   CO2  24  25  24   BUN  26*  26*  23*   CREATININE  1.1  0.9  0.9     LIVER PROFILE:   Recent Labs      10/22/18   2207   AST  56*   ALT  17   BILITOT  0.6   ALKPHOS  92     PT/INR:   Recent Labs      10/22/18   2207   PROTIME  20.4*   INR  1.79*     Cultures:   none    Assessment:  Principal Problem:    Atrial fibrillation with rapid ventricular response (HCC)  Active Problems:    DM II (diabetes mellitus, type II), controlled ([facility]    Coronary artery disease involving native coronary artery of native heart without angina pectoris    Controlled type 2 diabetes mellitus without complication, without long-term current use of insulin (HCC)    HOLLAND (obstructive sleep apnea)    Essential hypertension    COPD, severe (HCC)    Mixed hyperlipidemia    Rectal bleed  Resolved Problems:    * No resolved hospital problems. *      Plan:    #  Rapid a fib. His HR was better after staring IV cardizem. This was changed to PO Cardizem. He was also given digoxin. Plan was to release him but HR went back up and his discharge was cancelled. Amiodarone drip was started. His HR is better. #  Mild hyponatremia likely related to colon prep. Monitor. #  He had a bloody BM. H and H dropped some. Colonoscopy today for rectal bleeding. Xarelto on hold. Stop ASA. He has minimal CAD. #  DM type 2:  Very well controlled. Last A1C is 6.2. Continue home regimen. #  HTN stable. #  Severe COPD stable. #  CAD no chest pain. Xarelto held.     John Morejon MD 10/25/2018 10:11 AM

## 2018-10-25 NOTE — PROGRESS NOTES
[address] Daily Progress Note      Admit Date:  10/22/2018    Subjective:  Mr. Ivory Grissom  Is being seen today for f/u afib. Today he remains afib but HR 90-low 100's bpm. Developed rapid HR 160bpm walking prior to intended d/c yesterday and d/c cancelled. I stopped digoxin and cardizem and started amiodarone gtt. He developed BRPPR overnight and prepped for colonscopy.      Objective:   /78   Pulse 80   Temp 97.4 °F (36.3 °C) (Oral)   Resp 16   Ht 5' 10\" (1.778 m)   Wt 223 lb 6.4 oz (101.3 kg)   SpO2 97%   BMI 32.05 kg/m²       Intake/Output Summary (Last 24 hours) at 10/25/18 5211  Last data filed at 10/24/18 1802   Gross per 24 hour   Intake              585 ml   Output              200 ml   Net              385 ml       TELEMETRY: AFIB    Physical Exam:  General:  Awake, alert, NAD  Skin:  Warm and dry  Neck:  JVD none  Chest:  Clear to auscultation, respiration easy  Cardiovascular:  +irregularly irregular; S1S2  Abdomen:  Soft NT  Extremities:  no edema    Medications:    aspirin  81 mg Oral Daily    vitamin B complex w/C  1 tablet Oral Daily    donepezil  10 mg Oral Nightly    finasteride  5 mg Oral Daily    mesalamine  800 mg Oral TID    montelukast  10 mg Oral Nightly    pramipexole  0.75 mg Oral Nightly    sertraline  100 mg Oral Daily    tamsulosin  0.4 mg Oral Daily    sodium chloride flush  10 mL Intravenous 2 times per day    famotidine  20 mg Oral BID    insulin lispro  0-12 Units Subcutaneous TID     insulin lispro  0-6 Units Subcutaneous Nightly      amiodarone 450mg/250ml D5W infusion 0.5 mg/min (10/24/18 2320)    dextrose       glucose, dextrose, glucagon (rDNA), dextrose, zolpidem, albuterol, cyclobenzaprine, oxyCODONE-acetaminophen, sodium chloride flush, magnesium hydroxide, ondansetron    Lab Data:  CBC:   Recent Labs      10/22/18   2207  10/24/18   2058  10/24/18   2358  10/25/18   0809   WBC  6.7   --    --   8.7   HGB  10.6*  8.9*  9.0*  9.1* HCT  32.4*  27.0*  27.4*  27.4*   MCV  85.2   --    --   84.1   PLT  222   --    --   213     BMP:   Recent Labs      10/22/18   2207  10/23/18   0353  10/25/18   0809   NA  136  135*  126*   K  4.3  4.1  4.1   CL  99  100  91*   CO2  24  25  24   BUN  26*  26*  23*   CREATININE  1.1  0.9  0.9     LIVER PROFILE:   Recent Labs      10/22/18   2207   AST  56*   ALT  17   BILITOT  0.6   ALKPHOS  92     PT/INR:   Recent Labs      10/22/18   2207   PROTIME  20.4*   INR  1.79*     APTT:   Recent Labs      10/22/18   2207   APTT  37.3*     BNP:  No results for input(s): BNP in the last 72 hours. IMAGING:  EKG: 10/22/18 I have reviewed EKG with the following interpretation:   Supraventricular tachycardia possible atrial tachycardiaNonspecific ST and T wave abnormalityAbnormal ECGWhen compared with ECG of 22-OCT-2018 16:04, (unconfirmed)SVT replaces afib with RVRConfirmed by YESSI      CXR 10/22/18  Lungs are mildly hyperinflated. Barbette Oscar is a band of chronic atelectasis/fibrosis in the lingula with slight tenting of the left heart border. No acute infiltrate, pneumothorax or pleural effusion.  The heart size is normal.  Prior resection of the distal left clavicle.  No acute bone finding. EKG 10/19/2018  Atrial fibrillation with rapid ventricular responseAbnormal ECGWhen compared with ECG of 21-SEP-2018 13:22,Atrial fibrillation has replaced Sinus rhythmVent. rate has increased BY  80 BPMConfirmed by Shahzad Nur MD, [address] ([id number]) on 10/19/2018 3:53:09 PM     CXR 10/20/18  FINDINGS: Cardiomediastinal silhouette is within normal limits. No pneumothorax or effusion. No focal consolidation. Linear opacities in the bases likely represent atelectasis. No acute osseous abnormality. ECHO 4/20/2017  Summary   Technically difficult examination.  Pt supine secondary to back fracture.   Left ventricular systolic function is normal with the ejection fraction estimated at 55%.   No regional wall motion abnormalities.   Normal left Priority: Medium    HLD (hyperlipidemia) 04/26/2015     Priority: Medium    Seasonal allergic rhinitis 09/25/2015     Priority: Low    A-fib ([address] 10/22/2018    Atrial fibrillation with rapid ventricular response ([address] 10/19/2018    BPH (benign prostatic hyperplasia) 09/21/2018    Nocturia 11/28/2017    Short-term memory loss 07/12/2017    Gastroesophageal reflux disease without esophagitis 06/02/2017    Typical atrial flutter (HCC)     Acute diastolic congestive heart failure ([address]     Former smoker 04/18/2017    Fall at home 04/17/2017    T12 vertebral burst fracture 04/17/2017    Depression 02/16/2017    Restless legs syndrome (RLS) 02/15/2017    Mixed hyperlipidemia 02/15/2017    Chronic bilateral low back pain without sciatica 10/06/2016    COPD, severe ([address] 08/17/2016    Essential hypertension 08/10/2016    Controlled type 2 diabetes mellitus without complication, without long-term current use of insulin ([address] 06/09/2016    HOLLAND (obstructive sleep apnea) 06/09/2016    Colitis 05/08/2015    Unstable angina (HCC) 04/26/2015       Sarath Friedman MD 10/25/2018 9:18 AM

## 2018-10-25 NOTE — H&P
History and Physical / Pre-Sedation Assessment    Patient:  Shayy Rodriguez   :   1945     Intended Procedure:  Colonoscopy    HPI: 68year old male with history of HTN, HLD, DM, CAD, HOLLAND, COPD, A fib and BPH admitted with A fib with RVR. He was recently started on xarelto and subsequently developed rectal bleeding this AM      Nurses notes reviewed and agreed. Medications reviewed  Allergies: No Known Allergies    Physical Exam:  Vital Signs: /66   Pulse 87   Temp 97.2 °F (36.2 °C) (Temporal)   Resp 18   Ht 5' 10\" (1.778 m)   Wt 223 lb 6.4 oz (101.3 kg)   SpO2 98%   BMI 32.05 kg/m²    Airway: Mallampati: II (soft palate, uvula, fauces visible)  Pulmonary:Normal  Cardiac:Normal  Abdomen:Normal    Pre-Procedure Assessment / Plan:  ASA: Class 3 - A patient with severe systemic disease that limits activity but is not incapacitating  Level of Sedation Plan: Moderate sedation  Post Procedure plan: Return to same level of care    I assessed the patient and find that the patient is in satisfactory condition to proceed with the planned procedure and sedation plan. I have explained the risk, benefits, and alternatives to the procedure; the patient understands and agrees to proceed.        Hannah Taylor  10/25/2018
and reactive to light. Extra ocular muscles intact. Conjunctivae/corneas clear. Neck: Supple, No jugular venous distention/bruits. Trachea midline without thyromegaly or adenopathy with full range of motion. Lungs: Clear to auscultation, bilaterally without Rales/Wheezes/Rhonchi with good respiratory effort. Heart: irregular rate and rhythm with Normal S1/S2 without murmurs, rubs or gallops, point of maximum impulse non-displaced  Abdomen: Soft, non-tender or non-distended without rigidity or guarding and positive bowel sounds all four quadrants. Extremities: No clubbing, cyanosis, or edema bilaterally. Full range of motion without deformity and normal gait intact. Skin: Skin color, texture, turgor normal.  No rashes or lesions. Neurologic: Alert and oriented X 3, neurovascularly intact with sensory/motor intact upper extremities/lower extremities, bilaterally. Cranial nerves: II-XII intact, grossly non-focal.  Mental status: Alert, oriented, thought content appropriate.   Capillary Refill: Acceptable  < 3 seconds  Peripheral Pulses: +3 Easily felt, not easily obliterated with pressure      CXR:  I have reviewed the CXR   EKG:  I have reviewed the EKG     CBC   Recent Labs      10/21/18   0444  10/22/18   0611  10/22/18   2207   WBC  5.9  5.1  6.7   HGB  10.4*  10.5*  10.6*   HCT  32.5*  32.2*  32.4*   PLT  189  193  222      RENAL  Recent Labs      10/21/18   0444  10/22/18   0611  10/22/18   2207   NA  131*  137  136   K  4.0  4.3  4.3   CL  97*  101  99   CO2  24  24  24   BUN  15  19  26*   CREATININE  0.8  0.9  1.1     LFT'S  Recent Labs      10/22/18   2207   AST  56*   ALT  17   BILITOT  0.6   ALKPHOS  92     COAG  Recent Labs      10/22/18   2207   INR  1.79*     CARDIAC ENZYMES  Recent Labs      10/20/18   0628  10/20/18   1209  10/22/18   2207   TROPONINI  <0.01  <0.01  <0.01       U/A:    Lab Results   Component Value Date    NITRITE neg 08/27/2018    COLORU yellow 08/27/2018    COLORU MORRIS

## 2018-10-25 NOTE — PROGRESS NOTES
Pt returned from outpatient, VSS, arouses but drowsy. Requesting lunch, ordered. AM meds given that were held.  Will con't to monitor

## 2018-10-26 PROBLEM — D62 ACUTE BLOOD LOSS ANEMIA: Status: ACTIVE | Noted: 2018-10-26

## 2018-10-26 LAB
ANION GAP SERPL CALCULATED.3IONS-SCNC: 7 MMOL/L (ref 3–16)
BASOPHILS ABSOLUTE: 0 K/UL (ref 0–0.2)
BASOPHILS RELATIVE PERCENT: 0.5 %
BUN BLDV-MCNC: 17 MG/DL (ref 7–20)
CALCIUM SERPL-MCNC: 9.6 MG/DL (ref 8.3–10.6)
CHLORIDE BLD-SCNC: 100 MMOL/L (ref 99–110)
CO2: 25 MMOL/L (ref 21–32)
CREAT SERPL-MCNC: 0.9 MG/DL (ref 0.8–1.3)
EOSINOPHILS ABSOLUTE: 0.1 K/UL (ref 0–0.6)
EOSINOPHILS RELATIVE PERCENT: 1.6 %
GFR AFRICAN AMERICAN: >60
GFR NON-AFRICAN AMERICAN: >60
GLUCOSE BLD-MCNC: 118 MG/DL (ref 70–99)
GLUCOSE BLD-MCNC: 129 MG/DL (ref 70–99)
GLUCOSE BLD-MCNC: 131 MG/DL (ref 70–99)
GLUCOSE BLD-MCNC: 140 MG/DL (ref 70–99)
GLUCOSE BLD-MCNC: 151 MG/DL (ref 70–99)
HCT VFR BLD CALC: 25.7 % (ref 40.5–52.5)
HEMOGLOBIN: 8.3 G/DL (ref 13.5–17.5)
LYMPHOCYTES ABSOLUTE: 1.8 K/UL (ref 1–5.1)
LYMPHOCYTES RELATIVE PERCENT: 28 %
MCH RBC QN AUTO: 27.2 PG (ref 26–34)
MCHC RBC AUTO-ENTMCNC: 32.3 G/DL (ref 31–36)
MCV RBC AUTO: 84.3 FL (ref 80–100)
MONOCYTES ABSOLUTE: 0.6 K/UL (ref 0–1.3)
MONOCYTES RELATIVE PERCENT: 9.5 %
NEUTROPHILS ABSOLUTE: 3.8 K/UL (ref 1.7–7.7)
NEUTROPHILS RELATIVE PERCENT: 60.4 %
PDW BLD-RTO: 16 % (ref 12.4–15.4)
PERFORMED ON: ABNORMAL
PLATELET # BLD: 188 K/UL (ref 135–450)
PMV BLD AUTO: 8.5 FL (ref 5–10.5)
POTASSIUM SERPL-SCNC: 4.4 MMOL/L (ref 3.5–5.1)
RBC # BLD: 3.05 M/UL (ref 4.2–5.9)
SODIUM BLD-SCNC: 132 MMOL/L (ref 136–145)
WBC # BLD: 6.3 K/UL (ref 4–11)

## 2018-10-26 PROCEDURE — 99232 SBSQ HOSP IP/OBS MODERATE 35: CPT | Performed by: INTERNAL MEDICINE

## 2018-10-26 PROCEDURE — 6360000002 HC RX W HCPCS: Performed by: INTERNAL MEDICINE

## 2018-10-26 PROCEDURE — 6370000000 HC RX 637 (ALT 250 FOR IP): Performed by: INTERNAL MEDICINE

## 2018-10-26 PROCEDURE — 2580000003 HC RX 258: Performed by: INTERNAL MEDICINE

## 2018-10-26 PROCEDURE — 85025 COMPLETE CBC W/AUTO DIFF WBC: CPT

## 2018-10-26 PROCEDURE — 36415 COLL VENOUS BLD VENIPUNCTURE: CPT

## 2018-10-26 PROCEDURE — 2060000000 HC ICU INTERMEDIATE R&B

## 2018-10-26 PROCEDURE — 80048 BASIC METABOLIC PNL TOTAL CA: CPT

## 2018-10-26 RX ORDER — AMIODARONE HYDROCHLORIDE 200 MG/1
200 TABLET ORAL 2 TIMES DAILY
Status: DISCONTINUED | OUTPATIENT
Start: 2018-10-26 | End: 2018-10-27 | Stop reason: HOSPADM

## 2018-10-26 RX ADMIN — OXYCODONE HYDROCHLORIDE AND ACETAMINOPHEN 1 TABLET: 10; 325 TABLET ORAL at 11:35

## 2018-10-26 RX ADMIN — Medication 10 ML: at 08:25

## 2018-10-26 RX ADMIN — FAMOTIDINE 20 MG: 20 TABLET, FILM COATED ORAL at 08:24

## 2018-10-26 RX ADMIN — Medication 10 ML: at 20:44

## 2018-10-26 RX ADMIN — MONTELUKAST SODIUM 10 MG: 10 TABLET, COATED ORAL at 20:42

## 2018-10-26 RX ADMIN — AMIODARONE HYDROCHLORIDE 0.5 MG/MIN: 50 INJECTION, SOLUTION INTRAVENOUS at 01:35

## 2018-10-26 RX ADMIN — PRAMIPEXOLE DIHYDROCHLORIDE 0.75 MG: 0.25 TABLET ORAL at 20:42

## 2018-10-26 RX ADMIN — OXYCODONE HYDROCHLORIDE AND ACETAMINOPHEN 1 TABLET: 10; 325 TABLET ORAL at 22:50

## 2018-10-26 RX ADMIN — AMIODARONE HYDROCHLORIDE 200 MG: 200 TABLET ORAL at 20:42

## 2018-10-26 RX ADMIN — MESALAMINE 800 MG: 400 CAPSULE, DELAYED RELEASE ORAL at 08:24

## 2018-10-26 RX ADMIN — MESALAMINE 800 MG: 400 CAPSULE, DELAYED RELEASE ORAL at 15:22

## 2018-10-26 RX ADMIN — AMIODARONE HYDROCHLORIDE 200 MG: 200 TABLET ORAL at 11:24

## 2018-10-26 RX ADMIN — FINASTERIDE 5 MG: 5 TABLET, FILM COATED ORAL at 08:24

## 2018-10-26 RX ADMIN — FAMOTIDINE 20 MG: 20 TABLET, FILM COATED ORAL at 20:42

## 2018-10-26 RX ADMIN — MESALAMINE 800 MG: 400 CAPSULE, DELAYED RELEASE ORAL at 20:46

## 2018-10-26 RX ADMIN — B-COMPLEX W/ C & FOLIC ACID TAB 1 TABLET: TAB at 08:24

## 2018-10-26 RX ADMIN — TAMSULOSIN HYDROCHLORIDE 0.4 MG: 0.4 CAPSULE ORAL at 08:24

## 2018-10-26 RX ADMIN — SERTRALINE HYDROCHLORIDE 100 MG: 50 TABLET ORAL at 08:24

## 2018-10-26 RX ADMIN — ZOLPIDEM TARTRATE 5 MG: 5 TABLET ORAL at 22:52

## 2018-10-26 RX ADMIN — DONEPEZIL HYDROCHLORIDE 10 MG: 5 TABLET, FILM COATED ORAL at 20:41

## 2018-10-26 ASSESSMENT — PAIN DESCRIPTION - ONSET: ONSET: ON-GOING

## 2018-10-26 ASSESSMENT — PAIN DESCRIPTION - PROGRESSION: CLINICAL_PROGRESSION: GRADUALLY WORSENING

## 2018-10-26 ASSESSMENT — PAIN DESCRIPTION - LOCATION: LOCATION: BACK

## 2018-10-26 ASSESSMENT — PAIN DESCRIPTION - PAIN TYPE: TYPE: CHRONIC PAIN

## 2018-10-26 ASSESSMENT — PAIN DESCRIPTION - DESCRIPTORS: DESCRIPTORS: ACHING;DULL

## 2018-10-26 ASSESSMENT — PAIN SCALES - GENERAL
PAINLEVEL_OUTOF10: 8
PAINLEVEL_OUTOF10: 7

## 2018-10-26 ASSESSMENT — PAIN DESCRIPTION - FREQUENCY: FREQUENCY: CONTINUOUS

## 2018-10-26 NOTE — PROGRESS NOTES
Progress Note    Admit Date:  10/22/2018    Subjective:  Mr. Chu Monae was discharged from the hospital after admission for rapid a fib. His HR was controlled and he was sent home. He returned to the ER with rapid heart rate and was readmitted. Cardiology has since started him on Digoxin. This am he was doing fine. His HR was controlled. He was still in a fib. The plan was to discharge him. His HR however went back up to 158. He also had a bloody BM. Discharge was cancelled. He was started on an Amiodarone drip. The blood in stool was not large and no further bleeding reported. No chest pain. 10/25-  He is being prepped for a colonoscopy. Had rectal bleeding yesterday and with prep. Colonoscopy today. He is still in a fib. On Amiodarone. 10/26- he is feeling better. Colonoscopy did not show active bleeding. Per colonoscopy report:  Colonoscopy  A pre-procedure re-evaluation was performed immediately prior to the procedure. Preprocedure Dx: rectal bleeding  Postprocedure Dx: diverticulosis, hemorrhoids, colon polyps, no active bleeding    Objective:   /77   Pulse 60   Temp 97.6 °F (36.4 °C) (Oral)   Resp 16   Ht 5' 10\" (1.778 m)   Wt 218 lb 14.4 oz (99.3 kg)   SpO2 98%   BMI 31.41 kg/m²            Intake/Output Summary (Last 24 hours) at 10/26/18 1006  Last data filed at 10/26/18 0834   Gross per 24 hour   Intake              862 ml   Output              700 ml   Net              162 ml       Physical Exam:    General appearance: alert, appears stated age and cooperative  Head: Normocephalic, without obvious abnormality, atraumatic  Eyes: conjunctivae/corneas clear. PERRL, EOM's intact.   Neck: no adenopathy, no carotid bruit, no JVD, supple, symmetrical, trachea midline and thyroid not enlarged, symmetric, no tenderness/mass/nodules  Lungs: clear to auscultation bilaterally  Heart: irregular rate and rhythm, S1, S2 normal, no murmur, click, rub or gallop  Abdomen: soft, non-tender; bowel sounds type 2 diabetes mellitus without complication, without long-term current use of insulin (HCC)    HOLLAND (obstructive sleep apnea)    Essential hypertension    COPD, severe (HCC)    Mixed hyperlipidemia    Rectal bleed    Acute blood loss anemia  Resolved Problems:    * No resolved hospital problems. *      Plan:    #  Rapid a fib. His HR was better after staring IV cardizem. This was changed to PO Cardizem. He was also given digoxin. Plan was to release him but HR went back up and his discharge was cancelled. Amiodarone drip was started. His HR is better. Will change to PO Amiodarone. Hold Xarelto for one week per GI recommendations. #  Mild hyponatremia likely related to colon prep. Resolved. # Rectal bleeding better. He has acute blood loss anemia. ASA stopped. Xarelto held. Monitor H and h. He has minimal CAD. #  DM type 2:  Very well controlled. Last A1C is 6.2. Continue home regimen. #  HTN stable. #  Severe COPD stable. #  CAD no chest pain. SCD for DVT prophylaxis.     Raúl Bansal MD 10/26/2018 10:06 AM

## 2018-10-26 NOTE — PROGRESS NOTES
groove as a small-caliber vessel. It gave off 2 obtuse marginal  branches which were medium and normal.  In the posterior AV groove, it also  gave off a small left posterolateral branch. 3.  LAD:  It was a medium-caliber vessel that reached the apex and wrapped  around it. It gave off a medium-caliber diagonal branch. There was 20%  stenosis noted in the mid LAD. 4.  Right coronary artery: It was dominant with a shephards crook takeoff. It had luminal irregularities. It gave off a medium-caliber right posterior  descending artery and 1 small right posterolateral branch. IMPRESSION:  1. Mild coronary artery disease of the mid left anterior descending and  luminal irregularities of circumflex and right coronary artery. 2.  Normal left ventricular systolic function with an ejection fraction of  55% to 60%. 3.  Normal left ventricular end-diastolic pressure, 6 mmHg. EKG 10/22/18  Supraventricular tachycardia possible atrial tachycardiaNonspecific ST and T wave abnormalityAbnormal ECGWhen compared with ECG of 22-OCT-2018 16:04, (unconfirmed)SVT replaces afib with RVRConfirmed by Jannette Lenz MD, [address] Due ([id number]) on 10/23/2018 8:02:57 AM     ECHO 10/22/18 Summary   Atrial fibrillation throughout the study.   LV systolic function is hyperdynamic with EF estimated 65-70%.   No regional wall motion abnormalities are noted.   There is moderate concentric left ventricular hypertrophy.   The right atrium is mildly dilated.   Patient noted to be in rapid atrial fibrillation during the study. 10/25/2018 Colonoscopy  A pre-procedure re-evaluation was performed immediately prior to the procedure. Preprocedure Dx: rectal bleeding  Postprocedure Dx: diverticulosis, hemorrhoids, colon polyps, no active bleeding     Assessment:  Emily Chandra is a 68 y.o. patient who presented to [facility] on 10/22/18 with c/o rapid heart rate and SOB. Note on 10/19/18 scheduled for TURP and found to be in new-onset rapid afib.  No definite hx of afib prior. He has PMH of hypertension, hyperlipidemia, diabetes, chronic hepatitis, BPH. Note 2015 LHC showed 20% LAD and luminal irregularities of CCx/RCA.  Note ECHO from 4/16 showed EF=55%; LVH. Started on xarelto and diltiazem gtt for HR control. Diagnosed new-onset rapid afib asymptomatic. He was actually discharged last night and within 4 hours after being home he started feeling bad again with rapid heart rates and feeling SOB. HIs wife phoned on-call doc (myself) and I advised to return to ED. In the ED his EKG revealed SVT vs atrial tachycardia rates 165bpm; nonspecific ST changes. Troponin neg x 1. He received Diltiazem bolus and started on drip at 5mg/hr. Note CXR negative. He remains in AFIB with controlled rates 90's bpm. Diagnosis of recurrent atrial fibrillation with RVR symptomatic with palpitations. Note TSH=1.02 on 10/20/18.     Recs:  1. Holding xarelto and baby aspirin given rectal bleed 10/24/18. CHADS-vasc=3. Colonoscopy 10/25/18 with diverticulosis, hemorrhoids, colon polyps, and no active bleed. Recommended holding AC at least 1 week. I would hold ACE for 2 weeks and restart xarelto 20mg qd but NOT aspirin. 2. H/H decreased mildly from 10/32 to 9/27 to 8.3/25.7 today. 3. Will d/c IV amiodarone gtt today and start amiodarone 200mg PO BID. Would take BID x 1 week and then daily thereafter. 4. Hopeful home Saturday if HR's better controlled and H/H remains stabilizes.        Patient Active Problem List    Diagnosis Date Noted    Coronary artery disease involving native coronary artery of native heart without angina pectoris 07/29/2015     Priority: Medium    RLS (restless legs syndrome) 07/23/2015     Priority: Medium    Insomnia 07/23/2015     Priority: Medium    Diabetic neuropathy ([facility] 05/14/2015     Priority: Medium    DM II (diabetes mellitus, type II), controlled ([facility] [address] 04/26/2015     Priority: Medium    HTN (hypertension) 04/26/2015     Priority: Medium

## 2018-10-26 NOTE — PROGRESS NOTES
Bedside report given to August Camarena Pt. denies further needs at this time. Call light is within reach.   Viola Garsia RN

## 2018-10-26 NOTE — OP NOTE
[address]                [address] [address]                               OPERATIVE REPORT    PATIENT NAME: Dwayne Beach                    :         1945  MED REC NO:   [id number]                          ROOM:         ACCOUNT NO:   [de-identified]                           ADMIT DATE:  10/22/2018  PROVIDER:     Irwin Anderson MD    DATE OF PROCEDURE:  10/25/2018    PREPROCEDURE DIAGNOSES:  1.  GI bleed. 2.  Rectal bleeding in the setting of anticoagulation. POSTPROCEDURE DIAGNOSES:  1.  Moderate to extensive diverticulosis of the sigmoid colon. 2.  No active bleeding. 3.  Two small polyps in the right colon resected but not retrieved. PROCEDURE:  Colonoscopy to the cecum with snare polypectomy. PROCEDURE INDICATIONS:  This is a 58-year-old male with a history of  hypertension, hyperlipidemia, diabetes, coronary artery disease,  obstructive sleep apnea, COPD, atrial fibrillation, and BPH, admitted  with rapid ventricular response and shortness of breath. He was  recently started on Xarelto and subsequently developed rectal bleeding  yesterday. Anticoagulation is being held and colonoscopy is being  performed to localize the source of bleeding. He did have colonoscopy  in  and was found to have colitis. His last EGD in 2016 was  normal.    MEDICATIONS:  Versed 5 mg, fentanyl 75 mcg. PROCEDURE DETAILS:  Informed consent was obtained after discussing the  risks, benefits and alternatives. Full history and physical was  performed. The patient was classified as ASA class III. Medications  were sequentially given to achieve adequate sedation. Cardiopulmonary  status was continuously monitored throughout the procedure. The  patient was placed in the left lateral decubitus position.   Once the  patient was deemed to be adequately sedated, a standard pediatric  colonoscope was inserted in the anus and advanced to

## 2018-10-26 NOTE — PROGRESS NOTES
PROGRESS NOTE  S:73 yrs Patient  admitted on 10/22/2018 with A-fib ([facility] [I48.91]  A-fib ([facility] [address] [I48.91] . No bleeding. Planning possible discharge tomorrow. Exam:   Vitals:    10/26/18 1520   BP: (!) 111/58   Pulse: 128   Resp: 16   Temp: 97.2 °F (36.2 °C)   SpO2: 97%      General appearance: alert, appears stated age and cooperative  HEENT: PERRLA  Neck: no adenopathy, no carotid bruit, no JVD, supple, symmetrical, trachea midline and thyroid not enlarged, symmetric, no tenderness/mass/nodules  Lungs: clear to auscultation bilaterally  Heart: regular rate and rhythm, S1, S2 normal, no murmur, click, rub or gallop  Abdomen: soft, non-tender; bowel sounds normal; no masses,  no organomegaly  Extremities: extremities normal, atraumatic, no cyanosis or edema     Medications: Reviewed    Labs:  CBC:   Recent Labs      10/24/18   2358  10/25/18   0809  10/26/18   0454   WBC   --   8.7  6.3   HGB  9.0*  9.1*  8.3*   HCT  27.4*  27.4*  25.7*   MCV   --   84.1  84.3   PLT   --   213  188     BMP:   Recent Labs      10/25/18   0809  10/26/18   0454   NA  126*  132*   K  4.1  4.4   CL  91*  100   CO2  24  25   BUN  23*  17   CREATININE  0.9  0.9     LIVER PROFILE: No results for input(s): AST, ALT, LIPASE, PROT, BILIDIR, BILITOT, ALKPHOS in the last 72 hours. Invalid input(s): AMYLASE,  ALB  PT/INR: No results for input(s): INR in the last 72 hours. Invalid input(s): PT    IMAGING:      Impression:  Rectal bleeding  Atrial fibrillation with RVR  Chronic pancreatitis    Recommendation:  1. No active bleeding post colonoscopy. Planning possible discharge tomorrow with holding anticoagulation for 1 week.       Priya Hicks DO  4:02 PM 10/26/2018

## 2018-10-27 VITALS
TEMPERATURE: 97.7 F | HEART RATE: 98 BPM | SYSTOLIC BLOOD PRESSURE: 108 MMHG | BODY MASS INDEX: 31.15 KG/M2 | DIASTOLIC BLOOD PRESSURE: 69 MMHG | RESPIRATION RATE: 16 BRPM | OXYGEN SATURATION: 97 % | WEIGHT: 217.6 LBS | HEIGHT: 70 IN

## 2018-10-27 LAB
ANION GAP SERPL CALCULATED.3IONS-SCNC: 9 MMOL/L (ref 3–16)
BASOPHILS ABSOLUTE: 0 K/UL (ref 0–0.2)
BASOPHILS RELATIVE PERCENT: 0.5 %
BUN BLDV-MCNC: 11 MG/DL (ref 7–20)
CALCIUM SERPL-MCNC: 9.6 MG/DL (ref 8.3–10.6)
CHLORIDE BLD-SCNC: 102 MMOL/L (ref 99–110)
CO2: 25 MMOL/L (ref 21–32)
CREAT SERPL-MCNC: 0.8 MG/DL (ref 0.8–1.3)
EOSINOPHILS ABSOLUTE: 0.1 K/UL (ref 0–0.6)
EOSINOPHILS RELATIVE PERCENT: 1.3 %
GFR AFRICAN AMERICAN: >60
GFR NON-AFRICAN AMERICAN: >60
GLUCOSE BLD-MCNC: 123 MG/DL (ref 70–99)
GLUCOSE BLD-MCNC: 123 MG/DL (ref 70–99)
GLUCOSE BLD-MCNC: 136 MG/DL (ref 70–99)
HCT VFR BLD CALC: 25.1 % (ref 40.5–52.5)
HEMOGLOBIN: 8.2 G/DL (ref 13.5–17.5)
LYMPHOCYTES ABSOLUTE: 1.9 K/UL (ref 1–5.1)
LYMPHOCYTES RELATIVE PERCENT: 27.2 %
MCH RBC QN AUTO: 27.6 PG (ref 26–34)
MCHC RBC AUTO-ENTMCNC: 32.7 G/DL (ref 31–36)
MCV RBC AUTO: 84.3 FL (ref 80–100)
MONOCYTES ABSOLUTE: 0.7 K/UL (ref 0–1.3)
MONOCYTES RELATIVE PERCENT: 10.8 %
NEUTROPHILS ABSOLUTE: 4.1 K/UL (ref 1.7–7.7)
NEUTROPHILS RELATIVE PERCENT: 60.2 %
PDW BLD-RTO: 15.9 % (ref 12.4–15.4)
PERFORMED ON: ABNORMAL
PERFORMED ON: ABNORMAL
PLATELET # BLD: 186 K/UL (ref 135–450)
PMV BLD AUTO: 8.3 FL (ref 5–10.5)
POTASSIUM SERPL-SCNC: 4.1 MMOL/L (ref 3.5–5.1)
RBC # BLD: 2.98 M/UL (ref 4.2–5.9)
SODIUM BLD-SCNC: 136 MMOL/L (ref 136–145)
WBC # BLD: 6.8 K/UL (ref 4–11)

## 2018-10-27 PROCEDURE — 6370000000 HC RX 637 (ALT 250 FOR IP): Performed by: INTERNAL MEDICINE

## 2018-10-27 PROCEDURE — 80048 BASIC METABOLIC PNL TOTAL CA: CPT

## 2018-10-27 PROCEDURE — 99239 HOSP IP/OBS DSCHRG MGMT >30: CPT | Performed by: INTERNAL MEDICINE

## 2018-10-27 PROCEDURE — 99233 SBSQ HOSP IP/OBS HIGH 50: CPT | Performed by: INTERNAL MEDICINE

## 2018-10-27 PROCEDURE — 85025 COMPLETE CBC W/AUTO DIFF WBC: CPT

## 2018-10-27 PROCEDURE — 36415 COLL VENOUS BLD VENIPUNCTURE: CPT

## 2018-10-27 PROCEDURE — 2580000003 HC RX 258: Performed by: INTERNAL MEDICINE

## 2018-10-27 RX ORDER — AMIODARONE HYDROCHLORIDE 200 MG/1
TABLET ORAL
Qty: 60 TABLET | Refills: 0 | Status: ON HOLD | OUTPATIENT
Start: 2018-10-27 | End: 2018-01-01 | Stop reason: HOSPADM

## 2018-10-27 RX ORDER — METOPROLOL SUCCINATE 25 MG/1
25 TABLET, EXTENDED RELEASE ORAL DAILY
Qty: 30 TABLET | Refills: 3 | Status: SHIPPED | OUTPATIENT
Start: 2018-10-28 | End: 2018-01-01 | Stop reason: SDUPTHER

## 2018-10-27 RX ORDER — METOPROLOL SUCCINATE 25 MG/1
25 TABLET, EXTENDED RELEASE ORAL DAILY
Status: DISCONTINUED | OUTPATIENT
Start: 2018-10-27 | End: 2018-10-27 | Stop reason: HOSPADM

## 2018-10-27 RX ADMIN — TAMSULOSIN HYDROCHLORIDE 0.4 MG: 0.4 CAPSULE ORAL at 08:36

## 2018-10-27 RX ADMIN — B-COMPLEX W/ C & FOLIC ACID TAB 1 TABLET: TAB at 08:36

## 2018-10-27 RX ADMIN — METOPROLOL SUCCINATE 25 MG: 25 TABLET, EXTENDED RELEASE ORAL at 09:33

## 2018-10-27 RX ADMIN — FAMOTIDINE 20 MG: 20 TABLET, FILM COATED ORAL at 08:36

## 2018-10-27 RX ADMIN — Medication 10 ML: at 08:37

## 2018-10-27 RX ADMIN — OXYCODONE HYDROCHLORIDE AND ACETAMINOPHEN 1 TABLET: 10; 325 TABLET ORAL at 06:34

## 2018-10-27 RX ADMIN — AMIODARONE HYDROCHLORIDE 200 MG: 200 TABLET ORAL at 08:36

## 2018-10-27 RX ADMIN — MESALAMINE 800 MG: 400 CAPSULE, DELAYED RELEASE ORAL at 08:37

## 2018-10-27 RX ADMIN — FINASTERIDE 5 MG: 5 TABLET, FILM COATED ORAL at 08:36

## 2018-10-27 RX ADMIN — SERTRALINE HYDROCHLORIDE 100 MG: 50 TABLET ORAL at 08:36

## 2018-10-27 ASSESSMENT — PAIN DESCRIPTION - DESCRIPTORS: DESCRIPTORS: DISCOMFORT

## 2018-10-27 ASSESSMENT — PAIN SCALES - GENERAL
PAINLEVEL_OUTOF10: 7
PAINLEVEL_OUTOF10: 8

## 2018-10-27 NOTE — PROGRESS NOTES
Shift assessment completed. No new findings. Vs. Stable. Respirations even and easy. No distress noted. Will continue to monitor. Call light within reach.

## 2018-10-27 NOTE — PLAN OF CARE
Problem: Safety:  Goal: Free from accidental physical injury  Free from accidental physical injury   Outcome: Ongoing      Problem: Pain:  Goal: Patient's pain/discomfort is manageable  Patient's pain/discomfort is manageable   Outcome: Ongoing  Pain medication given per physicians order. See MAR.     Problem: Skin Integrity:  Goal: Skin integrity will stabilize  Skin integrity will stabilize   Outcome: Ongoing

## 2018-10-27 NOTE — PROGRESS NOTES
Pt discharge instructions reviewed with patient and wife. Both verbalized understanding in regard to the new medicine, atrial fibrillation, follow up care, and when to return to the physician. Phoned [facility] to attempt to [facility].  [facility] stated [facility]. Wife informed and she [facility]oned [facility] to [facility]. Attempted to call [employer] to discontinue digoxin and they are closed until Monday. Patient refused wheelchair is ambulating down with wife at this time in stable condition.  Angie Gonzalez

## 2018-10-27 NOTE — DISCHARGE SUMMARY
stomach, constipation, dizziness, headache.      metoprolol succinate 25 MG extended release tablet  Commonly known as:  TOPROL XL  Take 1 tablet by mouth daily  Start taking on:  10/28/2018  Notes to patient:  Use: treat heart failure, prevent future heart attacks, lower blood pressure and heart rate, treat chest pain  Side effects: dizziness, fatigue, and diarrhea        CONTINUE taking these medications    albuterol (2.5 MG/3ML) 0.083% nebulizer solution  Commonly known as:  PROVENTIL  Take 3 mLs by nebulization every 6 hours as needed for Wheezing     ANORO ELLIPTA 62.5-25 MCG/INH Aepb inhaler  Generic drug:  umeclidinium-vilanterol  INHALE 1 PUFF INTO THE LUNGS DAILY     b complex vitamins capsule  Take 1 capsule by mouth daily     blood glucose test strips strip  Commonly known as:  ASCENSIA AUTODISC VI;ONE TOUCH ULTRA TEST VI  [employer] True Metrix Air Test Strips. FSBS daily. DX E11.9     colesevelam 625 MG tablet  Commonly known as:  WELCHOL  TAKE 3 TABLETS TWICE DAILY     cyclobenzaprine 10 MG tablet  Commonly known as:  FLEXERIL  Take 1 tablet by mouth 3 times daily as needed for Muscle spasms     donepezil 10 MG tablet  Commonly known as:  ARICEPT  Take 1 tablet by mouth nightly     finasteride 5 MG tablet  Commonly known as:  PROSCAR  Take 1 tablet by mouth daily Will shrink prostate;   Affects PSA number     mesalamine 1.2 g EC tablet  Commonly known as:  LIALDA  TAKE 1 TABLET THREE TIMES DAILY     metFORMIN 500 MG tablet  Commonly known as:  GLUCOPHAGE  Take 1 tablet by mouth 2 times daily     montelukast 10 MG tablet  Commonly known as:  SINGULAIR  Take 1 tablet by mouth nightly     omeprazole 40 MG delayed release capsule  Commonly known as:  PRILOSEC  Take 1 capsule by mouth daily     oxyCODONE-acetaminophen  MG per tablet  Commonly known as:  PERCOCET     pramipexole 1 MG tablet  Commonly known as:  MIRAPEX  Take 1 tablet by mouth nightly     sertraline 100 MG tablet  Commonly known as:

## 2018-10-28 ENCOUNTER — CARE COORDINATION (OUTPATIENT)
Dept: CASE MANAGEMENT | Age: 73
End: 2018-10-28

## 2018-10-28 DIAGNOSIS — D62 ACUTE BLOOD LOSS ANEMIA: Primary | ICD-10-CM

## 2018-10-29 LAB
EKG ATRIAL RATE: 165 BPM
EKG DIAGNOSIS: NORMAL
EKG P AXIS: 90 DEGREES
EKG P-R INTERVAL: 128 MS
EKG Q-T INTERVAL: 260 MS
EKG QRS DURATION: 84 MS
EKG QTC CALCULATION (BAZETT): 430 MS
EKG R AXIS: 19 DEGREES
EKG T AXIS: 181 DEGREES
EKG VENTRICULAR RATE: 165 BPM

## 2018-10-30 ENCOUNTER — OFFICE VISIT (OUTPATIENT)
Dept: FAMILY MEDICINE CLINIC | Age: 73
End: 2018-10-30
Payer: MEDICARE

## 2018-10-30 VITALS
DIASTOLIC BLOOD PRESSURE: 72 MMHG | BODY MASS INDEX: 32.71 KG/M2 | WEIGHT: 228 LBS | OXYGEN SATURATION: 98 % | SYSTOLIC BLOOD PRESSURE: 110 MMHG | HEART RATE: 68 BPM

## 2018-10-30 DIAGNOSIS — I48.91 ATRIAL FIBRILLATION, UNSPECIFIED TYPE (HCC): Primary | ICD-10-CM

## 2018-10-30 DIAGNOSIS — E53.8 VITAMIN B 12 DEFICIENCY: ICD-10-CM

## 2018-10-30 DIAGNOSIS — K62.5 RECTAL BLEED: ICD-10-CM

## 2018-10-30 DIAGNOSIS — D64.9 LOW HEMOGLOBIN: ICD-10-CM

## 2018-10-30 LAB
HCT VFR BLD CALC: 23.7 % (ref 40.5–52.5)
HEMOGLOBIN: 7.4 G/DL (ref 13.5–17.5)
MCH RBC QN AUTO: 27 PG (ref 26–34)
MCHC RBC AUTO-ENTMCNC: 31.1 G/DL (ref 31–36)
MCV RBC AUTO: 86.7 FL (ref 80–100)
PDW BLD-RTO: 16.2 % (ref 12.4–15.4)
PLATELET # BLD: 207 K/UL (ref 135–450)
PMV BLD AUTO: 8.8 FL (ref 5–10.5)
RBC # BLD: 2.74 M/UL (ref 4.2–5.9)
WBC # BLD: 5.2 K/UL (ref 4–11)

## 2018-10-30 PROCEDURE — 99495 TRANSJ CARE MGMT MOD F2F 14D: CPT | Performed by: PHYSICIAN ASSISTANT

## 2018-10-30 PROCEDURE — 1111F DSCHRG MED/CURRENT MED MERGE: CPT | Performed by: PHYSICIAN ASSISTANT

## 2018-10-31 DIAGNOSIS — D64.9 LOW HEMOGLOBIN AND LOW HEMATOCRIT: Primary | ICD-10-CM

## 2018-10-31 DIAGNOSIS — I10 ESSENTIAL HYPERTENSION: Chronic | ICD-10-CM

## 2018-10-31 DIAGNOSIS — I25.10 CORONARY ARTERY DISEASE INVOLVING NATIVE CORONARY ARTERY OF NATIVE HEART WITHOUT ANGINA PECTORIS: Primary | Chronic | ICD-10-CM

## 2018-10-31 LAB — VITAMIN B-12: 343 PG/ML (ref 211–911)

## 2018-11-01 ENCOUNTER — APPOINTMENT (OUTPATIENT)
Dept: CT IMAGING | Age: 73
DRG: 811 | End: 2018-11-01
Payer: MEDICARE

## 2018-11-01 ENCOUNTER — OFFICE VISIT (OUTPATIENT)
Dept: CARDIOLOGY CLINIC | Age: 73
End: 2018-11-01
Payer: MEDICARE

## 2018-11-01 ENCOUNTER — APPOINTMENT (OUTPATIENT)
Dept: GENERAL RADIOLOGY | Age: 73
DRG: 811 | End: 2018-11-01
Payer: MEDICARE

## 2018-11-01 ENCOUNTER — HOSPITAL ENCOUNTER (INPATIENT)
Age: 73
LOS: 6 days | Discharge: HOME | DRG: 811 | End: 2018-11-07
Attending: EMERGENCY MEDICINE | Admitting: INTERNAL MEDICINE
Payer: MEDICARE

## 2018-11-01 ENCOUNTER — APPOINTMENT (OUTPATIENT)
Dept: INTERVENTIONAL RADIOLOGY/VASCULAR | Age: 73
DRG: 811 | End: 2018-11-01
Payer: MEDICARE

## 2018-11-01 VITALS
WEIGHT: 229.4 LBS | OXYGEN SATURATION: 96 % | SYSTOLIC BLOOD PRESSURE: 116 MMHG | HEART RATE: 73 BPM | BODY MASS INDEX: 32.84 KG/M2 | DIASTOLIC BLOOD PRESSURE: 74 MMHG | HEIGHT: 70 IN

## 2018-11-01 DIAGNOSIS — R91.1 PULMONARY NODULE: ICD-10-CM

## 2018-11-01 DIAGNOSIS — I48.0 PAROXYSMAL ATRIAL FIBRILLATION (HCC): ICD-10-CM

## 2018-11-01 DIAGNOSIS — D62 ACUTE BLOOD LOSS ANEMIA: ICD-10-CM

## 2018-11-01 DIAGNOSIS — D64.9 SYMPTOMATIC ANEMIA: Primary | ICD-10-CM

## 2018-11-01 DIAGNOSIS — R06.00 DYSPNEA, UNSPECIFIED TYPE: ICD-10-CM

## 2018-11-01 DIAGNOSIS — D64.9 ANEMIA, UNSPECIFIED TYPE: ICD-10-CM

## 2018-11-01 DIAGNOSIS — I10 ESSENTIAL HYPERTENSION: Primary | Chronic | ICD-10-CM

## 2018-11-01 PROBLEM — E66.9 OBESITY: Status: ACTIVE | Noted: 2018-11-01

## 2018-11-01 PROBLEM — I48.19 PERSISTENT ATRIAL FIBRILLATION (HCC): Status: ACTIVE | Noted: 2018-11-01

## 2018-11-01 LAB
A/G RATIO: 1.1 (ref 1.1–2.2)
ABO/RH: NORMAL
ALBUMIN SERPL-MCNC: 3.9 G/DL (ref 3.4–5)
ALP BLD-CCNC: 73 U/L (ref 40–129)
ALT SERPL-CCNC: 15 U/L (ref 10–40)
ANION GAP SERPL CALCULATED.3IONS-SCNC: 11 MMOL/L (ref 3–16)
ANTIBODY SCREEN: NORMAL
AST SERPL-CCNC: 35 U/L (ref 15–37)
BASOPHILS ABSOLUTE: 0 K/UL (ref 0–0.2)
BASOPHILS RELATIVE PERCENT: 0.4 %
BILIRUB SERPL-MCNC: 0.5 MG/DL (ref 0–1)
BLOOD BANK DISPENSE STATUS: NORMAL
BLOOD BANK PRODUCT CODE: NORMAL
BPU ID: NORMAL
BUN BLDV-MCNC: 14 MG/DL (ref 7–20)
CALCIUM SERPL-MCNC: 9.4 MG/DL (ref 8.3–10.6)
CHLORIDE BLD-SCNC: 99 MMOL/L (ref 99–110)
CO2: 25 MMOL/L (ref 21–32)
CREAT SERPL-MCNC: 0.9 MG/DL (ref 0.8–1.3)
DESCRIPTION BLOOD BANK: NORMAL
EOSINOPHILS ABSOLUTE: 0.1 K/UL (ref 0–0.6)
EOSINOPHILS RELATIVE PERCENT: 1.4 %
GFR AFRICAN AMERICAN: >60
GFR NON-AFRICAN AMERICAN: >60
GLOBULIN: 3.4 G/DL
GLUCOSE BLD-MCNC: 111 MG/DL (ref 70–99)
GLUCOSE BLD-MCNC: 127 MG/DL (ref 70–99)
HCT VFR BLD CALC: 22.9 % (ref 40.5–52.5)
HEMOGLOBIN: 7.3 G/DL (ref 13.5–17.5)
INR BLD: 1.03 (ref 0.86–1.14)
LIPASE: 26 U/L (ref 13–60)
LYMPHOCYTES ABSOLUTE: 1.2 K/UL (ref 1–5.1)
LYMPHOCYTES RELATIVE PERCENT: 11.5 %
MCH RBC QN AUTO: 26.6 PG (ref 26–34)
MCHC RBC AUTO-ENTMCNC: 31.8 G/DL (ref 31–36)
MCV RBC AUTO: 83.6 FL (ref 80–100)
MONOCYTES ABSOLUTE: 0.9 K/UL (ref 0–1.3)
MONOCYTES RELATIVE PERCENT: 8.9 %
NEUTROPHILS ABSOLUTE: 8 K/UL (ref 1.7–7.7)
NEUTROPHILS RELATIVE PERCENT: 77.8 %
OCCULT BLOOD SCREENING: NORMAL
PDW BLD-RTO: 16.7 % (ref 12.4–15.4)
PERFORMED ON: ABNORMAL
PLATELET # BLD: 203 K/UL (ref 135–450)
PMV BLD AUTO: 8.5 FL (ref 5–10.5)
POTASSIUM SERPL-SCNC: 5.1 MMOL/L (ref 3.5–5.1)
PRO-BNP: 528 PG/ML (ref 0–124)
PROTHROMBIN TIME: 11.7 SEC (ref 9.8–13)
RBC # BLD: 2.74 M/UL (ref 4.2–5.9)
SODIUM BLD-SCNC: 135 MMOL/L (ref 136–145)
TOTAL PROTEIN: 7.3 G/DL (ref 6.4–8.2)
TROPONIN: <0.01 NG/ML
WBC # BLD: 10.4 K/UL (ref 4–11)

## 2018-11-01 PROCEDURE — G8417 CALC BMI ABV UP PARAM F/U: HCPCS | Performed by: NURSE PRACTITIONER

## 2018-11-01 PROCEDURE — G0328 FECAL BLOOD SCRN IMMUNOASSAY: HCPCS

## 2018-11-01 PROCEDURE — 99285 EMERGENCY DEPT VISIT HI MDM: CPT

## 2018-11-01 PROCEDURE — 85025 COMPLETE CBC W/AUTO DIFF WBC: CPT

## 2018-11-01 PROCEDURE — 36430 TRANSFUSION BLD/BLD COMPNT: CPT

## 2018-11-01 PROCEDURE — 6360000002 HC RX W HCPCS: Performed by: PHYSICIAN ASSISTANT

## 2018-11-01 PROCEDURE — P9016 RBC LEUKOCYTES REDUCED: HCPCS

## 2018-11-01 PROCEDURE — 74177 CT ABD & PELVIS W/CONTRAST: CPT

## 2018-11-01 PROCEDURE — 83036 HEMOGLOBIN GLYCOSYLATED A1C: CPT

## 2018-11-01 PROCEDURE — 2700000000 HC OXYGEN THERAPY PER DAY

## 2018-11-01 PROCEDURE — G8598 ASA/ANTIPLAT THER USED: HCPCS | Performed by: NURSE PRACTITIONER

## 2018-11-01 PROCEDURE — 6370000000 HC RX 637 (ALT 250 FOR IP): Performed by: INTERNAL MEDICINE

## 2018-11-01 PROCEDURE — 85610 PROTHROMBIN TIME: CPT

## 2018-11-01 PROCEDURE — 83690 ASSAY OF LIPASE: CPT

## 2018-11-01 PROCEDURE — C9113 INJ PANTOPRAZOLE SODIUM, VIA: HCPCS | Performed by: PHYSICIAN ASSISTANT

## 2018-11-01 PROCEDURE — 93005 ELECTROCARDIOGRAM TRACING: CPT | Performed by: EMERGENCY MEDICINE

## 2018-11-01 PROCEDURE — 6370000000 HC RX 637 (ALT 250 FOR IP): Performed by: PHYSICIAN ASSISTANT

## 2018-11-01 PROCEDURE — 4040F PNEUMOC VAC/ADMIN/RCVD: CPT | Performed by: NURSE PRACTITIONER

## 2018-11-01 PROCEDURE — 6360000004 HC RX CONTRAST MEDICATION: Performed by: EMERGENCY MEDICINE

## 2018-11-01 PROCEDURE — 93000 ELECTROCARDIOGRAM COMPLETE: CPT | Performed by: NURSE PRACTITIONER

## 2018-11-01 PROCEDURE — 86923 COMPATIBILITY TEST ELECTRIC: CPT

## 2018-11-01 PROCEDURE — 3017F COLORECTAL CA SCREEN DOC REV: CPT | Performed by: NURSE PRACTITIONER

## 2018-11-01 PROCEDURE — 36415 COLL VENOUS BLD VENIPUNCTURE: CPT

## 2018-11-01 PROCEDURE — 1101F PT FALLS ASSESS-DOCD LE1/YR: CPT | Performed by: NURSE PRACTITIONER

## 2018-11-01 PROCEDURE — 2500000003 HC RX 250 WO HCPCS: Performed by: PHYSICIAN ASSISTANT

## 2018-11-01 PROCEDURE — 76937 US GUIDE VASCULAR ACCESS: CPT

## 2018-11-01 PROCEDURE — G8484 FLU IMMUNIZE NO ADMIN: HCPCS | Performed by: NURSE PRACTITIONER

## 2018-11-01 PROCEDURE — 99214 OFFICE O/P EST MOD 30 MIN: CPT | Performed by: NURSE PRACTITIONER

## 2018-11-01 PROCEDURE — 71046 X-RAY EXAM CHEST 2 VIEWS: CPT

## 2018-11-01 PROCEDURE — 86901 BLOOD TYPING SEROLOGIC RH(D): CPT

## 2018-11-01 PROCEDURE — 2580000003 HC RX 258: Performed by: PHYSICIAN ASSISTANT

## 2018-11-01 PROCEDURE — 83880 ASSAY OF NATRIURETIC PEPTIDE: CPT

## 2018-11-01 PROCEDURE — 86900 BLOOD TYPING SEROLOGIC ABO: CPT

## 2018-11-01 PROCEDURE — 86850 RBC ANTIBODY SCREEN: CPT

## 2018-11-01 PROCEDURE — 94761 N-INVAS EAR/PLS OXIMETRY MLT: CPT

## 2018-11-01 PROCEDURE — C1751 CATH, INF, PER/CENT/MIDLINE: HCPCS

## 2018-11-01 PROCEDURE — 1111F DSCHRG MED/CURRENT MED MERGE: CPT | Performed by: NURSE PRACTITIONER

## 2018-11-01 PROCEDURE — 02HV33Z INSERTION OF INFUSION DEVICE INTO SUPERIOR VENA CAVA, PERCUTANEOUS APPROACH: ICD-10-PCS | Performed by: INTERNAL MEDICINE

## 2018-11-01 PROCEDURE — 94640 AIRWAY INHALATION TREATMENT: CPT

## 2018-11-01 PROCEDURE — G8427 DOCREV CUR MEDS BY ELIG CLIN: HCPCS | Performed by: NURSE PRACTITIONER

## 2018-11-01 PROCEDURE — 36569 INSJ PICC 5 YR+ W/O IMAGING: CPT

## 2018-11-01 PROCEDURE — 1200000000 HC SEMI PRIVATE

## 2018-11-01 PROCEDURE — 6370000000 HC RX 637 (ALT 250 FOR IP): Performed by: NURSE PRACTITIONER

## 2018-11-01 PROCEDURE — 1036F TOBACCO NON-USER: CPT | Performed by: NURSE PRACTITIONER

## 2018-11-01 PROCEDURE — 84484 ASSAY OF TROPONIN QUANT: CPT

## 2018-11-01 PROCEDURE — 1123F ACP DISCUSS/DSCN MKR DOCD: CPT | Performed by: NURSE PRACTITIONER

## 2018-11-01 PROCEDURE — 80053 COMPREHEN METABOLIC PANEL: CPT

## 2018-11-01 PROCEDURE — 2580000003 HC RX 258: Performed by: INTERNAL MEDICINE

## 2018-11-01 RX ORDER — ALBUTEROL SULFATE 2.5 MG/3ML
2.5 SOLUTION RESPIRATORY (INHALATION) EVERY 4 HOURS PRN
Status: DISCONTINUED | OUTPATIENT
Start: 2018-11-01 | End: 2018-11-07 | Stop reason: HOSPADM

## 2018-11-01 RX ORDER — DONEPEZIL HYDROCHLORIDE 5 MG/1
10 TABLET, FILM COATED ORAL NIGHTLY
Status: DISCONTINUED | OUTPATIENT
Start: 2018-11-01 | End: 2018-11-07 | Stop reason: HOSPADM

## 2018-11-01 RX ORDER — PANTOPRAZOLE SODIUM 40 MG/1
40 TABLET, DELAYED RELEASE ORAL
Status: DISCONTINUED | OUTPATIENT
Start: 2018-11-02 | End: 2018-11-07 | Stop reason: HOSPADM

## 2018-11-01 RX ORDER — ALBUTEROL SULFATE 2.5 MG/3ML
2.5 SOLUTION RESPIRATORY (INHALATION) EVERY 6 HOURS PRN
Status: DISCONTINUED | OUTPATIENT
Start: 2018-11-01 | End: 2018-11-01

## 2018-11-01 RX ORDER — LIDOCAINE HYDROCHLORIDE 10 MG/ML
5 INJECTION, SOLUTION INFILTRATION; PERINEURAL ONCE
Status: COMPLETED | OUTPATIENT
Start: 2018-11-01 | End: 2018-11-01

## 2018-11-01 RX ORDER — OXYCODONE AND ACETAMINOPHEN 10; 325 MG/1; MG/1
1 TABLET ORAL EVERY 6 HOURS PRN
Status: DISCONTINUED | OUTPATIENT
Start: 2018-11-01 | End: 2018-11-07 | Stop reason: HOSPADM

## 2018-11-01 RX ORDER — NICOTINE POLACRILEX 4 MG
15 LOZENGE BUCCAL PRN
Status: DISCONTINUED | OUTPATIENT
Start: 2018-11-01 | End: 2018-11-07 | Stop reason: HOSPADM

## 2018-11-01 RX ORDER — SODIUM CHLORIDE 0.9 % (FLUSH) 0.9 %
10 SYRINGE (ML) INJECTION EVERY 12 HOURS SCHEDULED
Status: DISCONTINUED | OUTPATIENT
Start: 2018-11-01 | End: 2018-11-07 | Stop reason: HOSPADM

## 2018-11-01 RX ORDER — TAMSULOSIN HYDROCHLORIDE 0.4 MG/1
0.4 CAPSULE ORAL DAILY
Status: DISCONTINUED | OUTPATIENT
Start: 2018-11-01 | End: 2018-11-07 | Stop reason: HOSPADM

## 2018-11-01 RX ORDER — SODIUM CHLORIDE 9 MG/ML
INJECTION, SOLUTION INTRAVENOUS CONTINUOUS
Status: DISCONTINUED | OUTPATIENT
Start: 2018-11-01 | End: 2018-11-02

## 2018-11-01 RX ORDER — IPRATROPIUM BROMIDE AND ALBUTEROL SULFATE 2.5; .5 MG/3ML; MG/3ML
1 SOLUTION RESPIRATORY (INHALATION) ONCE
Status: COMPLETED | OUTPATIENT
Start: 2018-11-01 | End: 2018-11-01

## 2018-11-01 RX ORDER — METOPROLOL SUCCINATE 25 MG/1
25 TABLET, EXTENDED RELEASE ORAL DAILY
Status: DISCONTINUED | OUTPATIENT
Start: 2018-11-01 | End: 2018-11-07 | Stop reason: HOSPADM

## 2018-11-01 RX ORDER — ONDANSETRON 2 MG/ML
4 INJECTION INTRAMUSCULAR; INTRAVENOUS EVERY 6 HOURS PRN
Status: DISCONTINUED | OUTPATIENT
Start: 2018-11-01 | End: 2018-11-07 | Stop reason: HOSPADM

## 2018-11-01 RX ORDER — MONTELUKAST SODIUM 10 MG/1
10 TABLET ORAL NIGHTLY
Status: DISCONTINUED | OUTPATIENT
Start: 2018-11-01 | End: 2018-11-07 | Stop reason: HOSPADM

## 2018-11-01 RX ORDER — PANTOPRAZOLE SODIUM 40 MG/10ML
40 INJECTION, POWDER, LYOPHILIZED, FOR SOLUTION INTRAVENOUS ONCE
Status: COMPLETED | OUTPATIENT
Start: 2018-11-01 | End: 2018-11-01

## 2018-11-01 RX ORDER — SODIUM CHLORIDE 0.9 % (FLUSH) 0.9 %
10 SYRINGE (ML) INJECTION PRN
Status: DISCONTINUED | OUTPATIENT
Start: 2018-11-01 | End: 2018-11-07 | Stop reason: HOSPADM

## 2018-11-01 RX ORDER — DEXTROSE MONOHYDRATE 50 MG/ML
100 INJECTION, SOLUTION INTRAVENOUS PRN
Status: DISCONTINUED | OUTPATIENT
Start: 2018-11-01 | End: 2018-11-07 | Stop reason: HOSPADM

## 2018-11-01 RX ORDER — DEXTROSE MONOHYDRATE 25 G/50ML
12.5 INJECTION, SOLUTION INTRAVENOUS PRN
Status: DISCONTINUED | OUTPATIENT
Start: 2018-11-01 | End: 2018-11-07 | Stop reason: HOSPADM

## 2018-11-01 RX ORDER — ZOLPIDEM TARTRATE 5 MG/1
5 TABLET ORAL NIGHTLY PRN
Status: DISCONTINUED | OUTPATIENT
Start: 2018-11-01 | End: 2018-11-07 | Stop reason: HOSPADM

## 2018-11-01 RX ORDER — 0.9 % SODIUM CHLORIDE 0.9 %
250 INTRAVENOUS SOLUTION INTRAVENOUS ONCE
Status: DISCONTINUED | OUTPATIENT
Start: 2018-11-01 | End: 2018-11-03

## 2018-11-01 RX ORDER — CHOLESTYRAMINE 4 G/9G
4 POWDER, FOR SUSPENSION ORAL DAILY
Status: DISCONTINUED | OUTPATIENT
Start: 2018-11-02 | End: 2018-11-07 | Stop reason: HOSPADM

## 2018-11-01 RX ORDER — PRAMIPEXOLE DIHYDROCHLORIDE 0.25 MG/1
1 TABLET ORAL NIGHTLY
Status: DISCONTINUED | OUTPATIENT
Start: 2018-11-01 | End: 2018-11-07 | Stop reason: HOSPADM

## 2018-11-01 RX ORDER — AMIODARONE HYDROCHLORIDE 200 MG/1
200 TABLET ORAL DAILY
Status: DISCONTINUED | OUTPATIENT
Start: 2018-11-01 | End: 2018-11-07 | Stop reason: HOSPADM

## 2018-11-01 RX ORDER — MESALAMINE 400 MG/1
400 CAPSULE, DELAYED RELEASE ORAL 3 TIMES DAILY
Status: DISCONTINUED | OUTPATIENT
Start: 2018-11-01 | End: 2018-11-07 | Stop reason: HOSPADM

## 2018-11-01 RX ORDER — CYCLOBENZAPRINE HCL 10 MG
10 TABLET ORAL 3 TIMES DAILY PRN
Status: DISCONTINUED | OUTPATIENT
Start: 2018-11-01 | End: 2018-11-07 | Stop reason: HOSPADM

## 2018-11-01 RX ORDER — OXYCODONE AND ACETAMINOPHEN 10; 325 MG/1; MG/1
1 TABLET ORAL ONCE
Status: COMPLETED | OUTPATIENT
Start: 2018-11-01 | End: 2018-11-01

## 2018-11-01 RX ORDER — 0.9 % SODIUM CHLORIDE 0.9 %
250 INTRAVENOUS SOLUTION INTRAVENOUS ONCE
Status: COMPLETED | OUTPATIENT
Start: 2018-11-01 | End: 2018-11-02

## 2018-11-01 RX ADMIN — MESALAMINE 400 MG: 400 CAPSULE, DELAYED RELEASE ORAL at 21:33

## 2018-11-01 RX ADMIN — SODIUM CHLORIDE 250 ML: 9 INJECTION, SOLUTION INTRAVENOUS at 19:00

## 2018-11-01 RX ADMIN — MONTELUKAST SODIUM 10 MG: 10 TABLET, COATED ORAL at 21:33

## 2018-11-01 RX ADMIN — OXYCODONE HYDROCHLORIDE AND ACETAMINOPHEN 1 TABLET: 10; 325 TABLET ORAL at 13:21

## 2018-11-01 RX ADMIN — OXYCODONE HYDROCHLORIDE AND ACETAMINOPHEN 1 TABLET: 10; 325 TABLET ORAL at 22:14

## 2018-11-01 RX ADMIN — PANTOPRAZOLE SODIUM 40 MG: 40 INJECTION, POWDER, FOR SOLUTION INTRAVENOUS at 18:58

## 2018-11-01 RX ADMIN — ZOLPIDEM TARTRATE 5 MG: 5 TABLET ORAL at 21:33

## 2018-11-01 RX ADMIN — IPRATROPIUM BROMIDE AND ALBUTEROL SULFATE 1 AMPULE: .5; 3 SOLUTION RESPIRATORY (INHALATION) at 11:52

## 2018-11-01 RX ADMIN — DONEPEZIL HYDROCHLORIDE 10 MG: 5 TABLET, FILM COATED ORAL at 21:33

## 2018-11-01 RX ADMIN — SODIUM CHLORIDE, PRESERVATIVE FREE 10 ML: 5 INJECTION INTRAVENOUS at 18:59

## 2018-11-01 RX ADMIN — IOPAMIDOL 75 ML: 755 INJECTION, SOLUTION INTRAVENOUS at 15:47

## 2018-11-01 RX ADMIN — LIDOCAINE HYDROCHLORIDE 5 ML: 10 INJECTION, SOLUTION EPIDURAL; INFILTRATION; INTRACAUDAL; PERINEURAL at 15:34

## 2018-11-01 ASSESSMENT — ENCOUNTER SYMPTOMS
COUGH: 0
VOMITING: 0
SHORTNESS OF BREATH: 1
CHEST TIGHTNESS: 0
DIARRHEA: 0
NAUSEA: 0
EYES NEGATIVE: 1
ABDOMINAL PAIN: 1
COLOR CHANGE: 0
BLOOD IN STOOL: 0

## 2018-11-01 ASSESSMENT — PAIN SCALES - GENERAL
PAINLEVEL_OUTOF10: 8
PAINLEVEL_OUTOF10: 3
PAINLEVEL_OUTOF10: 7
PAINLEVEL_OUTOF10: 6
PAINLEVEL_OUTOF10: 0
PAINLEVEL_OUTOF10: 9

## 2018-11-01 ASSESSMENT — PAIN DESCRIPTION - LOCATION
LOCATION: BACK
LOCATION: BACK

## 2018-11-01 ASSESSMENT — PAIN DESCRIPTION - PAIN TYPE
TYPE: CHRONIC PAIN
TYPE: CHRONIC PAIN

## 2018-11-01 NOTE — ED PROVIDER NOTES
change, chills and fever. HENT: Negative. Eyes: Negative. Respiratory: Positive for shortness of breath. Negative for cough and chest tightness. Cardiovascular: Negative for chest pain and palpitations. Gastrointestinal: Positive for abdominal pain. Negative for blood in stool, diarrhea, nausea and vomiting. Genitourinary: Negative. Musculoskeletal: Negative for joint swelling and neck pain. Skin: Negative for color change. Neurological: Positive for light-headedness. Negative for dizziness and headaches. All other systems reviewed and are negative. Exceptas noted above in the ROS, all other systems were reviewed and negative.          PAST MEDICAL HISTORY:     Past Medical History:   Diagnosis Date    Cancer ([facility] [address]     skin    [facility]     Colitis 5/08/2015    Diabetes mellitus ([facility]     Esophagitis     Gastritis     Hyperlipidemia     Hypertension          SURGICAL HISTORY:      Past Surgical History:   Procedure Laterality Date    CATARACT REMOVAL WITH IMPLANT Right 05/2016    COLONOSCOPY  5/08/2015    colitis-mild gastritis    COLONOSCOPY N/A 10/25/2018    COLONOSCOPY POLYPECTOMY SNARE/COLD BIOPSY performed by Clare Hamm MD at [address] [place] [address]  09/21/2018    CYSTOSCOPY, DIRECT VISION INTERAL URETHROTOMY     HERNIA REPAIR Right 2012    inguinal    WA OFFICE/OUTPT VISIT,PROCEDURE ONLY N/A 9/21/2018    CYSTOSCOPY, DIRECT VISION INTERAL URETHROTOMY performed by Nati Michel MD at [facility] Left 2001    Rotator cuff    SKIN CANCER EXCISION Left 5/2016    melanoma    TESTICLE REMOVAL Left 2012    UPPER GASTROINTESTINAL ENDOSCOPY  05/08/2015    Gastritis    UPPER GASTROINTESTINAL ENDOSCOPY N/A 09/08/2016    gastritis-Bx pending         CURRENT MEDICATIONS:       Previous Medications    ALBUTEROL (PROVENTIL) (2.5 MG/3ML) 0.083% NEBULIZER SOLUTION    Take 3 mLs by nebulization every 6 hours as needed for Wheezing    AMIODARONE (CORDARONE) 200 MG TABLET    200 mg po bid for 14 days, then 200 mg po daily    ANORO ELLIPTA 62.5-25 MCG/INH AEPB INHALER    INHALE 1 PUFF INTO THE LUNGS DAILY    B COMPLEX VITAMINS CAPSULE    Take 1 capsule by mouth daily    COLESEVELAM (WELCHOL) 625 MG TABLET    TAKE 3 TABLETS TWICE DAILY    CYCLOBENZAPRINE (FLEXERIL) 10 MG TABLET    Take 1 tablet by mouth 3 times daily as needed for Muscle spasms    DONEPEZIL (ARICEPT) 10 MG TABLET    Take 1 tablet by mouth nightly    FINASTERIDE (PROSCAR) 5 MG TABLET    Take 1 tablet by mouth daily Will shrink prostate; Affects PSA number    GLUCOSE BLOOD VI TEST STRIPS (ASCENSIA AUTODISC VI;ONE TOUCH ULTRA TEST VI) STRIP    [employer] True Metrix Air Test Strips. FSBS daily. DX E11.9    MESALAMINE (LIALDA) 1.2 G EC TABLET    TAKE 1 TABLET THREE TIMES DAILY    METFORMIN (GLUCOPHAGE) 500 MG TABLET    Take 1 tablet by mouth 2 times daily    METOPROLOL SUCCINATE (TOPROL XL) 25 MG EXTENDED RELEASE TABLET    Take 1 tablet by mouth daily    MONTELUKAST (SINGULAIR) 10 MG TABLET    Take 1 tablet by mouth nightly    OMEPRAZOLE (PRILOSEC) 40 MG DELAYED RELEASE CAPSULE    Take 1 capsule by mouth daily    OXYCODONE-ACETAMINOPHEN (PERCOCET)  MG PER TABLET    Take 1 tablet by mouth every 6 hours as needed for Pain. Laurena Rad PRAMIPEXOLE (MIRAPEX) 1 MG TABLET    Take 1 tablet by mouth nightly    SERTRALINE (ZOLOFT) 100 MG TABLET    Take 1 tablet by mouth daily    TAMSULOSIN (FLOMAX) 0.4 MG CAPSULE    Take 1 capsule by mouth daily    TRUEPLUS LANCETS 28G MISC    1 each by Does not apply route daily E11.9 Test FBS daily--NEED 33 GAUGE    ZOLPIDEM (AMBIEN) 5 MG TABLET    Take 5 mg by mouth nightly as needed for Sleep. .         ALLERGIES:    Patient has no known allergies.     FAMILY HISTORY:       Family History   Problem Relation Age of Onset    Diabetes Mother     Early Death Mother     Cancer Mother         stomach    Other Father     Diabetes Son           SOCIAL complexes are now PresentVent. rate has decreased  BPMST no longer depressed in Inferior leadsST no longer depressed in Lateral leadsNonspecific T wave abnormality no longer evident in Inferior leads   TYPE AND SCREEN   Result Value Ref Range    ABO/Rh A POS     Antibody Screen NEG    PREPARE RBC (CROSSMATCH) Number of Units: 1, 1 Units   Result Value Ref Range    Product Code Blood Bank [id number]     Description Blood Bank Red Blood Cells, Leuko-reduced     Unit Number [id number]     Dispense Status Blood Bank selected          RADIOLOGY:  All x-ray studies areviewed/reviewed by me. Formal interpretations per the radiologist are as follows:      Xr Chest Portable    Result Date: 10/22/2018  EXAMINATION: SINGLE XRAY VIEW OF THE CHEST 10/22/2018 10:11 pm COMPARISON: 10/20/2028 HISTORY: ORDERING SYSTEM PROVIDED HISTORY: afib RVR TECHNOLOGIST PROVIDED HISTORY: Reason for exam:->afib RVR Ordering Physician Provided Reason for Exam: Tachycardia (pt c/o fast heart rate in the 160's . Pt recently discharged today due the same thing. Heart rate 167 at triage. ) Acuity: Acute Type of Exam: Initial Relevant Medical/Surgical History: HTN DM SKIN CANCER FINDINGS: Lungs are mildly hyperinflated. There is a band of chronic atelectasis/fibrosis in the lingula with slight tenting of the left heart border. No acute infiltrate, pneumothorax or pleural effusion. The heart size is normal.  Prior resection of the distal left clavicle. No acute bone finding. Stable chest x-ray. No acute disease. EKG: The Ekginterpreted by me in the absence of a cardiologist shows. sinus bradycardia, rate=57   Axis is   Normal  QTc is  within an acceptable range  Intervals and Durations areunremarkable. No specific ST-T wave changes appreciated. No evidence of acute ischemia.      See also interpretation by Tri Stern MD.      PROCEDURES:   N/A    CRITICAL CARE TIME:   N/A    CONSULTS:  IP CONSULT TO

## 2018-11-01 NOTE — PROGRESS NOTES
[address]   Electrophysiology Note              Date:  November 1, 2018  Patient name: Danny Vidales  YOB: 1945    Primary Care physician: Yoni Love DO    HISTORY OF PRESENT ILLNESS: The patient is a 68 y.o.  male with a history of atrial fibrillation, HTN, anemia, diverticulosis, DM, and chronic hepatitis. He was admitted in 10/2018 for a TURP, was found to have afib with RVR, and was started on Xarelto. He was discharged and readmitted the same day with palpitations and SOB. He had bloody stools and became anemic. Colonoscopy on 10/26/2018 showed no active bleeding, mod-severe diverticulosis, and two colon polyps (resection done). He continued to have afib and was discharged on 10/27/2018 on amiodarone and Toprol. H&H continued to decline after discharge. Today he is being seen for paroxysmal atrial fibrillation. EKG shows SR with a HR of 61. He complains of worsening SOB, abdominal tightening, and dizziness. Denies chest pain and palpitations. Past Medical History:   has a past medical history of Cancer ([facility] [address] Chronic pancreatitis ([facility] [address] Colitis; Diabetes mellitus ([facility] [address] Esophagitis; Gastritis; Hyperlipidemia; and Hypertension. Past Surgical History:   has a past surgical history that includes Testicle removal (Left, 2012); shoulder surgery (Left, 2001); Upper gastrointestinal endoscopy (05/08/2015); Colonoscopy (5/08/2015); hernia repair (Right, 2012); Skin cancer excision (Left, 5/2016); Cataract removal with implant (Right, 05/2016); Upper gastrointestinal endoscopy (N/A, 09/08/2016); Cystoscopy (09/21/2018); pr office/outpt visit,procedure only (N/A, 9/21/2018); and Colonoscopy (N/A, 10/25/2018). Home Medications:    Prior to Admission medications    Medication Sig Start Date End Date Taking?  Authorizing Provider   amiodarone (CORDARONE) 200 MG tablet 200 mg po bid for 14 days, then 200 mg po daily 10/27/18  Yes Benjie MD Hetal

## 2018-11-01 NOTE — PROGRESS NOTES
Patient admitted to room 103 from ED. Patient oriented to room, call light, bed rails, phone, lights and bathroom. Patient instructed about the schedule of the day including: vital sign frequency, lab draws, possible tests, frequency of MD and staff rounds, including RN/MD rounding together at bedside, daily weights, and I &O's. Patient instructed about prescribed diet, how to use 8MENU, and television. Bed alarm in place, patient aware of placement and reason. Telemetry box in place, patient aware of placement and reason. Bed locked, in lowest position, side rails up 2/4, call light within reach. Will continue to monitor.

## 2018-11-01 NOTE — LETTER
[address] [place] [address] Cardiology - [address] [place], [place] [address] [address] [address]  Phone: [phone]  Fax: [phone]    SHAYY Mckay - PRAVEEN        November 2, 2018     Kim Hinson DO  [address] [place] [address]    Patient: Isabella De La Cruz  MR Number: [id number]  YOB: 1945  Date of Visit: 11/1/2018    Dear Dr. Ken Love:    Below are the relevant portions of my assessment and plan of care. [address]   Electrophysiology Note              Date:  November 1, 2018  Patient name: Isabella De La Cruz  YOB: 1945    Primary Care physician: Ken Love DO    HISTORY OF PRESENT ILLNESS: The patient is a 68 y.o.  male with a history of atrial fibrillation, HTN, anemia, diverticulosis, DM, and chronic hepatitis. He was admitted in 10/2018 for a TURP, was found to have afib with RVR, and was started on Xarelto. He was discharged and readmitted the same day with palpitations and SOB. He had bloody stools and became anemic. Colonoscopy on 10/26/2018 showed no active bleeding, mod-severe diverticulosis, and two colon polyps (resection done). He continued to have afib and was discharged on 10/27/2018 on amiodarone and Toprol. H&H continued to decline after discharge. Today he is being seen for paroxysmal atrial fibrillation. EKG shows SR with a HR of 61. He complains of worsening SOB, abdominal tightening, and dizziness. Denies chest pain and palpitations. Past Medical History:   has a past medical history of Cancer ([facility] [address] Chronic pancreatitis ([facility] [address] Colitis; Diabetes mellitus ([facility] [address] Esophagitis; Gastritis; Hyperlipidemia; and Hypertension. Past Surgical History:   has a past surgical history that includes Testicle removal (Left, 2012); shoulder surgery (Left, 2001); Upper gastrointestinal endoscopy (05/08/2015); Colonoscopy (5/08/2015); hernia repair (Right, 2012); Skin cancer excision (Left, 5/2016);  Cataract Respiratory:  · Normal excursion and expansion without use of accessory muscles  · Resp auscultation: Normal breath sounds without dullness or wheezing  Cardiovascular:  · The apical impulse is not displaced  · Heart tones are crisp and normal. regular S1 and S2.  · Jugular venous pulsation Normal  · The carotid upstroke is normal in amplitude and contour without delay or bruit  · Peripheral pulses are symmetrical and full   Abdomen:  · No masses or tenderness  · Bowel sounds present  Extremities:  ·  No cyanosis or clubbing  ·  No lower extremity edema  ·  Skin: warm and dry  Neurological:  · Alert and oriented  · Moves all extremities well  · No abnormalities of mood, affect, memory, mentation, or behavior are noted    DATA:    ECG 11/1/2018:  SR HR 61    Echo 10/22/2018:  Atrial fibrillation throughout the study.   LV systolic function is hyperdynamic with EF estimated 65-70%.   No regional wall motion abnormalities are noted.   There is moderate concentric left ventricular hypertrophy.   The right atrium is mildly dilated.   Patient noted to be in rapid atrial fibrillation during the study. CARDIOLOGY LABS:   CBC:   Recent Labs      10/30/18   1430   WBC  5.2   HGB  7.4*   HCT  23.7*   PLT  207     BMP: No results for input(s): NA, K, CO2, BUN, CREATININE, LABGLOM, GLUCOSE in the last 72 hours. PT/INR: No results for input(s): PROTIME, INR in the last 72 hours. APTT:No results for input(s): APTT in the last 72 hours. FASTING LIPID PANEL:  Lab Results   Component Value Date    HDL 44 12/31/2015    LDLCALC 24 12/31/2015    TRIG 212 12/31/2015     LIVER PROFILE:No results for input(s): AST, ALT, ALB in the last 72 hours. Assessment:   1. Symptomatic paroxysmal atrial fibrillation     -[id number] score 3 (age, HTN, DM)  2. HTN: controlled  3. Anemia: worsening  4. DM  5. Diverticulosis: moderate to severe; s/p colonoscopy in 10/2018  6.  Chronic heptatis    Plan: 1. Continue amiodarone 200mg po BID x2 weeks then decrease to 200mg po QD (decrease on 11/10/2018)  2. Continue Toprol  3. Restart Xarelto when OK with GI  4. Watchman previously discussed and patient declined. Will reevaluate at further follow up appointments. 5. ER evaluation due to worsening anemia, SOB at rest, and abdominal pain    SHAYY Jj-CNP  [facility]  [phone]            If you have questions, please do not hesitate to call me. I look forward to following Krystian along with you.     Sincerely,        SHAYY Jj - CNP

## 2018-11-01 NOTE — ED PROVIDER NOTES
I independently performed a history and physical on [employer]. All diagnostic, treatment, and disposition decisions were made by myself in conjunction with the advanced practice provider. For further details of Bronwyn Amaro [facility] emergency department encounter, please see GAVIN Lao's documentation. Patient is a 75-year-old male who was sent to the emergency department due to concern for symptomatic anemia by cardiology. He complains of worsening shortness of breath and significant lightheadedness with standing. No chest pain. He had been on blood thinners but these were stopped due to concern for rectal bleeding. Denies any prior blood transfusion. He does complain about some abdominal bloating. Any exertion makes his shortness of breath worse. Physical:   Gen: Mild acute distress. AOx3. Ill-appearing  Pysch: Normal mood and affect  HEENT: NCAT, PERRL, MMM  Neck: supple, nontender to palpation  Cardiac: RRR, pulses 2+ in upper extremities  Lungs: C2AB, no R/R/W  Abdomen: soft and diffuse abdominal tenderness with mild distention, no rebound or guarding  Neuro: no focal neuro deficits with strength and sensation 5/5 in all 4 extremities  Skin: Pale    The Ekg interpreted by me shows  sinus bradycardia, rate=57   Axis is   Normal  QTc is  normal  Intervals and Durations are unremarkable. ST Segments: normal  No significant change from prior EKG dated - 1/19/18  No STEMI      MDM:  Patient was evaluated due to concern for worsening shortness of breath along with lightheadedness was concern for her symptomatically anemia. He will need a blood transfusion but also would benefit from further evaluation related to his abdominal discomfort and shortness of breath. He is stable for the floor with telemetry. Story not suggestive of acute coronary syndrome but he may benefit from cardiology consult.   Story not suggestive of pulmonary embolism and he is not allowed to be on blood thinners

## 2018-11-02 ENCOUNTER — APPOINTMENT (OUTPATIENT)
Dept: GENERAL RADIOLOGY | Age: 73
DRG: 811 | End: 2018-11-02
Payer: MEDICARE

## 2018-11-02 LAB
ALBUMIN SERPL-MCNC: 3.2 G/DL (ref 3.4–5)
ANION GAP SERPL CALCULATED.3IONS-SCNC: 6 MMOL/L (ref 3–16)
BILIRUBIN URINE: NEGATIVE
BLOOD BANK DISPENSE STATUS: NORMAL
BLOOD BANK DISPENSE STATUS: NORMAL
BLOOD BANK PRODUCT CODE: NORMAL
BLOOD BANK PRODUCT CODE: NORMAL
BLOOD, URINE: NEGATIVE
BPU ID: NORMAL
BPU ID: NORMAL
BUN BLDV-MCNC: 10 MG/DL (ref 7–20)
CALCIUM SERPL-MCNC: 8 MG/DL (ref 8.3–10.6)
CHLORIDE BLD-SCNC: 106 MMOL/L (ref 99–110)
CLARITY: CLEAR
CO2: 24 MMOL/L (ref 21–32)
COLOR: YELLOW
CREAT SERPL-MCNC: 0.7 MG/DL (ref 0.8–1.3)
DESCRIPTION BLOOD BANK: NORMAL
DESCRIPTION BLOOD BANK: NORMAL
EKG ATRIAL RATE: 57 BPM
EKG DIAGNOSIS: NORMAL
EKG P AXIS: 76 DEGREES
EKG P-R INTERVAL: 146 MS
EKG Q-T INTERVAL: 406 MS
EKG QRS DURATION: 90 MS
EKG QTC CALCULATION (BAZETT): 395 MS
EKG R AXIS: 69 DEGREES
EKG T AXIS: 77 DEGREES
EKG VENTRICULAR RATE: 57 BPM
ESTIMATED AVERAGE GLUCOSE: 125.5 MG/DL
GFR AFRICAN AMERICAN: >60
GFR NON-AFRICAN AMERICAN: >60
GLUCOSE BLD-MCNC: 118 MG/DL (ref 70–99)
GLUCOSE BLD-MCNC: 151 MG/DL (ref 70–99)
GLUCOSE BLD-MCNC: 176 MG/DL (ref 70–99)
GLUCOSE BLD-MCNC: 322 MG/DL (ref 70–99)
GLUCOSE BLD-MCNC: 94 MG/DL (ref 70–99)
GLUCOSE URINE: 250 MG/DL
HBA1C MFR BLD: 6 %
HCT VFR BLD CALC: 23.2 % (ref 40.5–52.5)
HCT VFR BLD CALC: 25.5 % (ref 40.5–52.5)
HCT VFR BLD CALC: 26.5 % (ref 40.5–52.5)
HEMOGLOBIN: 7.6 G/DL (ref 13.5–17.5)
HEMOGLOBIN: 8.5 G/DL (ref 13.5–17.5)
HEMOGLOBIN: 8.7 G/DL (ref 13.5–17.5)
KETONES, URINE: NEGATIVE MG/DL
LEUKOCYTE ESTERASE, URINE: NEGATIVE
MCH RBC QN AUTO: 27.5 PG (ref 26–34)
MCHC RBC AUTO-ENTMCNC: 32.9 G/DL (ref 31–36)
MCV RBC AUTO: 83.8 FL (ref 80–100)
MICROSCOPIC EXAMINATION: ABNORMAL
NITRITE, URINE: NEGATIVE
PDW BLD-RTO: 16.1 % (ref 12.4–15.4)
PERFORMED ON: ABNORMAL
PH UA: 6.5
PHOSPHORUS: 2.4 MG/DL (ref 2.5–4.9)
PLATELET # BLD: 147 K/UL (ref 135–450)
PMV BLD AUTO: 8.5 FL (ref 5–10.5)
POTASSIUM SERPL-SCNC: 4 MMOL/L (ref 3.5–5.1)
PRO-BNP: 458 PG/ML (ref 0–124)
PROCALCITONIN: 0.09 NG/ML (ref 0–0.15)
PROTEIN UA: NEGATIVE MG/DL
RBC # BLD: 2.77 M/UL (ref 4.2–5.9)
SODIUM BLD-SCNC: 136 MMOL/L (ref 136–145)
SPECIFIC GRAVITY UA: 1.01
URINE TYPE: ABNORMAL
UROBILINOGEN, URINE: 0.2 E.U./DL
WBC # BLD: 7.6 K/UL (ref 4–11)

## 2018-11-02 PROCEDURE — 2700000000 HC OXYGEN THERAPY PER DAY

## 2018-11-02 PROCEDURE — 6370000000 HC RX 637 (ALT 250 FOR IP): Performed by: INTERNAL MEDICINE

## 2018-11-02 PROCEDURE — 2580000003 HC RX 258: Performed by: INTERNAL MEDICINE

## 2018-11-02 PROCEDURE — 2580000003 HC RX 258: Performed by: PHYSICIAN ASSISTANT

## 2018-11-02 PROCEDURE — 94640 AIRWAY INHALATION TREATMENT: CPT

## 2018-11-02 PROCEDURE — 6360000002 HC RX W HCPCS: Performed by: INTERNAL MEDICINE

## 2018-11-02 PROCEDURE — 94761 N-INVAS EAR/PLS OXIMETRY MLT: CPT

## 2018-11-02 PROCEDURE — 93005 ELECTROCARDIOGRAM TRACING: CPT | Performed by: INTERNAL MEDICINE

## 2018-11-02 PROCEDURE — 93010 ELECTROCARDIOGRAM REPORT: CPT | Performed by: INTERNAL MEDICINE

## 2018-11-02 PROCEDURE — 71046 X-RAY EXAM CHEST 2 VIEWS: CPT

## 2018-11-02 PROCEDURE — 85018 HEMOGLOBIN: CPT

## 2018-11-02 PROCEDURE — 6360000002 HC RX W HCPCS: Performed by: NURSE PRACTITIONER

## 2018-11-02 PROCEDURE — 85014 HEMATOCRIT: CPT

## 2018-11-02 PROCEDURE — 2580000003 HC RX 258: Performed by: NURSE PRACTITIONER

## 2018-11-02 PROCEDURE — 80069 RENAL FUNCTION PANEL: CPT

## 2018-11-02 PROCEDURE — 84145 PROCALCITONIN (PCT): CPT

## 2018-11-02 PROCEDURE — 2500000003 HC RX 250 WO HCPCS: Performed by: INTERNAL MEDICINE

## 2018-11-02 PROCEDURE — 6370000000 HC RX 637 (ALT 250 FOR IP): Performed by: NURSE PRACTITIONER

## 2018-11-02 PROCEDURE — 85027 COMPLETE CBC AUTOMATED: CPT

## 2018-11-02 PROCEDURE — 81003 URINALYSIS AUTO W/O SCOPE: CPT

## 2018-11-02 PROCEDURE — 1200000000 HC SEMI PRIVATE

## 2018-11-02 PROCEDURE — 94664 DEMO&/EVAL PT USE INHALER: CPT

## 2018-11-02 PROCEDURE — 83880 ASSAY OF NATRIURETIC PEPTIDE: CPT

## 2018-11-02 RX ORDER — METHYLPREDNISOLONE SODIUM SUCCINATE 125 MG/2ML
125 INJECTION, POWDER, LYOPHILIZED, FOR SOLUTION INTRAMUSCULAR; INTRAVENOUS ONCE
Status: COMPLETED | OUTPATIENT
Start: 2018-11-02 | End: 2018-11-02

## 2018-11-02 RX ORDER — 0.9 % SODIUM CHLORIDE 0.9 %
500 INTRAVENOUS SOLUTION INTRAVENOUS ONCE
Status: COMPLETED | OUTPATIENT
Start: 2018-11-02 | End: 2018-11-03

## 2018-11-02 RX ORDER — METOPROLOL TARTRATE 5 MG/5ML
INJECTION INTRAVENOUS
Status: DISPENSED
Start: 2018-11-02 | End: 2018-11-03

## 2018-11-02 RX ORDER — FUROSEMIDE 10 MG/ML
40 INJECTION INTRAMUSCULAR; INTRAVENOUS ONCE
Status: COMPLETED | OUTPATIENT
Start: 2018-11-02 | End: 2018-11-02

## 2018-11-02 RX ORDER — PRAMIPEXOLE DIHYDROCHLORIDE 0.25 MG/1
0.5 TABLET ORAL ONCE
Status: COMPLETED | OUTPATIENT
Start: 2018-11-02 | End: 2018-11-02

## 2018-11-02 RX ORDER — DIGOXIN 0.25 MG/ML
250 INJECTION INTRAMUSCULAR; INTRAVENOUS ONCE
Status: COMPLETED | OUTPATIENT
Start: 2018-11-02 | End: 2018-11-02

## 2018-11-02 RX ORDER — IPRATROPIUM BROMIDE AND ALBUTEROL SULFATE 2.5; .5 MG/3ML; MG/3ML
1 SOLUTION RESPIRATORY (INHALATION) 4 TIMES DAILY
Status: DISCONTINUED | OUTPATIENT
Start: 2018-11-02 | End: 2018-11-07 | Stop reason: HOSPADM

## 2018-11-02 RX ORDER — METOPROLOL TARTRATE 5 MG/5ML
5 INJECTION INTRAVENOUS ONCE
Status: COMPLETED | OUTPATIENT
Start: 2018-11-02 | End: 2018-11-02

## 2018-11-02 RX ADMIN — PRAMIPEXOLE DIHYDROCHLORIDE 1 MG: 0.25 TABLET ORAL at 21:07

## 2018-11-02 RX ADMIN — SODIUM CHLORIDE: 9 INJECTION, SOLUTION INTRAVENOUS at 00:00

## 2018-11-02 RX ADMIN — AMIODARONE HYDROCHLORIDE 200 MG: 200 TABLET ORAL at 09:06

## 2018-11-02 RX ADMIN — OXYCODONE HYDROCHLORIDE AND ACETAMINOPHEN 1 TABLET: 10; 325 TABLET ORAL at 15:06

## 2018-11-02 RX ADMIN — METOPROLOL SUCCINATE 25 MG: 25 TABLET, EXTENDED RELEASE ORAL at 09:05

## 2018-11-02 RX ADMIN — ZOLPIDEM TARTRATE 5 MG: 5 TABLET ORAL at 22:29

## 2018-11-02 RX ADMIN — CYCLOBENZAPRINE HYDROCHLORIDE 10 MG: 10 TABLET, FILM COATED ORAL at 22:29

## 2018-11-02 RX ADMIN — CHOLESTYRAMINE 4 G: 4 POWDER, FOR SUSPENSION ORAL at 09:05

## 2018-11-02 RX ADMIN — FUROSEMIDE 40 MG: 10 INJECTION, SOLUTION INTRAMUSCULAR; INTRAVENOUS at 11:55

## 2018-11-02 RX ADMIN — MESALAMINE 400 MG: 400 CAPSULE, DELAYED RELEASE ORAL at 22:30

## 2018-11-02 RX ADMIN — Medication 10 ML: at 22:13

## 2018-11-02 RX ADMIN — IPRATROPIUM BROMIDE AND ALBUTEROL SULFATE 1 AMPULE: .5; 3 SOLUTION RESPIRATORY (INHALATION) at 11:59

## 2018-11-02 RX ADMIN — METHYLPREDNISOLONE SODIUM SUCCINATE 125 MG: 125 INJECTION, POWDER, FOR SOLUTION INTRAMUSCULAR; INTRAVENOUS at 09:30

## 2018-11-02 RX ADMIN — AMIODARONE HYDROCHLORIDE 150 MG: 150 INJECTION, SOLUTION INTRAVENOUS at 23:21

## 2018-11-02 RX ADMIN — DONEPEZIL HYDROCHLORIDE 10 MG: 5 TABLET, FILM COATED ORAL at 21:07

## 2018-11-02 RX ADMIN — DIGOXIN 250 MCG: 250 INJECTION, SOLUTION INTRAMUSCULAR; INTRAVENOUS; PARENTERAL at 19:54

## 2018-11-02 RX ADMIN — SODIUM CHLORIDE, PRESERVATIVE FREE 10 ML: 5 INJECTION INTRAVENOUS at 11:55

## 2018-11-02 RX ADMIN — MONTELUKAST SODIUM 10 MG: 10 TABLET, COATED ORAL at 21:07

## 2018-11-02 RX ADMIN — INSULIN LISPRO 4 UNITS: 100 INJECTION, SOLUTION INTRAVENOUS; SUBCUTANEOUS at 17:46

## 2018-11-02 RX ADMIN — SODIUM CHLORIDE: 9 INJECTION, SOLUTION INTRAVENOUS at 09:23

## 2018-11-02 RX ADMIN — METOPROLOL TARTRATE 5 MG: 5 INJECTION, SOLUTION INTRAVENOUS at 18:28

## 2018-11-02 RX ADMIN — SODIUM CHLORIDE, PRESERVATIVE FREE 10 ML: 5 INJECTION INTRAVENOUS at 09:07

## 2018-11-02 RX ADMIN — SERTRALINE HYDROCHLORIDE 100 MG: 50 TABLET ORAL at 09:06

## 2018-11-02 RX ADMIN — ALBUTEROL SULFATE 2.5 MG: 2.5 SOLUTION RESPIRATORY (INHALATION) at 06:00

## 2018-11-02 RX ADMIN — GLYCOPYRROLATE AND FORMOTEROL FUMARATE 2 PUFF: 9; 4.8 AEROSOL, METERED RESPIRATORY (INHALATION) at 09:02

## 2018-11-02 RX ADMIN — ALBUTEROL SULFATE 2.5 MG: 2.5 SOLUTION RESPIRATORY (INHALATION) at 15:50

## 2018-11-02 RX ADMIN — OXYCODONE HYDROCHLORIDE AND ACETAMINOPHEN 1 TABLET: 10; 325 TABLET ORAL at 09:05

## 2018-11-02 RX ADMIN — MESALAMINE 400 MG: 400 CAPSULE, DELAYED RELEASE ORAL at 14:52

## 2018-11-02 RX ADMIN — OXYCODONE HYDROCHLORIDE AND ACETAMINOPHEN 1 TABLET: 10; 325 TABLET ORAL at 22:29

## 2018-11-02 RX ADMIN — TAMSULOSIN HYDROCHLORIDE 0.4 MG: 0.4 CAPSULE ORAL at 09:05

## 2018-11-02 RX ADMIN — CYCLOBENZAPRINE HYDROCHLORIDE 10 MG: 10 TABLET, FILM COATED ORAL at 14:52

## 2018-11-02 RX ADMIN — INSULIN LISPRO 1 UNITS: 100 INJECTION, SOLUTION INTRAVENOUS; SUBCUTANEOUS at 23:44

## 2018-11-02 RX ADMIN — FUROSEMIDE 40 MG: 10 INJECTION, SOLUTION INTRAMUSCULAR; INTRAVENOUS at 17:31

## 2018-11-02 RX ADMIN — GLYCOPYRROLATE AND FORMOTEROL FUMARATE 2 PUFF: 9; 4.8 AEROSOL, METERED RESPIRATORY (INHALATION) at 20:57

## 2018-11-02 RX ADMIN — SODIUM CHLORIDE 500 ML: 9 INJECTION, SOLUTION INTRAVENOUS at 23:21

## 2018-11-02 RX ADMIN — PANTOPRAZOLE SODIUM 40 MG: 40 TABLET, DELAYED RELEASE ORAL at 05:29

## 2018-11-02 RX ADMIN — Medication 10 ML: at 09:07

## 2018-11-02 RX ADMIN — SODIUM CHLORIDE, PRESERVATIVE FREE 10 ML: 5 INJECTION INTRAVENOUS at 19:54

## 2018-11-02 RX ADMIN — PRAMIPEXOLE DIHYDROCHLORIDE 0.5 MG: 0.25 TABLET ORAL at 17:31

## 2018-11-02 RX ADMIN — MESALAMINE 400 MG: 400 CAPSULE, DELAYED RELEASE ORAL at 09:05

## 2018-11-02 ASSESSMENT — PAIN SCALES - GENERAL
PAINLEVEL_OUTOF10: 0
PAINLEVEL_OUTOF10: 0
PAINLEVEL_OUTOF10: 8
PAINLEVEL_OUTOF10: 0
PAINLEVEL_OUTOF10: 0
PAINLEVEL_OUTOF10: 8
PAINLEVEL_OUTOF10: 5
PAINLEVEL_OUTOF10: 0

## 2018-11-02 NOTE — CONSULTS
[address] [address]                                  CONSULTATION    PATIENT NAME: Joanie Cole                    :        1945  MED REC NO:   [id number]                          ROOM:       1068  ACCOUNT NO:   [de-identified]                           ADMIT DATE: 2018  PROVIDER:     Danielle Garcia MD    CONSULT DATE:  2018    GASTROENTEROLOGY CONSULT REPORT    REASON FOR REFERRAL:  Anemia. REFERRING PROVIDER:  Madhu Rodriguez MD    HISTORY OF PRESENT ILLNESS:  This is a 24-year-old male with a history  of hypertension, hyperlipidemia, diabetes, coronary artery disease,  obstructive sleep apnea, COPD, atrial fibrillation, BPH, admitted with  anemia. The patient is familiar to me from recent hospitalization at  [facility]. He was started on Xarelto after being diagnosed with  paroxysmal atrial fibrillation with RVR. He was stabilized with  Cardizem drip and subsequently switched over to oral calcium-channel  blockers. In the interim, he developed acute onset of rectal bleeding. Colonoscopy was performed on 10/25/2018 which showed diverticulosis and  benign colon polyps, but no active bleeding. Recommendation was made to  hold the anticoagulation for one to two weeks. He was discharged on  10/27/2018 only to return yesterday with complaints of anemia. The  patient denies any symptoms, but routine lab evaluation by his primary  care showed decline in hemoglobin down to 7. He was thus recommended to  be evaluated in the emergency room. He did receive two units of packed  RBCs and responded appropriately. He denies any signs of active  bleeding. His wife at the bedside corroborates with this history. REVIEW OF SYSTEMS:  CONSTITUTIONAL:  No fevers, sweats, or chills. No  weight loss. ENT:  No sore throat. No double vision. No blurred  vision. CARDIOVASCULAR:  No chest pain.

## 2018-11-02 NOTE — PROGRESS NOTES
Radha Amaro NP paged \"Patient is complaining of extreme SOB. O2 level increased to 5 liters current O2 level 93%. Last resp treatment 6am. Please advise. Thank you. \"  Awaiting response.

## 2018-11-02 NOTE — PLAN OF CARE
Problem: Falls - Risk of:  Goal: Will remain free from falls  Will remain free from falls   Outcome: Ongoing  Pt has remained free from falls this shift. Fall precautions in place. Bed locked in lowest position, call light within reach, and hourly rounding performed. Will continue to monitor.

## 2018-11-03 LAB
ANION GAP SERPL CALCULATED.3IONS-SCNC: 9 MMOL/L (ref 3–16)
BASOPHILS ABSOLUTE: 0 K/UL (ref 0–0.2)
BASOPHILS RELATIVE PERCENT: 0.2 %
BUN BLDV-MCNC: 19 MG/DL (ref 7–20)
CALCIUM SERPL-MCNC: 9.2 MG/DL (ref 8.3–10.6)
CHLORIDE BLD-SCNC: 99 MMOL/L (ref 99–110)
CO2: 29 MMOL/L (ref 21–32)
CREAT SERPL-MCNC: 1 MG/DL (ref 0.8–1.3)
EKG ATRIAL RATE: 153 BPM
EKG DIAGNOSIS: NORMAL
EKG Q-T INTERVAL: 340 MS
EKG QRS DURATION: 94 MS
EKG QTC CALCULATION (BAZETT): 503 MS
EKG R AXIS: 75 DEGREES
EKG T AXIS: 33 DEGREES
EKG VENTRICULAR RATE: 132 BPM
EOSINOPHILS ABSOLUTE: 0 K/UL (ref 0–0.6)
EOSINOPHILS RELATIVE PERCENT: 0.1 %
GFR AFRICAN AMERICAN: >60
GFR NON-AFRICAN AMERICAN: >60
GLUCOSE BLD-MCNC: 112 MG/DL (ref 70–99)
GLUCOSE BLD-MCNC: 117 MG/DL (ref 70–99)
GLUCOSE BLD-MCNC: 136 MG/DL (ref 70–99)
GLUCOSE BLD-MCNC: 149 MG/DL (ref 70–99)
HCT VFR BLD CALC: 25.2 % (ref 40.5–52.5)
HEMOGLOBIN: 8.2 G/DL (ref 13.5–17.5)
LYMPHOCYTES ABSOLUTE: 1.5 K/UL (ref 1–5.1)
LYMPHOCYTES RELATIVE PERCENT: 12.3 %
MCH RBC QN AUTO: 27.1 PG (ref 26–34)
MCHC RBC AUTO-ENTMCNC: 32.7 G/DL (ref 31–36)
MCV RBC AUTO: 83 FL (ref 80–100)
MONOCYTES ABSOLUTE: 1 K/UL (ref 0–1.3)
MONOCYTES RELATIVE PERCENT: 8.2 %
NEUTROPHILS ABSOLUTE: 9.4 K/UL (ref 1.7–7.7)
NEUTROPHILS RELATIVE PERCENT: 79.2 %
PDW BLD-RTO: 16.9 % (ref 12.4–15.4)
PERFORMED ON: ABNORMAL
PLATELET # BLD: 167 K/UL (ref 135–450)
PMV BLD AUTO: 8.5 FL (ref 5–10.5)
POTASSIUM SERPL-SCNC: 4.1 MMOL/L (ref 3.5–5.1)
RBC # BLD: 3.04 M/UL (ref 4.2–5.9)
SODIUM BLD-SCNC: 137 MMOL/L (ref 136–145)
WBC # BLD: 11.8 K/UL (ref 4–11)

## 2018-11-03 PROCEDURE — 6360000002 HC RX W HCPCS: Performed by: NURSE PRACTITIONER

## 2018-11-03 PROCEDURE — 85025 COMPLETE CBC W/AUTO DIFF WBC: CPT

## 2018-11-03 PROCEDURE — 2700000000 HC OXYGEN THERAPY PER DAY

## 2018-11-03 PROCEDURE — 94640 AIRWAY INHALATION TREATMENT: CPT

## 2018-11-03 PROCEDURE — 6370000000 HC RX 637 (ALT 250 FOR IP): Performed by: NURSE PRACTITIONER

## 2018-11-03 PROCEDURE — 2580000003 HC RX 258: Performed by: INTERNAL MEDICINE

## 2018-11-03 PROCEDURE — 94761 N-INVAS EAR/PLS OXIMETRY MLT: CPT

## 2018-11-03 PROCEDURE — 6370000000 HC RX 637 (ALT 250 FOR IP): Performed by: INTERNAL MEDICINE

## 2018-11-03 PROCEDURE — 93010 ELECTROCARDIOGRAM REPORT: CPT | Performed by: INTERNAL MEDICINE

## 2018-11-03 PROCEDURE — 99223 1ST HOSP IP/OBS HIGH 75: CPT | Performed by: INTERNAL MEDICINE

## 2018-11-03 PROCEDURE — 80048 BASIC METABOLIC PNL TOTAL CA: CPT

## 2018-11-03 PROCEDURE — 1200000000 HC SEMI PRIVATE

## 2018-11-03 PROCEDURE — 2580000003 HC RX 258: Performed by: PHYSICIAN ASSISTANT

## 2018-11-03 RX ORDER — FUROSEMIDE 10 MG/ML
40 INJECTION INTRAMUSCULAR; INTRAVENOUS 2 TIMES DAILY
Status: DISCONTINUED | OUTPATIENT
Start: 2018-11-03 | End: 2018-11-06

## 2018-11-03 RX ADMIN — OXYCODONE HYDROCHLORIDE AND ACETAMINOPHEN 1 TABLET: 10; 325 TABLET ORAL at 22:14

## 2018-11-03 RX ADMIN — PANTOPRAZOLE SODIUM 40 MG: 40 TABLET, DELAYED RELEASE ORAL at 05:51

## 2018-11-03 RX ADMIN — CHOLESTYRAMINE 4 G: 4 POWDER, FOR SUSPENSION ORAL at 14:14

## 2018-11-03 RX ADMIN — OXYCODONE HYDROCHLORIDE AND ACETAMINOPHEN 1 TABLET: 10; 325 TABLET ORAL at 14:13

## 2018-11-03 RX ADMIN — IPRATROPIUM BROMIDE AND ALBUTEROL SULFATE 1 AMPULE: .5; 3 SOLUTION RESPIRATORY (INHALATION) at 16:23

## 2018-11-03 RX ADMIN — MESALAMINE 400 MG: 400 CAPSULE, DELAYED RELEASE ORAL at 14:13

## 2018-11-03 RX ADMIN — SERTRALINE HYDROCHLORIDE 100 MG: 50 TABLET ORAL at 10:36

## 2018-11-03 RX ADMIN — SODIUM CHLORIDE, PRESERVATIVE FREE 10 ML: 5 INJECTION INTRAVENOUS at 10:43

## 2018-11-03 RX ADMIN — DONEPEZIL HYDROCHLORIDE 10 MG: 5 TABLET, FILM COATED ORAL at 22:14

## 2018-11-03 RX ADMIN — MONTELUKAST SODIUM 10 MG: 10 TABLET, COATED ORAL at 22:13

## 2018-11-03 RX ADMIN — FUROSEMIDE 40 MG: 10 INJECTION, SOLUTION INTRAMUSCULAR; INTRAVENOUS at 10:35

## 2018-11-03 RX ADMIN — Medication 10 ML: at 10:44

## 2018-11-03 RX ADMIN — IPRATROPIUM BROMIDE AND ALBUTEROL SULFATE 1 AMPULE: .5; 3 SOLUTION RESPIRATORY (INHALATION) at 08:06

## 2018-11-03 RX ADMIN — GLYCOPYRROLATE AND FORMOTEROL FUMARATE 2 PUFF: 9; 4.8 AEROSOL, METERED RESPIRATORY (INHALATION) at 08:07

## 2018-11-03 RX ADMIN — FUROSEMIDE 40 MG: 10 INJECTION, SOLUTION INTRAMUSCULAR; INTRAVENOUS at 18:15

## 2018-11-03 RX ADMIN — Medication 10 ML: at 22:16

## 2018-11-03 RX ADMIN — CYCLOBENZAPRINE HYDROCHLORIDE 10 MG: 10 TABLET, FILM COATED ORAL at 22:13

## 2018-11-03 RX ADMIN — ZOLPIDEM TARTRATE 5 MG: 5 TABLET ORAL at 22:14

## 2018-11-03 RX ADMIN — IPRATROPIUM BROMIDE AND ALBUTEROL SULFATE 1 AMPULE: .5; 3 SOLUTION RESPIRATORY (INHALATION) at 12:08

## 2018-11-03 RX ADMIN — SODIUM CHLORIDE, PRESERVATIVE FREE 10 ML: 5 INJECTION INTRAVENOUS at 22:16

## 2018-11-03 RX ADMIN — PRAMIPEXOLE DIHYDROCHLORIDE 1 MG: 0.25 TABLET ORAL at 22:13

## 2018-11-03 RX ADMIN — TAMSULOSIN HYDROCHLORIDE 0.4 MG: 0.4 CAPSULE ORAL at 10:36

## 2018-11-03 RX ADMIN — MESALAMINE 400 MG: 400 CAPSULE, DELAYED RELEASE ORAL at 22:13

## 2018-11-03 RX ADMIN — IPRATROPIUM BROMIDE AND ALBUTEROL SULFATE 1 AMPULE: .5; 3 SOLUTION RESPIRATORY (INHALATION) at 20:00

## 2018-11-03 RX ADMIN — CYCLOBENZAPRINE HYDROCHLORIDE 10 MG: 10 TABLET, FILM COATED ORAL at 14:13

## 2018-11-03 RX ADMIN — GLYCOPYRROLATE AND FORMOTEROL FUMARATE 2 PUFF: 9; 4.8 AEROSOL, METERED RESPIRATORY (INHALATION) at 20:53

## 2018-11-03 RX ADMIN — AMIODARONE HYDROCHLORIDE 200 MG: 200 TABLET ORAL at 14:13

## 2018-11-03 ASSESSMENT — PAIN SCALES - GENERAL
PAINLEVEL_OUTOF10: 6
PAINLEVEL_OUTOF10: 6

## 2018-11-03 NOTE — CONSULTS
[address] [address]                                  CONSULTATION    PATIENT NAME: Jaime Romo                    :        1945  MED REC NO:   [id number]                          ROOM:       0210  ACCOUNT NO:   [de-identified]                           ADMIT DATE: 2018  PROVIDER:     Buzzy Gottron, MD    CONSULT DATE:  2018    CARDIOLOGY CONSULTATION    REASON FOR CONSULTATION:  Atrial fibrillation. HISTORY OF PRESENT ILLNESS:  The patient is a 12-year-old man with  history of recently identified paroxysmal atrial fibrillation and GI  bleeding, who is admitted for worsening shortness of breath and anemia. The patient was originally seen on 10/20/2018 with AF noted after a TURP  procedure. He was started on Xarelto at that time, then had GI bleed  with associated anemia. Subsequent colonoscopy on 10/26/2018  demonstrated diverticulosis. Two colon polyps were resected at that  time. He was ultimately discharged on 10/27/2018 on amiodarone and  Toprol. He was seen in the office on 2018 and described worsening  shortness of breath and abdominal fullness. At the time of admission,  he was found to have mild elevation of his BNP level. The hemoglobin  was 7.3. Since admission, he has been treated with IV diuretics and has  had a modest diuresis. The BNP level has declined from 769 to 458. He  did have one episode of atrial fibrillation, which lasted about six  hours in the evening hours of 2018. He did receive some  additional IV amiodarone and is terminated with a modest conversion  pause. Currently, the patient feels well. He does not describe  shortness of breath today. He is back in sinus rhythm. PAST MEDICAL HISTORY:  1. Paroxysmal atrial fibrillation. 2.  GI bleed. 3.  Hypertension. 4.  Diabetes. 5.  COPD.     MEDICATIONS:  At the time of admission include is no evidence for chronic venous stasis. SKIN:  Otherwise, warm and dry without skin rash. A 12-lead ECG from 11/02/2018 demonstrates atrial fibrillation with a  rapid ventricular rate at 132 beats per minute. There is some  conduction aberration. There is a nonspecific ST segment and T wave  abnormalities. IMPRESSION:  1. Paroxysmal atrial fibrillation. 2.  Diastolic congestive heart failure. 3.  GI bleed. 4.  Hypertension. 5.  Diabetes. 6.  COPD. The patient is admitted with some shortness of breath and abdominal  fullness. He does have elevated BNP and some radiographic evidence for  pulmonary vascular congestion. He has had a modest diuresis on IV  diuretics with a net diuresis of 1.3 L. This has resulted in some  symptomatic improvement. His jugular venous pressure appears to be  within normal limits today. He continues to have paroxysms of AF, which  are rapidly conducted. Hopefully, the sustained exposure to amiodarone  will suppress this. He is at risk for stroke and should be  anticoagulated if this is possible. He has already been approached  about left atrial appendage occlusion and does not wish to pursue this  at this time. RECOMMENDATIONS:  1. Daily oral dose of furosemide. 2.  Continue amiodarone. 3.  Would consider reexposure to anticoagulation when this is acceptable  to GI. Thank you for the opportunity to assist in the care of the patient. Please contact me if you have any questions regarding his evaluation.         Nathaly Marr MD    D: 11/03/2018 8:57:40       T: 11/03/2018 12:48:47     [email or web address]  Job#: [id number]     Doc#: [id number]    CC:

## 2018-11-03 NOTE — PROGRESS NOTES
After IV dig given, no change in pt's HR or rhythm. HR still fluctuating from 110s-140s. Page sent to Ashley Canas CNP notifying of pt's HR and rhythm and blood pressures. Awaiting response.

## 2018-11-04 ENCOUNTER — APPOINTMENT (OUTPATIENT)
Dept: GENERAL RADIOLOGY | Age: 73
DRG: 811 | End: 2018-11-04
Payer: MEDICARE

## 2018-11-04 LAB
ANION GAP SERPL CALCULATED.3IONS-SCNC: 10 MMOL/L (ref 3–16)
BASOPHILS ABSOLUTE: 0 K/UL (ref 0–0.2)
BASOPHILS RELATIVE PERCENT: 0.4 %
BUN BLDV-MCNC: 19 MG/DL (ref 7–20)
CALCIUM SERPL-MCNC: 9 MG/DL (ref 8.3–10.6)
CHLORIDE BLD-SCNC: 97 MMOL/L (ref 99–110)
CO2: 30 MMOL/L (ref 21–32)
CREAT SERPL-MCNC: 0.8 MG/DL (ref 0.8–1.3)
EKG ATRIAL RATE: 278 BPM
EKG DIAGNOSIS: NORMAL
EKG P AXIS: 256 DEGREES
EKG Q-T INTERVAL: 318 MS
EKG QRS DURATION: 84 MS
EKG QTC CALCULATION (BAZETT): 483 MS
EKG R AXIS: 68 DEGREES
EKG T AXIS: -42 DEGREES
EKG VENTRICULAR RATE: 139 BPM
EOSINOPHILS ABSOLUTE: 0 K/UL (ref 0–0.6)
EOSINOPHILS RELATIVE PERCENT: 0.5 %
GFR AFRICAN AMERICAN: >60
GFR NON-AFRICAN AMERICAN: >60
GLUCOSE BLD-MCNC: 119 MG/DL (ref 70–99)
GLUCOSE BLD-MCNC: 183 MG/DL (ref 70–99)
GLUCOSE BLD-MCNC: 189 MG/DL (ref 70–99)
GLUCOSE BLD-MCNC: 94 MG/DL (ref 70–99)
HCT VFR BLD CALC: 25.2 % (ref 40.5–52.5)
HEMOGLOBIN: 8.4 G/DL (ref 13.5–17.5)
LYMPHOCYTES ABSOLUTE: 1.4 K/UL (ref 1–5.1)
LYMPHOCYTES RELATIVE PERCENT: 15.9 %
MCH RBC QN AUTO: 27.4 PG (ref 26–34)
MCHC RBC AUTO-ENTMCNC: 33.3 G/DL (ref 31–36)
MCV RBC AUTO: 82.4 FL (ref 80–100)
MONOCYTES ABSOLUTE: 0.7 K/UL (ref 0–1.3)
MONOCYTES RELATIVE PERCENT: 8.1 %
NEUTROPHILS ABSOLUTE: 6.5 K/UL (ref 1.7–7.7)
NEUTROPHILS RELATIVE PERCENT: 75.1 %
PDW BLD-RTO: 16.8 % (ref 12.4–15.4)
PERFORMED ON: ABNORMAL
PLATELET # BLD: 171 K/UL (ref 135–450)
PMV BLD AUTO: 8.5 FL (ref 5–10.5)
POTASSIUM SERPL-SCNC: 3.6 MMOL/L (ref 3.5–5.1)
RBC # BLD: 3.06 M/UL (ref 4.2–5.9)
SODIUM BLD-SCNC: 137 MMOL/L (ref 136–145)
WBC # BLD: 8.6 K/UL (ref 4–11)

## 2018-11-04 PROCEDURE — 6370000000 HC RX 637 (ALT 250 FOR IP): Performed by: NURSE PRACTITIONER

## 2018-11-04 PROCEDURE — 6370000000 HC RX 637 (ALT 250 FOR IP): Performed by: INTERNAL MEDICINE

## 2018-11-04 PROCEDURE — 93010 ELECTROCARDIOGRAM REPORT: CPT | Performed by: INTERNAL MEDICINE

## 2018-11-04 PROCEDURE — 99233 SBSQ HOSP IP/OBS HIGH 50: CPT | Performed by: INTERNAL MEDICINE

## 2018-11-04 PROCEDURE — 6360000002 HC RX W HCPCS: Performed by: NURSE PRACTITIONER

## 2018-11-04 PROCEDURE — 94640 AIRWAY INHALATION TREATMENT: CPT

## 2018-11-04 PROCEDURE — 71046 X-RAY EXAM CHEST 2 VIEWS: CPT

## 2018-11-04 PROCEDURE — 1200000000 HC SEMI PRIVATE

## 2018-11-04 PROCEDURE — 94664 DEMO&/EVAL PT USE INHALER: CPT

## 2018-11-04 PROCEDURE — 85025 COMPLETE CBC W/AUTO DIFF WBC: CPT

## 2018-11-04 PROCEDURE — 93005 ELECTROCARDIOGRAM TRACING: CPT | Performed by: INTERNAL MEDICINE

## 2018-11-04 PROCEDURE — 2700000000 HC OXYGEN THERAPY PER DAY

## 2018-11-04 PROCEDURE — 2580000003 HC RX 258: Performed by: PHYSICIAN ASSISTANT

## 2018-11-04 PROCEDURE — 2580000003 HC RX 258: Performed by: INTERNAL MEDICINE

## 2018-11-04 PROCEDURE — 94761 N-INVAS EAR/PLS OXIMETRY MLT: CPT

## 2018-11-04 PROCEDURE — 80048 BASIC METABOLIC PNL TOTAL CA: CPT

## 2018-11-04 RX ORDER — DILTIAZEM HYDROCHLORIDE 60 MG/1
60 TABLET, FILM COATED ORAL EVERY 6 HOURS SCHEDULED
Status: DISCONTINUED | OUTPATIENT
Start: 2018-11-04 | End: 2018-11-06

## 2018-11-04 RX ADMIN — PRAMIPEXOLE DIHYDROCHLORIDE 1 MG: 0.25 TABLET ORAL at 22:11

## 2018-11-04 RX ADMIN — INSULIN LISPRO 1 UNITS: 100 INJECTION, SOLUTION INTRAVENOUS; SUBCUTANEOUS at 19:09

## 2018-11-04 RX ADMIN — Medication 10 ML: at 22:15

## 2018-11-04 RX ADMIN — MESALAMINE 400 MG: 400 CAPSULE, DELAYED RELEASE ORAL at 22:11

## 2018-11-04 RX ADMIN — DILTIAZEM HYDROCHLORIDE 60 MG: 60 TABLET, FILM COATED ORAL at 18:13

## 2018-11-04 RX ADMIN — IPRATROPIUM BROMIDE AND ALBUTEROL SULFATE 1 AMPULE: .5; 3 SOLUTION RESPIRATORY (INHALATION) at 16:25

## 2018-11-04 RX ADMIN — GLYCOPYRROLATE AND FORMOTEROL FUMARATE 2 PUFF: 9; 4.8 AEROSOL, METERED RESPIRATORY (INHALATION) at 08:23

## 2018-11-04 RX ADMIN — DILTIAZEM HYDROCHLORIDE 60 MG: 60 TABLET, FILM COATED ORAL at 12:01

## 2018-11-04 RX ADMIN — AMIODARONE HYDROCHLORIDE 200 MG: 200 TABLET ORAL at 08:13

## 2018-11-04 RX ADMIN — GLYCOPYRROLATE AND FORMOTEROL FUMARATE 2 PUFF: 9; 4.8 AEROSOL, METERED RESPIRATORY (INHALATION) at 19:41

## 2018-11-04 RX ADMIN — SERTRALINE HYDROCHLORIDE 100 MG: 50 TABLET ORAL at 08:13

## 2018-11-04 RX ADMIN — CYCLOBENZAPRINE HYDROCHLORIDE 10 MG: 10 TABLET, FILM COATED ORAL at 22:20

## 2018-11-04 RX ADMIN — MESALAMINE 400 MG: 400 CAPSULE, DELAYED RELEASE ORAL at 18:50

## 2018-11-04 RX ADMIN — IPRATROPIUM BROMIDE AND ALBUTEROL SULFATE 1 AMPULE: .5; 3 SOLUTION RESPIRATORY (INHALATION) at 08:21

## 2018-11-04 RX ADMIN — MONTELUKAST SODIUM 10 MG: 10 TABLET, COATED ORAL at 22:11

## 2018-11-04 RX ADMIN — ZOLPIDEM TARTRATE 5 MG: 5 TABLET ORAL at 22:20

## 2018-11-04 RX ADMIN — OXYCODONE HYDROCHLORIDE AND ACETAMINOPHEN 1 TABLET: 10; 325 TABLET ORAL at 22:20

## 2018-11-04 RX ADMIN — INSULIN LISPRO 1 UNITS: 100 INJECTION, SOLUTION INTRAVENOUS; SUBCUTANEOUS at 22:12

## 2018-11-04 RX ADMIN — SODIUM CHLORIDE, PRESERVATIVE FREE 10 ML: 5 INJECTION INTRAVENOUS at 08:15

## 2018-11-04 RX ADMIN — IPRATROPIUM BROMIDE AND ALBUTEROL SULFATE 1 AMPULE: .5; 3 SOLUTION RESPIRATORY (INHALATION) at 19:41

## 2018-11-04 RX ADMIN — IPRATROPIUM BROMIDE AND ALBUTEROL SULFATE 1 AMPULE: .5; 3 SOLUTION RESPIRATORY (INHALATION) at 11:26

## 2018-11-04 RX ADMIN — CYCLOBENZAPRINE HYDROCHLORIDE 10 MG: 10 TABLET, FILM COATED ORAL at 10:11

## 2018-11-04 RX ADMIN — FUROSEMIDE 40 MG: 10 INJECTION, SOLUTION INTRAMUSCULAR; INTRAVENOUS at 10:11

## 2018-11-04 RX ADMIN — OXYCODONE HYDROCHLORIDE AND ACETAMINOPHEN 1 TABLET: 10; 325 TABLET ORAL at 10:11

## 2018-11-04 RX ADMIN — TAMSULOSIN HYDROCHLORIDE 0.4 MG: 0.4 CAPSULE ORAL at 08:13

## 2018-11-04 RX ADMIN — DONEPEZIL HYDROCHLORIDE 10 MG: 5 TABLET, FILM COATED ORAL at 22:11

## 2018-11-04 RX ADMIN — SODIUM CHLORIDE, PRESERVATIVE FREE 10 ML: 5 INJECTION INTRAVENOUS at 22:15

## 2018-11-04 RX ADMIN — METOPROLOL SUCCINATE 25 MG: 25 TABLET, EXTENDED RELEASE ORAL at 08:13

## 2018-11-04 RX ADMIN — Medication 10 ML: at 08:16

## 2018-11-04 ASSESSMENT — PAIN SCALES - GENERAL
PAINLEVEL_OUTOF10: 6
PAINLEVEL_OUTOF10: 8

## 2018-11-04 NOTE — PROGRESS NOTES
Spoke with endo and Dr. Renee De La Cruz is canceling EGD for today because of pt's heart rate.    Verbal order for diet from [facility] NP.

## 2018-11-04 NOTE — PROGRESS NOTES
0813    zolpidem (AMBIEN) tablet 5 mg  5 mg Oral Nightly PRN Jihan Jeffers MD   5 mg at 11/03/18 2214    sodium chloride flush 0.9 % injection 10 mL  10 mL Intravenous 2 times per day Jihan Jeffers MD   10 mL at 11/04/18 0815    sodium chloride flush 0.9 % injection 10 mL  10 mL Intravenous PRN Jihan Jeffers MD   10 mL at 11/02/18 1155    magnesium hydroxide (MILK OF MAGNESIA) 400 MG/5ML suspension 30 mL  30 mL Oral Daily PRN Jihan Jeffers MD        ondansetron [facility]) injection 4 mg  4 mg Intravenous Q6H PRN Jihan Jeffers MD        glucose (GLUTOSE) 40 % oral gel 15 g  15 g Oral PRN Jihan Jeffers MD        dextrose 50 % solution 12.5 g  12.5 g Intravenous PRN Jihan Jeffers MD        glucagon (rDNA) injection 1 mg  1 mg Intramuscular PRN Jihan Jeffers MD        dextrose 5 % solution  100 mL/hr Intravenous PRN Jihan Jeffers MD        insulin lispro (HUMALOG) injection vial 0-6 Units  0-6 Units Subcutaneous TID WC Jihan Jeffers MD   Stopped at 11/03/18 1042    insulin lispro (HUMALOG) injection vial 0-3 Units  0-3 Units Subcutaneous Nightly Jihan Jeffers MD   1 Units at 11/02/18 2344    glycopyrrolate-formoterol (BEVESPI) 9-4.8 MCG/ACT inhaler 2 puff  2 puff Inhalation BID Jihan Jeffers MD   2 puff at 11/04/18 0823    albuterol (PROVENTIL) nebulizer solution 2.5 mg  2.5 mg Nebulization Q4H PRN Jihan Jeffers MD   2.5 mg at 11/02/18 1550    oxyCODONE-acetaminophen (PERCOCET)  MG per tablet 1 tablet  1 tablet Oral Q6H PRN Orysia SHAYY Lawton - CNP   1 tablet at 11/04/18 1011     No Known Allergies  Principal Problem:    Anemia  Active Problems:    DM II (diabetes mellitus, type II), controlled ([address]    HLD (hyperlipidemia)    Coronary artery disease involving native coronary artery of native heart without angina pectoris    HOLLAND (obstructive sleep apnea)    Essential hypertension    COPD, severe ([facility])    Paroxysmal atrial fibrillation (HCC)    Obesity    Symptomatic

## 2018-11-04 NOTE — PROGRESS NOTES
[address]          Cardiology                                                                Progress Note    Admission date:  2018    Subjective:     HPI/CC: pt feels well. Rhythm has been sinus. AF began at 0630 with elevated HR, minimal symptoms. Vitals:  Blood pressure 123/84, pulse 163, temperature 97.9 °F (36.6 °C), temperature source Oral, resp. rate 18, height 5' 10\" (1.778 m), weight 218 lb 6.4 oz (99.1 kg), SpO2 94 %.   Temp  Av.3 °F (36.8 °C)  Min: 97.9 °F (36.6 °C)  Max: 98.7 °F (37.1 °C)  Pulse  Av  Min: 59  Max: 163  BP  Min: 113/62  Max: 137/77  SpO2  Av.9 %  Min: 90 %  Max: 97 %    24 hour I/O    Intake/Output Summary (Last 24 hours) at 18 0921  Last data filed at 18 0545   Gross per 24 hour   Intake             1255 ml   Output             3300 ml   Net            -2045 ml       Objective:     Telemetry monitor: AF    Physical Exam:  General Appearance:  comfortable  Skin:  Warm and dry  Neck:  supple  Heart:  Irregular, tachy,  normal apex, S1 and S2 normal  Lungs:  Clear  Extremities:  No edema      Lab Review     Renal Profile: Lab Results   Component Value Date    CREATININE 0.8 2018    BUN 19 2018     2018    K 3.6 2018    K 4.1 10/23/2018    CL 97 2018    CO2 30 2018     CBC:  Lab Results   Component Value Date    WBC 8.6 2018    RBC 3.06 2018    HGB 8.4 2018    HCT 25.2 2018    MCV 82.4 2018    RDW 16.8 2018     2018     BNP:  No results found for: BNP  Fasting Lipid Panel:  Lab Results   Component Value Date    CHOL 110 2015    HDL 44 2015    TRIG 212 2015     Cardiac Enzymes:  CK/MbTroponin  Lab Results   Component Value Date    CKTOTAL 205 2017    TROPONINI <0.01 2018     PT/ INR   Lab Results   Component Value Date    INR 1.03 2018    INR 1.79 10/22/2018    INR 1.09 2017    PROTIME 11.7

## 2018-11-04 NOTE — PROGRESS NOTES
General appearance: Pleasant male in no acute distress, appears stated age and cooperative. HEENT: Pupils equal, round, and reactive to light. Conjunctivae/corneas clear. Neck: Supple, with full range of motion. No jugular venous distention. Trachea midline. Respiratory:  Increased respiratory effort. Decreased throughout with crackles bilateral bases. Cardiovascular: Regular rate and rhythm with normal S1/S2 without murmurs, rubs or gallops. Abdomen: Soft, non-tender, non-distended with normal bowel sounds. Musculoskeletal: No clubbing, cyanosis bilaterally. Full range of motion without deformity. 1+ BLE edema. Skin: Skin color, texture, turgor normal.  No rashes or lesions. Neurologic:  Neurovascularly intact without any focal sensory/motor deficits. Cranial nerves: II-XII intact, grossly non-focal.  Psychiatric: Alert and oriented, thought content appropriate, normal insight  Capillary Refill: Brisk,< 3 seconds   Peripheral Pulses: +2 palpable, equal bilaterally       Labs:   Recent Labs      11/02/18   0510  11/02/18   1200  11/03/18   0550  11/04/18   0532   WBC  7.6   --   11.8*  8.6   HGB  7.6*  8.7*  8.2*  8.4*   HCT  23.2*  26.5*  25.2*  25.2*   PLT  147   --   167  171     Recent Labs      11/02/18   0510  11/03/18   0550  11/04/18   0532   NA  136  137  137   K  4.0  4.1  3.6   CL  106  99  97*   CO2  24  29  30   BUN  10  19  19   CREATININE  0.7*  1.0  0.8   CALCIUM  8.0*  9.2  9.0   PHOS  2.4*   --    --      No results for input(s): AST, ALT, BILIDIR, BILITOT, ALKPHOS in the last 72 hours. No results for input(s): INR in the last 72 hours. No results for input(s): Rumalda Revel in the last 72 hours.     Urinalysis:      Lab Results   Component Value Date    NITRU Negative 11/02/2018    WBCUA 0-2 04/18/2017    BACTERIA Rare 04/18/2017    RBCUA None seen 04/18/2017    BLOODU Negative 11/02/2018    SPECGRAV 1.015 11/02/2018    GLUCOSEU 250 11/02/2018       Radiology:  XR CHEST GIB.  Continue amiodarone and Toprol. Hayes added Cardizem this AM, while in afib. DM2 - controlled on home meds. Follow FSBS/SSI. Hold home metformin. Acute respiratory failure - likely due to pulmonary edema from IVF and pRBC. - supplemental O2 to keep sats > 92%. Wean as tolerated. - Lasix 40 mg IV BID starting 11/2.  - daily weights, I&O.     - Last TTE 10/22 - left ventricular hypertrophy with hyperdynamic EF.  - cardiology consulted. - repeat CXR in AM 11/4    DVT Prophylaxis: SCDs only due to GIB  Diet: DIET CARB CONTROL;  Code Status: Full Code    PT/OT Eval Status: ordered    Dispo - likely 1-2 days.  Possibly 41/7    Arnold Dawson, SHAYY - CNP

## 2018-11-05 LAB
ANION GAP SERPL CALCULATED.3IONS-SCNC: 14 MMOL/L (ref 3–16)
BASOPHILS ABSOLUTE: 0 K/UL (ref 0–0.2)
BASOPHILS RELATIVE PERCENT: 0.7 %
BUN BLDV-MCNC: 24 MG/DL (ref 7–20)
CALCIUM SERPL-MCNC: 9.7 MG/DL (ref 8.3–10.6)
CHLORIDE BLD-SCNC: 95 MMOL/L (ref 99–110)
CO2: 28 MMOL/L (ref 21–32)
CREAT SERPL-MCNC: 1 MG/DL (ref 0.8–1.3)
EOSINOPHILS ABSOLUTE: 0.1 K/UL (ref 0–0.6)
EOSINOPHILS RELATIVE PERCENT: 2.3 %
GFR AFRICAN AMERICAN: >60
GFR NON-AFRICAN AMERICAN: >60
GLUCOSE BLD-MCNC: 111 MG/DL (ref 70–99)
GLUCOSE BLD-MCNC: 128 MG/DL (ref 70–99)
GLUCOSE BLD-MCNC: 151 MG/DL (ref 70–99)
GLUCOSE BLD-MCNC: 157 MG/DL (ref 70–99)
HCT VFR BLD CALC: 28.3 % (ref 40.5–52.5)
HEMOGLOBIN: 9.4 G/DL (ref 13.5–17.5)
LYMPHOCYTES ABSOLUTE: 1.4 K/UL (ref 1–5.1)
LYMPHOCYTES RELATIVE PERCENT: 24.5 %
MAGNESIUM: 1.9 MG/DL (ref 1.8–2.4)
MCH RBC QN AUTO: 27.4 PG (ref 26–34)
MCHC RBC AUTO-ENTMCNC: 33.2 G/DL (ref 31–36)
MCV RBC AUTO: 82.6 FL (ref 80–100)
MONOCYTES ABSOLUTE: 0.5 K/UL (ref 0–1.3)
MONOCYTES RELATIVE PERCENT: 8.4 %
NEUTROPHILS ABSOLUTE: 3.6 K/UL (ref 1.7–7.7)
NEUTROPHILS RELATIVE PERCENT: 64.1 %
PDW BLD-RTO: 16.8 % (ref 12.4–15.4)
PERFORMED ON: ABNORMAL
PLATELET # BLD: 207 K/UL (ref 135–450)
PMV BLD AUTO: 8.2 FL (ref 5–10.5)
POTASSIUM REFLEX MAGNESIUM: 3.2 MMOL/L (ref 3.5–5.1)
RBC # BLD: 3.42 M/UL (ref 4.2–5.9)
SODIUM BLD-SCNC: 137 MMOL/L (ref 136–145)
WBC # BLD: 5.7 K/UL (ref 4–11)

## 2018-11-05 PROCEDURE — 85025 COMPLETE CBC W/AUTO DIFF WBC: CPT

## 2018-11-05 PROCEDURE — 2580000003 HC RX 258: Performed by: INTERNAL MEDICINE

## 2018-11-05 PROCEDURE — 6370000000 HC RX 637 (ALT 250 FOR IP): Performed by: NURSE PRACTITIONER

## 2018-11-05 PROCEDURE — 1200000000 HC SEMI PRIVATE

## 2018-11-05 PROCEDURE — G8987 SELF CARE CURRENT STATUS: HCPCS

## 2018-11-05 PROCEDURE — 97161 PT EVAL LOW COMPLEX 20 MIN: CPT

## 2018-11-05 PROCEDURE — 94640 AIRWAY INHALATION TREATMENT: CPT

## 2018-11-05 PROCEDURE — 80048 BASIC METABOLIC PNL TOTAL CA: CPT

## 2018-11-05 PROCEDURE — 2700000000 HC OXYGEN THERAPY PER DAY

## 2018-11-05 PROCEDURE — 97165 OT EVAL LOW COMPLEX 30 MIN: CPT

## 2018-11-05 PROCEDURE — 94761 N-INVAS EAR/PLS OXIMETRY MLT: CPT

## 2018-11-05 PROCEDURE — G8978 MOBILITY CURRENT STATUS: HCPCS

## 2018-11-05 PROCEDURE — 2580000003 HC RX 258: Performed by: PHYSICIAN ASSISTANT

## 2018-11-05 PROCEDURE — 97535 SELF CARE MNGMENT TRAINING: CPT

## 2018-11-05 PROCEDURE — G8988 SELF CARE GOAL STATUS: HCPCS

## 2018-11-05 PROCEDURE — 6370000000 HC RX 637 (ALT 250 FOR IP): Performed by: INTERNAL MEDICINE

## 2018-11-05 PROCEDURE — 97116 GAIT TRAINING THERAPY: CPT

## 2018-11-05 PROCEDURE — 97110 THERAPEUTIC EXERCISES: CPT

## 2018-11-05 PROCEDURE — 83735 ASSAY OF MAGNESIUM: CPT

## 2018-11-05 PROCEDURE — 6360000002 HC RX W HCPCS: Performed by: NURSE PRACTITIONER

## 2018-11-05 PROCEDURE — G8980 MOBILITY D/C STATUS: HCPCS

## 2018-11-05 PROCEDURE — G8989 SELF CARE D/C STATUS: HCPCS

## 2018-11-05 PROCEDURE — G8979 MOBILITY GOAL STATUS: HCPCS

## 2018-11-05 RX ADMIN — IPRATROPIUM BROMIDE AND ALBUTEROL SULFATE 1 AMPULE: .5; 3 SOLUTION RESPIRATORY (INHALATION) at 07:46

## 2018-11-05 RX ADMIN — DONEPEZIL HYDROCHLORIDE 10 MG: 5 TABLET, FILM COATED ORAL at 21:25

## 2018-11-05 RX ADMIN — SERTRALINE HYDROCHLORIDE 100 MG: 50 TABLET ORAL at 08:00

## 2018-11-05 RX ADMIN — SODIUM CHLORIDE, PRESERVATIVE FREE 10 ML: 5 INJECTION INTRAVENOUS at 21:34

## 2018-11-05 RX ADMIN — METOPROLOL SUCCINATE 25 MG: 25 TABLET, EXTENDED RELEASE ORAL at 08:01

## 2018-11-05 RX ADMIN — DILTIAZEM HYDROCHLORIDE 60 MG: 60 TABLET, FILM COATED ORAL at 23:26

## 2018-11-05 RX ADMIN — TAMSULOSIN HYDROCHLORIDE 0.4 MG: 0.4 CAPSULE ORAL at 08:01

## 2018-11-05 RX ADMIN — DILTIAZEM HYDROCHLORIDE 60 MG: 60 TABLET, FILM COATED ORAL at 00:02

## 2018-11-05 RX ADMIN — ZOLPIDEM TARTRATE 5 MG: 5 TABLET ORAL at 21:33

## 2018-11-05 RX ADMIN — MESALAMINE 400 MG: 400 CAPSULE, DELAYED RELEASE ORAL at 21:25

## 2018-11-05 RX ADMIN — FUROSEMIDE 40 MG: 10 INJECTION, SOLUTION INTRAMUSCULAR; INTRAVENOUS at 19:13

## 2018-11-05 RX ADMIN — MESALAMINE 400 MG: 400 CAPSULE, DELAYED RELEASE ORAL at 08:01

## 2018-11-05 RX ADMIN — SODIUM CHLORIDE, PRESERVATIVE FREE 10 ML: 5 INJECTION INTRAVENOUS at 19:13

## 2018-11-05 RX ADMIN — Medication 10 ML: at 08:02

## 2018-11-05 RX ADMIN — IPRATROPIUM BROMIDE AND ALBUTEROL SULFATE 1 AMPULE: .5; 3 SOLUTION RESPIRATORY (INHALATION) at 19:44

## 2018-11-05 RX ADMIN — PRAMIPEXOLE DIHYDROCHLORIDE 1 MG: 0.25 TABLET ORAL at 21:39

## 2018-11-05 RX ADMIN — GLYCOPYRROLATE AND FORMOTEROL FUMARATE 2 PUFF: 9; 4.8 AEROSOL, METERED RESPIRATORY (INHALATION) at 19:46

## 2018-11-05 RX ADMIN — MESALAMINE 400 MG: 400 CAPSULE, DELAYED RELEASE ORAL at 13:31

## 2018-11-05 RX ADMIN — DILTIAZEM HYDROCHLORIDE 60 MG: 60 TABLET, FILM COATED ORAL at 19:13

## 2018-11-05 RX ADMIN — MONTELUKAST SODIUM 10 MG: 10 TABLET, COATED ORAL at 21:25

## 2018-11-05 RX ADMIN — IPRATROPIUM BROMIDE AND ALBUTEROL SULFATE 1 AMPULE: .5; 3 SOLUTION RESPIRATORY (INHALATION) at 11:43

## 2018-11-05 RX ADMIN — OXYCODONE HYDROCHLORIDE AND ACETAMINOPHEN 1 TABLET: 10; 325 TABLET ORAL at 21:33

## 2018-11-05 RX ADMIN — FUROSEMIDE 40 MG: 10 INJECTION, SOLUTION INTRAMUSCULAR; INTRAVENOUS at 08:00

## 2018-11-05 RX ADMIN — DILTIAZEM HYDROCHLORIDE 60 MG: 60 TABLET, FILM COATED ORAL at 13:27

## 2018-11-05 RX ADMIN — AMIODARONE HYDROCHLORIDE 200 MG: 200 TABLET ORAL at 08:01

## 2018-11-05 RX ADMIN — OXYCODONE HYDROCHLORIDE AND ACETAMINOPHEN 1 TABLET: 10; 325 TABLET ORAL at 08:00

## 2018-11-05 RX ADMIN — GLYCOPYRROLATE AND FORMOTEROL FUMARATE 2 PUFF: 9; 4.8 AEROSOL, METERED RESPIRATORY (INHALATION) at 07:47

## 2018-11-05 RX ADMIN — IPRATROPIUM BROMIDE AND ALBUTEROL SULFATE 1 AMPULE: .5; 3 SOLUTION RESPIRATORY (INHALATION) at 15:44

## 2018-11-05 RX ADMIN — CHOLESTYRAMINE 4 G: 4 POWDER, FOR SUSPENSION ORAL at 08:01

## 2018-11-05 RX ADMIN — INSULIN LISPRO 1 UNITS: 100 INJECTION, SOLUTION INTRAVENOUS; SUBCUTANEOUS at 21:25

## 2018-11-05 RX ADMIN — SODIUM CHLORIDE, PRESERVATIVE FREE 10 ML: 5 INJECTION INTRAVENOUS at 07:59

## 2018-11-05 RX ADMIN — CYCLOBENZAPRINE HYDROCHLORIDE 10 MG: 10 TABLET, FILM COATED ORAL at 21:34

## 2018-11-05 ASSESSMENT — PAIN SCALES - GENERAL
PAINLEVEL_OUTOF10: 8
PAINLEVEL_OUTOF10: 6

## 2018-11-05 NOTE — PROGRESS NOTES
PROGRESS NOTE  S:73 yrs Patient  admitted on 11/1/2018 with Anemia [D64.9]  Anemia [D64.9] . Today he feels well. Denies bleeding, chest pain or SOB      Exam:   Vitals:    11/05/18 1123   BP: 126/75   Pulse: 65   Resp: 14   Temp: 98.2 °F (36.8 °C)   SpO2: 94%      General appearance: alert, appears stated age and cooperative  HEENT: Sclera clear, anicteric and Oropharynx clear, no lesions  Neck: no adenopathy and supple, symmetrical, trachea midline  Lungs: clear to auscultation bilaterally  Heart: irregularly irregular rhythm  Abdomen: soft, non-tender; bowel sounds normal; no masses,  no organomegaly  Extremities: extremities normal, atraumatic, no cyanosis or edema        Medications: Reviewed    Labs:  CBC:   Recent Labs      11/03/18   0550  11/04/18   0532   WBC  11.8*  8.6   HGB  8.2*  8.4*   HCT  25.2*  25.2*   MCV  83.0  82.4   PLT  167  171     BMP:   Recent Labs      11/03/18   0550  11/04/18   0532   NA  137  137   K  4.1  3.6   CL  99  97*   CO2  29  30   BUN  19  19   CREATININE  1.0  0.8     Impression: 68year old male with history of HTN, HLD, DM, CAD, HOLLAND, COPD, A fib and BPH admitted with A fib with RVR. He was recently started on xarelto and subsequently developed rectal bleeding and colonoscopy on 10/25/18 showed diverticulosis. Anticoagulation was held. He was admitted for anemia but denies any bleeding. Recommendation:  1. Continue supportive care  2. Monitor Hgb  3. Observe for signs of bleeding  4. EGD tomorrow AM  5. Continue PPI  6.  Will follow      Boston Mcnulty MD  2:12 PM 11/5/2018

## 2018-11-05 NOTE — PROGRESS NOTES
notified    G-Code  PT G-Codes  Functional Assessment Tool Used: AM PAC  Score: 20  Functional Limitation: Mobility: Walking and moving around  Mobility: Walking and Moving Around Current Status (): 0 percent impaired, limited or restricted  Mobility: Walking and Moving Around Goal Status (): 0 percent impaired, limited or restricted  Mobility: Walking and Moving Around Discharge Status (): 0 percent impaired, limited or restricted    AM-PAC Score     AM-PAC Inpatient Mobility without Stair Climbing Raw Score : 20  AM-PAC Inpatient without Stair Climbing T-Scale Score : 60.57  Mobility Inpatient CMS 0-100% Score: 0  Mobility Inpatient without Stair CMS G-Code Modifier : CH       Goals  Short term goals  Time Frame for Short term goals: 1 visit  Short term goal 1: pt to voice understanding of meaning of O2 sats, importance of mobility and energy conservation, safety- met  Short term goal 2: Pt to be indep with basic LE Ex - met  Short term goal 3: pt to voice understanding of benefits of amb, importance of energy conservation during amb- met  Patient Goals   Patient goals : \"to get off the oxygen\"       Therapy Time   Individual Concurrent Group Co-treatment   Time In 0900         Time Out 0933         Minutes 33         Timed Code Treatment Minutes: [address] [facility], [id number]

## 2018-11-06 LAB
ANION GAP SERPL CALCULATED.3IONS-SCNC: 10 MMOL/L (ref 3–16)
BASOPHILS ABSOLUTE: 0 K/UL (ref 0–0.2)
BASOPHILS RELATIVE PERCENT: 0.7 %
BUN BLDV-MCNC: 23 MG/DL (ref 7–20)
CALCIUM SERPL-MCNC: 9.6 MG/DL (ref 8.3–10.6)
CHLORIDE BLD-SCNC: 97 MMOL/L (ref 99–110)
CO2: 31 MMOL/L (ref 21–32)
CREAT SERPL-MCNC: 0.9 MG/DL (ref 0.8–1.3)
EOSINOPHILS ABSOLUTE: 0.2 K/UL (ref 0–0.6)
EOSINOPHILS RELATIVE PERCENT: 4.4 %
GFR AFRICAN AMERICAN: >60
GFR NON-AFRICAN AMERICAN: >60
GLUCOSE BLD-MCNC: 106 MG/DL (ref 70–99)
GLUCOSE BLD-MCNC: 116 MG/DL (ref 70–99)
GLUCOSE BLD-MCNC: 152 MG/DL (ref 70–99)
GLUCOSE BLD-MCNC: 154 MG/DL (ref 70–99)
GLUCOSE BLD-MCNC: 185 MG/DL (ref 70–99)
HCT VFR BLD CALC: 26.2 % (ref 40.5–52.5)
HEMOGLOBIN: 8.5 G/DL (ref 13.5–17.5)
LYMPHOCYTES ABSOLUTE: 1.5 K/UL (ref 1–5.1)
LYMPHOCYTES RELATIVE PERCENT: 27.7 %
MAGNESIUM: 1.9 MG/DL (ref 1.8–2.4)
MCH RBC QN AUTO: 26.7 PG (ref 26–34)
MCHC RBC AUTO-ENTMCNC: 32.5 G/DL (ref 31–36)
MCV RBC AUTO: 82.4 FL (ref 80–100)
MONOCYTES ABSOLUTE: 0.5 K/UL (ref 0–1.3)
MONOCYTES RELATIVE PERCENT: 9.3 %
NEUTROPHILS ABSOLUTE: 3.2 K/UL (ref 1.7–7.7)
NEUTROPHILS RELATIVE PERCENT: 57.9 %
PDW BLD-RTO: 17 % (ref 12.4–15.4)
PERFORMED ON: ABNORMAL
PLATELET # BLD: 187 K/UL (ref 135–450)
PMV BLD AUTO: 8 FL (ref 5–10.5)
POTASSIUM REFLEX MAGNESIUM: 3.5 MMOL/L (ref 3.5–5.1)
RBC # BLD: 3.18 M/UL (ref 4.2–5.9)
SODIUM BLD-SCNC: 138 MMOL/L (ref 136–145)
WBC # BLD: 5.6 K/UL (ref 4–11)

## 2018-11-06 PROCEDURE — 6370000000 HC RX 637 (ALT 250 FOR IP): Performed by: INTERNAL MEDICINE

## 2018-11-06 PROCEDURE — 0DJD8ZZ INSPECTION OF LOWER INTESTINAL TRACT, VIA NATURAL OR ARTIFICIAL OPENING ENDOSCOPIC: ICD-10-PCS | Performed by: INTERNAL MEDICINE

## 2018-11-06 PROCEDURE — 6370000000 HC RX 637 (ALT 250 FOR IP): Performed by: NURSE PRACTITIONER

## 2018-11-06 PROCEDURE — 94640 AIRWAY INHALATION TREATMENT: CPT

## 2018-11-06 PROCEDURE — 1200000000 HC SEMI PRIVATE

## 2018-11-06 PROCEDURE — 99233 SBSQ HOSP IP/OBS HIGH 50: CPT | Performed by: NURSE PRACTITIONER

## 2018-11-06 PROCEDURE — 6360000002 HC RX W HCPCS: Performed by: INTERNAL MEDICINE

## 2018-11-06 PROCEDURE — 7100000011 HC PHASE II RECOVERY - ADDTL 15 MIN: Performed by: INTERNAL MEDICINE

## 2018-11-06 PROCEDURE — 83735 ASSAY OF MAGNESIUM: CPT

## 2018-11-06 PROCEDURE — 0DB98ZX EXCISION OF DUODENUM, VIA NATURAL OR ARTIFICIAL OPENING ENDOSCOPIC, DIAGNOSTIC: ICD-10-PCS | Performed by: INTERNAL MEDICINE

## 2018-11-06 PROCEDURE — 2709999900 HC NON-CHARGEABLE SUPPLY: Performed by: INTERNAL MEDICINE

## 2018-11-06 PROCEDURE — 88305 TISSUE EXAM BY PATHOLOGIST: CPT

## 2018-11-06 PROCEDURE — 94761 N-INVAS EAR/PLS OXIMETRY MLT: CPT

## 2018-11-06 PROCEDURE — 7100000010 HC PHASE II RECOVERY - FIRST 15 MIN: Performed by: INTERNAL MEDICINE

## 2018-11-06 PROCEDURE — 3609012400 HC EGD TRANSORAL BIOPSY SINGLE/MULTIPLE: Performed by: INTERNAL MEDICINE

## 2018-11-06 PROCEDURE — 2580000003 HC RX 258: Performed by: INTERNAL MEDICINE

## 2018-11-06 PROCEDURE — 6360000002 HC RX W HCPCS: Performed by: NURSE PRACTITIONER

## 2018-11-06 PROCEDURE — 99152 MOD SED SAME PHYS/QHP 5/>YRS: CPT | Performed by: INTERNAL MEDICINE

## 2018-11-06 PROCEDURE — 2580000003 HC RX 258: Performed by: PHYSICIAN ASSISTANT

## 2018-11-06 PROCEDURE — 94664 DEMO&/EVAL PT USE INHALER: CPT

## 2018-11-06 PROCEDURE — 2700000000 HC OXYGEN THERAPY PER DAY

## 2018-11-06 PROCEDURE — 85025 COMPLETE CBC W/AUTO DIFF WBC: CPT

## 2018-11-06 PROCEDURE — 80048 BASIC METABOLIC PNL TOTAL CA: CPT

## 2018-11-06 RX ORDER — FENTANYL CITRATE 50 UG/ML
INJECTION, SOLUTION INTRAMUSCULAR; INTRAVENOUS PRN
Status: DISCONTINUED | OUTPATIENT
Start: 2018-11-06 | End: 2018-11-06 | Stop reason: HOSPADM

## 2018-11-06 RX ORDER — MIDAZOLAM HYDROCHLORIDE 5 MG/ML
INJECTION INTRAMUSCULAR; INTRAVENOUS PRN
Status: DISCONTINUED | OUTPATIENT
Start: 2018-11-06 | End: 2018-11-06 | Stop reason: HOSPADM

## 2018-11-06 RX ORDER — SODIUM CHLORIDE 9 MG/ML
INJECTION, SOLUTION INTRAVENOUS
Status: DISPENSED
Start: 2018-11-06 | End: 2018-11-06

## 2018-11-06 RX ORDER — DILTIAZEM HYDROCHLORIDE 240 MG/1
240 CAPSULE, COATED, EXTENDED RELEASE ORAL DAILY
Status: DISCONTINUED | OUTPATIENT
Start: 2018-11-06 | End: 2018-11-07

## 2018-11-06 RX ORDER — FUROSEMIDE 40 MG/1
40 TABLET ORAL DAILY
Status: DISCONTINUED | OUTPATIENT
Start: 2018-11-07 | End: 2018-11-07 | Stop reason: HOSPADM

## 2018-11-06 RX ADMIN — IPRATROPIUM BROMIDE AND ALBUTEROL SULFATE 1 AMPULE: .5; 3 SOLUTION RESPIRATORY (INHALATION) at 09:33

## 2018-11-06 RX ADMIN — ZOLPIDEM TARTRATE 5 MG: 5 TABLET ORAL at 22:29

## 2018-11-06 RX ADMIN — GLYCOPYRROLATE AND FORMOTEROL FUMARATE 2 PUFF: 9; 4.8 AEROSOL, METERED RESPIRATORY (INHALATION) at 21:16

## 2018-11-06 RX ADMIN — METOPROLOL SUCCINATE 25 MG: 25 TABLET, EXTENDED RELEASE ORAL at 09:14

## 2018-11-06 RX ADMIN — INSULIN LISPRO 1 UNITS: 100 INJECTION, SOLUTION INTRAVENOUS; SUBCUTANEOUS at 21:05

## 2018-11-06 RX ADMIN — CYCLOBENZAPRINE HYDROCHLORIDE 10 MG: 10 TABLET, FILM COATED ORAL at 22:29

## 2018-11-06 RX ADMIN — OXYCODONE HYDROCHLORIDE AND ACETAMINOPHEN 1 TABLET: 10; 325 TABLET ORAL at 14:54

## 2018-11-06 RX ADMIN — DONEPEZIL HYDROCHLORIDE 10 MG: 5 TABLET, FILM COATED ORAL at 21:10

## 2018-11-06 RX ADMIN — INSULIN LISPRO 1 UNITS: 100 INJECTION, SOLUTION INTRAVENOUS; SUBCUTANEOUS at 11:26

## 2018-11-06 RX ADMIN — SODIUM CHLORIDE, PRESERVATIVE FREE 10 ML: 5 INJECTION INTRAVENOUS at 22:03

## 2018-11-06 RX ADMIN — DILTIAZEM HYDROCHLORIDE 240 MG: 240 CAPSULE, COATED, EXTENDED RELEASE ORAL at 11:44

## 2018-11-06 RX ADMIN — DILTIAZEM HYDROCHLORIDE 60 MG: 60 TABLET, FILM COATED ORAL at 06:14

## 2018-11-06 RX ADMIN — FUROSEMIDE 40 MG: 10 INJECTION, SOLUTION INTRAMUSCULAR; INTRAVENOUS at 09:14

## 2018-11-06 RX ADMIN — Medication 10 ML: at 22:03

## 2018-11-06 RX ADMIN — CHOLESTYRAMINE 4 G: 4 POWDER, FOR SUSPENSION ORAL at 09:14

## 2018-11-06 RX ADMIN — GLYCOPYRROLATE AND FORMOTEROL FUMARATE 2 PUFF: 9; 4.8 AEROSOL, METERED RESPIRATORY (INHALATION) at 12:39

## 2018-11-06 RX ADMIN — MESALAMINE 400 MG: 400 CAPSULE, DELAYED RELEASE ORAL at 09:14

## 2018-11-06 RX ADMIN — IPRATROPIUM BROMIDE AND ALBUTEROL SULFATE 1 AMPULE: .5; 3 SOLUTION RESPIRATORY (INHALATION) at 21:15

## 2018-11-06 RX ADMIN — MESALAMINE 400 MG: 400 CAPSULE, DELAYED RELEASE ORAL at 14:58

## 2018-11-06 RX ADMIN — SODIUM CHLORIDE, PRESERVATIVE FREE 10 ML: 5 INJECTION INTRAVENOUS at 09:15

## 2018-11-06 RX ADMIN — TAMSULOSIN HYDROCHLORIDE 0.4 MG: 0.4 CAPSULE ORAL at 09:14

## 2018-11-06 RX ADMIN — AMIODARONE HYDROCHLORIDE 200 MG: 200 TABLET ORAL at 09:14

## 2018-11-06 RX ADMIN — PANTOPRAZOLE SODIUM 40 MG: 40 TABLET, DELAYED RELEASE ORAL at 06:14

## 2018-11-06 RX ADMIN — MONTELUKAST SODIUM 10 MG: 10 TABLET, COATED ORAL at 21:10

## 2018-11-06 RX ADMIN — IPRATROPIUM BROMIDE AND ALBUTEROL SULFATE 1 AMPULE: .5; 3 SOLUTION RESPIRATORY (INHALATION) at 12:35

## 2018-11-06 RX ADMIN — OXYCODONE HYDROCHLORIDE AND ACETAMINOPHEN 1 TABLET: 10; 325 TABLET ORAL at 22:29

## 2018-11-06 RX ADMIN — SERTRALINE HYDROCHLORIDE 100 MG: 50 TABLET ORAL at 09:14

## 2018-11-06 RX ADMIN — IPRATROPIUM BROMIDE AND ALBUTEROL SULFATE 1 AMPULE: .5; 3 SOLUTION RESPIRATORY (INHALATION) at 15:30

## 2018-11-06 RX ADMIN — PRAMIPEXOLE DIHYDROCHLORIDE 1 MG: 0.25 TABLET ORAL at 21:10

## 2018-11-06 RX ADMIN — MESALAMINE 400 MG: 400 CAPSULE, DELAYED RELEASE ORAL at 21:10

## 2018-11-06 ASSESSMENT — PAIN SCALES - GENERAL
PAINLEVEL_OUTOF10: 0
PAINLEVEL_OUTOF10: 0
PAINLEVEL_OUTOF10: 5
PAINLEVEL_OUTOF10: 0
PAINLEVEL_OUTOF10: 8

## 2018-11-06 ASSESSMENT — PAIN DESCRIPTION - LOCATION: LOCATION: BACK

## 2018-11-06 NOTE — H&P
History and Physical / Pre-Sedation Assessment    Patient:  Nnamdi Wells   :   1945     Intended Procedure:  EGD    HPI: 68year old male with history of HTN, HLD, DM, CAD, HOLLAND, COPD, A fib and BPH admitted with A fib with RVR. He was recently started on xarelto and subsequently developed rectal bleeding and colonoscopy on 10/25/18 showed diverticulosis. Anticoagulation was held. He was admitted for anemia but denies any bleeding. Nurses notes reviewed and agreed. Medications reviewed  Allergies: No Known Allergies    Physical Exam:  Vital Signs: BP (!) 142/64   Pulse 55   Temp 97.8 °F (36.6 °C) (Temporal)   Resp 16   Ht 5' 10\" (1.778 m)   Wt 217 lb 8 oz (98.7 kg)   SpO2 94%   BMI 31.21 kg/m²    Airway: Mallampati: II (soft palate, uvula, fauces visible)  Pulmonary:Normal  Cardiac:Normal  Abdomen:Normal    Pre-Procedure Assessment / Plan:  ASA: Class 3 - A patient with severe systemic disease that limits activity but is not incapacitating  Level of Sedation Plan: Moderate sedation  Post Procedure plan: Return to same level of care    I assessed the patient and find that the patient is in satisfactory condition to proceed with the planned procedure and sedation plan. I have explained the risk, benefits, and alternatives to the procedure; the patient understands and agrees to proceed.        Rodrigo Almonte  2018
DO   TRUEPLUS LANCETS 28G MISC 1 each by Does not apply route daily E11.9 Test FBS daily--NEED 33 GAUGE 5/1/17  Yes Ryan Love DO   b complex vitamins capsule Take 1 capsule by mouth daily 4/23/17  Yes SHAYY Hopkins CNP   finasteride (PROSCAR) 5 MG tablet Take 1 tablet by mouth daily Will shrink prostate; Affects PSA number 9/21/18 10/22/18  Kassy Leyva MD       Allergies:  Patient has no known allergies. Social History:      The patient currently lives independently    TOBACCO:   reports that he quit smoking about 18 years ago. His smoking use included Cigarettes. He has a 30.00 pack-year smoking history. He has never used smokeless tobacco.  ETOH:   reports that he does not drink alcohol. Family History:      Reviewed in detail and negative for CAD, CVA. Positive as follows:    Family History   Problem Relation Age of Onset    Diabetes Mother     Early Death Mother     Cancer Mother         stomach    Other Father     Diabetes Son        REVIEW OF SYSTEMS:   Pertinent positives as noted in the HPI. All other systems reviewed and negative. PHYSICAL EXAM:    /65   Pulse 58   Temp 98 °F (36.7 °C)   Resp 23   Ht 5' 10\" (1.778 m)   Wt 223 lb (101.2 kg)   SpO2 96%   BMI 32.00 kg/m²     General appearance:  No apparent distress, appears stated age and cooperative. HEENT:  Normal cephalic, atraumatic without obvious deformity. Pupils equal, round, and reactive to light. Extra ocular muscles intact. Conjunctivae/corneas clear. Neck: Supple, with full range of motion. No jugular venous distention. Trachea midline. Respiratory:  Normal respiratory effort. Clear to auscultation, bilaterally without Rales/Wheezes/Rhonchi. Cardiovascular:  Regular rate and rhythm with normal S1/S2 without murmurs, rubs or gallops. Abdomen: Soft, non-tender, non-distended with normal bowel sounds. Musculoskeletal:  No clubbing, cyanosis or edema bilaterally.   Full range of motion without

## 2018-11-06 NOTE — PLAN OF CARE
Problem: Falls - Risk of:  Goal: Will remain free from falls  Will remain free from falls   Outcome: Ongoing  Patient calls out appropriately, call light within reach, non skid sock on     Problem: Serum Glucose Level - Abnormal:  Goal: Ability to maintain appropriate glucose levels will improve  Ability to maintain appropriate glucose levels will improve   Outcome: Ongoing      Comments: Diabetes education provided today:    Different diabetic medications. Managing high and low sugar readings.

## 2018-11-06 NOTE — PROGRESS NOTES
Hospitalist Progress Note      PCP: Min Rees DO    Date of Admission: 11/1/2018    Chief Complaint: SOB/dizziness    Hospital Course:     68 y.o. male recently discharged from [facility] who presented to [facility] with SOB/Dizziness and fatigue since discharge. Patient had Hgb drawn w/ decrease from discharge value and was told to come to hospital for transfusion. Complains of extreme SOB with increasing O2 requirement. Subjective:   S/p EGD, OK with GI to d.c with follow upand hold AC x 2 weeks.    Will s/w cards    Patient Vitals for the past 96 hrs (Last 3 readings):   Weight   11/06/18 0622 217 lb 8 oz (98.7 kg)   11/05/18 0636 217 lb 3.2 oz (98.5 kg)   11/04/18 0613 218 lb 6.4 oz (99.1 kg)         Medications:  Reviewed    Infusion Medications    sodium chloride      dextrose       Scheduled Medications    diltiazem  240 mg Oral Daily    [START ON 11/7/2018] furosemide  40 mg Oral Daily    ipratropium-albuterol  1 ampule Inhalation 4x daily    sodium chloride flush  10 mL Intravenous 2 times per day    amiodarone  200 mg Oral Daily    cholestyramine  4 g Oral Daily    donepezil  10 mg Oral Nightly    mesalamine  400 mg Oral TID    metoprolol succinate  25 mg Oral Daily    montelukast  10 mg Oral Nightly    pantoprazole  40 mg Oral QAM AC    pramipexole  1 mg Oral Nightly    sertraline  100 mg Oral Daily    tamsulosin  0.4 mg Oral Daily    sodium chloride flush  10 mL Intravenous 2 times per day    insulin lispro  0-6 Units Subcutaneous TID WC    insulin lispro  0-3 Units Subcutaneous Nightly    glycopyrrolate-formoterol  2 puff Inhalation BID     PRN Meds: sodium chloride flush, cyclobenzaprine, zolpidem, sodium chloride flush, magnesium hydroxide, ondansetron, glucose, dextrose, glucagon (rDNA), dextrose, albuterol, oxyCODONE-acetaminophen      Intake/Output Summary (Last 24 hours) at 11/06/18 1400  Last data filed at 11/06/18 1236   Gross per 24 hour   Intake

## 2018-11-07 VITALS
SYSTOLIC BLOOD PRESSURE: 146 MMHG | WEIGHT: 220.8 LBS | RESPIRATION RATE: 20 BRPM | OXYGEN SATURATION: 95 % | DIASTOLIC BLOOD PRESSURE: 64 MMHG | BODY MASS INDEX: 31.61 KG/M2 | TEMPERATURE: 98.1 F | HEIGHT: 70 IN | HEART RATE: 60 BPM

## 2018-11-07 LAB
ANION GAP SERPL CALCULATED.3IONS-SCNC: 9 MMOL/L (ref 3–16)
BASOPHILS ABSOLUTE: 0 K/UL (ref 0–0.2)
BASOPHILS RELATIVE PERCENT: 0.7 %
BUN BLDV-MCNC: 22 MG/DL (ref 7–20)
CALCIUM SERPL-MCNC: 9.8 MG/DL (ref 8.3–10.6)
CHLORIDE BLD-SCNC: 98 MMOL/L (ref 99–110)
CO2: 30 MMOL/L (ref 21–32)
CREAT SERPL-MCNC: 0.9 MG/DL (ref 0.8–1.3)
EOSINOPHILS ABSOLUTE: 0.3 K/UL (ref 0–0.6)
EOSINOPHILS RELATIVE PERCENT: 6 %
GFR AFRICAN AMERICAN: >60
GFR NON-AFRICAN AMERICAN: >60
GLUCOSE BLD-MCNC: 114 MG/DL (ref 70–99)
GLUCOSE BLD-MCNC: 116 MG/DL (ref 70–99)
HCT VFR BLD CALC: 26.5 % (ref 40.5–52.5)
HEMOGLOBIN: 8.5 G/DL (ref 13.5–17.5)
LYMPHOCYTES ABSOLUTE: 1.5 K/UL (ref 1–5.1)
LYMPHOCYTES RELATIVE PERCENT: 29 %
MCH RBC QN AUTO: 26.5 PG (ref 26–34)
MCHC RBC AUTO-ENTMCNC: 32.1 G/DL (ref 31–36)
MCV RBC AUTO: 82.5 FL (ref 80–100)
MONOCYTES ABSOLUTE: 0.5 K/UL (ref 0–1.3)
MONOCYTES RELATIVE PERCENT: 8.7 %
NEUTROPHILS ABSOLUTE: 2.9 K/UL (ref 1.7–7.7)
NEUTROPHILS RELATIVE PERCENT: 55.6 %
PDW BLD-RTO: 16.9 % (ref 12.4–15.4)
PERFORMED ON: ABNORMAL
PLATELET # BLD: 188 K/UL (ref 135–450)
PMV BLD AUTO: 7.7 FL (ref 5–10.5)
POTASSIUM REFLEX MAGNESIUM: 3.8 MMOL/L (ref 3.5–5.1)
RBC # BLD: 3.22 M/UL (ref 4.2–5.9)
SODIUM BLD-SCNC: 137 MMOL/L (ref 136–145)
WBC # BLD: 5.3 K/UL (ref 4–11)

## 2018-11-07 PROCEDURE — 80048 BASIC METABOLIC PNL TOTAL CA: CPT

## 2018-11-07 PROCEDURE — 6370000000 HC RX 637 (ALT 250 FOR IP): Performed by: INTERNAL MEDICINE

## 2018-11-07 PROCEDURE — 99233 SBSQ HOSP IP/OBS HIGH 50: CPT | Performed by: NURSE PRACTITIONER

## 2018-11-07 PROCEDURE — 85025 COMPLETE CBC W/AUTO DIFF WBC: CPT

## 2018-11-07 PROCEDURE — 94664 DEMO&/EVAL PT USE INHALER: CPT

## 2018-11-07 PROCEDURE — 94640 AIRWAY INHALATION TREATMENT: CPT

## 2018-11-07 PROCEDURE — 6370000000 HC RX 637 (ALT 250 FOR IP): Performed by: NURSE PRACTITIONER

## 2018-11-07 RX ORDER — DILTIAZEM HYDROCHLORIDE 120 MG/1
120 CAPSULE, COATED, EXTENDED RELEASE ORAL DAILY
Qty: 30 CAPSULE | Refills: 3 | Status: SHIPPED | OUTPATIENT
Start: 2018-11-08 | End: 2018-01-01 | Stop reason: SDUPTHER

## 2018-11-07 RX ORDER — DILTIAZEM HYDROCHLORIDE 240 MG/1
240 CAPSULE, COATED, EXTENDED RELEASE ORAL DAILY
Qty: 30 CAPSULE | Refills: 3 | Status: SHIPPED | OUTPATIENT
Start: 2018-11-08 | End: 2018-11-07 | Stop reason: HOSPADM

## 2018-11-07 RX ORDER — FUROSEMIDE 40 MG/1
40 TABLET ORAL DAILY
Qty: 60 TABLET | Refills: 3 | Status: SHIPPED | OUTPATIENT
Start: 2018-11-08 | End: 2018-01-01 | Stop reason: SDUPTHER

## 2018-11-07 RX ORDER — DILTIAZEM HYDROCHLORIDE 120 MG/1
120 CAPSULE, COATED, EXTENDED RELEASE ORAL DAILY
Status: DISCONTINUED | OUTPATIENT
Start: 2018-11-08 | End: 2018-11-07 | Stop reason: HOSPADM

## 2018-11-07 RX ADMIN — METOPROLOL SUCCINATE 25 MG: 25 TABLET, EXTENDED RELEASE ORAL at 10:24

## 2018-11-07 RX ADMIN — GLYCOPYRROLATE AND FORMOTEROL FUMARATE 2 PUFF: 9; 4.8 AEROSOL, METERED RESPIRATORY (INHALATION) at 09:35

## 2018-11-07 RX ADMIN — IPRATROPIUM BROMIDE AND ALBUTEROL SULFATE 1 AMPULE: .5; 3 SOLUTION RESPIRATORY (INHALATION) at 09:35

## 2018-11-07 RX ADMIN — FUROSEMIDE 40 MG: 40 TABLET ORAL at 10:24

## 2018-11-07 RX ADMIN — PANTOPRAZOLE SODIUM 40 MG: 40 TABLET, DELAYED RELEASE ORAL at 05:25

## 2018-11-07 RX ADMIN — TAMSULOSIN HYDROCHLORIDE 0.4 MG: 0.4 CAPSULE ORAL at 10:24

## 2018-11-07 RX ADMIN — MESALAMINE 400 MG: 400 CAPSULE, DELAYED RELEASE ORAL at 10:24

## 2018-11-07 RX ADMIN — AMIODARONE HYDROCHLORIDE 200 MG: 200 TABLET ORAL at 10:24

## 2018-11-07 RX ADMIN — DILTIAZEM HYDROCHLORIDE 240 MG: 240 CAPSULE, COATED, EXTENDED RELEASE ORAL at 10:24

## 2018-11-07 RX ADMIN — SERTRALINE HYDROCHLORIDE 100 MG: 50 TABLET ORAL at 10:24

## 2018-11-07 RX ADMIN — CHOLESTYRAMINE 4 G: 4 POWDER, FOR SUSPENSION ORAL at 10:23

## 2018-11-07 ASSESSMENT — PAIN SCALES - GENERAL: PAINLEVEL_OUTOF10: 0

## 2018-11-07 ASSESSMENT — ENCOUNTER SYMPTOMS
WHEEZING: 0
ABDOMINAL DISTENTION: 0
SHORTNESS OF BREATH: 0
DIARRHEA: 0
COUGH: 0
ABDOMINAL PAIN: 0

## 2018-11-07 NOTE — PROGRESS NOTES
at 11/01/18 1859    amiodarone (CORDARONE) tablet 200 mg  200 mg Oral Daily Felipe Cruz MD   200 mg at 11/07/18 1024    cholestyramine (QUESTRAN) packet 4 g  4 g Oral Daily Felipe Cruz MD   4 g at 11/07/18 1023    cyclobenzaprine (FLEXERIL) tablet 10 mg  10 mg Oral TID PRN Felipe Cruz MD   10 mg at 11/06/18 2229    donepezil (ARICEPT) tablet 10 mg  10 mg Oral Nightly Felipe Cruz MD   10 mg at 11/06/18 2110    mesalamine (DELZICOL) delayed release capsule 400 mg  400 mg Oral TID Felipe Cruz MD   400 mg at 11/07/18 1024    metoprolol succinate (TOPROL XL) extended release tablet 25 mg  25 mg Oral Daily Felipe Cruz MD   25 mg at 11/07/18 1024    montelukast (SINGULAIR) tablet 10 mg  10 mg Oral Nightly Felipe Cruz MD   10 mg at 11/06/18 2110    pantoprazole (PROTONIX) tablet 40 mg  40 mg Oral QAM AC Felipe Cruz MD   40 mg at 11/07/18 0525    pramipexole (MIRAPEX) tablet 1 mg  1 mg Oral Nightly Felipe Cruz MD   1 mg at 11/06/18 2110    sertraline (ZOLOFT) tablet 100 mg  100 mg Oral Daily Felipe Cruz MD   100 mg at 11/07/18 1024    tamsulosin (FLOMAX) capsule 0.4 mg  0.4 mg Oral Daily Felipe Cruz MD   0.4 mg at 11/07/18 1024    zolpidem (AMBIEN) tablet 5 mg  5 mg Oral Nightly PRN Felipe Cruz MD   5 mg at 11/06/18 2229    sodium chloride flush 0.9 % injection 10 mL  10 mL Intravenous 2 times per day Felipe Cruz MD   10 mL at 11/06/18 2203    sodium chloride flush 0.9 % injection 10 mL  10 mL Intravenous PRN Felipe Cruz MD   10 mL at 11/05/18 1913    magnesium hydroxide (MILK OF MAGNESIA) 400 MG/5ML suspension 30 mL  30 mL Oral Daily PRN Felipe Cruz MD        ondansetron [facility]) injection 4 mg  4 mg Intravenous Q6H PRN Felipeviki Cruz MD        glucose (GLUTOSE) 40 % oral gel 15 g  15 g Oral PRN Felipeviki Cruz MD        dextrose 50 % solution 12.5 g  12.5 g Intravenous PRN Felipeviki Cruz MD        glucagon (rDNA) injection 1 mg  1 mg

## 2018-11-07 NOTE — PLAN OF CARE
Problem: Falls - Risk of:  Goal: Will remain free from falls  Will remain free from falls   Outcome: Ongoing  Pt is low fall risk. Pt is a selfer. Call light within reach. Bed in low position. Bed alarm on. Will continue to monitor. Problem: Serum Glucose Level - Abnormal:  Goal: Ability to maintain appropriate glucose levels will improve  Ability to maintain appropriate glucose levels will improve   Outcome: Ongoing  Pt blood glucose within normal limits and did not require insulin coverage this morning.

## 2018-11-08 ENCOUNTER — CARE COORDINATION (OUTPATIENT)
Dept: CASE MANAGEMENT | Age: 73
End: 2018-11-08

## 2018-11-08 NOTE — CARE COORDINATION
[facility] Transitions Initial Follow Up Call    Call within 2 business days of discharge: Yes    Patient: Karina Harris Patient : 1945   MRN: [id number]  Reason for Admission: Anemia   Discharge Date: 18 RARS: Readmission Risk Score: 21     Spoke with: [address]: Philippe Jimenes     Non-face-to-face services provided:  Obtained and reviewed discharge summary and/or continuity of care documents    Care Transitions 24 Hour Call    Do you have any ongoing symptoms?:  No  Do you have a copy of your discharge instructions?:  Yes  Do you have all of your prescriptions and are they filled?:  Yes  Have you been contacted by a [address]?:  No  Have you scheduled your follow up appointment?:  No  How are you going to get to your appointment?:  Car - family or friend to transport  Were you discharged with any Home Care or Post Acute Services:  No  Do you have support at home?:  Partner/Spouse/SO  Do you feel like you have everything you need to keep you well at home?:  Yes  Are you an active caregiver in your home?:  No  Care Transitions Interventions     Spoke with patient who states he is doing well. Patient denied dizziness, lightheadedness, CP, or SOB. Patient states his strength is good and he is getting around the house well on his own. Patient states he has an apt with cardiologist on 18. Patient reported he has his new prescriptions and is taking them as prescribed. Patient denied any further needs at this time and agreed to follow up calls. Gave reminder that if they have any questions/concerns at anytime, they can always call PCP/specialist as they always have an MD on call .             Follow Up  Future Appointments  Date Time Provider Jerry Real   2018 10:30 AM MD Lori Leon [facility]pler [facility]   2018 9:45 AM SHAYY Pisano - PRAVEEN Sandoval Simpler [facility]   2018 8:15 AM MD Lori Stephen Simpler [facility]   3/19/2019 10:00 AM Christine Villegas

## 2018-11-12 RX ORDER — FERROUS GLUCONATE 324(37.5)
324 TABLET ORAL 2 TIMES DAILY
Qty: 60 TABLET | Refills: 2 | Status: SHIPPED | OUTPATIENT
Start: 2018-11-12 | End: 2019-01-01 | Stop reason: SDUPTHER

## 2018-11-20 PROBLEM — I50.32 CHRONIC DIASTOLIC HEART FAILURE (HCC): Status: ACTIVE | Noted: 2018-01-01

## 2018-11-20 NOTE — PROGRESS NOTES
[address]  Advanced CHF/Pulmonary Hypertension   Cardiac Evaluation      La Fletcher  YOB: 1945    Date of Visit:  11/20/18  Chief Complaint   Patient presents with    Shortness of Breath     constant     History of Present Illness:  La Fletcher is a 68 y.o. male who presents from referral from BB NP for consultation and management of CHF. Has been sob and lung doctor doesn't think it's pulmonary. He has been having sob over the past year that has been getting progressively worse. Went to the hospital in October to have surgery and was told preoperatively that he has had a rapid heart rate 190s he was admitted coul not get it down, subsequently his surgery canceled. He was discharged and readmitted for unciontrolled afib. Was started on blood thinners and started bleeding rectally, blood thinners were stopped, had a coloscopy and no cause for bleeding found. He remains off of bllood thinners at this time. Has had no swelling since discharge. He has had feeing chest pressure, occurred in the hospital, was started on furosemide when admitted, had sob and it remains but swelling is down. He still gets sob with walking a short distance, sometimes has associated irregular heart beats. On inhalers and sees Dr Alley Mosquera pulmonology. He has been on oxygen and he doesn't feel the oxygen gets to his lungs, feels he can't take air in. [facility] He doesn't feel that the diuretic he takes has helped his breathing.      No Known Allergies  Current Outpatient Prescriptions   Medication Sig Dispense Refill    ferrous gluconate 324 (37.5 Fe) MG TABS Take 1 tablet by mouth 2 times daily 60 tablet 2    furosemide (LASIX) 40 MG tablet Take 1 tablet by mouth daily 60 tablet 3    diltiazem (CARDIZEM CD) 120 MG extended release capsule Take 1 capsule by mouth daily 30 capsule 3    amiodarone (CORDARONE) 200 MG tablet 200 mg po bid for 14 days, then 200 mg po daily 60 tablet are crisp and normal  · Cervical veins are not engorged  · The carotid upstroke is normal in amplitude and contour without delay or bruit  · JVP less than 8 cm H2O  RRR with nl S1 and S2 without m,r,g  · Peripheral pulses are symmetrical and full  · There is no clubbing, cyanosis of the extremities. · No edema  · Femoral Arteries: 2+ and equal  · Pedal Pulses: 2+ and equal   Neck:  · No thyromegaly  Abdomen:  · No masses or tenderness  · Liver/Spleen: No Abnormalities Noted  Neurological/Psychiatric:  · Alert and oriented in all spheres  · Moves all extremities well  · Exhibits normal gait balance and coordination  · No abnormalities of mood, affect, memory, mentation, or behavior are noted    Echo 4/20/2017   Technically difficult examination. Pt supine secondary to back fracture.   Left ventricular systolic function is normal with the ejection fraction   estimated at 55%.   No regional wall motion abnormalities.   Normal left ventricular diastolic filling pressure.   There is mild concentric left ventricular hypertrophy.   No significant valvular heart disease is identified.   Definity contrast was used but images are not adequate for interpretation. Echo 10/22/2018   Summary   Atrial fibrillation throughout the study.   LV systolic function is hyperdynamic with EF estimated 65-70%.   No regional wall motion abnormalities are noted.   There is moderate concentric left ventricular hypertrophy.   The right atrium is mildly dilated.   Patient noted to be in rapid atrial fibrillation during the study.     Assessment:    1. Pure hypercholesterolemia    2. Coronary artery disease involving native coronary artery of native heart without angina pectoris    3. COPD, severe ([address]    4. Essential hypertension       Plan:  Labs in 2 weeks iron and tibc, cbcd, bnp, cmp,. Add Sprionolactone 25 mg daily. RTO in 4-6 weeks at [facility] with Dasha Restrepo NP. Scribe's attestation:   This note was scribed in the presence of Tiffani KUMAR

## 2018-12-17 NOTE — TELEPHONE ENCOUNTER
Pt informed that he needs to have LFT  Before amiodarone can be filled. But I can fill the rest.  He will go to [facility] and get labs.

## 2018-12-20 NOTE — PROGRESS NOTES
RDW 16.9 11/07/2018    RDW 17.0 11/06/2018    RDW 16.8 11/05/2018     11/07/2018     11/06/2018     11/05/2018     Iron:   Lab Results   Component Value Date    IRON 45 (L) 07/06/2015    TIBC 513 (H) 07/06/2015    FERRITIN 12.3 (L) 07/06/2015     BMP:   Lab Results   Component Value Date     11/07/2018     11/06/2018     11/05/2018    K 3.8 11/07/2018    K 3.5 11/06/2018    K 3.2 11/05/2018    CL 98 11/07/2018    CL 97 11/06/2018    CL 95 11/05/2018    CO2 30 11/07/2018    CO2 31 11/06/2018    CO2 28 11/05/2018    PHOS 2.4 11/02/2018    PHOS 2.4 04/23/2017    PHOS 2.6 04/22/2017    BUN 22 11/07/2018    BUN 23 11/06/2018    BUN 24 11/05/2018    CREATININE 0.9 11/07/2018    CREATININE 0.9 11/06/2018    CREATININE 1.0 11/05/2018     BNP:   Lab Results   Component Value Date    PROBNP 458 11/02/2018    PROBNP 528 11/01/2018    PROBNP 769 10/22/2018     Lipids:   Lab Results   Component Value Date    CHOL 110 12/31/2015        Lab Results   Component Value Date    TRIG 212 (H) 12/31/2015        Lab Results   Component Value Date    HDL 44 12/31/2015        Lab Results   Component Value Date    LDLCALC 24 12/31/2015        Lab Results   Component Value Date    LABVLDL 42 12/31/2015      No results found for: CHOLHDLRATIO    EF: No results found for: LVEF, LVEFMODE    Recent Testing:  Echo 4/20/2017   Technically difficult examination.  Pt supine secondary to back fracture.   Left ventricular systolic function is normal with the ejection fraction   estimated at 55%.   No regional wall motion abnormalities.   Normal left ventricular diastolic filling pressure.   There is mild concentric left ventricular hypertrophy.   No significant valvular heart disease is identified.   Definity contrast was used but images are not adequate for interpretation.     Echo 10/22/2018   Summary   Atrial fibrillation throughout the study.   LV systolic function is hyperdynamic with EF estimated 65-70%.   No regional wall motion abnormalities are noted.   There is moderate concentric left ventricular hypertrophy.   The right atrium is mildly dilated.   Patient noted to be in rapid atrial fibrillation during the study.       NYHA:   III  ACC/ AHA Stage:    C    Pertinent Problems:  · Chronic diastolic heart failure  · Shortness of Breath  · PAF on amiodarone per EP  · HTN  · Anemia    Visit Diagnosis:    1. Chronic diastolic heart failure (HCC)    2. Paroxysmal atrial fibrillation ([address]    3. Iron deficiency anemia, unspecified iron deficiency anemia type        Plan:   1. Check labs, iron, CBC, BNP and CMP  2. Check EKG today- shows sinus rhythm  3. No changes to medications today; appears euvolemic   4. Will Send labs results in my chart  5. Make sure electrolytes are stable prior to proceeding to prostate surgery next week. 6. Start oral iron daily with breakfast; discussed constipation, if constipation worsens then may need IV injectafer  7. Follow up Dr. Daren Banuelos as planned      QUALITY MEASURES  1. Tobacco Cessation Counseling: NA  2. Retake of BP if >140/90:   NA  3. Documentation to PCP/referring for new patient:  Sent to PCP at close of office visit  4. CAD patient on anti-platelet: NA  5. CAD patient on STATIN therapy:  NA  6. Patient with CHF and aFib on anticoagulation:  Yes     I appreciate the opportunity for caring for this patient.      Darrel Crespo CNP, 12/20/2018, 5:05 PM

## 2018-12-20 NOTE — LETTER
FBS daily--NEED 33 GAUGE 5/1/17   Ryan Love, DO   b complex vitamins capsule Take 1 capsule by mouth daily 4/23/17   Ever Ohms, APRN - CNP        Allergies:  Patient has no known allergies. Review of Systems:   · Constitutional: there has been no unanticipated weight loss. · Eyes: No vision changes  · ENT: No Headaches, no nasal congestion. No mouth sores or sore throat. · Cardiovascular: Reviewed in HPI  · Respiratory: No cough or wheezing, no sputum production. · Gastrointestinal: No abdominal pain, + constipation no  diarrhea  · Genitourinary: No dysuria, + trouble voiding, + frequency, urgency and nocturia. no hematuria. · Musculoskeletal:  + weakness or joint complaints. · Integumentary: No rash or pruritis. · Neurological: No numbness or tingling. No weakness. No tremor. · Psychiatric: No anxiety, no depression. · Endocrine:  No excessive thirst or urination. · Hematologic/Lymphatic: No abnormal bruising or bleeding, blood clots or swollen lymph nodes.     Physical Examination:    Vitals:    12/20/18 1300 12/20/18 1308   BP: (!) 90/48 112/70   Pulse: 82 79   SpO2: 97%    Weight: 221 lb 6.4 oz (100.4 kg)    Height: 5' 10\" (1.778 m)         Constitutional and General Appearance: no apparent distress, pale, appears stated age  HEENT: non-icteric sclera, oropharynx without exudate, oral mucosa moist  Neck: JVP less than 9 cm H20  Respiratory:  · No use of accessory muscles, able to lay at 40 degree angle without dyspnea  · Clear breath sounds throughout, no wheezing, no crackles, no rhonchi  Cardiovascular:  · The apical impulses not displaced  · Heart tones are crisp and normal, no murmur/rub/gallop  · Regular rate and rhythm, S1,S2 normal  · Radial pulses 2+ and equal bilaterally  · No edema BLE  · Pedal Pulses: 2+ and equal   Abdomen:  · No masses or tenderness  · Liver: No Abnormalities Noted  Musculoskeletal/Skin:  · Gait slow  · There is no clubbing, cyanosis of the extremities  Technically difficult examination. Pt supine secondary to back fracture.   Left ventricular systolic function is normal with the ejection fraction   estimated at 55%.   No regional wall motion abnormalities.   Normal left ventricular diastolic filling pressure.   There is mild concentric left ventricular hypertrophy.   No significant valvular heart disease is identified.   Definity contrast was used but images are not adequate for interpretation.     Echo 10/22/2018   Summary   Atrial fibrillation throughout the study.   LV systolic function is hyperdynamic with EF estimated 65-70%.   No regional wall motion abnormalities are noted.   There is moderate concentric left ventricular hypertrophy.   The right atrium is mildly dilated.   Patient noted to be in rapid atrial fibrillation during the study.       NYHA:   III  ACC/ AHA Stage:    C    Pertinent Problems:  · Chronic diastolic heart failure  · Shortness of Breath  · PAF on amiodarone per EP  · HTN  · Anemia    Visit Diagnosis:    1. Chronic diastolic heart failure (HCC)    2. Paroxysmal atrial fibrillation ([address]    3. Iron deficiency anemia, unspecified iron deficiency anemia type        Plan:   1. Check labs, iron, CBC, BNP and CMP  2. Check EKG today- shows sinus rhythm  3. No changes to medications today; appears euvolemic   4. Will Send labs results in my chart  5. Make sure electrolytes are stable prior to proceeding to prostate surgery next week. 6. Start oral iron daily with breakfast; discussed constipation, if constipation worsens then may need IV injectafer  7. Follow up Dr. Agustin Howell as planned      QUALITY MEASURES  1. Tobacco Cessation Counseling: NA  2. Retake of BP if >140/90:   NA  3. Documentation to PCP/referring for new patient:  Sent to PCP at close of office visit  4. CAD patient on anti-platelet: NA  5. CAD patient on STATIN therapy:  NA  6.  Patient with CHF and aFib on anticoagulation:  Yes I appreciate the opportunity for caring for this patient. Kathi Caballero CNP, 12/20/2018, 5:05 PM    If you have questions, please do not hesitate to call me. I look forward to following Krystian along with you.     Sincerely,        SHAYY Wolff CNP

## 2018-12-21 PROBLEM — R57.9 SHOCK (HCC): Status: ACTIVE | Noted: 2018-01-01

## 2018-12-21 NOTE — TELEPHONE ENCOUNTER
K is 6.4. Needs to stop spironolactone immediately. Needs kayexelate dosage ASAP    May need to come in and get rehydrated.   Nadia what do you think>

## 2018-12-21 NOTE — FLOWSHEET NOTE
Dr. Taisha Maldonado at bedside to evaluate pt. Orders to be placed for CRRT. Orders to transfuse sodium bicarb until 3 hours post-CRRT initiation & then d/c. Will implement.

## 2018-12-21 NOTE — TELEPHONE ENCOUNTER
Spoke with Stefany Denis, and his wife, Rory Kim, relayed message per NPRB. Xin states GRAHAMB just told them to head to ER and they are about to leave and head there.

## 2018-12-21 NOTE — TELEPHONE ENCOUNTER
----- [facility] sent at 12/21/2018  8:38 AM EST -----  . Potassium is too high.  Stop Spironolactone (aldactone)

## 2018-12-21 NOTE — PRE SEDATION
[address] [facility]                                           Cardiology Procedure Note - Brief                                                     - Full note dictated -     Patient: Gneo Lockwood  Date:12/21/2018  Performing Physician: Asia Sebastian MD MBA    Indications:   Symptomatolytic bradycardia    Procedure Performed:  1. RIJ access  2. Tempoary pacemaker insertion    Findings:  1. High grade AV block     Conclusion/plan of care:  1.  Admit to the ICU for monitoring      Asia Sebastian MD, [facility], [facility], [address] [place] [address]  [phone] [facility]  12/21/2018  1:38 PM

## 2018-12-21 NOTE — ED PROVIDER NOTES
did receive cocktail for hyperkalemia and it did seem to improve his pulse and blood pressure, and the patient also received IV hydration. Otherwise, at this time he is in critical condition at the time of being sent to the Cath Lab for further care by cardiology. Critical Care Time:   Time: 50 minutes   Includes repeat examinations, speaking with consultants, lab interpretation, charting, providing medications for bradycardia and hyperkalemia   Excludes separate billable procedures and time spent by NP [place]. Patient at risk for serious decompensation if not treated for this life-threatening condition.            Ahsan Conley MD  12/21/18 6649
(LASIX) 40 MG tablet Take 1 tablet by mouth daily 30 tablet 3    diltiazem (CARDIZEM CD) 120 MG extended release capsule Take 1 capsule by mouth daily 30 capsule 3    metoprolol succinate (TOPROL XL) 25 MG extended release tablet Take 1 tablet by mouth daily 30 tablet 3    ferrous gluconate 324 (37.5 Fe) MG TABS Take 1 tablet by mouth 2 times daily 60 tablet 2    amiodarone (CORDARONE) 200 MG tablet 200 mg po bid for 14 days, then 200 mg po daily 60 tablet 0    oxyCODONE-acetaminophen (PERCOCET)  MG per tablet Take 1 tablet by mouth every 6 hours as needed for Pain. Edy Brandt finasteride (PROSCAR) 5 MG tablet Take 1 tablet by mouth daily Will shrink prostate; Affects PSA number 30 tablet 5    donepezil (ARICEPT) 10 MG tablet Take 1 tablet by mouth nightly 90 tablet 1    albuterol (PROVENTIL) (2.5 MG/3ML) 0.083% nebulizer solution Take 3 mLs by nebulization every 6 hours as needed for Wheezing 120 each 3    tamsulosin (FLOMAX) 0.4 MG capsule Take 1 capsule by mouth daily 30 capsule 3    pramipexole (MIRAPEX) 1 MG tablet Take 1 tablet by mouth nightly 90 tablet 3    mesalamine (LIALDA) 1.2 g EC tablet TAKE 1 TABLET THREE TIMES DAILY 270 tablet 1    metFORMIN (GLUCOPHAGE) 500 MG tablet Take 1 tablet by mouth 2 times daily 180 tablet 1    omeprazole (PRILOSEC) 40 MG delayed release capsule Take 1 capsule by mouth daily 90 capsule 1    glucose blood VI test strips (ASCENSIA AUTODISC VI;ONE TOUCH ULTRA TEST VI) strip [employer] True Metrix Air Test Strips. FSBS daily.  DX E11.9 100 strip 3    sertraline (ZOLOFT) 100 MG tablet Take 1 tablet by mouth daily 90 tablet 3    cyclobenzaprine (FLEXERIL) 10 MG tablet Take 1 tablet by mouth 3 times daily as needed for Muscle spasms 270 tablet 0    montelukast (SINGULAIR) 10 MG tablet Take 1 tablet by mouth nightly 90 tablet 3    colesevelam (WELCHOL) 625 MG tablet TAKE 3 TABLETS TWICE DAILY 540 tablet 1    TRUEPLUS LANCETS 28G MISC 1 each by Does not apply route

## 2018-12-22 PROBLEM — R57.1 HYPOVOLEMIC SHOCK (HCC): Status: ACTIVE | Noted: 2018-01-01

## 2018-12-22 NOTE — PROGRESS NOTES
[address]   Daily Cardiovascular Progress Note    Admit Date: 12/21/2018    Chief complaint:  Dizziness, told to come to ER for elevated potassium  HPI:     Pt presented for above complaints on 12/21/18, was found to have FREDI/hyperkalemia and noted to have symptomatic sev sbrady and had urgent temp tv pacer placement and was started on dopamine gtt. He was seen by nephrology and underwent emergent CVVH d/t these issues/acidosis and he has improved. When he presented he was felt to be in shock and started on norepi. Today he is tired, otherwise no dizziness/sob/cp.         Medications/Labs all Reviewed:  Patient Active Problem List   Diagnosis    DM II (diabetes mellitus, type II), controlled ([facility]    HLD (hyperlipidemia)    Unstable angina ([facility])    Diabetic neuropathy ([facility])    RLS (restless legs syndrome)    Insomnia    Coronary artery disease involving native coronary artery of native heart without angina pectoris    Seasonal allergic rhinitis    Controlled type 2 diabetes mellitus without complication, without long-term current use of insulin ([facility])    HOLLAND (obstructive sleep apnea)    Essential hypertension    COPD, severe ([facility])    Chronic bilateral low back pain without sciatica    Restless legs syndrome (RLS)    Mixed hyperlipidemia    Depression    Fall at home    T12 vertebral burst fracture    Colitis    Former smoker    Typical atrial flutter ([facility])    Gastroesophageal reflux disease without esophagitis    Short-term memory loss    Nocturia    BPH (benign prostatic hyperplasia)    Rectal bleed    Acute blood loss anemia    Paroxysmal atrial fibrillation ([facility])    Obesity    Anemia    Symptomatic anemia    Acute on chronic diastolic congestive heart failure (HCC)    Chronic diastolic heart failure (HCC)    Hyperkalemia    Bradycardia    FREDI (acute kidney injury) ([facility])    Hypotension    Shock ([facility])       Medications:    atropine 1 MG/10ML injection ipratropium-albuterol (DUONEB) nebulizer solution 1 ampule Q4H WA   mupirocin (BACTROBAN) 2 % ointment BID   norepinephrine (LEVOPHED) 16 mg in dextrose 5 % 250 mL infusion Continuous   sodium chloride flush 0.9 % injection 10 mL 2 times per day   sodium chloride flush 0.9 % injection 10 mL PRN   prismaSATE BGK 2/0 5,000 mL with calcium chloride 1 g solution Continuous   prismaSATE BGK 2/0 5,000 mL with calcium chloride 1 g solution Continuous   prismaSATE BGK 2/0 5,000 mL with calcium chloride 1 g solution Continuous   potassium chloride 20 mEq/50 mL IVPB (Central Line) PRN   magnesium sulfate 1 g in dextrose 5% 100 mL IVPB PRN   calcium gluconate 1 g in dextrose 5 % 100 mL IVPB PRN   Or    calcium gluconate 2 g in dextrose 5 % 100 mL IVPB PRN   Or    calcium gluconate 3 g in dextrose 5 % 100 mL IVPB PRN   Or    calcium gluconate 4 g in dextrose 5 % 100 mL IVPB PRN   sodium glycerophosphate 6 mmol in dextrose 5 % 250 mL IVPB PRN   Or    sodium glycerophosphate 12 mmol in dextrose 5 % 250 mL IVPB PRN   Or    sodium glycerophosphate 18 mmol in dextrose 5 % 500 mL IVPB PRN   Or    sodium glycerophosphate 24 mmol in dextrose 5 % 500 mL IVPB PRN   oxyCODONE-acetaminophen (PERCOCET)  MG per tablet 1 tablet Q4H PRN   LORazepam (ATIVAN) injection 1 mg Q4H PRN   cyclobenzaprine (FLEXERIL) tablet 10 mg TID PRN   donepezil (ARICEPT) tablet 10 mg Nightly   ferrous gluconate 324 (37.5 Fe) MG tablet 324 mg BID   finasteride (PROSCAR) tablet 5 mg Daily   mesalamine (DELZICOL) delayed release capsule 400 mg TID   montelukast (SINGULAIR) tablet 10 mg Nightly   oxyCODONE-acetaminophen (PERCOCET)  MG per tablet 1 tablet Q6H PRN   pramipexole (MIRAPEX) tablet 1 mg Nightly   sertraline (ZOLOFT) tablet 100 mg Daily   insulin lispro (HUMALOG) injection vial 0-6 Units TID WC   insulin lispro (HUMALOG) injection vial 0-3 Units Nightly   glucose (GLUTOSE) 40 % oral gel 15 g PRN   dextrose 50 % solution 12.5 g PRN   glucagon reversible and he will likely not need a perm pacer    Hyperkalemia, getting CVVH, improving, tx per nephrology     H/o afib/flutter, monitor, had been on xarelto, stopped d/t recent gi bleed, wife indicated not taking, will need to reconsider options in f/u after adan resolved     Htn, presently hypotension/shock, wean pressors     Chronic diast HF, monitor     Crit care [id number]    Thank you for allowing me to participate in the care of your patient. Please call me with any questions [phone].       Kamari Ponce MD, [facility]   Interventional Cardiologist  [facility]   [phone] [facility]  [phone] [address] [place] [address]  12/22/2018 10:32 AM

## 2018-12-22 NOTE — CONSULTS
Nutrition Assessment    Type and Reason for Visit: Initial, Consult, Positive Nutrition Screen    Nutrition Recommendations:   · Continue NPO, advance diet as tolerated per MD  · Recommend Ensure High Protein daily when PO diet starts ( lowest ONS in K)  · Monitor nutrition adequacy, weights, pertinent labs, BMs and clinical progress    Nutrition Assessment: Consult received for Chaz score of 15, nutrition score 3. Positive screen for chew/swallow difficulty. Patient is a 68 y.o. male admitted with hyperkalemia. Pt is adequatelly nourished AEB stable body weight. x 1 year. K is 5.4 this date. On CRRT. He is at risk for nutritional compromise d/t NPO status. Spoke with RN who reports no chew/swallow difficulty with meds this AM. Pt with CHF, will defer education to follow up. Will monitor ability to advance diet and nutrition status. Malnutrition Assessment:  · Malnutrition Status: Insufficient data    Nutrition Risk Level: Moderate    Nutrient Needs:  · Estimated Daily Total Kcal: 1913-3876  · Estimated Daily Protein (g):   · Estimated Daily Total Fluid (ml/day): per Nephrology    Nutrition Diagnosis:   · Problem: Inadequate oral intake  · Etiology: related to Insufficient energy/nutrient consumption     Signs and symptoms:  as evidenced by NPO status due to medical condition    Objective Information:  · Nutrition-Focused Physical Findings: hypoactive BS.   · Wound Type: None  · Current Nutrition Therapies:  · Oral Diet Orders: NPO   · Oral Diet intake: NPO  · Oral Nutrition Supplement (ONS) Orders: None  · Anthropometric Measures:  · Ht: 5' 10\" (177.8 cm)   · Current Body Wt: 227 lb (103 kg)  · Ideal Body Wt: 166 lb (75.3 kg)  · BMI Classification: BMI 30.0 - 34.9 Obese Class I    Nutrition Interventions:   Continue NPO, Start oral diet, Start ONS (when medically feasible)  Continued Inpatient Monitoring    Nutrition Evaluation:   · Evaluation: Goals set   · Goals: Pt will advance to PO diet with intakes of 50% or more of meals and ONS this admission    · Monitoring: Nutrition Progression, Meal Intake, Supplement Intake, Diet Tolerance, Pertinent Labs      Electronically signed by Lupe Hawthorne RD, BENNY on 12/22/18 at 2:54 PM    Contact Number: Office: [phone]; [address]: [id number]

## 2018-12-22 NOTE — PROGRESS NOTES
the 24 hours ending 12/22/18 1828    Physical Exam Performed:    BP (!) 118/38   Pulse 53   Temp 98.2 °F (36.8 °C) (Core)   Resp 13   Ht 5' 10\" (1.778 m)   Wt 227 lb 15.3 oz (103.4 kg)   SpO2 98%   BMI 32.71 kg/m²     General appearance: No apparent distress, appears stated age and cooperative. HEENT: Pupils equal, round, and reactive to light. Conjunctivae/corneas clear. Neck: Supple, with full range of motion. No jugular venous distention. Trachea midline. Respiratory:  Normal respiratory effort. Clear to auscultation, bilaterally without Rales/Wheezes/Rhonchi. Cardiovascular: Regular rate and rhythm with normal S1/S2 without murmurs, rubs or gallops. Abdomen: Soft, non-tender, non-distended with normal bowel sounds. Musculoskeletal: No clubbing, cyanosis or edema bilaterally. Full range of motion without deformity. Skin: Skin color, texture, turgor normal.  No rashes or lesions. Neurologic:  Neurovascularly intact without any focal sensory/motor deficits.  Cranial nerves: II-XII intact, grossly non-focal.  Psychiatric: Alert and oriented, thought content appropriate, normal insight  Capillary Refill: Brisk,< 3 seconds   Peripheral Pulses: +2 palpable, equal bilaterally       Labs:   Recent Labs      12/21/18   1135  12/22/18   0440  12/22/18   0754   WBC  7.8  9.9  7.7   HGB  9.1*  8.0*  7.4*   HCT  29.8*  25.4*  23.5*   PLT  233  153  129*     Recent Labs      12/22/18   0440  12/22/18   0754  12/22/18   1240  12/22/18   1801   NA  130*  135*  135*  132*  132*   K  5.4*  5.0  5.0  5.4*  5.1   CL  99  99  100  98*  97*   CO2  22  24  24  24  24   BUN  35*  30*  29*  26*  22*   CREATININE  1.8*  1.7*  1.6*  1.5*  1.2   CALCIUM  9.0  7.7*  7.8*  8.4  8.7   PHOS  3.3   --   2.9  2.5     Recent Labs      12/22/18   0754  12/22/18   1240  12/22/18   1801   AST  21  21  21   ALT  8*  8*  8*   BILITOT  0.6  0.6  0.7   ALKPHOS  62  59  60     No results for input(s): INR in the last 72

## 2018-12-22 NOTE — H&P
performed by Beatrice Brown MD at [address] [facility] Left 2001    Rotator cuff    SKIN CANCER EXCISION Left 5/2016    melanoma    TESTICLE REMOVAL Left 2012    UPPER GASTROINTESTINAL ENDOSCOPY  05/08/2015    Gastritis    UPPER GASTROINTESTINAL ENDOSCOPY N/A 09/08/2016    gastritis-Bx pending    UPPER GASTROINTESTINAL ENDOSCOPY  11/06/2018    UPPER GASTROINTESTINAL ENDOSCOPY N/A 11/6/2018    EGD BIOPSY performed by Gabriela Hanna MD at [address]       Medications Prior to Admission:      Prior to Admission medications    Medication Sig Start Date End Date Taking? Authorizing Provider   ferrous sulfate 325 (65 Fe) MG tablet Take 1 tablet by mouth daily (with breakfast) 12/20/18   Mary Cordova, APRN - CNP   furosemide (LASIX) 40 MG tablet Take 1 tablet by mouth daily 12/17/18   José Miguel Westbrook MD   diltiazem (CARDIZEM CD) 120 MG extended release capsule Take 1 capsule by mouth daily 12/17/18   José Miguel Westbrook MD   metoprolol succinate (TOPROL XL) 25 MG extended release tablet Take 1 tablet by mouth daily 12/17/18   José Miguel Westbrook MD   ferrous gluconate 324 (37.5 Fe) MG TABS Take 1 tablet by mouth 2 times daily 11/12/18   Ryan Love, DO   amiodarone (CORDARONE) 200 MG tablet 200 mg po bid for 14 days, then 200 mg po daily 10/27/18   Marina Ham MD   oxyCODONE-acetaminophen (PERCOCET)  MG per tablet Take 1 tablet by mouth every 6 hours as needed for Pain. Inez Diamond Historical Provider, MD   finasteride (PROSCAR) 5 MG tablet Take 1 tablet by mouth daily Will shrink prostate;   Affects PSA number 9/21/18 12/20/18  Beatrice Brown MD   donepezil (ARICEPT) 10 MG tablet Take 1 tablet by mouth nightly 9/19/18   Ryan Love,    albuterol (PROVENTIL) (2.5 MG/3ML) 0.083% nebulizer solution Take 3 mLs by nebulization every 6 hours as needed for Wheezing 9/11/18   Gavino Lea MD   [facility]) 0.4 MG capsule Take 1 capsule by mouth daily 8/27/18

## 2018-12-22 NOTE — PROGRESS NOTES
New orders for Heparin infusion and bolus for to prevent CRRT filter clotting from Dr. Rogelio Bhatia.

## 2018-12-22 NOTE — PLAN OF CARE
Problem: Infection - Central Venous Catheter-Associated Bloodstream Infection:  Goal: Will show no infection signs and symptoms  Will show no infection signs and symptoms   Outcome: Ongoing  Pt w/ left subclavian CVC and LIJ vascath. Both dressings CDI, biopatch in place, no s/s of infection to site at this time. Will con't to monitor CVC sites closely. Problem: Falls - Risk of  Goal: Absence of falls  Outcome: Ongoing  Pt remains a fall risk. See Saima Chirinos Fall Score. Fall risk bundle in place. Pt bed is in low position with bed alarm on, side rails up, fall risk bracelet and non-skid footwear in use. Will continue to monitor and reassess garrett fall risk. Problem: Pain:  Goal: Control of acute pain  Outcome: Ongoing  Pt able to respond appropriately to pain assessments; 0-10 scale being used. See doc flow for pain assessment details. Will cont to monitor. Problem: Risk for Impaired Skin Integrity  Goal: Tissue integrity - skin and mucous membranes  Structural intactness and normal physiological function of skin and  mucous membranes. Outcome: Ongoing  Pt remains at risk for skin breakdown- see Chaz score. Pt turned q2h, preventative sacral heart mepilex in place, and heels elevated off bed. Skin is kept clean and dry.  Will continue to assess skin and monitor for any signs of breakdown.

## 2018-12-22 NOTE — PROGRESS NOTES
M/S: No cyanosis. No joint deformity. No clubbing. Neuro: Awake. Does not follow commands, no obvious focal deficit.   Psych: Deferred     Medications:  Scheduled Meds:   ipratropium-albuterol  1 ampule Inhalation Q4H WA    mupirocin   Nasal BID    sodium chloride flush  10 mL Intravenous 2 times per day    donepezil  10 mg Oral Nightly    ferrous gluconate  324 mg Oral BID    finasteride  5 mg Oral Daily    mesalamine  400 mg Oral TID    montelukast  10 mg Oral Nightly    pramipexole  1 mg Oral Nightly    sertraline  100 mg Oral Daily    insulin lispro  0-6 Units Subcutaneous TID WC    insulin lispro  0-3 Units Subcutaneous Nightly    sodium chloride flush  10 mL Intravenous 2 times per day    heparin (porcine)  5,000 Units Subcutaneous 3 times per day    patiromer sorbitex calcium  8.4 g Oral Once       PRN Meds:  sodium chloride flush, potassium chloride, magnesium sulfate, calcium gluconate IVPB **OR** calcium gluconate IVPB **OR** calcium gluconate IVPB **OR** calcium gluconate IVPB, sodium phosphate IVPB **OR** sodium phosphate IVPB **OR** sodium phosphate IVPB **OR** sodium phosphate IVPB, oxyCODONE-acetaminophen, LORazepam, cyclobenzaprine, oxyCODONE-acetaminophen, glucose, dextrose, glucagon (rDNA), dextrose, sodium chloride flush, magnesium hydroxide, ondansetron    Results:  CBC:   Recent Labs      12/22/18   0440  12/22/18   0754  12/23/18   0440   WBC  9.9  7.7  9.7   HGB  8.0*  7.4*  7.0*   HCT  25.4*  23.5*  22.0*   MCV  77.7*  77.9*  78.6*   PLT  153  129*  100*     BMP:   Recent Labs      12/22/18   2358  12/23/18   0440  12/23/18   1123   NA  130*  135*  136   K  4.8  4.7  4.3   CL  97*  98*  98*   CO2  25  25  27   PHOS  1.9*  2.4*  2.4*   BUN  18  16  15   CREATININE  1.0  0.9  0.9     LIVER PROFILE:   Recent Labs      12/22/18 2358 12/23/18   0440  12/23/18   1123   AST  18  20  19   ALT  7*  7*  7*   BILITOT  0.7  0.8  0.8   ALKPHOS  54  61  59     PT/INR: No results for

## 2018-12-22 NOTE — PROGRESS NOTES
and has had labs  Done. Labs came back today, cardiology called and asked  To come to ED. In ED he was found to have high K+, bradycardia,  And high creatinine. He was taken to cath labs and has temporary pacemaker  Placed.      Interval History:     Potassium stable  Making urine  K+    ROS:     Seen with- RN  Unable to provide good details because of lethargy      Medication:     Scheduled Meds:   atropine        mupirocin   Nasal BID    sodium chloride flush  10 mL Intravenous 2 times per day    donepezil  10 mg Oral Nightly    ferrous gluconate  324 mg Oral BID    finasteride  5 mg Oral Daily    mesalamine  400 mg Oral TID    montelukast  10 mg Oral Nightly    pramipexole  1 mg Oral Nightly    sertraline  100 mg Oral Daily    insulin lispro  0-6 Units Subcutaneous TID WC    insulin lispro  0-3 Units Subcutaneous Nightly    sodium chloride flush  10 mL Intravenous 2 times per day    heparin (porcine)  5,000 Units Subcutaneous 3 times per day    patiromer sorbitex calcium  8.4 g Oral Once     Continuous Infusions:   DOPamine 8 mcg/kg/min (12/22/18 0803)    norepinephrine 8 mcg/min (12/22/18 0809)    IV infusion builder Stopped (12/22/18 0300)    dialysis builder 1,500 mL/hr at 12/22/18 0710    dialysis builder 1,000 mL/hr at 12/22/18 0950    dialysis builder 500 mL/hr at 12/22/18 0950    dextrose      heparin 5000 units CRRT syringe       PRN Meds:.sodium chloride flush, potassium chloride, magnesium sulfate, calcium gluconate IVPB **OR** calcium gluconate IVPB **OR** calcium gluconate IVPB **OR** calcium gluconate IVPB, sodium phosphate IVPB **OR** sodium phosphate IVPB **OR** sodium phosphate IVPB **OR** sodium phosphate IVPB, oxyCODONE-acetaminophen, LORazepam, albuterol, cyclobenzaprine, oxyCODONE-acetaminophen, glucose, dextrose, glucagon (rDNA), dextrose, sodium chloride flush, magnesium hydroxide, ondansetron       Vitals :     Vitals:    12/22/18 0602   BP: 92/60   Pulse: 80

## 2018-12-23 NOTE — PLAN OF CARE
Problem: Discharge Planning:  Goal: Discharged to appropriate level of care  Discharged to appropriate level of care  Outcome: Ongoing        Problem:  Activity Intolerance:  Goal: Able to perform prescribed physical activity  Able to perform prescribed physical activity  Outcome: Ongoing     Goal: Ability to tolerate increased activity will improve  Ability to tolerate increased activity will improve  Outcome: Ongoing        Problem: Anxiety:  Goal: Level of anxiety will decrease  Level of anxiety will decrease  Outcome: Ongoing        Problem: Cardiac Output - Decreased:  Goal: Cardiac output within specified parameters  Cardiac output within specified parameters  Outcome: Ongoing     Goal: Hemodynamic stability will improve  Hemodynamic stability will improve  Outcome: Ongoing        Problem: Fluid Volume - Imbalance:  Goal: Ability to achieve a balanced intake and output will improve  Ability to achieve a balanced intake and output will improve  Outcome: Ongoing          Problem: Gas Exchange - Impaired:  Goal: Levels of oxygenation will improve  Levels of oxygenation will improve  Outcome: Ongoing     Goal: Ability to maintain adequate ventilation will improve  Ability to maintain adequate ventilation will improve  Outcome: Ongoing        Problem: Pain:  Goal: Control of acute pain  Control of acute pain  Outcome: Ongoing     Goal: Control of chronic pain  Control of chronic pain  Outcome: Ongoing        Problem: Tissue Perfusion - Cardiopulmonary, Altered:  Goal: Absence of angina  Absence of angina  Outcome: Ongoing     Goal: Will show no evidence of cardiac arrhythmias  Will show no evidence of cardiac arrhythmias  Outcome: Ongoing

## 2018-12-23 NOTE — PROGRESS NOTES
97%       I & O :     No intake or output data in the 24 hours ending 12/23/18 1002     Physical Examination :     General appearance: Anxious- yes, distressed- no, in good spirits- no  Lethargic, anxious  HEENT: Lips- normal, teeth- ok , oral mucosa- moist  Neck : Mass- no, appears symmetrical, JVD- not visible  Respiratory: Respiratory effort-  Slightly labored, wheeze- no, crackles - no  Cardiovascular:  Ausculation- No M/R/G, Edema none  Abdomen: visible mass- no, distention- no, scar- no, tenderness- no                            hepatosplenomegaly-  no  Musculoskeletal:  clubbing no,cyanosis- no , digital ischemia- no                           muscle strength- grossly normal , tone - grossly normal  Skin: rashes- no , ulcers- no, induration- no, tightening - no  Psychiatric:  Judgement and insight- normal           AAO X 3     LABS:     Recent Labs      12/22/18   0440  12/22/18   0754  12/23/18   0440   WBC  9.9  7.7  9.7   HGB  8.0*  7.4*  7.0*   HCT  25.4*  23.5*  22.0*   PLT  153  129*  100*     Recent Labs      12/22/18   1801  12/22/18   2358  12/23/18   0440   NA  132*  130*  135*   K  5.1  4.8  4.7   CL  97*  97*  98*   CO2  24  25  25   BUN  22*  18  16   CREATININE  1.2  1.0  0.9   GLUCOSE  130*  108*  110*   MG  2.00  2.00  1.90   PHOS  2.5  1.9*  2.4*

## 2018-12-23 NOTE — FLOWSHEET NOTE
Attempted to visit with Pt and/or family. Pt asleep, no family present. Left Spiritual Care note informing them of our availability for support.     Emmanuel Mao   Associate       12/23/18 9406   Encounter Summary   Services provided to: Patient not available   Referral/Consult From: Jessica   Continue Visiting (12/23 Pt asleep, no family, left BC)

## 2018-12-23 NOTE — PROGRESS NOTES
[facility] 81   Daily Cardiovascular Progress Note    Admit Date: 12/21/2018    Chief complaint:  Dizziness, told to come to ER for elevated potassium  HPI:     Pt presented for above complaints on 12/21/18, was found to have FREDI/hyperkalemia and noted to have symptomatic sev sbrady and had urgent temp tv pacer placement and was started on dopamine gtt. He was seen by nephrology and underwent emergent CVVH d/t these issues/acidosis and he has improved and he is producing urine. When he presented he was felt to be in shock and started on norepi and this has resolved and he is off pressors. Today he feels better, otherwise no dizziness/sob/cp.         Medications/Labs all Reviewed:  Patient Active Problem List   Diagnosis    DM II (diabetes mellitus, type II), controlled ([address]    HLD (hyperlipidemia)    Unstable angina (HCC)    Diabetic neuropathy (HCC)    RLS (restless legs syndrome)    Insomnia    Coronary artery disease involving native coronary artery of native heart without angina pectoris    Seasonal allergic rhinitis    Controlled type 2 diabetes mellitus without complication, without long-term current use of insulin (HCC)    HOLLAND (obstructive sleep apnea)    Essential hypertension    COPD, severe (HCC)    Chronic bilateral low back pain without sciatica    Restless legs syndrome (RLS)    Mixed hyperlipidemia    Depression    Fall at home    T12 vertebral burst fracture    Colitis    Former smoker    Typical atrial flutter (HCC)    Gastroesophageal reflux disease without esophagitis    Short-term memory loss    Nocturia    BPH (benign prostatic hyperplasia)    Rectal bleed    Acute blood loss anemia    Paroxysmal atrial fibrillation (HCC)    Obesity    Anemia    Symptomatic anemia    Acute on chronic diastolic congestive heart failure (HCC)    Chronic diastolic heart failure (HCC)    Hyperkalemia    Bradycardia    FREDI (acute kidney injury) ([address]    Hypotension    Hypovolemic shock (HCC)       Medications:    ipratropium-albuterol (DUONEB) nebulizer solution 1 ampule Q4H WA   mupirocin (BACTROBAN) 2 % ointment BID   norepinephrine (LEVOPHED) 16 mg in dextrose 5 % 250 mL infusion Continuous   sodium chloride flush 0.9 % injection 10 mL 2 times per day   sodium chloride flush 0.9 % injection 10 mL PRN   prismaSATE BGK 2/0 5,000 mL with calcium chloride 1 g solution Continuous   prismaSATE BGK 2/0 5,000 mL with calcium chloride 1 g solution Continuous   prismaSATE BGK 2/0 5,000 mL with calcium chloride 1 g solution Continuous   potassium chloride 20 mEq/50 mL IVPB (Central Line) PRN   magnesium sulfate 1 g in dextrose 5% 100 mL IVPB PRN   calcium gluconate 1 g in dextrose 5 % 100 mL IVPB PRN   Or    calcium gluconate 2 g in dextrose 5 % 100 mL IVPB PRN   Or    calcium gluconate 3 g in dextrose 5 % 100 mL IVPB PRN   Or    calcium gluconate 4 g in dextrose 5 % 100 mL IVPB PRN   sodium glycerophosphate 6 mmol in dextrose 5 % 250 mL IVPB PRN   Or    sodium glycerophosphate 12 mmol in dextrose 5 % 250 mL IVPB PRN   Or    sodium glycerophosphate 18 mmol in dextrose 5 % 500 mL IVPB PRN   Or    sodium glycerophosphate 24 mmol in dextrose 5 % 500 mL IVPB PRN   oxyCODONE-acetaminophen (PERCOCET)  MG per tablet 1 tablet Q4H PRN   LORazepam (ATIVAN) injection 1 mg Q4H PRN   cyclobenzaprine (FLEXERIL) tablet 10 mg TID PRN   donepezil (ARICEPT) tablet 10 mg Nightly   ferrous gluconate 324 (37.5 Fe) MG tablet 324 mg BID   finasteride (PROSCAR) tablet 5 mg Daily   mesalamine (DELZICOL) delayed release capsule 400 mg TID   montelukast (SINGULAIR) tablet 10 mg Nightly   oxyCODONE-acetaminophen (PERCOCET)  MG per tablet 1 tablet Q6H PRN   pramipexole (MIRAPEX) tablet 1 mg Nightly   sertraline (ZOLOFT) tablet 100 mg Daily   insulin lispro (HUMALOG) injection vial 0-6 Units TID WC   insulin lispro (HUMALOG) injection vial 0-3 Units Nightly   glucose (GLUTOSE) 40 % oral gel 15 g PRN   dextrose 50 % solution 12.5 g PRN   glucagon (rDNA) injection 1 mg PRN   dextrose 5 % solution PRN   sodium chloride flush 0.9 % injection 10 mL 2 times per day   sodium chloride flush 0.9 % injection 10 mL PRN   magnesium hydroxide (MILK OF MAGNESIA) 400 MG/5ML suspension 30 mL Daily PRN   ondansetron (ZOFRAN) injection 4 mg Q6H PRN   heparin (porcine) injection 5,000 Units 3 times per day   patiromer sorbitex calcium (VELTASSA) packet 8.4 g Once   heparin (porcine) 5,000 Units in sodium chloride 0.9 % 20 mL infusion Continuous          PHYSICAL EXAM   BP (!) 123/49   Pulse 93   Temp 99.8 °F (37.7 °C) (Core)   Resp 21   Ht 5' 10\" (1.778 m)   Wt 228 lb 2.8 oz (103.5 kg)   SpO2 100%   BMI 32.74 kg/m²    Vitals:    12/23/18 0930 12/23/18 0945 12/23/18 1000 12/23/18 1015   BP: (!) 123/49      Pulse: 101 99 94 93   Resp: 21 29 24 21   Temp: 99.8 °F (37.7 °C)      TempSrc: Core      SpO2: 98% 98% 97% 100%   Weight:       Height:           No intake or output data in the 24 hours ending 12/23/18 1142  Wt Readings from Last 3 Encounters:   12/22/18 228 lb 2.8 oz (103.5 kg)   12/20/18 221 lb 6.4 oz (100.4 kg)   11/20/18 227 lb (103 kg)         Gen: nad lying in bed, sleepy  Neck: No JVD or bruits  Respiratory: CTAB anteriorly  Chest: normal without deformity  Cardiovascular:RRR, S1S2, 2/6 sm  Abdomen: Soft, NTND, Normal BS  Extremities: No clubbing, cyanosis,   Neurological/Psychiatric:  AxO x4, No gross motors/sensory deficits  Skin:  Warm and dry       Labs:  CBC:   Recent Labs      12/22/18   0440  12/22/18   0754  12/23/18   0440   WBC  9.9  7.7  9.7   HGB  8.0*  7.4*  7.0*   HCT  25.4*  23.5*  22.0*   MCV  77.7*  77.9*  78.6*   PLT  153  129*  100*     BMP:   Recent Labs      12/22/18   1240  12/22/18   1801  12/22/18   2358  12/23/18   0440   NA  132*  132*  130*  135*   K  5.4*  5.1  4.8  4.7   CL  98*  97*  97*  98*   CO2  24  24  25  25   PHOS  2.9  2.5  1.9*  2.4*   BUN  26*  22*  18  16 CREATININE  1.5*  1.2  1.0  0.9     MG:    Recent Labs      12/22/18   1801  12/22/18   2358  12/23/18   0440   MG  2.00  2.00  1.90      PT/INR: No results for input(s): PROTIME, INR in the last 72 hours. APTT:   Recent Labs      12/22/18   0805   APTT  29.1     Cardiac Enzymes:   Recent Labs      12/21/18   1135   TROPONINI  0.01       Cardiac Studies:    ekg sbrady on 12/21/18    Echo 12/22/18     Summary   Technically difficult examination.   Normal systolic function with an estimated ejection fraction of 55%.   Mild concentric left ventricular hypertrophy.   No regional wall motion abnormalities are seen.   Normal left ventricular diastolic filling pressure.   The right atrium and ventricle are mildly dilated with normal function.   No significant valvular heart disease.       Assessment and Plan       Patient Active Problem List   Diagnosis    DM II (diabetes mellitus, type II), controlled ([address]    HLD (hyperlipidemia)    Unstable angina ([facility])    Diabetic neuropathy ([facility])    RLS (restless legs syndrome)    Insomnia    Coronary artery disease involving native coronary artery of native heart without angina pectoris    Seasonal allergic rhinitis    Controlled type 2 diabetes mellitus without complication, without long-term current use of insulin ([facility])    HOLLAND (obstructive sleep apnea)    Essential hypertension    COPD, severe ([address]    Chronic bilateral low back pain without sciatica    Restless legs syndrome (RLS)    Mixed hyperlipidemia    Depression    Fall at home    T12 vertebral burst fracture    Colitis    Former smoker    Typical atrial flutter ([facility])    Gastroesophageal reflux disease without esophagitis    Short-term memory loss    Nocturia    BPH (benign prostatic hyperplasia)    Rectal bleed    Acute blood loss anemia    Paroxysmal atrial fibrillation (HCC)    Obesity    Anemia    Symptomatic anemia    Acute on chronic diastolic congestive heart failure (HCC)    Chronic

## 2018-12-24 NOTE — PROGRESS NOTES
Pulmonary & Critical Care Medicine ICU Progress Note  Chief Complaint: Admitted from cardiology office with abnormal labs, elevated Cr and hyperkalemia. Had been feeling fatigued, found to have complete heart block. Events of Last 24 hours: Patient states he is feeling much better today. Continues to receive CRRT, has temporary pacemaker in place. Hemodynamically stable and weaned off pressors. H/H is low today, 6/19 and 1 unit PRBC ordered. He is making urine. Has been on 2L O2, denies sob. Invasive Lines:   Right IJ pacemaker 12/21   left subclavian CVC 12/21  L IJ Vas cath 12/21        IV:   sodium chloride      sodium chloride      norepinephrine Stopped (12/23/18 1000)    dialysis builder 1,000 mL/hr at 12/24/18 0849    dialysis builder 800 mL/hr at 12/24/18 4715    dialysis builder 200 mL/hr at 12/23/18 1008    dextrose      heparin 5000 units CRRT syringe         Vitals:  BP (!) 137/57   Pulse 96   Temp 98.1 °F (36.7 °C) (Core)   Resp 22   Ht 5' 10\" (1.778 m)   Wt 227 lb 11.8 oz (103.3 kg)   SpO2 94%   BMI 32.68 kg/m²      Intake/Output Summary (Last 24 hours) at 12/24/18 1453  Last data filed at 12/24/18 1145   Gross per 24 hour   Intake                0 ml   Output                0 ml   Net                0 ml       EXAM:  General:  Awake and pleasant, conversant. No distress. Eyes: PERRL. No sclera icterus. No conjunctival injection. ENT: No discharge. Pharynx clear. Oral mucosa dry  Neck: Short neck, lines in place, dressings dry. intact No JVD or masses. Resp: No accessory muscle use, mildly tachypneic. No crackles. No wheezing. No rhonchi. CV: Regular rate, paced. Regular rhythm. No mumur or rub. No edema. GI: Non-tender, protuberant. Non-distended. No masses. No organomegaly. Normal bowel sounds. Skin: Warm and dry. No rash on exposed extremities. Lymph: Deferred. M/S: No cyanosis. No joint deformity. No clubbing. Neuro: Awake.   Conversant, follows commands,

## 2018-12-24 NOTE — DISCHARGE INSTR - COC
gastritis-Bx pending    UPPER GASTROINTESTINAL ENDOSCOPY  11/06/2018    UPPER GASTROINTESTINAL ENDOSCOPY N/A 11/6/2018    EGD BIOPSY performed by Cesar Boxer, MD at [address]       Immunization History:   Immunization History   Administered Date(s) Administered    Influenza, High Dose (Fluzone 65 yrs and older) 11/27/2017    Pneumococcal 13-valent Conjugate (Glasgkm92) 05/14/2015    Pneumococcal Polysaccharide (Bhyzrktqk82) 05/10/2018    Tdap (Boostrix, Adacel) 05/21/2015       Active Problems:  Patient Active Problem List   Diagnosis Code    DM II (diabetes mellitus, type II), controlled ([facility] [address] E11.9    HLD (hyperlipidemia) E78.5    Unstable angina ([facility] [address] I20.0    Diabetic neuropathy ([facility] [address] E11.40    RLS (restless legs syndrome) G25.81    Insomnia G47.00    Coronary artery disease involving native coronary artery of native heart without angina pectoris I25.10    Seasonal allergic rhinitis J30.2    Controlled type 2 diabetes mellitus without complication, without long-term current use of insulin ([facility]) E11.9    HOLLAND (obstructive sleep apnea) G47.33    Essential hypertension I10    COPD, severe ([facility]) J44.9    Chronic bilateral low back pain without sciatica M54.5, G89.29    Restless legs syndrome (RLS) G25.81    Mixed hyperlipidemia E78.2    Depression F32.9    Fall at home W19. [facility], Y92.009    T12 vertebral burst fracture S22.089A    Colitis K52.9    Former smoker Z87.891    Typical atrial flutter (HCC) I48.3    Gastroesophageal reflux disease without esophagitis K21.9    Short-term memory loss R41.3    Nocturia R35.1    BPH (benign prostatic hyperplasia) N40.0    Rectal bleed K62.5    Acute blood loss anemia D62    Paroxysmal atrial fibrillation (HCC) I48.0    Obesity E66.9    Anemia D64.9    Symptomatic anemia D64.9    Acute on chronic diastolic congestive heart failure (HCC) I50.33    Chronic diastolic heart failure ([facility]) I50.32    Hyperkalemia E87.5    Discharge:   Respiratory Treatments: ***  Oxygen Therapy:  {Therapy; copd oxygen:97331}  Ventilator:    {MH CC Vent RYBJ:690252992}    Rehab Therapies: {THERAPEUTIC INTERVENTION:7054056473}  Weight Bearing Status/Restrictions: 50Ashwin [place] Weight Bearin}  Other Medical Equipment (for information only, NOT a DME order):  {EQUIPMENT:719957541}  Other Treatments: ***    Patient's personal belongings (please select all that are sent with patient):  {P DME Belongings:009249851}    RN SIGNATURE:  {Esignature:134105533}    CASE MANAGEMENT/SOCIAL WORK SECTION    Inpatient Status Date: ***    Readmission Risk Assessment Score:  Readmission Risk              Risk of Unplanned Readmission:        31           Discharging to Facility/ Agency   · Name:   · Address:  · Phone:  · Fax:    Dialysis Facility (if applicable)   · Name:  · Address:  · Dialysis Schedule:  · Phone:  · Fax:    / signature: {Esignature:587423089}    PHYSICIAN SECTION    Prognosis: {Prognosis:2109403057}    Condition at Discharge: 50Ashwin Lomeli Patient Condition:280887377}    Rehab Potential (if transferring to Rehab): {Prognosis:8794661817}    Recommended Labs or Other Treatments After Discharge: ***    Physician Certification: I certify the above information and transfer of Fernando Fillers  is necessary for the continuing treatment of the diagnosis listed and that he requires {Admit to Appropriate Level of Care:71295} for {GREATER/LESS:088478842} 30 days.      Update Admission H&P: {P DME Changes in [facility]:278401165}     Recommended Follow-up, Labs or Other Treatments After Discharge:    ***            PHYSICIAN SIGNATURE:  {Esignature:312956862}

## 2018-12-24 NOTE — PROGRESS NOTES
7*  6*  6*   BILITOT  0.6  0.5  0.5   ALKPHOS  58  55  60     PT/INR: No results for input(s): PROTIME, INR in the last 72 hours. APTT:   Recent Labs      12/22/18   0805  12/23/18   1123  12/24/18   0600   APTT  29.1  29.3  28.3     UA:  Recent Labs      12/21/18   1730   COLORU  Yellow   PHUR  5.0   WBCUA  0-2   RBCUA  3-5*   BACTERIA  2+*   CLARITYU  Clear   SPECGRAV  1.025   LEUKOCYTESUR  Negative   UROBILINOGEN  0.2   BILIRUBINUR  Negative   BLOODU  Negative   GLUCOSEU  Negative       Cultures:  Negative so far    Films:  CXR reviewed by me    Assessment and plan:  Hypotension, complete heart block. Unclear whether due to cardiac etiology or metabolic, more likely metabolic. Just turned off vasopressors. Being paced. Acute kidney injury, hyperkalemia, symptomatic bradycardia. On CRRT, Dr. Adam Vasquez following. Potassium improved. Cardiology to decide about discontinuing pacer. Acute hypoxemic respiratory failure. Oxygen to keep SaO2 >90%. Felt possibly to be due to pulmonary edema. DM2 anemia, dementia. Per IM. Anemia. Hold transfusion unless hemoglobin <7 g/dL. Microcytic. DVT prophylaxis. Heparin. Discussed with patient, family and nursing.       Electronically signed by:  Glendy Milligan MD    12/24/2018    10:56 AM.

## 2018-12-24 NOTE — FLOWSHEET NOTE
Order received to remove pt's left radial a-line. Procedure explained to patient, pressure held x10 minutes post-removal without complication.  Pt tolerated well

## 2018-12-25 NOTE — PROGRESS NOTES
Hospitalist Progress Note      PCP: Juan Alberto Adler DO    Date of Admission: 12/21/2018    Chief Complaint: weakness    Hospital Course: \"admitted with extreme weakness, found to have severe bradycardia and hyperkalemia. Still no significant urine output,  required hemodialysis. \"      Subjective:  Off CRRT. Temp 101.3 last night but OK this AM and patient has no symptoms of infection. The nogueira catheter does cause him some discomfort. 1u pRBCs this AM.  He feels great, is in good spirits, eating a big breakfast.  Family updated.        Medications:  Reviewed    Infusion Medications    norepinephrine Stopped (12/23/18 1000)    dextrose      heparin 5000 units CRRT syringe       Scheduled Medications    sodium chloride  250 mL Intravenous Once    0.9 % sodium chloride  250 mL Intravenous Once    furosemide  40 mg Intravenous BID    ipratropium-albuterol  1 ampule Inhalation Q4H WA    mupirocin   Nasal BID    sodium chloride flush  10 mL Intravenous 2 times per day    donepezil  10 mg Oral Nightly    ferrous gluconate  324 mg Oral BID    finasteride  5 mg Oral Daily    mesalamine  400 mg Oral TID    montelukast  10 mg Oral Nightly    pramipexole  1 mg Oral Nightly    sertraline  100 mg Oral Daily    insulin lispro  0-6 Units Subcutaneous TID WC    insulin lispro  0-3 Units Subcutaneous Nightly    sodium chloride flush  10 mL Intravenous 2 times per day    patiromer sorbitex calcium  8.4 g Oral Once     PRN Meds: senna, sodium chloride flush, potassium chloride, magnesium sulfate, calcium gluconate IVPB **OR** calcium gluconate IVPB **OR** calcium gluconate IVPB **OR** calcium gluconate IVPB, sodium phosphate IVPB **OR** sodium phosphate IVPB **OR** sodium phosphate IVPB **OR** sodium phosphate IVPB, oxyCODONE-acetaminophen, LORazepam, cyclobenzaprine, oxyCODONE-acetaminophen, glucose, dextrose, glucagon (rDNA), dextrose, sodium chloride flush, magnesium hydroxide,

## 2018-12-25 NOTE — PROGRESS NOTES
Occupational Therapy/Physcial Therapy    Attempt made this AM for therapy evaluations; hold per RN as patient about to receive blood transfusion; will hold and f/u tomorrow for therapy evaluations.      Remigio Alanis OTR/L

## 2018-12-25 NOTE — PROGRESS NOTES
come to ED. In ED he was found to have high K+, bradycardia,  And high creatinine. He was taken to cath labs and has temporary pacemaker  Placed.      Interval History:     Potassium stable  Making urine  Off dialysis  Out of ICU    ROS:     Seen with- RN  Seen with daughter by bedside  No complaints      Medication:     Scheduled Meds:   0.9 % sodium chloride  250 mL Intravenous Once    furosemide  40 mg Intravenous BID    ipratropium-albuterol  1 ampule Inhalation Q4H WA    mupirocin   Nasal BID    sodium chloride flush  10 mL Intravenous 2 times per day    donepezil  10 mg Oral Nightly    ferrous gluconate  324 mg Oral BID    finasteride  5 mg Oral Daily    mesalamine  400 mg Oral TID    montelukast  10 mg Oral Nightly    pramipexole  1 mg Oral Nightly    sertraline  100 mg Oral Daily    insulin lispro  0-6 Units Subcutaneous TID WC    insulin lispro  0-3 Units Subcutaneous Nightly    sodium chloride flush  10 mL Intravenous 2 times per day    patiromer sorbitex calcium  8.4 g Oral Once     Continuous Infusions:   dextrose       PRN Meds:.senna, sodium chloride flush, potassium chloride, magnesium sulfate, calcium gluconate IVPB **OR** calcium gluconate IVPB **OR** calcium gluconate IVPB **OR** calcium gluconate IVPB, sodium phosphate IVPB **OR** sodium phosphate IVPB **OR** sodium phosphate IVPB **OR** sodium phosphate IVPB, oxyCODONE-acetaminophen, LORazepam, cyclobenzaprine, oxyCODONE-acetaminophen, glucose, dextrose, glucagon (rDNA), dextrose, sodium chloride flush, magnesium hydroxide, ondansetron       Vitals :     Vitals:    12/25/18 1318   BP: (!) 150/68   Pulse: 74   Resp: 16   Temp: 98.3 °F (36.8 °C)   SpO2: 90%       I & O :       Intake/Output Summary (Last 24 hours) at 12/25/18 1344  Last data filed at 12/25/18 1231   Gross per 24 hour   Intake              610 ml   Output             4954 ml   Net            -4344 ml        Physical Examination :     General appearance: Anxious-

## 2018-12-25 NOTE — PROGRESS NOTES
Pulmonary & Critical Care Medicine ICU Progress Note    Cardiogenic and hypovolemic shock, acute kidney injury, non-gap metabolic acidosis, hyperkalemia, symptomatic bradycardia, acute pulmonary edema, acute hypoxemic respiratory failure. Presented with generalized fatigue. Multiple medical problems at baseline including DM, COPD, hyperlipidemia, CAD, HOLLAND, PAF, chronic diastolic heart failure, hyperkalemia, RLS, diabetic neuropathy. Events of Last 24 hours: Patient has been hemodynamically stable. He is awake and oriented, communicative. Wife at bedside. He is on oxygen at 2 LPM, he is afebrile. Invasive Lines: Right IJ pacemaker, left IJ HD catheter        IV:   norepinephrine Stopped (12/23/18 1000)    dextrose      heparin 5000 units CRRT syringe         Vitals:  /69   Pulse 67   Temp 98.3 °F (36.8 °C) (Oral)   Resp 18   Ht 5' 10\" (1.778 m)   Wt 223 lb 8.7 oz (101.4 kg)   SpO2 96%   BMI 32.08 kg/m²      Intake/Output Summary (Last 24 hours) at 12/25/18 0720  Last data filed at 12/25/18 0503   Gross per 24 hour   Intake              240 ml   Output             3204 ml   Net            -2964 ml       EXAM:  General: Overweight, awake and oriented. No distress. Eyes: PERRL. No sclera icterus. No conjunctival injection. ENT: No discharge. Pharynx clear. Class III airway, dry mucosa  Neck: Short neck, both IJ veins accessed. No JVD or masses. Resp: No accessory muscle use. No crackles. No wheezing. No rhonchi. CV: Regular rate, paced. Regular rhythm. No mumur or rub. No edema. GI: Non-tender. Non-distended. No masses. No organomegaly. Normal bowel sounds. Skin: Warm and dry. No nodule on exposed extremities. No rash on exposed extremities. Lymph: Deferred. M/S: No cyanosis. No joint deformity. No clubbing. Neuro: Awake.   Conversant, no obvious focal deficit Psych: Deferred     Medications:  Scheduled Meds:   sodium chloride  250 mL Intravenous Once    0.9 % sodium chloride  250 mL Intravenous Once    furosemide  40 mg Intravenous BID    ipratropium-albuterol  1 ampule Inhalation Q4H WA    mupirocin   Nasal BID    sodium chloride flush  10 mL Intravenous 2 times per day    donepezil  10 mg Oral Nightly    ferrous gluconate  324 mg Oral BID    finasteride  5 mg Oral Daily    mesalamine  400 mg Oral TID    montelukast  10 mg Oral Nightly    pramipexole  1 mg Oral Nightly    sertraline  100 mg Oral Daily    insulin lispro  0-6 Units Subcutaneous TID WC    insulin lispro  0-3 Units Subcutaneous Nightly    sodium chloride flush  10 mL Intravenous 2 times per day    patiromer sorbitex calcium  8.4 g Oral Once       PRN Meds:  senna, sodium chloride flush, potassium chloride, magnesium sulfate, calcium gluconate IVPB **OR** calcium gluconate IVPB **OR** calcium gluconate IVPB **OR** calcium gluconate IVPB, sodium phosphate IVPB **OR** sodium phosphate IVPB **OR** sodium phosphate IVPB **OR** sodium phosphate IVPB, oxyCODONE-acetaminophen, LORazepam, cyclobenzaprine, oxyCODONE-acetaminophen, glucose, dextrose, glucagon (rDNA), dextrose, sodium chloride flush, magnesium hydroxide, ondansetron    Results:  CBC:   Recent Labs      12/24/18   0615  12/24/18   1545  12/25/18   0510   WBC  6.6  6.1  5.9   HGB  6.0*  7.0*  6.9*   HCT  19.2*  22.1*  21.6*   MCV  78.3*  80.3  79.2*   PLT  89*  85*  102*     BMP:   Recent Labs      12/24/18   0600  12/24/18   1545  12/25/18   0510   NA  136  136  134*   K  3.9  3.9  3.8   CL  99  99  95*   CO2  29  28  32   PHOS  2.3*  1.8*  2.5   BUN  14  15  17   CREATININE  0.7*  0.8  0.8     LIVER PROFILE:   Recent Labs      12/24/18   0600  12/24/18   1545  12/25/18   0510   AST  20  22  22   ALT  6*  7*  7*   BILITOT  0.5  0.7  0.7   ALKPHOS  60  71  88     PT/INR: No results for input(s): PROTIME, INR in the last 72 hours.   APTT:   Recent Labs      12/23/18   1123  12/24/18   0600  12/25/18   0510   APTT  29.3  28.3  25.3*     UA:  No

## 2018-12-25 NOTE — PROGRESS NOTES
Arrival to room 425 safely and in stable condition. VSS - afebrile. Pt is alert and oriented x 4 with no history of falls. Assessment completed as charted. Bed is in lowest position with 2/4 bed rails raised, bed alarm turned on, wheels locked and call light within reach - patient wearing non-skid socks and verbalizes understanding to call out for assistance. Wife at bedside. No further requests at this time. Will continue to monitor.

## 2018-12-25 NOTE — PLAN OF CARE
Patient's EF (Ejection Fraction) is greater than 40%    Patient has a past medical history of Cancer ([facility] [address] Chronic pancreatitis ([facility] [address] Colitis; Diabetes mellitus ([facility] [address] Esophagitis; Gastritis; Hyperlipidemia; and Hypertension. Comorbidities reviewed and education provided. Patient and family's stated goal of care: reduce shortness of breath, increase activity tolerance, better understand heart failure and disease management, be more comfortable and reduce lower extremity edema prior to discharge    Patient's current functional capacity:  Marked limitation of physical activity. Comfortable at rest. Less than ordinary activity causes fatigue, palpitation, or dyspnea. Pt resting in bed at this time on room air. Pt denies shortness of breath. Pt without lower extremity edema. Patient's weights and intake/output reviewed:    Patient Vitals for the past 96 hrs (Last 3 readings):   Weight   12/25/18 0503 223 lb 8.7 oz (101.4 kg)   12/23/18 2215 227 lb 11.8 oz (103.3 kg)   12/22/18 2200 228 lb 2.8 oz (103.5 kg)       Intake/Output Summary (Last 24 hours) at 12/25/18 1511  Last data filed at 12/25/18 1459   Gross per 24 hour   Intake              970 ml   Output             4700 ml   Net            -3730 ml         >>For CHF and Comorbidity Education Time and Topics, please see education tab    Diabetes education provided today:    Foot care: advised to wash feet daily, pat dry and apply lotion at night, avoiding between toes. Need to look at feet daily and report to a physician any signs of inflammation or skin damage. Discussed diabetes shoes and socks. Carbs: good carbs and bad carbs, importance of carb counting, incorporation of protein with each meal to reduce Glycemic index, importance of portions, Carb/insulin ratio. Fats: Good fats and bad fats, meal planning and supplements. Physical activity: advised to exercise 5-7 days a week 30-60 mins at least. Discussed how it affects BS readings.   Managing high and

## 2018-12-25 NOTE — PROGRESS NOTES
(rDNA) injection 1 mg PRN   dextrose 5 % solution PRN   sodium chloride flush 0.9 % injection 10 mL 2 times per day   sodium chloride flush 0.9 % injection 10 mL PRN   magnesium hydroxide (MILK OF MAGNESIA) 400 MG/5ML suspension 30 mL Daily PRN   ondansetron (ZOFRAN) injection 4 mg Q6H PRN   patiromer sorbitex calcium (VELTASSA) packet 8.4 g Once          PHYSICAL EXAM   BP (!) 144/77   Pulse 72   Temp 98.9 °F (37.2 °C) (Oral)   Resp 16   Ht 5' 10\" (1.778 m)   Wt 223 lb 8.7 oz (101.4 kg)   SpO2 93%   BMI 32.08 kg/m²    Vitals:    12/25/18 1154 12/25/18 1215 12/25/18 1318 12/25/18 1545   BP:  121/66 (!) 150/68 (!) 144/77   Pulse:  76 74 72   Resp:  16 16 16   Temp:  98.3 °F (36.8 °C) 98.3 °F (36.8 °C) 98.9 °F (37.2 °C)   TempSrc:  Oral Oral Oral   SpO2: 95% 94% 90% 93%   Weight:       Height:             Intake/Output Summary (Last 24 hours) at 12/25/18 1610  Last data filed at 12/25/18 1459   Gross per 24 hour   Intake              970 ml   Output             4700 ml   Net            -3730 ml     Wt Readings from Last 3 Encounters:   12/25/18 223 lb 8.7 oz (101.4 kg)   12/20/18 221 lb 6.4 oz (100.4 kg)   11/20/18 227 lb (103 kg)         Gen: nad lying in bed  Neck: No JVD or bruits  Respiratory: CTAB anteriorly  Chest: normal without deformity  Cardiovascular:RRR, S1S2, 2/6 sm  Abdomen: Soft, NTND, Normal BS  Extremities: No clubbing, cyanosis,   Neurological/Psychiatric:  AxO x4, No gross motors/sensory deficits  Skin:  Warm and dry , but somewhat diaphoretic      Labs:  CBC:   Recent Labs      12/24/18   0615  12/24/18   1545  12/25/18   0510   WBC  6.6  6.1  5.9   HGB  6.0*  7.0*  6.9*   HCT  19.2*  22.1*  21.6*   MCV  78.3*  80.3  79.2*   PLT  89*  85*  102*     BMP:   Recent Labs      12/24/18   0030  12/24/18   0600  12/24/18   1545  12/25/18   0510   NA  135*  136  136  134*   K  4.0  3.9  3.9  3.8   CL  100  99  99  95*   CO2  27  29  28  32   PHOS  1.6*  2.3*  1.8*  2.5   BUN  16  14  15  17 anemia    Acute on chronic diastolic congestive heart failure (HCC)    Chronic diastolic heart failure (HCC)    Hyperkalemia    Bradycardia    FREDI (acute kidney injury) ([facility] [address]    Hypotension    Hypovolemic shock (HCC)     Symptomatic sbrady, s/p temp tv pacer 12/21/18, temp pacer removed 12/24/18, site is clean, no indication for surgery for perm pacer at this time    Hyperkalemia, got CVVH, improving, tx per nephrolog     H/o afib/flutter, monitor, had been on xarelto, stopped d/t recent gi bleed, wife indicated not taking, will need to reconsider options in f/u, he will need to be evaluated for watchman as oupt w/ dr Chetna Ace, consider reintroducing cardiac meds in next few days, would avoid av twila agents and spirinolactone given recent events     Chronic diast HF, monitor       Thank you for allowing me to participate in the care of your patient. Please call me with any questions [phone].       Tigre Chawla MD, [facility] [place]   Interventional Cardiologist  [address]  [phone] [facility]  [phone] [address] [place] [address]  12/25/2018 4:10 PM

## 2018-12-26 NOTE — FLOWSHEET NOTE
12/25/18 1930   Assessment   Charting Type Shift assessment   Neurological   Neuro (WDL) WDL   Level of Consciousness 0   Orientation Level Oriented X4   Cognition Appropriate judgement; Appropriate safety awareness; Appropriate attention/concentration; Appropriate for developmental age; Follows commands   [place] Coma Scale   Eye Opening 4   Best Verbal Response 5   Best Motor Response 6   [place] Coma Scale Score 15   HEENT   HEENT (WDL) X   Throat Painful to swallow   Teeth Missing teeth   Respiratory   Respiratory (WDL) X   Respiratory Pattern Regular   Respiratory Depth Normal   Respiratory Quality/Effort Dyspnea with exertion   Chest Assessment Chest expansion symmetrical;Trachea midline   L Breath Sounds Diminished   R Breath Sounds Diminished   Breath Sounds   Right Upper Lobe Diminished   Right Middle Lobe Diminished   Right Lower Lobe Diminished   Left Upper Lobe Diminished   Left Lower Lobe Diminished   Cardiac   Cardiac (WDL) WDL   Cardiac Regularity Regular   Heart Sounds S1, S2   Cardiac Rhythm NSR   Cardiac Monitor   Telemetry Monitor On Yes   Telemetry Audible Yes   Telemetry Alarms Set Yes   Pacemaker   Pacemaker No   Gastrointestinal   Abdominal (WDL) X   Abdomen Inspection Distended;Rounded   RUQ Bowel Sounds Active   LUQ Bowel Sounds Active   RLQ Bowel Sounds Active   LLQ Bowel Sounds Active   Peripheral Vascular   Peripheral Vascular (WDL) WDL   Edema None   Skin Color/Condition   Skin Color/Condition (WDL) WDL   Skin Integrity   Skin Integrity (WDL) WDL   Musculoskeletal   Musculoskeletal (WDL) X   RUE Weakness   LUE Weakness   RL Extremity Weakness   LL Extremity Weakness   Genitourinary   Genitourinary (WDL) WDL   Flank Tenderness No   Suprapubic Tenderness No   Dysuria No   Urine Assessment   Incontinence No   Urine Color Pink   Anus/Rectum   Anus/Rectum (WDL) WDL   Psychosocial   Psychosocial (WDL) WDL

## 2018-12-26 NOTE — CARE COORDINATION
Chart review completed. Patient a 68year old male, admitted for bradycardia. hosptial day 5, currently on C4. CM was following for potential HD needs however nephrology has said there will be no HD needs moving forward. CM will continue to follow should any additional needs arise. Please advise should any needs be identified.   Clifford Issa RN

## 2018-12-26 NOTE — PROGRESS NOTES
Hospitalist Progress Note      PCP: Fatuma Mast DO    Date of Admission: 12/21/2018    Chief Complaint: weakness    Hospital Course: \"admitted with extreme weakness, found to have severe bradycardia and hyperkalemia. Still no significant urine output,  required hemodialysis. \"      Subjective:  dilt gtt was started overnight for RVR. This is complicated because he had severe bradycardia earlier in the stay and cardiology has wanted to avoid AV-twila blockers. Gtt currently at 5 and HR still 140's, nursing will titrated. Will look for cardiology's input. Cr stable, Hb improved nicely. Jameson was removed. The patient tells me he feels obstructed. Has been urinating frequently. Nursing will bladder scan.       Medications:  Reviewed    Infusion Medications    diltiazem (CARDIZEM) 125 mg in dextrose 5% 125 mL infusion      dextrose       Scheduled Medications    QUEtiapine  25 mg Oral Once    diltiazem  10 mg Intravenous Once    0.9 % sodium chloride  250 mL Intravenous Once    furosemide  40 mg Intravenous BID    ipratropium-albuterol  1 ampule Inhalation Q4H WA    mupirocin   Nasal BID    sodium chloride flush  10 mL Intravenous 2 times per day    donepezil  10 mg Oral Nightly    ferrous gluconate  324 mg Oral BID    finasteride  5 mg Oral Daily    mesalamine  400 mg Oral TID    montelukast  10 mg Oral Nightly    pramipexole  1 mg Oral Nightly    sertraline  100 mg Oral Daily    insulin lispro  0-6 Units Subcutaneous TID WC    insulin lispro  0-3 Units Subcutaneous Nightly    sodium chloride flush  10 mL Intravenous 2 times per day    patiromer sorbitex calcium  8.4 g Oral Once     PRN Meds: senna, sodium chloride flush, potassium chloride, magnesium sulfate, calcium gluconate IVPB **OR** calcium gluconate IVPB **OR** calcium gluconate IVPB **OR** calcium gluconate IVPB, sodium phosphate IVPB **OR** sodium phosphate IVPB **OR** sodium phosphate IVPB **OR** sodium phosphate IVPB, 18   CREATININE  0.7*  0.8  0.8  0.9   CALCIUM  8.6  8.9  9.0  9.2   PHOS  2.3*  1.8*  2.5   --      Recent Labs      12/24/18   0600  12/24/18   1545  12/25/18   0510   AST  20  22  22   ALT  6*  7*  7*   BILITOT  0.5  0.7  0.7   ALKPHOS  60  71  88     No results for input(s): INR in the last 72 hours. No results for input(s): Merryl Passe in the last 72 hours. Urinalysis:      Lab Results   Component Value Date    NITRU Negative 12/21/2018    WBCUA 0-2 12/21/2018    BACTERIA 2+ 12/21/2018    RBCUA 3-5 12/21/2018    BLOODU Negative 12/21/2018    SPECGRAV 1.025 12/21/2018    GLUCOSEU Negative 12/21/2018       Radiology:  XR CHEST STANDARD (2 VW)   Final Result   1. New left central venous line in place terminating in the SVC. Left   jugular central venous catheter remains in place   2. Linear infiltrates seen at the left lung base could represent atelectatic   changes or pneumonia         XR CHEST PORTABLE   Final Result   Development of mild airspace disease which could represent pulmonary edema. XR CHEST PORTABLE   Final Result   Stable position of several lines. Right IJ central venous catheter is   unchanged in position extending through the right atrium, presumably into the   right ventricle. XR CHEST PORTABLE   Final Result   Left subclavian line in adequate position. No evidence of postprocedure   pneumothorax. Unchanged left IJ catheter as discussed on the comparison      Unchanged right IJ catheter as discussed on the comparison. Retraction is   recommended for this catheter. XR CHEST PORTABLE   Final Result   New bilateral IJ catheters as discussed. The right IJ catheter should be   retracted. Recommend retracting 8-9 cm, and obtaining a follow-up chest   x-ray.   The left IJ catheter should be advanced 7 cm to reach the lower SVC   near the cavoatrial junction         XR ABDOMEN (KUB) (SINGLE AP VIEW)   Final Result   Moderate air-filled distention of the stomach. Otherwise negative supine   abdominal radiographs         XR CHEST PORTABLE   Final Result   Findings are most compatible with pulmonary edema. Assessment/Plan:    Active Hospital Problems    Diagnosis Date Noted    Coronary artery disease involving native coronary artery of native heart without angina pectoris [I25.10] 07/29/2015     Priority: Medium    DM II (diabetes mellitus, type II), controlled ([facility] [address] [E11.9] 04/26/2015     Priority: Medium    HLD (hyperlipidemia) [E78.5] 04/26/2015     Priority: Medium    Hypovolemic shock ([address] [R57.1] 12/21/2018    Hyperkalemia [E87.5]     Bradycardia [R00.1]     FREDI (acute kidney injury) ([facility] [address] [N17.9]     Chronic diastolic heart failure (HCC) [I50.32] 11/20/2018    Paroxysmal atrial fibrillation ([facility] [address] [I48.0] 11/01/2018    Depression [F32.9] 02/16/2017    COPD, severe ([facility] [address] [J44.9] 08/17/2016    HOLLAND (obstructive sleep apnea) [G47.33] 06/09/2016       PLAN:    \"Pt presented to the ER today morning after he was instructed to do so by his cardiologist, after he was observed to have abnormal labs with K- 6.4. Pt was fpound to be in shock in the ER, with HR in the 30s, with FREDI and hyperkalemia. He was recently started on spironolactone. He was started on dopamine infusion and taken to the cath lab for a temporary pacemaker. His K only got higher, so a decision to start CRRT was made. \"      Hypovolemic shock POA  required aggressive IV hydration, weaned off pressors. Bradycardia:  Required urgent temporary pacemaker, seemed to improve once off negative chronotropic medications. Not likely to require PPM.  Removed temporary pacer without issue. Severe, life-threatening hyperkalemia; Resolved with CRRT. Will not resume spironolactone. Diabetes mellitus Type 2, controlled;  Metformin on hold. SSI only    Acute renal failure, ATN likely:  Required CRRT, stopped 12/24, FREDI resolved. Nephrology following.     Acute on chronic

## 2018-12-26 NOTE — PROGRESS NOTES
Sit: Supervision  Sit to Supine: Supervision  Transfers  Sit to stand: Stand by assistance  Stand to sit: Stand by assistance     Cognition  Overall Cognitive Status: WFL     LUE AROM (degrees)  LUE AROM : WFL  RUE AROM (degrees)  RUE AROM : WFL  LUE Strength  Gross LUE Strength:  (4-/5 grossly)  RUE Strength  Gross RUE Strength:  (4-/5 grossly)     Assessment   Performance deficits / Impairments: Decreased functional mobility ; Decreased endurance;Decreased ADL status; Decreased balance  After evaluation, pt found to be presenting with the above mentioned occupational performance deficits which are affecting participation in daily living skills. Pt would benefit from continued skilled occupational therapy to address ADLs, functional mobility, and safety while in acute care. Prognosis: Good  Decision Making: Medium Complexity  REQUIRES OT FOLLOW UP: Yes  Activity Tolerance  Activity Tolerance: Patient limited by fatigue  Activity Tolerance: O2 sat = 95-97% on room air throughout treatment session. Activity tolerance limited by tachycardia (HR into the 140s-low 150s after amb to/from bathroom). HR decreased to ~135 bpm after ~6-7 minutes of rest in sitting/supine. Safety Devices  Safety Devices in place: Yes  Type of devices: Nurse notified;Gait belt;Call light within reach; Left in bed;Bed alarm in place         Plan   Plan  Times per week: 3-5x's a week while in acute care    G-Code  OT G-codes  Functional Assessment Tool Used: AM-PAC  Score: 21  Functional Limitation: Self care  Self Care Current Status (): At least 20 percent but less than 40 percent impaired, limited or restricted  Self Care Goal Status ():  At least 1 percent but less than 20 percent impaired, limited or restricted           AM-PAC Score        AM-[facility] Inpatient Daily Activity Raw Score: 21  AM-PAC Inpatient ADL T-Scale Score : 44.27  ADL Inpatient CMS 0-100% Score: 32.79  ADL Inpatient CMS G-Code Modifier : CJ    Goals  Short term goals  Time Frame for Short term goals: 1 week unless otherwise specified  Short term goal 1: Pt. will complete 5-7 mins of OOB activity to increase activity tolerance  Short term goal 2: Pt. will LE dressing with supervision by 12/31  Short term goal 3: Pt. will complete standing level ADLs with supervision for balance  Patient Goals   Patient goals : \"to go home and get better\"       Therapy Time   Individual Concurrent Group Co-treatment   Time In 0840         Time Out 0916         Minutes 36         Timed Code Treatment Minutes: LULU Carrington/L

## 2018-12-26 NOTE — FLOWSHEET NOTE
Informed by [facility] that pt converted to afib. Pt has a noted history of a fib. NP notified.        12/26/18 0242   Vital Signs   Pulse 103   /80   MAP (mmHg) 99

## 2018-12-26 NOTE — PROGRESS NOTES
PRN Yolanda Abel MD   10 mg at 12/25/18 1057    donepezil (ARICEPT) tablet 10 mg  10 mg Oral Nightly Yolanda Abel MD   10 mg at 12/25/18 2046    ferrous gluconate 324 (37.5 Fe) MG tablet 324 mg  324 mg Oral BID Yolanda Abel MD   324 mg at 12/25/18 2311    finasteride (PROSCAR) tablet 5 mg  5 mg Oral Daily Yolanda Abel MD   5 mg at 12/25/18 0908    mesalamine (DELZICOL) delayed release capsule 400 mg  400 mg Oral TID Yolanda Abel MD   400 mg at 12/25/18 2046    montelukast (SINGULAIR) tablet 10 mg  10 mg Oral Nightly Yolanda Abel MD   10 mg at 12/25/18 2046    oxyCODONE-acetaminophen (PERCOCET)  MG per tablet 1 tablet  1 tablet Oral Q6H PRN Yolanda Abel MD        pramipexole (MIRAPEX) tablet 1 mg  1 mg Oral Nightly Yolanda Abel MD   1 mg at 12/25/18 2046    sertraline (ZOLOFT) tablet 100 mg  100 mg Oral Daily Yolanda Abel MD   100 mg at 12/25/18 0908    insulin lispro (HUMALOG) injection vial 0-6 Units  0-6 Units Subcutaneous TID WC Yolanda Abel MD   1 Units at 12/25/18 1733    insulin lispro (HUMALOG) injection vial 0-3 Units  0-3 Units Subcutaneous Nightly Yolanda Abel MD   1 Units at 12/21/18 2331    glucose (GLUTOSE) 40 % oral gel 15 g  15 g Oral PRN Yolanad Abel MD        dextrose 50 % solution 12.5 g  12.5 g Intravenous PRN Yolanda Abel MD        glucagon (rDNA) injection 1 mg  1 mg Intramuscular PRN Yolanda Abel MD        dextrose 5 % solution  100 mL/hr Intravenous PRN Yolanda Abel MD        sodium chloride flush 0.9 % injection 10 mL  10 mL Intravenous 2 times per day Yolanda Abel MD   10 mL at 12/25/18 2046    sodium chloride flush 0.9 % injection 10 mL  10 mL Intravenous PRN Yolanda Abel MD        magnesium hydroxide (MILK OF MAGNESIA) 400 MG/5ML suspension 30 mL  30 mL Oral Daily PRN Yolanda Abel MD   30 mL at 12/23/18 7010    ondansetron (ZOFRAN) injection 4 mg  4 mg Intravenous Q6H PRN Tono Stern MD        patiromer sorbitex calcium (VELTASSA) packet 8.4 g  8.4 g Oral Once Tono Stern MD           Objective:     Telemetry monitor: SR    Physical Exam:  Constitutional:  Comfortable, NAD  Skin:  Warm and dry; no rash or lesions  Neck:  Supple, no JVD  Heart:  Irregular, normal apex, S1 and S2 normal  Lungs:  Clear but diminished; no wheezing/rhonchi/rales  Abdomen: soft, non tender, + bowel sounds  Extremities:  No edema or cyanosis  Neuro: alert and oriented, moves all extremities equally    Data  EKG 12/21/2018:   Sinus bradycardia HR 33    Echo 12/22/2018:  Technically difficult examination.   Normal systolic function with an estimated ejection fraction of 55%.   Mild concentric left ventricular hypertrophy.   No regional wall motion abnormalities are seen.   Normal left ventricular diastolic filling pressure.   The right atrium and ventricle are mildly dilated with normal function.   No significant valvular heart disease. Echo 10/22/2018:  Atrial fibrillation throughout the study.   LV systolic function is hyperdynamic with EF estimated 65-70%.   No regional wall motion abnormalities are noted.   There is moderate concentric left ventricular hypertrophy.   The right atrium is mildly dilated.   Patient noted to be in rapid atrial fibrillation during the study.     Lab Review     Renal Profile: Lab Results   Component Value Date    CREATININE 0.9 12/26/2018    BUN 18 12/26/2018     12/26/2018    K 3.3 12/26/2018    K 5.0 12/22/2018    K 5.0 12/22/2018    CL 95 12/26/2018    CO2 31 12/26/2018     CBC:  Lab Results   Component Value Date    WBC 6.1 12/26/2018    RBC 3.28 12/26/2018    HGB 8.6 12/26/2018    HCT 26.2 12/26/2018    MCV 79.9 12/26/2018    RDW 23.0 12/26/2018     12/26/2018     BNP:  No results found for: BNP  Fasting Lipid Panel:  Lab Results   Component Value Date    CHOL 110 12/31/2015    HDL 44 12/31/2015    TRIG 212 12/31/2015     Cardiac Enzymes:  CK/MbTroponin  Lab Results   Component Value Date    CKTOTAL 205 04/18/2017    TROPONINI 0.01 12/21/2018     PT/ INR   Lab Results   Component Value Date    INR 1.03 11/01/2018    INR 1.79 10/22/2018    INR 1.09 04/18/2017    PROTIME 11.7 11/01/2018    PROTIME 20.4 10/22/2018    PROTIME 12.3 04/18/2017     PTT No results found for: PTT Lab Results   Component Value Date    MG 1.60 12/26/2018    Lab Results   Component Value Date    TSH 0.98 08/27/2018       Assessment:  Symptomatic sinus bradycardia: resolved; temporary pacemaker removed 12/24  Paroxysmal atrial fibrillation: known history    -[id number] score 4 (age, HTN, CHF, DM)   HTN: fairly well controlled  Chronic diastolic CHF  Hyperkalemia: resolved off spironolactone (now hypokalemic)  Hypomagnesemia  FREDI: resolved, required CRRT  Chronic anemia  History of diverticulosis and GI bleeding: Xarelto was stopped in 11/2018  DM  Chronic hepatitis  COPD    Plan:   Start Cardizem 30mg po Q6H and monitor heart rate  Discontinue IV Cardizem  Unable to anticoagulate due to recent GI bleeding  Watchman previously recommended. Patient to consider as outpatient.    Lasix per nephrology  Maintain normal electrolytes     Danni Galarza[address]  [phone]

## 2018-12-26 NOTE — PROGRESS NOTES
Pulmonary & Critical Care Medicine ICU Progress Note    Cardiogenic and hypovolemic shock, acute kidney injury, non-gap metabolic acidosis, hyperkalemia, symptomatic bradycardia, acute pulmonary edema, acute hypoxemic respiratory failure. Presented with generalized fatigue. Multiple medical problems at baseline including DM, COPD, hyperlipidemia, CAD, HOLLAND, PAF, chronic diastolic heart failure, hyperkalemia, RLS, diabetic neuropathy. Events of Last 24 hours: Patient has been hemodynamically stable. He is awake and oriented, communicative. Sitting up in a chair. Oxygen weaned, afebrile. Says he gets short of breath when he initially gets up, but it improves subsequently in the latter part of the day. He is eating well, has had bowel movements. Invasive Lines: Left IJ HD catheter        IV:   dextrose         Vitals:  BP (!) 121/57   Pulse 65   Temp 97.6 °F (36.4 °C) (Oral)   Resp 18   Ht 5' 10\" (1.778 m)   Wt 219 lb 4.8 oz (99.5 kg)   SpO2 92%   BMI 31.47 kg/m²      Intake/Output Summary (Last 24 hours) at 12/26/18 1623  Last data filed at 12/26/18 0941   Gross per 24 hour   Intake              580 ml   Output             1975 ml   Net            -1395 ml       EXAM:  General: Overweight, awake and oriented. No distress. Eyes: PERRL. No sclera icterus. No conjunctival injection. ENT: No discharge. Pharynx clear. Class III airway, dry mucosa  Neck: Short neck, IJ Vas-Cath. No JVD   Resp: No accessory muscle use. No crackles. No wheezing. No rhonchi. CV: Normal sinus rhythm. No mumur or rub. No edema. GI: Non-tender. Non-distended. No masses. No organomegaly. Normal bowel sounds. Skin: Warm and dry. No nodule on exposed extremities. No rash on exposed extremities. Lymph: Deferred. M/S: No cyanosis. No joint deformity. No clubbing. Neuro: Awake.   Conversant, no obvious focal deficit Psych: Deferred     Medications:  Scheduled Meds:   diltiazem  30 mg Oral 4 times per day    12/26/18   1325   COLORU  Yellow   PHUR  7.0   WBCUA  6-10*   RBCUA  >100*   BACTERIA  1+*   CLARITYU  Clear   SPECGRAV  1.010   LEUKOCYTESUR  MODERATE*   UROBILINOGEN  0.2   BILIRUBINUR  Negative   BLOODU  LARGE*   GLUCOSEU  Negative       Cultures:  Negative so far    Films:  CXR reviewed by me    Assessment and plan:  Hypotension. Resolved. Acute kidney injury, hyperkalemia, symptomatic bradycardia. Off dialysis, resolved. Acute hypoxemic respiratory failure. Oxygen to keep SaO2 >90%. Felt possibly to be due to pulmonary edema. Resolved  DM2, anemia, thrombocytopenia, dementia. Per IM. Anemia. Hold transfusion unless hemoglobin <7 g/dL. Microcytic. DVT prophylaxis. Heparin. Discussed with patient, nursing. We will sign off as patient is stable from a cardiopulmonary standpoint. Please call with questions, thank you for the consultation.       Electronically signed by:  Rj Monge MD    12/26/2018    4:23 PM.

## 2018-12-26 NOTE — PROGRESS NOTES
Patient Name: SharePlow                                                    Primary Physician: Giancarlo Blakely MD  Admitting Dx: Bradycardia [R00.1]    Dr. Zoila Rudd      Nephrology Basil Dial Note  [facility]  [email or web address]                                       Interval Plan:     · Renal function back to baseline SCr of 0.9   · K is now low at 3.3 with replacement ordered. Remains on lasix 40 mg IV BID and UOP is 3.8 L. Weight is now 219 from 228 lbs. No edema. Reduce to PO by tomorrow. · Complains of burning with urination. Checking UA and culture. Difficulty urinating as well and Urology has been consulted. Assessment:     Hyperkalemia  · Potassium 7.2 on admission  · Bradycardia despite medical treatment  · Needed pacemaker temporary- not taken off  · On CRRT from 12/21/18--> 12/23/18. HFpEF  · Has diastolic heart failure Chronic  · 12/18- echo EF of 55%, mild LVH, normal diastolic filling pressure, RA ventricle mildly dilated with normal function  FREDI    · Started dialysis on 12/21/18  · Known to have DM, and HTN but SCr was 0.9 on 11/2018  · Pro- BNP is 191  · UA- 11/2/18- bland, glucose  Hypotension  · BP: (112-150)/(61-70) Pulse:  [74-90]   · BP low on admission, now coming up Getting dopamine  · Paced rhythm Also has Afib  Anemia    · Chronic Unlikely to be anemia of ckd  · Platelet is normal     Please call our office at [phone] with any questions or contact me directly. Subjective:     CC / Reason for Consult: FREDI w/ Hyperkalemia    HPI/PMH:  68 y.o. male presented to the hospital on 12/21/2018 with FREDI, hyperkalemia and bradycardia. He is known to have CKD,and not followed by Nephrology recently. He used to follow a nephrologist by his house,but not seen recently and is looking to see somebody. He is unable to provide great details. According to family, he was hesitant to see cardiology.  He came to see them yesterday, and has had labs done which came back today, cardiology called and asked to come to ED. In ED he was found to have high K+, bradycardia, and high creatinine. He was taken to cath labs and has temporary pacemaker Placed. ROS / Interval Hx: Patient seen in room. Main complaint is buring when urinating. Denies swelling and feels breathing is good. Objective:        Gen: alert, well appearing, and in no distress and oriented to person, place, and time     Neck:  supple, no significant adenopathy     Cardio: normal rate, regular rhythm, normal S1, S2, no murmurs, rubs, clicks or gallops      Resp: clear to auscultation, no wheezes, rales or rhonchi, symmetric air entry. GI:  soft, nontender, nondistended, no masses or organomegaly. Ext:  peripheral pulses normal, no pedal edema, no clubbing or cyanosis      MS: no joint tenderness, deformity or swelling      DERM: normal coloration and turgor, no rashesor bruising    Vitals:     /65   Pulse 150   Temp 99 °F (37.2 °C) (Oral)   Resp 18   Ht 5' 10\" (1.778 m)   Wt 219 lb 4.8 oz (99.5 kg)   SpO2 93%   BMI 31.47 kg/m²      I/Os: Reviewed    Meds: Reviewed. Please see plan for changes made.     Labs:    Lab Results   Component Value Date    WBC 6.1 12/26/2018    HGB 8.6 12/26/2018    HCT 26.2 12/26/2018    MCV 79.9 12/26/2018     12/26/2018      Lab Results   Component Value Date    CREATININE 0.9 12/26/2018    BUN 18 12/26/2018     12/26/2018    K 3.3 12/26/2018    K 5.0 12/22/2018    K 5.0 12/22/2018    CL 95 12/26/2018    CO2 31 12/26/2018     No components found for: GLU   Lab Results   Component Value Date    CALCIUM 9.2 12/26/2018    CAION 1.17 12/24/2018    PHOS 2.5 12/25/2018      No results found for: [id number]   Lab Results   Component Value Date    IRON 31 12/20/2018    TIBC 578 12/20/2018    FERRITIN 18.8 12/20/2018      Lab Results   Component Value Date    MICROALBUR 80 11/27/2017

## 2018-12-27 NOTE — CARE COORDINATION
Case Management/Follow up:    Chart reviewed for length of stay. Hospital day #  6     Unit:  C-4    Diagnosis and current status as per MD progress: Bradycardia, hypotension, hyperkalemia, FREDI- CRRT in ICU, HD- now off as Creatinine is back to normal.     Anticipated d/c date: per MD note today, \" Perhaps 12/27-28, pending urine sensitivities. He lives at home. \"    Expected plan for discharge: Pt plans to return home with no needs. Pt was independent PTA. Cardiology and Pulmonology has signed off. Urology consult pending per nephro note today.     Potential barriers:none identified    Precert required/date initiated: n/a at this time    Confirmed plan with patient and/or family: yes, prior

## 2018-12-27 NOTE — PROGRESS NOTES
[facility]   Daily Progress Note    Admit Date:  12/21/2018  HPI:    Chief Complaint   Patient presents with    Shortness of Breath     Sent to ER by PMD for abnormal labs and hydration. Elevanted Potassium. Interval history: Emily Chandra is being followed for bradycardia, hyperkalemia with potassium 7.2. Required CRRT, temporary pacer from 12/21/18-12/24/18. Developed Afib with RVR on 12/26, converted to sinus with cardizem infusion. Subjective:  Mr. Armen Ingram feels so much better. Breathing is back at his baseline. No chest pain. No palpitations.      Objective:   BP (!) 141/64   Pulse 68   Temp 98.2 °F (36.8 °C) (Oral)   Resp 16   Ht 5' 10\" (1.778 m)   Wt 214 lb 14.4 oz (97.5 kg)   SpO2 90%   BMI 30.83 kg/m²     Intake/Output Summary (Last 24 hours) at 12/27/18 1014  Last data filed at 12/27/18 0758   Gross per 24 hour   Intake              360 ml   Output             1400 ml   Net            -1040 ml       NYHA: III   Tele: SR-SB rates 58-90's    Physical Exam:  General:  Awake, alert, NAD  Skin:  Warm and dry  Neck:  JVD<8  Chest:  Dim to auscultation, no wheezes/rhonchi/ few basilar rales  Cardiovascular:  RRR S1S2, no m/r/g  Abdomen:  Soft, nontender, +bowel sounds  Extremities:  No ble edema    Medications:    potassium chloride  40 mEq Oral Once    furosemide  40 mg Oral Daily    diltiazem  30 mg Oral 4 times per day    cefTRIAXone (ROCEPHIN) IV  1 g Intravenous Q24H    0.9 % sodium chloride  250 mL Intravenous Once    ipratropium-albuterol  1 ampule Inhalation Q4H WA    sodium chloride flush  10 mL Intravenous 2 times per day    donepezil  10 mg Oral Nightly    ferrous gluconate  324 mg Oral BID    finasteride  5 mg Oral Daily    mesalamine  400 mg Oral TID    montelukast  10 mg Oral Nightly    pramipexole  1 mg Oral Nightly    sertraline  100 mg Oral Daily    insulin lispro  0-6 Units Subcutaneous TID WC    insulin lispro  0-3 Units Subcutaneous Nightly

## 2018-12-27 NOTE — PROGRESS NOTES
They will consider watchman device when acute issues resolve. DVT Prophylaxis: SCDs  Diet: DIET RENAL;  Code Status: Full Code    PT/OT Eval Status: rec'd assistance PRN    Dispo - Perhaps 12/27-28, pending urine sensitivities. He lives at home.       Lyle Greenfield MD

## 2018-12-28 NOTE — DISCHARGE SUMMARY
Hospital Medicine Discharge Summary    Patient ID: Gabriele Bates      Patient's PCP: Mariel Gutierrez DO    Admit Date: 12/21/2018     Discharge Date:   12/28/18     Admitting Physician: Prakash Beach MD     Discharge Physician: Christina De La Rosa MD     Discharge Diagnoses: Active Hospital Problems    Diagnosis Date Noted    Coronary artery disease involving native coronary artery of native heart without angina pectoris [I25.10] 07/29/2015     Priority: Medium    DM II (diabetes mellitus, type II), controlled ([address] [E11.9] 04/26/2015     Priority: Medium    HLD (hyperlipidemia) [E78.5] 04/26/2015     Priority: Medium    Hypovolemic shock ([address] [R57.1] 12/21/2018    Hyperkalemia [E87.5]     Bradycardia [R00.1]     FREDI (acute kidney injury) ([address] [N17.9]     Chronic diastolic heart failure (HCC) [I50.32] 11/20/2018    Paroxysmal atrial fibrillation ([address] [I48.0] 11/01/2018    Depression [F32.9] 02/16/2017    COPD, severe ([address] [J44.9] 08/17/2016    HOLLAND (obstructive sleep apnea) [G47.33] 06/09/2016       The patient was seen and examined on day of discharge and this discharge summary is in conjunction with any daily progress note from day of discharge. Hospital Course: \"Pt presented to the ER today morning after he was instructed to do so by his cardiologist, after he was observed to have abnormal labs with K- 6.4. Pt was fpound to be in shock in the ER, with HR in the 30s, with FREDI and hyperkalemia. He was recently started on spironolactone. He was started on dopamine infusion and taken to the cath lab for a temporary pacemaker. His K only got higher, so a decision to start CRRT was made. \"        Hypovolemic shock POA  required aggressive IV hydration, weaned off pressors. Did well.      Bradycardia:  Required urgent temporary pacemaker, seemed to improve once off negative chronotropic medications.   Did not require PPM.  Removed temporary pacer without issue.      Severe, oriented, thought content appropriate, normal insight  Capillary Refill: Brisk,< 3 seconds   Peripheral Pulses: +2 palpable, equal bilaterally       Labs: For convenience and continuity at follow-up the following most recent labs are provided:      CBC:    Lab Results   Component Value Date    WBC 5.9 12/28/2018    HGB 8.3 12/28/2018    HCT 26.2 12/28/2018     12/28/2018       Renal:    Lab Results   Component Value Date     12/28/2018    K 3.8 12/28/2018    CL 97 12/28/2018    CO2 29 12/28/2018    BUN 17 12/28/2018    CREATININE 0.9 12/28/2018    CALCIUM 9.7 12/28/2018    PHOS 2.5 12/25/2018         Significant Diagnostic Studies    Radiology:   XR CHEST STANDARD (2 VW)   Final Result   1. New left central venous line in place terminating in the SVC. Left   jugular central venous catheter remains in place   2. Linear infiltrates seen at the left lung base could represent atelectatic   changes or pneumonia         XR CHEST PORTABLE   Final Result   Development of mild airspace disease which could represent pulmonary edema. XR CHEST PORTABLE   Final Result   Stable position of several lines. Right IJ central venous catheter is   unchanged in position extending through the right atrium, presumably into the   right ventricle. XR CHEST PORTABLE   Final Result   Left subclavian line in adequate position. No evidence of postprocedure   pneumothorax. Unchanged left IJ catheter as discussed on the comparison      Unchanged right IJ catheter as discussed on the comparison. Retraction is   recommended for this catheter. XR CHEST PORTABLE   Final Result   New bilateral IJ catheters as discussed. The right IJ catheter should be   retracted. Recommend retracting 8-9 cm, and obtaining a follow-up chest   x-ray.   The left IJ catheter should be advanced 7 cm to reach the lower SVC   near the cavoatrial junction         XR ABDOMEN (KUB) (SINGLE AP VIEW)   Final Result   Moderate air-filled distention of the stomach. Otherwise negative supine   abdominal radiographs         XR CHEST PORTABLE   Final Result   Findings are most compatible with pulmonary edema. Consults:     IP CONSULT TO CARDIOLOGY  IP CONSULT TO HOSPITALIST  IP CONSULT TO NEPHROLOGY  IP CONSULT TO CRITICAL CARE  IP CONSULT TO CARDIAC REHAB  IP CONSULT TO DIETITIAN  IP CONSULT TO HOSPITALIST    Disposition:  home     Condition at Discharge: Stable    Discharge Instructions/Follow-up:  Follow up with PCP within 1-2 weeks. Follow up with cardiology per their instructions, typically in 2 weeks. Code Status:  Full Code     Activity: activity as tolerated    Diet: cardiac diet      Discharge Medications:     Current Discharge Medication List           Details   ciprofloxacin (CIPRO) 500 MG tablet Take 1 tablet by mouth 2 times daily for 5 days  Qty: 10 tablet, Refills: 0              Details   ferrous sulfate 325 (65 Fe) MG tablet Take 1 tablet by mouth daily (with breakfast)  Qty: 90 tablet, Refills: 2      furosemide (LASIX) 40 MG tablet Take 1 tablet by mouth daily  Qty: 30 tablet, Refills: 3      diltiazem (CARDIZEM CD) 120 MG extended release capsule Take 1 capsule by mouth daily  Qty: 30 capsule, Refills: 3      ferrous gluconate 324 (37.5 Fe) MG TABS Take 1 tablet by mouth 2 times daily  Qty: 60 tablet, Refills: 2      oxyCODONE-acetaminophen (PERCOCET)  MG per tablet Take 1 tablet by mouth every 6 hours as needed for Pain. .      finasteride (PROSCAR) 5 MG tablet Take 1 tablet by mouth daily Will shrink prostate; Affects PSA number  Qty: 30 tablet, Refills: 5      donepezil (ARICEPT) 10 MG tablet Take 1 tablet by mouth nightly  Qty: 90 tablet, Refills: 1      albuterol (PROVENTIL) (2.5 MG/3ML) 0.083% nebulizer solution Take 3 mLs by nebulization every 6 hours as needed for Wheezing  Qty: 120 each, Refills: 3    Associated Diagnoses:  Moderate COPD (chronic obstructive pulmonary disease) ([address] tamsulosin (FLOMAX) 0.4 MG capsule Take 1 capsule by mouth daily  Qty: 30 capsule, Refills: 3      pramipexole (MIRAPEX) 1 MG tablet Take 1 tablet by mouth nightly  Qty: 90 tablet, Refills: 3    Associated Diagnoses: Restless legs syndrome (RLS)      mesalamine (LIALDA) 1.2 g EC tablet TAKE 1 TABLET THREE TIMES DAILY  Qty: 270 tablet, Refills: 1      metFORMIN (GLUCOPHAGE) 500 MG tablet Take 1 tablet by mouth 2 times daily  Qty: 180 tablet, Refills: 1    Associated Diagnoses: Controlled type 2 diabetes mellitus without complication, without long-term current use of insulin ([facility])      omeprazole (PRILOSEC) 40 MG delayed release capsule Take 1 capsule by mouth daily  Qty: 90 capsule, Refills: 1    Associated Diagnoses: Gastroesophageal reflux disease without esophagitis      glucose blood VI test strips (ASCENSIA AUTODISC VI;ONE TOUCH ULTRA TEST VI) strip [employer] True [employer] Air Test Strips. FSBS daily. DX E11.9  Qty: 100 strip, Refills: 3    Associated Diagnoses: Controlled type 2 diabetes mellitus without complication, without long-term current use of insulin ([facility])      sertraline (ZOLOFT) 100 MG tablet Take 1 tablet by mouth daily  Qty: 90 tablet, Refills: 3    Associated Diagnoses: Depressed affect      cyclobenzaprine (FLEXERIL) 10 MG tablet Take 1 tablet by mouth 3 times daily as needed for Muscle spasms  Qty: 270 tablet, Refills: 0      montelukast (SINGULAIR) 10 MG tablet Take 1 tablet by mouth nightly  Qty: 90 tablet, Refills: 3    Associated Diagnoses: COPD, severe ([address] Perennial allergic rhinitis      colesevelam (WELCHOL) 625 MG tablet TAKE 3 TABLETS TWICE DAILY  Qty: 540 tablet, Refills: 1      TRUEPLUS LANCETS 28G MISC 1 each by Does not apply route daily E11.9 Test FBS daily--NEED 33 GAUGE  Qty: 100 each, Refills: 3               Time Spent on discharge is more than 30 minutes in the examination, evaluation, counseling and review of medications and discharge plan.       Signed:    Gricelda Quinonez

## 2018-12-28 NOTE — PROGRESS NOTES
[facility]   Daily Progress Note    Admit Date:  12/21/2018  HPI:    Chief Complaint   Patient presents with    Shortness of Breath     Sent to ER by PMD for abnormal labs and hydration. Elevanted Potassium. Interval history: Vamshi Spangler is being followed for bradycardia, hyperkalemia with potassium 7.2. Required CRRT, temporary pacer from 12/21/18-12/24/18. Developed Afib with RVR on 12/26, converted to sinus with cardizem infusion. Subjective:  Mr. Christina Tejeda ready to go home. Feels much better. Breathing is stable. Labs are stable.       Objective:   /69   Pulse 93   Temp 97.5 °F (36.4 °C) (Oral)   Resp 18   Ht 5' 10\" (1.778 m)   Wt 215 lb 14.4 oz (97.9 kg)   SpO2 94%   BMI 30.98 kg/m²       Intake/Output Summary (Last 24 hours) at 12/28/18 1355  Last data filed at 12/28/18 1034   Gross per 24 hour   Intake              500 ml   Output              425 ml   Net               75 ml       NYHA: III   Tele: SR- with PACs rate 60-90's    Physical Exam:  General:  Awake, alert, NAD, smiling   Skin:  Warm and dry  Neck:  JVD<8  Chest:  Dim to auscultation, no wheezes/rhonchi/ no basilar rales  Cardiovascular:  RRR S1S2, no m/r/g  Abdomen:  Soft, nontender, +bowel sounds  Extremities:  No ble edema    Medications:    diltiazem  120 mg Oral Daily    furosemide  40 mg Oral Daily    cefTRIAXone (ROCEPHIN) IV  1 g Intravenous Q24H    0.9 % sodium chloride  250 mL Intravenous Once    ipratropium-albuterol  1 ampule Inhalation Q4H WA    sodium chloride flush  10 mL Intravenous 2 times per day    donepezil  10 mg Oral Nightly    ferrous gluconate  324 mg Oral BID    finasteride  5 mg Oral Daily    mesalamine  400 mg Oral TID    montelukast  10 mg Oral Nightly    pramipexole  1 mg Oral Nightly    sertraline  100 mg Oral Daily    insulin lispro  0-6 Units Subcutaneous TID WC    insulin lispro  0-3 Units Subcutaneous Nightly    sodium chloride flush  10 mL Intravenous 2

## 2018-12-29 NOTE — CARE COORDINATION
[facility] Transitions Initial Follow Up Call    Call within 2 business days of discharge: Yes    Patient: Vamshi Spangler Patient : 1945   MRN: [id number]  Reason for Admission: shock  Discharge Date: 18 RARS: Readmission Risk Score: 34     Spoke with: patient and his wife    Facility: [place]    Non-face-to-face services provided:  Obtained and reviewed discharge summary and/or continuity of care documents  Education of patient/family/caregiver/guardian to support self-management-CHF education  Assessment and support for treatment adherence and medication management-med review completed with spouse    Care Transitions 24 Hour Call    Schedule Follow Up Appointment with PCP:  Completed  Do you have any ongoing symptoms?:  Yes  Patient-reported symptoms:  Weakness  Interventions for patient-reported symptoms:  Other  Do you have a copy of your discharge instructions?:  Yes  Do you have all of your prescriptions and are they filled?:  Yes  Have you been contacted by a [address]?:  No  Have you scheduled your follow up appointment?:  Yes  How are you going to get to your appointment?:  Car - family or friend to transport  Were you discharged with any Home Care or Post Acute Services:  No  Do you have support at home?:  Partner/Spouse/SO  Do you feel like you have everything you need to keep you well at home?:  Yes  Are you an active caregiver in your home?:  No  Care Transitions Interventions  No Identified Needs       Continues with weakness but has been up doing laundry, dishes desiring to return to normal routine as soon as possible. Encouraged rest breaks. Weight 215# when he got home. Forgot to weigh this morning. Encouraged continued CHF monitoring for exac and reviewed s/s and weights to call for. He c/o continued incontinence \"leaking\" and urgency. Known to [facility] as wife states ongoing issue. Encouraged them to call Monday for appt to see if there is intervention.  Has

## 2019-01-01 ENCOUNTER — TELEPHONE (OUTPATIENT)
Dept: CARDIOLOGY CLINIC | Age: 74
End: 2019-01-01

## 2019-01-01 ENCOUNTER — TELEPHONE (OUTPATIENT)
Dept: FAMILY MEDICINE CLINIC | Age: 74
End: 2019-01-01

## 2019-01-01 ENCOUNTER — APPOINTMENT (OUTPATIENT)
Dept: GENERAL RADIOLOGY | Age: 74
DRG: 189 | End: 2019-01-01
Payer: MEDICARE

## 2019-01-01 ENCOUNTER — OFFICE VISIT (OUTPATIENT)
Dept: CARDIOLOGY CLINIC | Age: 74
End: 2019-01-01
Payer: MEDICARE

## 2019-01-01 ENCOUNTER — PROCEDURE VISIT (OUTPATIENT)
Dept: CARDIOLOGY CLINIC | Age: 74
End: 2019-01-01

## 2019-01-01 ENCOUNTER — OFFICE VISIT (OUTPATIENT)
Dept: FAMILY MEDICINE CLINIC | Age: 74
End: 2019-01-01
Payer: MEDICARE

## 2019-01-01 ENCOUNTER — HOSPITAL ENCOUNTER (INPATIENT)
Age: 74
LOS: 4 days | Discharge: HOME HEALTH | DRG: 309 | End: 2019-06-15
Attending: EMERGENCY MEDICINE | Admitting: INTERNAL MEDICINE
Payer: MEDICARE

## 2019-01-01 ENCOUNTER — HOSPITAL ENCOUNTER (OUTPATIENT)
Dept: NON INVASIVE DIAGNOSTICS | Age: 74
Discharge: HOME | End: 2019-05-22
Payer: MEDICARE

## 2019-01-01 ENCOUNTER — OFFICE VISIT (OUTPATIENT)
Dept: PULMONOLOGY | Age: 74
End: 2019-01-01
Payer: MEDICARE

## 2019-01-01 ENCOUNTER — APPOINTMENT (OUTPATIENT)
Dept: GENERAL RADIOLOGY | Age: 74
DRG: 208 | End: 2019-01-01
Attending: INTERNAL MEDICINE
Payer: MEDICARE

## 2019-01-01 ENCOUNTER — APPOINTMENT (OUTPATIENT)
Dept: CT IMAGING | Age: 74
End: 2019-01-01
Payer: MEDICARE

## 2019-01-01 ENCOUNTER — HOSPITAL ENCOUNTER (OUTPATIENT)
Dept: CARDIOLOGY | Age: 74
Discharge: HOME | End: 2019-01-29
Payer: MEDICARE

## 2019-01-01 ENCOUNTER — TELEPHONE (OUTPATIENT)
Dept: PULMONOLOGY | Age: 74
End: 2019-01-01

## 2019-01-01 ENCOUNTER — HOSPITAL ENCOUNTER (EMERGENCY)
Age: 74
Discharge: HOME | End: 2019-08-19
Payer: MEDICARE

## 2019-01-01 ENCOUNTER — HOSPITAL ENCOUNTER (EMERGENCY)
Age: 74
Discharge: HOME | End: 2019-01-19
Payer: MEDICARE

## 2019-01-01 ENCOUNTER — APPOINTMENT (OUTPATIENT)
Dept: GENERAL RADIOLOGY | Age: 74
DRG: 243 | End: 2019-01-01
Attending: INTERNAL MEDICINE
Payer: MEDICARE

## 2019-01-01 ENCOUNTER — NURSE ONLY (OUTPATIENT)
Dept: CARDIOLOGY CLINIC | Age: 74
End: 2019-01-01
Payer: MEDICARE

## 2019-01-01 ENCOUNTER — HOSPITAL ENCOUNTER (OUTPATIENT)
Dept: PULMONOLOGY | Age: 74
Discharge: HOME | End: 2019-07-03
Payer: MEDICARE

## 2019-01-01 ENCOUNTER — APPOINTMENT (OUTPATIENT)
Dept: GENERAL RADIOLOGY | Age: 74
DRG: 309 | End: 2019-01-01
Payer: MEDICARE

## 2019-01-01 ENCOUNTER — APPOINTMENT (OUTPATIENT)
Dept: GENERAL RADIOLOGY | Age: 74
DRG: 193 | End: 2019-01-01
Payer: MEDICARE

## 2019-01-01 ENCOUNTER — HOSPITAL ENCOUNTER (OUTPATIENT)
Dept: ULTRASOUND IMAGING | Age: 74
Discharge: HOME | End: 2019-04-24
Payer: MEDICARE

## 2019-01-01 ENCOUNTER — TELEPHONE (OUTPATIENT)
Dept: OTHER | Facility: CLINIC | Age: 74
End: 2019-01-01

## 2019-01-01 ENCOUNTER — HOSPITAL ENCOUNTER (INPATIENT)
Dept: CARDIAC CATH/INVASIVE PROCEDURES | Age: 74
LOS: 1 days | Discharge: HOME | DRG: 243 | End: 2019-06-01
Attending: INTERNAL MEDICINE | Admitting: INTERNAL MEDICINE
Payer: MEDICARE

## 2019-01-01 ENCOUNTER — TELEPHONE (OUTPATIENT)
Dept: CARDIOLOGY | Age: 74
End: 2019-01-01

## 2019-01-01 ENCOUNTER — APPOINTMENT (OUTPATIENT)
Dept: CT IMAGING | Age: 74
DRG: 189 | End: 2019-01-01
Payer: MEDICARE

## 2019-01-01 ENCOUNTER — CARE COORDINATION (OUTPATIENT)
Dept: CASE MANAGEMENT | Age: 74
End: 2019-01-01

## 2019-01-01 ENCOUNTER — APPOINTMENT (OUTPATIENT)
Dept: GENERAL RADIOLOGY | Age: 74
End: 2019-01-01
Payer: MEDICARE

## 2019-01-01 ENCOUNTER — HOSPITAL ENCOUNTER (OUTPATIENT)
Dept: VASCULAR LAB | Age: 74
Discharge: HOME | End: 2019-01-19
Payer: MEDICARE

## 2019-01-01 ENCOUNTER — HOSPITAL ENCOUNTER (OUTPATIENT)
Age: 74
Setting detail: OBSERVATION
Discharge: HOME | End: 2019-08-07
Attending: EMERGENCY MEDICINE | Admitting: INTERNAL MEDICINE
Payer: MEDICARE

## 2019-01-01 ENCOUNTER — HOSPITAL ENCOUNTER (INPATIENT)
Age: 74
LOS: 3 days | Discharge: HOME | DRG: 193 | End: 2019-04-12
Attending: EMERGENCY MEDICINE | Admitting: INTERNAL MEDICINE
Payer: MEDICARE

## 2019-01-01 ENCOUNTER — PATIENT MESSAGE (OUTPATIENT)
Dept: FAMILY MEDICINE CLINIC | Age: 74
End: 2019-01-01

## 2019-01-01 ENCOUNTER — NURSE ONLY (OUTPATIENT)
Dept: CARDIOLOGY CLINIC | Age: 74
End: 2019-01-01

## 2019-01-01 ENCOUNTER — HOSPITAL ENCOUNTER (OUTPATIENT)
Age: 74
Discharge: HOME | End: 2019-04-18
Payer: MEDICARE

## 2019-01-01 ENCOUNTER — HOSPITAL ENCOUNTER (INPATIENT)
Age: 74
LOS: 5 days | Discharge: HOME HEALTH | DRG: 208 | End: 2019-06-07
Attending: INTERNAL MEDICINE | Admitting: INTERNAL MEDICINE
Payer: MEDICARE

## 2019-01-01 ENCOUNTER — CARE COORDINATION (OUTPATIENT)
Dept: CARE COORDINATION | Age: 74
End: 2019-01-01

## 2019-01-01 ENCOUNTER — HOSPITAL ENCOUNTER (EMERGENCY)
Age: 74
Discharge: ANOTHER HOSPITAL | End: 2019-06-02
Attending: EMERGENCY MEDICINE
Payer: MEDICARE

## 2019-01-01 ENCOUNTER — HOSPITAL ENCOUNTER (INPATIENT)
Age: 74
LOS: 4 days | Discharge: HOME | DRG: 189 | End: 2019-07-25
Attending: EMERGENCY MEDICINE | Admitting: INTERNAL MEDICINE
Payer: MEDICARE

## 2019-01-01 VITALS
OXYGEN SATURATION: 94 % | HEART RATE: 60 BPM | RESPIRATION RATE: 16 BRPM | DIASTOLIC BLOOD PRESSURE: 84 MMHG | WEIGHT: 222 LBS | TEMPERATURE: 97 F | BODY MASS INDEX: 31.85 KG/M2 | SYSTOLIC BLOOD PRESSURE: 139 MMHG

## 2019-01-01 VITALS
HEART RATE: 60 BPM | HEIGHT: 70 IN | TEMPERATURE: 97.4 F | RESPIRATION RATE: 16 BRPM | WEIGHT: 220 LBS | DIASTOLIC BLOOD PRESSURE: 67 MMHG | SYSTOLIC BLOOD PRESSURE: 129 MMHG | OXYGEN SATURATION: 95 % | BODY MASS INDEX: 31.5 KG/M2

## 2019-01-01 VITALS
HEART RATE: 64 BPM | DIASTOLIC BLOOD PRESSURE: 70 MMHG | WEIGHT: 238.2 LBS | BODY MASS INDEX: 34.1 KG/M2 | OXYGEN SATURATION: 97 % | HEIGHT: 70 IN | SYSTOLIC BLOOD PRESSURE: 122 MMHG

## 2019-01-01 VITALS
HEIGHT: 70 IN | WEIGHT: 236 LBS | DIASTOLIC BLOOD PRESSURE: 60 MMHG | SYSTOLIC BLOOD PRESSURE: 132 MMHG | HEART RATE: 94 BPM | BODY MASS INDEX: 33.79 KG/M2 | OXYGEN SATURATION: 95 %

## 2019-01-01 VITALS
HEART RATE: 65 BPM | TEMPERATURE: 97.7 F | DIASTOLIC BLOOD PRESSURE: 72 MMHG | HEIGHT: 70 IN | WEIGHT: 227 LBS | OXYGEN SATURATION: 92 % | BODY MASS INDEX: 32.5 KG/M2 | SYSTOLIC BLOOD PRESSURE: 124 MMHG | RESPIRATION RATE: 17 BRPM

## 2019-01-01 VITALS
SYSTOLIC BLOOD PRESSURE: 104 MMHG | WEIGHT: 246.2 LBS | DIASTOLIC BLOOD PRESSURE: 56 MMHG | RESPIRATION RATE: 16 BRPM | HEART RATE: 65 BPM | OXYGEN SATURATION: 89 % | HEIGHT: 70 IN | BODY MASS INDEX: 35.24 KG/M2

## 2019-01-01 VITALS
HEIGHT: 70 IN | SYSTOLIC BLOOD PRESSURE: 153 MMHG | WEIGHT: 236 LBS | HEART RATE: 86 BPM | RESPIRATION RATE: 22 BRPM | DIASTOLIC BLOOD PRESSURE: 96 MMHG | TEMPERATURE: 98 F | BODY MASS INDEX: 33.79 KG/M2 | OXYGEN SATURATION: 93 %

## 2019-01-01 VITALS
SYSTOLIC BLOOD PRESSURE: 112 MMHG | HEART RATE: 81 BPM | HEIGHT: 70 IN | OXYGEN SATURATION: 97 % | DIASTOLIC BLOOD PRESSURE: 58 MMHG | WEIGHT: 229 LBS | BODY MASS INDEX: 32.78 KG/M2

## 2019-01-01 VITALS
HEIGHT: 70 IN | RESPIRATION RATE: 16 BRPM | HEART RATE: 60 BPM | TEMPERATURE: 98.3 F | DIASTOLIC BLOOD PRESSURE: 74 MMHG | WEIGHT: 227.1 LBS | OXYGEN SATURATION: 95 % | BODY MASS INDEX: 32.51 KG/M2 | SYSTOLIC BLOOD PRESSURE: 143 MMHG

## 2019-01-01 VITALS
SYSTOLIC BLOOD PRESSURE: 110 MMHG | WEIGHT: 235.8 LBS | DIASTOLIC BLOOD PRESSURE: 60 MMHG | BODY MASS INDEX: 33.76 KG/M2 | HEIGHT: 70 IN | HEART RATE: 185 BPM | OXYGEN SATURATION: 96 %

## 2019-01-01 VITALS
RESPIRATION RATE: 20 BRPM | DIASTOLIC BLOOD PRESSURE: 64 MMHG | OXYGEN SATURATION: 93 % | BODY MASS INDEX: 32.93 KG/M2 | SYSTOLIC BLOOD PRESSURE: 118 MMHG | TEMPERATURE: 98.2 F | WEIGHT: 230 LBS | HEART RATE: 88 BPM | HEIGHT: 70 IN

## 2019-01-01 VITALS
BODY MASS INDEX: 33.79 KG/M2 | HEIGHT: 70 IN | RESPIRATION RATE: 25 BRPM | WEIGHT: 236 LBS | HEART RATE: 79 BPM | OXYGEN SATURATION: 97 % | TEMPERATURE: 99.4 F | DIASTOLIC BLOOD PRESSURE: 67 MMHG | SYSTOLIC BLOOD PRESSURE: 111 MMHG

## 2019-01-01 VITALS
SYSTOLIC BLOOD PRESSURE: 145 MMHG | RESPIRATION RATE: 18 BRPM | HEART RATE: 82 BPM | OXYGEN SATURATION: 92 % | BODY MASS INDEX: 35.24 KG/M2 | WEIGHT: 246.2 LBS | DIASTOLIC BLOOD PRESSURE: 82 MMHG | HEIGHT: 70 IN | TEMPERATURE: 97.8 F

## 2019-01-01 VITALS
BODY MASS INDEX: 33.44 KG/M2 | DIASTOLIC BLOOD PRESSURE: 80 MMHG | OXYGEN SATURATION: 94 % | WEIGHT: 233.6 LBS | SYSTOLIC BLOOD PRESSURE: 125 MMHG | HEART RATE: 88 BPM | HEIGHT: 70 IN

## 2019-01-01 VITALS
BODY MASS INDEX: 31.5 KG/M2 | WEIGHT: 220 LBS | HEIGHT: 70 IN | TEMPERATURE: 98.4 F | DIASTOLIC BLOOD PRESSURE: 79 MMHG | OXYGEN SATURATION: 94 % | SYSTOLIC BLOOD PRESSURE: 134 MMHG | RESPIRATION RATE: 18 BRPM | HEART RATE: 66 BPM

## 2019-01-01 VITALS
TEMPERATURE: 97.6 F | WEIGHT: 222.5 LBS | OXYGEN SATURATION: 95 % | SYSTOLIC BLOOD PRESSURE: 158 MMHG | RESPIRATION RATE: 18 BRPM | HEART RATE: 79 BPM | HEIGHT: 70 IN | DIASTOLIC BLOOD PRESSURE: 75 MMHG | BODY MASS INDEX: 31.85 KG/M2

## 2019-01-01 VITALS
HEART RATE: 72 BPM | BODY MASS INDEX: 32 KG/M2 | DIASTOLIC BLOOD PRESSURE: 80 MMHG | OXYGEN SATURATION: 98 % | WEIGHT: 223 LBS | SYSTOLIC BLOOD PRESSURE: 130 MMHG

## 2019-01-01 VITALS
HEART RATE: 72 BPM | OXYGEN SATURATION: 99 % | SYSTOLIC BLOOD PRESSURE: 138 MMHG | RESPIRATION RATE: 16 BRPM | WEIGHT: 227 LBS | DIASTOLIC BLOOD PRESSURE: 72 MMHG | BODY MASS INDEX: 32.57 KG/M2

## 2019-01-01 VITALS
OXYGEN SATURATION: 98 % | HEIGHT: 70 IN | DIASTOLIC BLOOD PRESSURE: 70 MMHG | SYSTOLIC BLOOD PRESSURE: 110 MMHG | WEIGHT: 220.8 LBS | HEART RATE: 73 BPM | BODY MASS INDEX: 31.61 KG/M2

## 2019-01-01 VITALS
DIASTOLIC BLOOD PRESSURE: 68 MMHG | BODY MASS INDEX: 36.76 KG/M2 | OXYGEN SATURATION: 98 % | HEART RATE: 96 BPM | HEIGHT: 67 IN | SYSTOLIC BLOOD PRESSURE: 124 MMHG | WEIGHT: 234.2 LBS

## 2019-01-01 VITALS
HEART RATE: 77 BPM | OXYGEN SATURATION: 94 % | DIASTOLIC BLOOD PRESSURE: 62 MMHG | WEIGHT: 229 LBS | SYSTOLIC BLOOD PRESSURE: 126 MMHG | BODY MASS INDEX: 32.78 KG/M2 | TEMPERATURE: 97.6 F | HEIGHT: 70 IN

## 2019-01-01 VITALS
OXYGEN SATURATION: 95 % | HEART RATE: 152 BPM | BODY MASS INDEX: 32.35 KG/M2 | DIASTOLIC BLOOD PRESSURE: 54 MMHG | HEIGHT: 70 IN | WEIGHT: 226 LBS | SYSTOLIC BLOOD PRESSURE: 84 MMHG

## 2019-01-01 VITALS
WEIGHT: 225 LBS | BODY MASS INDEX: 32.21 KG/M2 | HEIGHT: 70 IN | HEART RATE: 62 BPM | OXYGEN SATURATION: 94 % | SYSTOLIC BLOOD PRESSURE: 130 MMHG | DIASTOLIC BLOOD PRESSURE: 54 MMHG

## 2019-01-01 VITALS
TEMPERATURE: 97.3 F | HEIGHT: 70 IN | DIASTOLIC BLOOD PRESSURE: 82 MMHG | SYSTOLIC BLOOD PRESSURE: 136 MMHG | OXYGEN SATURATION: 92 % | RESPIRATION RATE: 18 BRPM | BODY MASS INDEX: 31.73 KG/M2 | WEIGHT: 221.6 LBS | HEART RATE: 61 BPM

## 2019-01-01 VITALS
SYSTOLIC BLOOD PRESSURE: 120 MMHG | BODY MASS INDEX: 35.01 KG/M2 | WEIGHT: 244 LBS | DIASTOLIC BLOOD PRESSURE: 60 MMHG | HEART RATE: 48 BPM | OXYGEN SATURATION: 93 %

## 2019-01-01 VITALS — SYSTOLIC BLOOD PRESSURE: 104 MMHG | DIASTOLIC BLOOD PRESSURE: 62 MMHG

## 2019-01-01 DIAGNOSIS — R19.7 DIARRHEA, UNSPECIFIED TYPE: Primary | ICD-10-CM

## 2019-01-01 DIAGNOSIS — E11.9 CONTROLLED TYPE 2 DIABETES MELLITUS WITHOUT COMPLICATION, WITHOUT LONG-TERM CURRENT USE OF INSULIN (HCC): Chronic | ICD-10-CM

## 2019-01-01 DIAGNOSIS — J96.01 ACUTE RESPIRATORY FAILURE WITH HYPOXIA (HCC): Primary | ICD-10-CM

## 2019-01-01 DIAGNOSIS — Z95.0 PACEMAKER: ICD-10-CM

## 2019-01-01 DIAGNOSIS — R97.20 ELEVATED PSA: ICD-10-CM

## 2019-01-01 DIAGNOSIS — N40.1 BENIGN PROSTATIC HYPERPLASIA WITH URINARY FREQUENCY: ICD-10-CM

## 2019-01-01 DIAGNOSIS — R35.0 BENIGN PROSTATIC HYPERPLASIA WITH URINARY FREQUENCY: ICD-10-CM

## 2019-01-01 DIAGNOSIS — N17.9 AKI (ACUTE KIDNEY INJURY) (HCC): Primary | ICD-10-CM

## 2019-01-01 DIAGNOSIS — I49.5 SINUS NODE DYSFUNCTION (HCC): ICD-10-CM

## 2019-01-01 DIAGNOSIS — I10 ESSENTIAL HYPERTENSION: Primary | ICD-10-CM

## 2019-01-01 DIAGNOSIS — J44.9 COPD, SEVERE (HCC): ICD-10-CM

## 2019-01-01 DIAGNOSIS — E11.9 CONTROLLED TYPE 2 DIABETES MELLITUS WITHOUT COMPLICATION, WITHOUT LONG-TERM CURRENT USE OF INSULIN (HCC): ICD-10-CM

## 2019-01-01 DIAGNOSIS — R06.02 SOB (SHORTNESS OF BREATH): ICD-10-CM

## 2019-01-01 DIAGNOSIS — Z79.01 ANTICOAGULATED: ICD-10-CM

## 2019-01-01 DIAGNOSIS — R07.9 CHEST PAIN, UNSPECIFIED TYPE: Primary | ICD-10-CM

## 2019-01-01 DIAGNOSIS — R94.39 ABNORMAL STRESS TEST: ICD-10-CM

## 2019-01-01 DIAGNOSIS — R00.0 TACHYCARDIA: ICD-10-CM

## 2019-01-01 DIAGNOSIS — Z79.899 MEDICATION MANAGEMENT: Primary | ICD-10-CM

## 2019-01-01 DIAGNOSIS — R42 DIZZINESS: ICD-10-CM

## 2019-01-01 DIAGNOSIS — I82.402 ACUTE DEEP VEIN THROMBOSIS (DVT) OF LEFT LOWER EXTREMITY, UNSPECIFIED VEIN (HCC): Primary | ICD-10-CM

## 2019-01-01 DIAGNOSIS — I50.32 CHRONIC DIASTOLIC CONGESTIVE HEART FAILURE (HCC): Primary | ICD-10-CM

## 2019-01-01 DIAGNOSIS — I48.0 PAROXYSMAL ATRIAL FIBRILLATION (HCC): ICD-10-CM

## 2019-01-01 DIAGNOSIS — I25.10 CORONARY ARTERY DISEASE INVOLVING NATIVE CORONARY ARTERY OF NATIVE HEART WITHOUT ANGINA PECTORIS: Primary | Chronic | ICD-10-CM

## 2019-01-01 DIAGNOSIS — I10 ESSENTIAL HYPERTENSION: ICD-10-CM

## 2019-01-01 DIAGNOSIS — M79.662 PAIN OF LEFT CALF: Primary | ICD-10-CM

## 2019-01-01 DIAGNOSIS — R00.2 PALPITATION: Primary | ICD-10-CM

## 2019-01-01 DIAGNOSIS — R30.0 DYSURIA: ICD-10-CM

## 2019-01-01 DIAGNOSIS — J96.02 ACUTE RESPIRATORY FAILURE WITH HYPERCAPNIA (HCC): Primary | ICD-10-CM

## 2019-01-01 DIAGNOSIS — R45.89 DEPRESSED AFFECT: ICD-10-CM

## 2019-01-01 DIAGNOSIS — I48.3 TYPICAL ATRIAL FLUTTER (HCC): ICD-10-CM

## 2019-01-01 DIAGNOSIS — J44.9 MODERATE COPD (CHRONIC OBSTRUCTIVE PULMONARY DISEASE) (HCC): Primary | ICD-10-CM

## 2019-01-01 DIAGNOSIS — G89.29 CHRONIC BILATERAL LOW BACK PAIN WITHOUT SCIATICA: Primary | ICD-10-CM

## 2019-01-01 DIAGNOSIS — J30.89 PERENNIAL ALLERGIC RHINITIS: ICD-10-CM

## 2019-01-01 DIAGNOSIS — M54.6 ACUTE BILATERAL THORACIC BACK PAIN: ICD-10-CM

## 2019-01-01 DIAGNOSIS — G25.81 RESTLESS LEGS SYNDROME (RLS): ICD-10-CM

## 2019-01-01 DIAGNOSIS — I50.33 ACUTE ON CHRONIC DIASTOLIC CONGESTIVE HEART FAILURE (HCC): ICD-10-CM

## 2019-01-01 DIAGNOSIS — G89.29 ACUTE EXACERBATION OF CHRONIC LOW BACK PAIN: Primary | ICD-10-CM

## 2019-01-01 DIAGNOSIS — D64.9 ANEMIA, UNSPECIFIED TYPE: ICD-10-CM

## 2019-01-01 DIAGNOSIS — I48.0 PAROXYSMAL ATRIAL FIBRILLATION (HCC): Primary | ICD-10-CM

## 2019-01-01 DIAGNOSIS — J44.9 COPD, SEVERE (HCC): Primary | Chronic | ICD-10-CM

## 2019-01-01 DIAGNOSIS — J30.2 SEASONAL ALLERGIC RHINITIS, UNSPECIFIED TRIGGER: ICD-10-CM

## 2019-01-01 DIAGNOSIS — I48.92 ATRIAL FLUTTER WITH RAPID VENTRICULAR RESPONSE (HCC): Primary | ICD-10-CM

## 2019-01-01 DIAGNOSIS — R06.02 SOB (SHORTNESS OF BREATH): Primary | ICD-10-CM

## 2019-01-01 DIAGNOSIS — I82.402 LEG DVT (DEEP VENOUS THROMBOEMBOLISM), ACUTE, LEFT (HCC): ICD-10-CM

## 2019-01-01 DIAGNOSIS — M54.50 ACUTE EXACERBATION OF CHRONIC LOW BACK PAIN: Primary | ICD-10-CM

## 2019-01-01 DIAGNOSIS — R79.9 ELEVATED BUN: ICD-10-CM

## 2019-01-01 DIAGNOSIS — Z79.899 MEDICATION MANAGEMENT: ICD-10-CM

## 2019-01-01 DIAGNOSIS — I50.9 OTHER CONGESTIVE HEART FAILURE (HCC): ICD-10-CM

## 2019-01-01 DIAGNOSIS — J44.9 COPD, SEVERE (HCC): Chronic | ICD-10-CM

## 2019-01-01 DIAGNOSIS — M79.662 PAIN OF LEFT CALF: ICD-10-CM

## 2019-01-01 DIAGNOSIS — I50.32 CHRONIC DIASTOLIC HEART FAILURE (HCC): Primary | ICD-10-CM

## 2019-01-01 DIAGNOSIS — M54.50 CHRONIC BILATERAL LOW BACK PAIN WITHOUT SCIATICA: Primary | ICD-10-CM

## 2019-01-01 DIAGNOSIS — J44.9 COPD, SEVERE (HCC): Primary | ICD-10-CM

## 2019-01-01 DIAGNOSIS — D50.9 IRON DEFICIENCY ANEMIA, UNSPECIFIED IRON DEFICIENCY ANEMIA TYPE: ICD-10-CM

## 2019-01-01 DIAGNOSIS — J44.1 COPD EXACERBATION (HCC): ICD-10-CM

## 2019-01-01 DIAGNOSIS — I48.0 PAF (PAROXYSMAL ATRIAL FIBRILLATION) (HCC): Primary | ICD-10-CM

## 2019-01-01 DIAGNOSIS — Z95.0 PACEMAKER: Primary | ICD-10-CM

## 2019-01-01 DIAGNOSIS — R00.1 BRADYCARDIA: ICD-10-CM

## 2019-01-01 DIAGNOSIS — E78.00 PURE HYPERCHOLESTEROLEMIA: Chronic | ICD-10-CM

## 2019-01-01 DIAGNOSIS — R61 DIAPHORESIS: ICD-10-CM

## 2019-01-01 DIAGNOSIS — J18.9 COMMUNITY ACQUIRED PNEUMONIA, UNSPECIFIED LATERALITY: Primary | ICD-10-CM

## 2019-01-01 DIAGNOSIS — I95.9 HYPOTENSION, UNSPECIFIED HYPOTENSION TYPE: Primary | ICD-10-CM

## 2019-01-01 DIAGNOSIS — I82.4Z2 ACUTE DEEP VEIN THROMBOSIS (DVT) OF DISTAL END OF LEFT LOWER EXTREMITY (HCC): Primary | ICD-10-CM

## 2019-01-01 DIAGNOSIS — J96.01 ACUTE RESPIRATORY FAILURE WITH HYPOXIA (HCC): ICD-10-CM

## 2019-01-01 DIAGNOSIS — F32.9 REACTIVE DEPRESSION: ICD-10-CM

## 2019-01-01 LAB
A/G RATIO: 1 (ref 1.1–2.2)
A/G RATIO: 1 (ref 1.1–2.2)
A/G RATIO: 1.2 (ref 1.1–2.2)
A/G RATIO: 1.2 (ref 1.1–2.2)
A/G RATIO: 1.3 (ref 1.1–2.2)
A/G RATIO: 1.3 (ref 1.1–2.2)
A/G RATIO: 1.4 (ref 1.1–2.2)
A/G RATIO: 1.4 (ref 1.1–2.2)
ALBUMIN SERPL-MCNC: 3.9 G/DL (ref 3.4–5)
ALBUMIN SERPL-MCNC: 3.9 G/DL (ref 3.4–5)
ALBUMIN SERPL-MCNC: 4.1 G/DL (ref 3.4–5)
ALBUMIN SERPL-MCNC: 4.2 G/DL (ref 3.4–5)
ALBUMIN SERPL-MCNC: 4.3 G/DL (ref 3.4–5)
ALBUMIN SERPL-MCNC: 4.5 G/DL (ref 3.4–5)
ALBUMIN SERPL-MCNC: 4.6 G/DL (ref 3.4–5)
ALBUMIN SERPL-MCNC: 4.8 G/DL (ref 3.4–5)
ALP BLD-CCNC: 131 U/L (ref 40–129)
ALP BLD-CCNC: 75 U/L (ref 40–129)
ALP BLD-CCNC: 83 U/L (ref 40–129)
ALP BLD-CCNC: 83 U/L (ref 40–129)
ALP BLD-CCNC: 87 U/L (ref 40–129)
ALP BLD-CCNC: 91 U/L (ref 40–129)
ALP BLD-CCNC: 94 U/L (ref 40–129)
ALP BLD-CCNC: 99 U/L (ref 40–129)
ALT SERPL-CCNC: 10 U/L (ref 10–40)
ALT SERPL-CCNC: 12 U/L (ref 10–40)
ALT SERPL-CCNC: 13 U/L (ref 10–40)
ALT SERPL-CCNC: 14 U/L (ref 10–40)
ALT SERPL-CCNC: 15 U/L (ref 10–40)
ALT SERPL-CCNC: 15 U/L (ref 10–40)
ALT SERPL-CCNC: 23 U/L (ref 10–40)
ALT SERPL-CCNC: 26 U/L (ref 10–40)
ANION GAP SERPL CALCULATED.3IONS-SCNC: 10 MMOL/L (ref 3–16)
ANION GAP SERPL CALCULATED.3IONS-SCNC: 11 MMOL/L (ref 3–16)
ANION GAP SERPL CALCULATED.3IONS-SCNC: 12 MMOL/L (ref 3–16)
ANION GAP SERPL CALCULATED.3IONS-SCNC: 13 MMOL/L (ref 3–16)
ANION GAP SERPL CALCULATED.3IONS-SCNC: 13 MMOL/L (ref 3–16)
ANION GAP SERPL CALCULATED.3IONS-SCNC: 14 MMOL/L (ref 3–16)
ANION GAP SERPL CALCULATED.3IONS-SCNC: 15 MMOL/L (ref 3–16)
ANION GAP SERPL CALCULATED.3IONS-SCNC: 16 MMOL/L (ref 3–16)
ANION GAP SERPL CALCULATED.3IONS-SCNC: 18 MMOL/L (ref 3–16)
ANION GAP SERPL CALCULATED.3IONS-SCNC: 20 MMOL/L (ref 3–16)
ANION GAP SERPL CALCULATED.3IONS-SCNC: 8 MMOL/L (ref 3–16)
ANION GAP SERPL CALCULATED.3IONS-SCNC: 9 MMOL/L (ref 3–16)
ANISOCYTOSIS: ABNORMAL
APTT: 33.7 SEC (ref 26–36)
AST SERPL-CCNC: 28 U/L (ref 15–37)
AST SERPL-CCNC: 29 U/L (ref 15–37)
AST SERPL-CCNC: 35 U/L (ref 15–37)
AST SERPL-CCNC: 37 U/L (ref 15–37)
AST SERPL-CCNC: 38 U/L (ref 15–37)
AST SERPL-CCNC: 42 U/L (ref 15–37)
AST SERPL-CCNC: 53 U/L (ref 15–37)
AST SERPL-CCNC: 75 U/L (ref 15–37)
BACTERIA: ABNORMAL /HPF
BASE EXCESS ARTERIAL: -0.7 MMOL/L (ref -3–3)
BASE EXCESS ARTERIAL: -0.8 MMOL/L (ref -3–3)
BASE EXCESS ARTERIAL: -1.3 MMOL/L (ref -3–3)
BASE EXCESS ARTERIAL: 1.6 MMOL/L (ref -3–3)
BASE EXCESS ARTERIAL: 1.8 MMOL/L (ref -3–3)
BASE EXCESS ARTERIAL: 3.1 MMOL/L (ref -3–3)
BASE EXCESS ARTERIAL: 4.2 MMOL/L (ref -3–3)
BASOPHILS ABSOLUTE: 0 K/UL (ref 0–0.2)
BASOPHILS ABSOLUTE: 0.1 K/UL (ref 0–0.2)
BASOPHILS RELATIVE PERCENT: 0.2 %
BASOPHILS RELATIVE PERCENT: 0.3 %
BASOPHILS RELATIVE PERCENT: 0.3 %
BASOPHILS RELATIVE PERCENT: 0.4 %
BASOPHILS RELATIVE PERCENT: 0.5 %
BASOPHILS RELATIVE PERCENT: 0.5 %
BASOPHILS RELATIVE PERCENT: 0.6 %
BASOPHILS RELATIVE PERCENT: 0.7 %
BASOPHILS RELATIVE PERCENT: 0.7 %
BASOPHILS RELATIVE PERCENT: 0.9 %
BASOPHILS RELATIVE PERCENT: 1.3 %
BASOPHILS RELATIVE PERCENT: 1.3 %
BILIRUB SERPL-MCNC: 0.4 MG/DL (ref 0–1)
BILIRUB SERPL-MCNC: 0.4 MG/DL (ref 0–1)
BILIRUB SERPL-MCNC: 0.5 MG/DL (ref 0–1)
BILIRUB SERPL-MCNC: 0.6 MG/DL (ref 0–1)
BILIRUB SERPL-MCNC: 0.6 MG/DL (ref 0–1)
BILIRUB SERPL-MCNC: 0.7 MG/DL (ref 0–1)
BILIRUBIN URINE: NEGATIVE
BILIRUBIN URINE: NEGATIVE
BLOOD CULTURE, ROUTINE: ABNORMAL
BLOOD, URINE: NEGATIVE
BLOOD, URINE: NEGATIVE
BUN BLDV-MCNC: 10 MG/DL (ref 7–20)
BUN BLDV-MCNC: 10 MG/DL (ref 7–20)
BUN BLDV-MCNC: 11 MG/DL (ref 7–20)
BUN BLDV-MCNC: 12 MG/DL (ref 7–20)
BUN BLDV-MCNC: 13 MG/DL (ref 7–20)
BUN BLDV-MCNC: 14 MG/DL (ref 7–20)
BUN BLDV-MCNC: 15 MG/DL (ref 7–20)
BUN BLDV-MCNC: 16 MG/DL (ref 7–20)
BUN BLDV-MCNC: 18 MG/DL (ref 7–20)
BUN BLDV-MCNC: 19 MG/DL (ref 7–20)
BUN BLDV-MCNC: 20 MG/DL (ref 7–20)
BUN BLDV-MCNC: 21 MG/DL (ref 7–20)
BUN BLDV-MCNC: 22 MG/DL (ref 7–20)
BUN BLDV-MCNC: 22 MG/DL (ref 7–20)
BUN BLDV-MCNC: 23 MG/DL (ref 7–20)
BUN BLDV-MCNC: 23 MG/DL (ref 7–20)
BUN BLDV-MCNC: 24 MG/DL (ref 7–20)
BUN BLDV-MCNC: 25 MG/DL (ref 7–20)
BUN BLDV-MCNC: 27 MG/DL (ref 7–20)
BUN BLDV-MCNC: 28 MG/DL (ref 7–20)
BUN BLDV-MCNC: 28 MG/DL (ref 7–20)
BUN BLDV-MCNC: 29 MG/DL (ref 7–20)
BUN BLDV-MCNC: 30 MG/DL (ref 7–20)
BUN BLDV-MCNC: 32 MG/DL (ref 7–20)
BUN BLDV-MCNC: 35 MG/DL (ref 7–20)
BUN BLDV-MCNC: 8 MG/DL (ref 7–20)
CALCIUM SERPL-MCNC: 10 MG/DL (ref 8.3–10.6)
CALCIUM SERPL-MCNC: 10 MG/DL (ref 8.3–10.6)
CALCIUM SERPL-MCNC: 10.2 MG/DL (ref 8.3–10.6)
CALCIUM SERPL-MCNC: 10.3 MG/DL (ref 8.3–10.6)
CALCIUM SERPL-MCNC: 8.9 MG/DL (ref 8.3–10.6)
CALCIUM SERPL-MCNC: 9 MG/DL (ref 8.3–10.6)
CALCIUM SERPL-MCNC: 9.1 MG/DL (ref 8.3–10.6)
CALCIUM SERPL-MCNC: 9.1 MG/DL (ref 8.3–10.6)
CALCIUM SERPL-MCNC: 9.2 MG/DL (ref 8.3–10.6)
CALCIUM SERPL-MCNC: 9.2 MG/DL (ref 8.3–10.6)
CALCIUM SERPL-MCNC: 9.3 MG/DL (ref 8.3–10.6)
CALCIUM SERPL-MCNC: 9.4 MG/DL (ref 8.3–10.6)
CALCIUM SERPL-MCNC: 9.5 MG/DL (ref 8.3–10.6)
CALCIUM SERPL-MCNC: 9.6 MG/DL (ref 8.3–10.6)
CALCIUM SERPL-MCNC: 9.7 MG/DL (ref 8.3–10.6)
CALCIUM SERPL-MCNC: 9.7 MG/DL (ref 8.3–10.6)
CALCIUM SERPL-MCNC: 9.9 MG/DL (ref 8.3–10.6)
CARBOXYHEMOGLOBIN ARTERIAL: 0.3 % (ref 0–1.5)
CARBOXYHEMOGLOBIN ARTERIAL: 0.4 % (ref 0–1.5)
CARBOXYHEMOGLOBIN ARTERIAL: 0.4 % (ref 0–1.5)
CARBOXYHEMOGLOBIN ARTERIAL: 0.6 % (ref 0–1.5)
CARBOXYHEMOGLOBIN ARTERIAL: 0.7 % (ref 0–1.5)
CARBOXYHEMOGLOBIN ARTERIAL: 0.8 % (ref 0–1.5)
CARBOXYHEMOGLOBIN ARTERIAL: 1.3 % (ref 0–1.5)
CHLORIDE BLD-SCNC: 100 MMOL/L (ref 99–110)
CHLORIDE BLD-SCNC: 101 MMOL/L (ref 99–110)
CHLORIDE BLD-SCNC: 102 MMOL/L (ref 99–110)
CHLORIDE BLD-SCNC: 94 MMOL/L (ref 99–110)
CHLORIDE BLD-SCNC: 95 MMOL/L (ref 99–110)
CHLORIDE BLD-SCNC: 96 MMOL/L (ref 99–110)
CHLORIDE BLD-SCNC: 97 MMOL/L (ref 99–110)
CHLORIDE BLD-SCNC: 98 MMOL/L (ref 99–110)
CHLORIDE BLD-SCNC: 98 MMOL/L (ref 99–110)
CHLORIDE BLD-SCNC: 99 MMOL/L (ref 99–110)
CHOLESTEROL, TOTAL: 135 MG/DL (ref 0–199)
CLARITY: CLEAR
CLARITY: CLEAR
CO2: 21 MMOL/L (ref 21–32)
CO2: 21 MMOL/L (ref 21–32)
CO2: 22 MMOL/L (ref 21–32)
CO2: 22 MMOL/L (ref 21–32)
CO2: 23 MMOL/L (ref 21–32)
CO2: 23 MMOL/L (ref 21–32)
CO2: 24 MMOL/L (ref 21–32)
CO2: 24 MMOL/L (ref 21–32)
CO2: 25 MMOL/L (ref 21–32)
CO2: 26 MMOL/L (ref 21–32)
CO2: 27 MMOL/L (ref 21–32)
CO2: 28 MMOL/L (ref 21–32)
CO2: 28 MMOL/L (ref 21–32)
CO2: 29 MMOL/L (ref 21–32)
CO2: 30 MMOL/L (ref 21–32)
COLOR: ABNORMAL
COLOR: YELLOW
CREAT SERPL-MCNC: 0.6 MG/DL (ref 0.8–1.3)
CREAT SERPL-MCNC: 0.6 MG/DL (ref 0.8–1.3)
CREAT SERPL-MCNC: 0.7 MG/DL (ref 0.8–1.3)
CREAT SERPL-MCNC: 0.8 MG/DL (ref 0.8–1.3)
CREAT SERPL-MCNC: 0.9 MG/DL (ref 0.8–1.3)
CREAT SERPL-MCNC: 1 MG/DL (ref 0.8–1.3)
CREAT SERPL-MCNC: 1.1 MG/DL (ref 0.8–1.3)
CREAT SERPL-MCNC: 1.2 MG/DL (ref 0.8–1.3)
CREAT SERPL-MCNC: 1.2 MG/DL (ref 0.8–1.3)
CREAT SERPL-MCNC: 1.4 MG/DL (ref 0.8–1.3)
CREAT SERPL-MCNC: 1.8 MG/DL (ref 0.8–1.3)
CREAT SERPL-MCNC: <0.5 MG/DL (ref 0.8–1.3)
CREATININE URINE POCT: 50
CULTURE, BLOOD 2: NORMAL
CULTURE, BLOOD 2: NORMAL
CULTURE, RESPIRATORY: NORMAL
CULTURE, RESPIRATORY: NORMAL
EKG ATRIAL RATE: 104 BPM
EKG ATRIAL RATE: 157 BPM
EKG ATRIAL RATE: 316 BPM
EKG ATRIAL RATE: 56 BPM
EKG ATRIAL RATE: 62 BPM
EKG ATRIAL RATE: 62 BPM
EKG ATRIAL RATE: 63 BPM
EKG ATRIAL RATE: 64 BPM
EKG ATRIAL RATE: 66 BPM
EKG ATRIAL RATE: 66 BPM
EKG ATRIAL RATE: 67 BPM
EKG ATRIAL RATE: 72 BPM
EKG ATRIAL RATE: 85 BPM
EKG DIAGNOSIS: NORMAL
EKG P AXIS: -27 DEGREES
EKG P AXIS: 6 DEGREES
EKG P AXIS: 71 DEGREES
EKG P AXIS: 71 DEGREES
EKG P AXIS: 77 DEGREES
EKG P AXIS: 78 DEGREES
EKG P AXIS: 91 DEGREES
EKG P AXIS: 94 DEGREES
EKG P AXIS: 94 DEGREES
EKG P AXIS: 98 DEGREES
EKG P-R INTERVAL: 112 MS
EKG P-R INTERVAL: 118 MS
EKG P-R INTERVAL: 136 MS
EKG P-R INTERVAL: 138 MS
EKG P-R INTERVAL: 156 MS
EKG P-R INTERVAL: 156 MS
EKG P-R INTERVAL: 158 MS
EKG P-R INTERVAL: 164 MS
EKG P-R INTERVAL: 164 MS
EKG P-R INTERVAL: 166 MS
EKG P-R INTERVAL: 172 MS
EKG P-R INTERVAL: 172 MS
EKG Q-T INTERVAL: 280 MS
EKG Q-T INTERVAL: 292 MS
EKG Q-T INTERVAL: 316 MS
EKG Q-T INTERVAL: 352 MS
EKG Q-T INTERVAL: 392 MS
EKG Q-T INTERVAL: 406 MS
EKG Q-T INTERVAL: 406 MS
EKG Q-T INTERVAL: 414 MS
EKG Q-T INTERVAL: 414 MS
EKG Q-T INTERVAL: 418 MS
EKG Q-T INTERVAL: 422 MS
EKG Q-T INTERVAL: 426 MS
EKG Q-T INTERVAL: 430 MS
EKG QRS DURATION: 78 MS
EKG QRS DURATION: 80 MS
EKG QRS DURATION: 82 MS
EKG QRS DURATION: 86 MS
EKG QRS DURATION: 88 MS
EKG QRS DURATION: 90 MS
EKG QRS DURATION: 92 MS
EKG QRS DURATION: 96 MS
EKG QTC CALCULATION (BAZETT): 410 MS
EKG QTC CALCULATION (BAZETT): 414 MS
EKG QTC CALCULATION (BAZETT): 415 MS
EKG QTC CALCULATION (BAZETT): 415 MS
EKG QTC CALCULATION (BAZETT): 418 MS
EKG QTC CALCULATION (BAZETT): 424 MS
EKG QTC CALCULATION (BAZETT): 427 MS
EKG QTC CALCULATION (BAZETT): 428 MS
EKG QTC CALCULATION (BAZETT): 434 MS
EKG QTC CALCULATION (BAZETT): 444 MS
EKG QTC CALCULATION (BAZETT): 450 MS
EKG QTC CALCULATION (BAZETT): 454 MS
EKG QTC CALCULATION (BAZETT): 472 MS
EKG R AXIS: -10 DEGREES
EKG R AXIS: -7 DEGREES
EKG R AXIS: 19 DEGREES
EKG R AXIS: 3 DEGREES
EKG R AXIS: 31 DEGREES
EKG R AXIS: 33 DEGREES
EKG R AXIS: 46 DEGREES
EKG R AXIS: 57 DEGREES
EKG R AXIS: 58 DEGREES
EKG R AXIS: 64 DEGREES
EKG R AXIS: 64 DEGREES
EKG R AXIS: 71 DEGREES
EKG R AXIS: 79 DEGREES
EKG T AXIS: -73 DEGREES
EKG T AXIS: 265 DEGREES
EKG T AXIS: 60 DEGREES
EKG T AXIS: 65 DEGREES
EKG T AXIS: 70 DEGREES
EKG T AXIS: 74 DEGREES
EKG T AXIS: 75 DEGREES
EKG T AXIS: 75 DEGREES
EKG T AXIS: 79 DEGREES
EKG T AXIS: 80 DEGREES
EKG T AXIS: 83 DEGREES
EKG T AXIS: 86 DEGREES
EKG T AXIS: 92 DEGREES
EKG VENTRICULAR RATE: 104 BPM
EKG VENTRICULAR RATE: 157 BPM
EKG VENTRICULAR RATE: 158 BPM
EKG VENTRICULAR RATE: 56 BPM
EKG VENTRICULAR RATE: 62 BPM
EKG VENTRICULAR RATE: 62 BPM
EKG VENTRICULAR RATE: 63 BPM
EKG VENTRICULAR RATE: 64 BPM
EKG VENTRICULAR RATE: 66 BPM
EKG VENTRICULAR RATE: 66 BPM
EKG VENTRICULAR RATE: 67 BPM
EKG VENTRICULAR RATE: 72 BPM
EKG VENTRICULAR RATE: 85 BPM
EOSINOPHILS ABSOLUTE: 0 K/UL (ref 0–0.6)
EOSINOPHILS ABSOLUTE: 0.2 K/UL (ref 0–0.6)
EOSINOPHILS ABSOLUTE: 0.3 K/UL (ref 0–0.6)
EOSINOPHILS ABSOLUTE: 0.4 K/UL (ref 0–0.6)
EOSINOPHILS ABSOLUTE: 0.5 K/UL (ref 0–0.6)
EOSINOPHILS ABSOLUTE: 0.5 K/UL (ref 0–0.6)
EOSINOPHILS RELATIVE PERCENT: 0 %
EOSINOPHILS RELATIVE PERCENT: 0.1 %
EOSINOPHILS RELATIVE PERCENT: 0.5 %
EOSINOPHILS RELATIVE PERCENT: 0.7 %
EOSINOPHILS RELATIVE PERCENT: 1.4 %
EOSINOPHILS RELATIVE PERCENT: 3.3 %
EOSINOPHILS RELATIVE PERCENT: 3.5 %
EOSINOPHILS RELATIVE PERCENT: 4 %
EOSINOPHILS RELATIVE PERCENT: 4.3 %
EOSINOPHILS RELATIVE PERCENT: 4.8 %
EOSINOPHILS RELATIVE PERCENT: 7.4 %
EOSINOPHILS RELATIVE PERCENT: 8.3 %
EPITHELIAL CELLS, UA: ABNORMAL /HPF
ESTIMATED AVERAGE GLUCOSE: 131.2 MG/DL
GFR AFRICAN AMERICAN: 45
GFR AFRICAN AMERICAN: 60
GFR AFRICAN AMERICAN: >60
GFR NON-AFRICAN AMERICAN: 37
GFR NON-AFRICAN AMERICAN: 50
GFR NON-AFRICAN AMERICAN: 59
GFR NON-AFRICAN AMERICAN: 59
GFR NON-AFRICAN AMERICAN: >60
GLOBULIN: 3 G/DL
GLOBULIN: 3.3 G/DL
GLOBULIN: 3.3 G/DL
GLOBULIN: 3.4 G/DL
GLOBULIN: 3.4 G/DL
GLOBULIN: 3.8 G/DL
GLOBULIN: 3.9 G/DL
GLOBULIN: 3.9 G/DL
GLUCOSE BLD-MCNC: 101 MG/DL (ref 70–99)
GLUCOSE BLD-MCNC: 102 MG/DL (ref 70–99)
GLUCOSE BLD-MCNC: 102 MG/DL (ref 70–99)
GLUCOSE BLD-MCNC: 104 MG/DL (ref 70–99)
GLUCOSE BLD-MCNC: 105 MG/DL (ref 70–99)
GLUCOSE BLD-MCNC: 107 MG/DL (ref 70–99)
GLUCOSE BLD-MCNC: 108 MG/DL (ref 70–99)
GLUCOSE BLD-MCNC: 109 MG/DL (ref 70–99)
GLUCOSE BLD-MCNC: 110 MG/DL (ref 70–99)
GLUCOSE BLD-MCNC: 112 MG/DL (ref 70–99)
GLUCOSE BLD-MCNC: 114 MG/DL (ref 70–99)
GLUCOSE BLD-MCNC: 115 MG/DL (ref 70–99)
GLUCOSE BLD-MCNC: 116 MG/DL (ref 70–99)
GLUCOSE BLD-MCNC: 116 MG/DL (ref 70–99)
GLUCOSE BLD-MCNC: 118 MG/DL (ref 70–99)
GLUCOSE BLD-MCNC: 121 MG/DL (ref 70–99)
GLUCOSE BLD-MCNC: 122 MG/DL (ref 70–99)
GLUCOSE BLD-MCNC: 122 MG/DL (ref 70–99)
GLUCOSE BLD-MCNC: 123 MG/DL (ref 70–99)
GLUCOSE BLD-MCNC: 124 MG/DL (ref 70–99)
GLUCOSE BLD-MCNC: 124 MG/DL (ref 70–99)
GLUCOSE BLD-MCNC: 125 MG/DL (ref 70–99)
GLUCOSE BLD-MCNC: 126 MG/DL (ref 70–99)
GLUCOSE BLD-MCNC: 127 MG/DL (ref 70–99)
GLUCOSE BLD-MCNC: 127 MG/DL (ref 70–99)
GLUCOSE BLD-MCNC: 129 MG/DL (ref 70–99)
GLUCOSE BLD-MCNC: 131 MG/DL (ref 70–99)
GLUCOSE BLD-MCNC: 132 MG/DL (ref 70–99)
GLUCOSE BLD-MCNC: 134 MG/DL (ref 70–99)
GLUCOSE BLD-MCNC: 135 MG/DL (ref 70–99)
GLUCOSE BLD-MCNC: 135 MG/DL (ref 70–99)
GLUCOSE BLD-MCNC: 136 MG/DL (ref 70–99)
GLUCOSE BLD-MCNC: 137 MG/DL (ref 70–99)
GLUCOSE BLD-MCNC: 137 MG/DL (ref 70–99)
GLUCOSE BLD-MCNC: 138 MG/DL (ref 70–99)
GLUCOSE BLD-MCNC: 139 MG/DL (ref 70–99)
GLUCOSE BLD-MCNC: 140 MG/DL (ref 70–99)
GLUCOSE BLD-MCNC: 140 MG/DL (ref 70–99)
GLUCOSE BLD-MCNC: 141 MG/DL (ref 70–99)
GLUCOSE BLD-MCNC: 142 MG/DL (ref 70–99)
GLUCOSE BLD-MCNC: 142 MG/DL (ref 70–99)
GLUCOSE BLD-MCNC: 143 MG/DL (ref 70–99)
GLUCOSE BLD-MCNC: 143 MG/DL (ref 70–99)
GLUCOSE BLD-MCNC: 145 MG/DL (ref 70–99)
GLUCOSE BLD-MCNC: 146 MG/DL (ref 70–99)
GLUCOSE BLD-MCNC: 147 MG/DL (ref 70–99)
GLUCOSE BLD-MCNC: 148 MG/DL (ref 70–99)
GLUCOSE BLD-MCNC: 148 MG/DL (ref 70–99)
GLUCOSE BLD-MCNC: 149 MG/DL (ref 70–99)
GLUCOSE BLD-MCNC: 151 MG/DL (ref 70–99)
GLUCOSE BLD-MCNC: 151 MG/DL (ref 70–99)
GLUCOSE BLD-MCNC: 153 MG/DL (ref 70–99)
GLUCOSE BLD-MCNC: 153 MG/DL (ref 70–99)
GLUCOSE BLD-MCNC: 154 MG/DL (ref 70–99)
GLUCOSE BLD-MCNC: 156 MG/DL (ref 70–99)
GLUCOSE BLD-MCNC: 156 MG/DL (ref 70–99)
GLUCOSE BLD-MCNC: 158 MG/DL (ref 70–99)
GLUCOSE BLD-MCNC: 159 MG/DL (ref 70–99)
GLUCOSE BLD-MCNC: 159 MG/DL (ref 70–99)
GLUCOSE BLD-MCNC: 163 MG/DL (ref 70–99)
GLUCOSE BLD-MCNC: 170 MG/DL (ref 70–99)
GLUCOSE BLD-MCNC: 172 MG/DL (ref 70–99)
GLUCOSE BLD-MCNC: 174 MG/DL (ref 70–99)
GLUCOSE BLD-MCNC: 176 MG/DL (ref 70–99)
GLUCOSE BLD-MCNC: 180 MG/DL (ref 70–99)
GLUCOSE BLD-MCNC: 181 MG/DL (ref 70–99)
GLUCOSE BLD-MCNC: 182 MG/DL (ref 70–99)
GLUCOSE BLD-MCNC: 183 MG/DL (ref 70–99)
GLUCOSE BLD-MCNC: 188 MG/DL (ref 70–99)
GLUCOSE BLD-MCNC: 198 MG/DL (ref 70–99)
GLUCOSE BLD-MCNC: 201 MG/DL (ref 70–99)
GLUCOSE BLD-MCNC: 206 MG/DL (ref 70–99)
GLUCOSE BLD-MCNC: 217 MG/DL (ref 70–99)
GLUCOSE BLD-MCNC: 217 MG/DL (ref 70–99)
GLUCOSE BLD-MCNC: 224 MG/DL (ref 70–99)
GLUCOSE BLD-MCNC: 241 MG/DL (ref 70–99)
GLUCOSE BLD-MCNC: 93 MG/DL (ref 70–99)
GLUCOSE BLD-MCNC: 97 MG/DL (ref 70–99)
GLUCOSE URINE: 250 MG/DL
GLUCOSE URINE: NEGATIVE MG/DL
GRAM STAIN RESULT: NORMAL
GRAM STAIN RESULT: NORMAL
HBA1C MFR BLD: 6.2 %
HCO3 ARTERIAL: 23.7 MMOL/L (ref 21–29)
HCO3 ARTERIAL: 27 MMOL/L (ref 21–29)
HCO3 ARTERIAL: 27.2 MMOL/L (ref 21–29)
HCO3 ARTERIAL: 27.6 MMOL/L (ref 21–29)
HCO3 ARTERIAL: 28.1 MMOL/L (ref 21–29)
HCO3 ARTERIAL: 28.9 MMOL/L (ref 21–29)
HCO3 ARTERIAL: 36.2 MMOL/L (ref 21–29)
HCT VFR BLD CALC: 31.8 % (ref 40.5–52.5)
HCT VFR BLD CALC: 33.3 % (ref 40.5–52.5)
HCT VFR BLD CALC: 33.8 % (ref 40.5–52.5)
HCT VFR BLD CALC: 35.9 % (ref 40.5–52.5)
HCT VFR BLD CALC: 36 % (ref 40.5–52.5)
HCT VFR BLD CALC: 36.2 % (ref 40.5–52.5)
HCT VFR BLD CALC: 36.8 % (ref 40.5–52.5)
HCT VFR BLD CALC: 37.7 % (ref 40.5–52.5)
HCT VFR BLD CALC: 37.9 % (ref 40.5–52.5)
HCT VFR BLD CALC: 38.1 % (ref 40.5–52.5)
HCT VFR BLD CALC: 38.3 % (ref 40.5–52.5)
HCT VFR BLD CALC: 38.4 % (ref 40.5–52.5)
HCT VFR BLD CALC: 38.4 % (ref 40.5–52.5)
HCT VFR BLD CALC: 39.2 % (ref 40.5–52.5)
HCT VFR BLD CALC: 39.6 % (ref 40.5–52.5)
HCT VFR BLD CALC: 39.6 % (ref 40.5–52.5)
HCT VFR BLD CALC: 40.4 % (ref 40.5–52.5)
HCT VFR BLD CALC: 40.7 % (ref 40.5–52.5)
HCT VFR BLD CALC: 41.1 % (ref 40.5–52.5)
HCT VFR BLD CALC: 41.5 % (ref 40.5–52.5)
HDLC SERPL-MCNC: 47 MG/DL (ref 40–60)
HEMOGLOBIN, ART, EXTENDED: 12 G/DL (ref 13.5–17.5)
HEMOGLOBIN, ART, EXTENDED: 12.2 G/DL (ref 13.5–17.5)
HEMOGLOBIN, ART, EXTENDED: 12.7 G/DL (ref 13.5–17.5)
HEMOGLOBIN, ART, EXTENDED: 12.7 G/DL (ref 13.5–17.5)
HEMOGLOBIN, ART, EXTENDED: 13 G/DL (ref 13.5–17.5)
HEMOGLOBIN, ART, EXTENDED: 13.3 G/DL (ref 13.5–17.5)
HEMOGLOBIN, ART, EXTENDED: 13.6 G/DL (ref 13.5–17.5)
HEMOGLOBIN: 10.7 G/DL (ref 13.5–17.5)
HEMOGLOBIN: 10.8 G/DL (ref 13.5–17.5)
HEMOGLOBIN: 11.7 G/DL (ref 13.5–17.5)
HEMOGLOBIN: 11.8 G/DL (ref 13.5–17.5)
HEMOGLOBIN: 11.8 G/DL (ref 13.5–17.5)
HEMOGLOBIN: 11.9 G/DL (ref 13.5–17.5)
HEMOGLOBIN: 12 G/DL (ref 13.5–17.5)
HEMOGLOBIN: 12.2 G/DL (ref 13.5–17.5)
HEMOGLOBIN: 12.4 G/DL (ref 13.5–17.5)
HEMOGLOBIN: 12.4 G/DL (ref 13.5–17.5)
HEMOGLOBIN: 12.6 G/DL (ref 13.5–17.5)
HEMOGLOBIN: 12.6 G/DL (ref 13.5–17.5)
HEMOGLOBIN: 12.9 G/DL (ref 13.5–17.5)
HEMOGLOBIN: 13 G/DL (ref 13.5–17.5)
HEMOGLOBIN: 13 G/DL (ref 13.5–17.5)
HEMOGLOBIN: 13.1 G/DL (ref 13.5–17.5)
HEMOGLOBIN: 13.2 G/DL (ref 13.5–17.5)
HEMOGLOBIN: 13.3 G/DL (ref 13.5–17.5)
HEMOGLOBIN: 13.4 G/DL (ref 13.5–17.5)
HEMOGLOBIN: 9.9 G/DL (ref 13.5–17.5)
INR BLD: 0.99 (ref 0.86–1.14)
INR BLD: 1.14 (ref 0.86–1.14)
INR BLD: 1.16 (ref 0.86–1.14)
KETONES, URINE: NEGATIVE MG/DL
KETONES, URINE: NEGATIVE MG/DL
L. PNEUMOPHILA SEROGP 1 UR AG: NORMAL
LACTIC ACID: 1.1 MMOL/L (ref 0.4–2)
LACTIC ACID: 1.6 MMOL/L (ref 0.4–2)
LACTIC ACID: 2.1 MMOL/L (ref 0.4–2)
LACTIC ACID: 2.2 MMOL/L (ref 0.4–2)
LDL CHOLESTEROL CALCULATED: 46 MG/DL
LEUKOCYTE ESTERASE, URINE: ABNORMAL
LEUKOCYTE ESTERASE, URINE: NEGATIVE
LIPASE: 18 U/L (ref 13–60)
LV EF: 58 %
LV EF: 65 %
LVEF MODALITY: NORMAL
LVEF MODALITY: NORMAL
LYMPHOCYTES ABSOLUTE: 0.7 K/UL (ref 1–5.1)
LYMPHOCYTES ABSOLUTE: 0.9 K/UL (ref 1–5.1)
LYMPHOCYTES ABSOLUTE: 1.1 K/UL (ref 1–5.1)
LYMPHOCYTES ABSOLUTE: 1.2 K/UL (ref 1–5.1)
LYMPHOCYTES ABSOLUTE: 1.3 K/UL (ref 1–5.1)
LYMPHOCYTES ABSOLUTE: 1.4 K/UL (ref 1–5.1)
LYMPHOCYTES ABSOLUTE: 1.4 K/UL (ref 1–5.1)
LYMPHOCYTES ABSOLUTE: 1.6 K/UL (ref 1–5.1)
LYMPHOCYTES ABSOLUTE: 1.9 K/UL (ref 1–5.1)
LYMPHOCYTES ABSOLUTE: 2.2 K/UL (ref 1–5.1)
LYMPHOCYTES ABSOLUTE: 2.7 K/UL (ref 1–5.1)
LYMPHOCYTES RELATIVE PERCENT: 10 %
LYMPHOCYTES RELATIVE PERCENT: 13.8 %
LYMPHOCYTES RELATIVE PERCENT: 13.9 %
LYMPHOCYTES RELATIVE PERCENT: 20.3 %
LYMPHOCYTES RELATIVE PERCENT: 21 %
LYMPHOCYTES RELATIVE PERCENT: 21.5 %
LYMPHOCYTES RELATIVE PERCENT: 23.7 %
LYMPHOCYTES RELATIVE PERCENT: 23.7 %
LYMPHOCYTES RELATIVE PERCENT: 24.1 %
LYMPHOCYTES RELATIVE PERCENT: 24.4 %
LYMPHOCYTES RELATIVE PERCENT: 25.7 %
LYMPHOCYTES RELATIVE PERCENT: 28.9 %
LYMPHOCYTES RELATIVE PERCENT: 32 %
LYMPHOCYTES RELATIVE PERCENT: 6.7 %
LYMPHOCYTES RELATIVE PERCENT: 8.8 %
LYMPHOCYTES RELATIVE PERCENT: 9.8 %
MAGNESIUM: 1.8 MG/DL (ref 1.8–2.4)
MAGNESIUM: 1.9 MG/DL (ref 1.8–2.4)
MAGNESIUM: 2 MG/DL (ref 1.8–2.4)
MAGNESIUM: 2 MG/DL (ref 1.8–2.4)
MCH RBC QN AUTO: 25.7 PG (ref 26–34)
MCH RBC QN AUTO: 27.6 PG (ref 26–34)
MCH RBC QN AUTO: 28.3 PG (ref 26–34)
MCH RBC QN AUTO: 28.5 PG (ref 26–34)
MCH RBC QN AUTO: 28.7 PG (ref 26–34)
MCH RBC QN AUTO: 29.1 PG (ref 26–34)
MCH RBC QN AUTO: 29.2 PG (ref 26–34)
MCH RBC QN AUTO: 29.5 PG (ref 26–34)
MCH RBC QN AUTO: 29.6 PG (ref 26–34)
MCH RBC QN AUTO: 29.7 PG (ref 26–34)
MCH RBC QN AUTO: 29.8 PG (ref 26–34)
MCH RBC QN AUTO: 29.8 PG (ref 26–34)
MCH RBC QN AUTO: 30 PG (ref 26–34)
MCH RBC QN AUTO: 30.2 PG (ref 26–34)
MCH RBC QN AUTO: 30.4 PG (ref 26–34)
MCHC RBC AUTO-ENTMCNC: 31.1 G/DL (ref 31–36)
MCHC RBC AUTO-ENTMCNC: 31.2 G/DL (ref 31–36)
MCHC RBC AUTO-ENTMCNC: 31.3 G/DL (ref 31–36)
MCHC RBC AUTO-ENTMCNC: 31.5 G/DL (ref 31–36)
MCHC RBC AUTO-ENTMCNC: 31.7 G/DL (ref 31–36)
MCHC RBC AUTO-ENTMCNC: 31.9 G/DL (ref 31–36)
MCHC RBC AUTO-ENTMCNC: 32.1 G/DL (ref 31–36)
MCHC RBC AUTO-ENTMCNC: 32.2 G/DL (ref 31–36)
MCHC RBC AUTO-ENTMCNC: 32.5 G/DL (ref 31–36)
MCHC RBC AUTO-ENTMCNC: 32.6 G/DL (ref 31–36)
MCHC RBC AUTO-ENTMCNC: 32.7 G/DL (ref 31–36)
MCHC RBC AUTO-ENTMCNC: 32.8 G/DL (ref 31–36)
MCHC RBC AUTO-ENTMCNC: 32.9 G/DL (ref 31–36)
MCHC RBC AUTO-ENTMCNC: 33 G/DL (ref 31–36)
MCV RBC AUTO: 82.2 FL (ref 80–100)
MCV RBC AUTO: 88.6 FL (ref 80–100)
MCV RBC AUTO: 88.6 FL (ref 80–100)
MCV RBC AUTO: 89.8 FL (ref 80–100)
MCV RBC AUTO: 89.9 FL (ref 80–100)
MCV RBC AUTO: 90.1 FL (ref 80–100)
MCV RBC AUTO: 90.4 FL (ref 80–100)
MCV RBC AUTO: 90.5 FL (ref 80–100)
MCV RBC AUTO: 90.9 FL (ref 80–100)
MCV RBC AUTO: 91 FL (ref 80–100)
MCV RBC AUTO: 91.1 FL (ref 80–100)
MCV RBC AUTO: 91.2 FL (ref 80–100)
MCV RBC AUTO: 91.8 FL (ref 80–100)
MCV RBC AUTO: 92.1 FL (ref 80–100)
MCV RBC AUTO: 92.5 FL (ref 80–100)
MCV RBC AUTO: 93 FL (ref 80–100)
MCV RBC AUTO: 93.5 FL (ref 80–100)
METHEMOGLOBIN ARTERIAL: 0.4 %
METHEMOGLOBIN ARTERIAL: 0.5 %
METHEMOGLOBIN ARTERIAL: 0.6 %
METHEMOGLOBIN ARTERIAL: 0.7 %
MICROALBUMIN/CREAT 24H UR: 30 MG/G{CREAT}
MICROALBUMIN/CREAT UR-RTO: NORMAL
MICROSCOPIC EXAMINATION: NORMAL
MICROSCOPIC EXAMINATION: YES
MONOCYTES ABSOLUTE: 0.1 K/UL (ref 0–1.3)
MONOCYTES ABSOLUTE: 0.2 K/UL (ref 0–1.3)
MONOCYTES ABSOLUTE: 0.2 K/UL (ref 0–1.3)
MONOCYTES ABSOLUTE: 0.3 K/UL (ref 0–1.3)
MONOCYTES ABSOLUTE: 0.3 K/UL (ref 0–1.3)
MONOCYTES ABSOLUTE: 0.4 K/UL (ref 0–1.3)
MONOCYTES ABSOLUTE: 0.5 K/UL (ref 0–1.3)
MONOCYTES ABSOLUTE: 0.6 K/UL (ref 0–1.3)
MONOCYTES ABSOLUTE: 0.7 K/UL (ref 0–1.3)
MONOCYTES ABSOLUTE: 0.8 K/UL (ref 0–1.3)
MONOCYTES ABSOLUTE: 0.9 K/UL (ref 0–1.3)
MONOCYTES ABSOLUTE: 0.9 K/UL (ref 0–1.3)
MONOCYTES RELATIVE PERCENT: 10.1 %
MONOCYTES RELATIVE PERCENT: 11.1 %
MONOCYTES RELATIVE PERCENT: 2.1 %
MONOCYTES RELATIVE PERCENT: 2.7 %
MONOCYTES RELATIVE PERCENT: 3.1 %
MONOCYTES RELATIVE PERCENT: 3.7 %
MONOCYTES RELATIVE PERCENT: 4.3 %
MONOCYTES RELATIVE PERCENT: 6.8 %
MONOCYTES RELATIVE PERCENT: 7.1 %
MONOCYTES RELATIVE PERCENT: 7.7 %
MONOCYTES RELATIVE PERCENT: 8.5 %
MONOCYTES RELATIVE PERCENT: 8.8 %
MONOCYTES RELATIVE PERCENT: 8.8 %
MONOCYTES RELATIVE PERCENT: 9.1 %
MONOCYTES RELATIVE PERCENT: 9.4 %
MONOCYTES RELATIVE PERCENT: 9.9 %
NEUTROPHILS ABSOLUTE: 3.2 K/UL (ref 1.7–7.7)
NEUTROPHILS ABSOLUTE: 4 K/UL (ref 1.7–7.7)
NEUTROPHILS ABSOLUTE: 4.2 K/UL (ref 1.7–7.7)
NEUTROPHILS ABSOLUTE: 6.4 K/UL (ref 1.7–7.7)
NEUTROPHILS ABSOLUTE: 7.5 K/UL (ref 1.7–7.7)
NEUTROPHILS ABSOLUTE: 7.5 K/UL (ref 1.7–7.7)
NEUTROPHILS ABSOLUTE: 8.5 K/UL (ref 1.7–7.7)
NEUTROPHILS ABSOLUTE: 8.7 K/UL (ref 1.7–7.7)
NEUTROPHILS ABSOLUTE: 9 K/UL (ref 1.7–7.7)
NEUTROPHILS RELATIVE PERCENT: 56.2 %
NEUTROPHILS RELATIVE PERCENT: 57.4 %
NEUTROPHILS RELATIVE PERCENT: 59.6 %
NEUTROPHILS RELATIVE PERCENT: 59.8 %
NEUTROPHILS RELATIVE PERCENT: 60.5 %
NEUTROPHILS RELATIVE PERCENT: 61.5 %
NEUTROPHILS RELATIVE PERCENT: 62.7 %
NEUTROPHILS RELATIVE PERCENT: 66.4 %
NEUTROPHILS RELATIVE PERCENT: 67.3 %
NEUTROPHILS RELATIVE PERCENT: 73.9 %
NEUTROPHILS RELATIVE PERCENT: 77 %
NEUTROPHILS RELATIVE PERCENT: 81.3 %
NEUTROPHILS RELATIVE PERCENT: 83.6 %
NEUTROPHILS RELATIVE PERCENT: 86.1 %
NEUTROPHILS RELATIVE PERCENT: 88.1 %
NEUTROPHILS RELATIVE PERCENT: 89.9 %
NITRITE, URINE: NEGATIVE
NITRITE, URINE: NEGATIVE
O2 CONTENT ARTERIAL: 16 ML/DL
O2 CONTENT ARTERIAL: 16 ML/DL
O2 CONTENT ARTERIAL: 17 ML/DL
O2 CONTENT ARTERIAL: 17 ML/DL
O2 CONTENT ARTERIAL: 18 ML/DL
O2 CONTENT ARTERIAL: 18 ML/DL
O2 CONTENT ARTERIAL: 19 ML/DL
O2 SAT, ARTERIAL: 91.3 %
O2 SAT, ARTERIAL: 93.8 %
O2 SAT, ARTERIAL: 95.7 %
O2 SAT, ARTERIAL: 96.6 %
O2 SAT, ARTERIAL: 98.1 %
O2 SAT, ARTERIAL: 98.5 %
O2 SAT, ARTERIAL: 98.7 %
O2 THERAPY: ABNORMAL
ORGANISM: ABNORMAL
PCO2 ARTERIAL: 179.6 MMHG (ref 35–45)
PCO2 ARTERIAL: 38.8 MMHG (ref 35–45)
PCO2 ARTERIAL: 41.9 MMHG (ref 35–45)
PCO2 ARTERIAL: 43.6 MMHG (ref 35–45)
PCO2 ARTERIAL: 44.7 MMHG (ref 35–45)
PCO2 ARTERIAL: 52.9 MMHG (ref 35–45)
PCO2 ARTERIAL: 59.7 MMHG (ref 35–45)
PDW BLD-RTO: 15 % (ref 12.4–15.4)
PDW BLD-RTO: 15.3 % (ref 12.4–15.4)
PDW BLD-RTO: 15.5 % (ref 12.4–15.4)
PDW BLD-RTO: 15.7 % (ref 12.4–15.4)
PDW BLD-RTO: 15.8 % (ref 12.4–15.4)
PDW BLD-RTO: 15.9 % (ref 12.4–15.4)
PDW BLD-RTO: 15.9 % (ref 12.4–15.4)
PDW BLD-RTO: 16.1 % (ref 12.4–15.4)
PDW BLD-RTO: 16.2 % (ref 12.4–15.4)
PDW BLD-RTO: 16.3 % (ref 12.4–15.4)
PDW BLD-RTO: 16.3 % (ref 12.4–15.4)
PDW BLD-RTO: 16.4 % (ref 12.4–15.4)
PDW BLD-RTO: 16.7 % (ref 12.4–15.4)
PDW BLD-RTO: 16.9 % (ref 12.4–15.4)
PDW BLD-RTO: 17.1 % (ref 12.4–15.4)
PDW BLD-RTO: 17.1 % (ref 12.4–15.4)
PDW BLD-RTO: 17.2 % (ref 12.4–15.4)
PDW BLD-RTO: 17.3 % (ref 12.4–15.4)
PDW BLD-RTO: 20.5 % (ref 12.4–15.4)
PDW BLD-RTO: 20.7 % (ref 12.4–15.4)
PERFORMED ON: ABNORMAL
PERFORMED ON: NORMAL
PH ARTERIAL: 6.92 (ref 7.35–7.45)
PH ARTERIAL: 7.28 (ref 7.35–7.45)
PH ARTERIAL: 7.34 (ref 7.35–7.45)
PH ARTERIAL: 7.4 (ref 7.35–7.45)
PH ARTERIAL: 7.4 (ref 7.35–7.45)
PH ARTERIAL: 7.44 (ref 7.35–7.45)
PH ARTERIAL: 7.44 (ref 7.35–7.45)
PH UA: 5.5 (ref 5–8)
PH UA: 6 (ref 5–8)
PLATELET # BLD: 127 K/UL (ref 135–450)
PLATELET # BLD: 135 K/UL (ref 135–450)
PLATELET # BLD: 138 K/UL (ref 135–450)
PLATELET # BLD: 145 K/UL (ref 135–450)
PLATELET # BLD: 145 K/UL (ref 135–450)
PLATELET # BLD: 158 K/UL (ref 135–450)
PLATELET # BLD: 161 K/UL (ref 135–450)
PLATELET # BLD: 163 K/UL (ref 135–450)
PLATELET # BLD: 165 K/UL (ref 135–450)
PLATELET # BLD: 173 K/UL (ref 135–450)
PLATELET # BLD: 177 K/UL (ref 135–450)
PLATELET # BLD: 178 K/UL (ref 135–450)
PLATELET # BLD: 186 K/UL (ref 135–450)
PLATELET # BLD: 204 K/UL (ref 135–450)
PLATELET # BLD: 208 K/UL (ref 135–450)
PLATELET # BLD: 220 K/UL (ref 135–450)
PLATELET # BLD: 239 K/UL (ref 135–450)
PLATELET # BLD: 253 K/UL (ref 135–450)
PLATELET # BLD: 260 K/UL (ref 135–450)
PLATELET # BLD: 300 K/UL (ref 135–450)
PLATELET SLIDE REVIEW: ADEQUATE
PMV BLD AUTO: 7.2 FL (ref 5–10.5)
PMV BLD AUTO: 7.4 FL (ref 5–10.5)
PMV BLD AUTO: 7.5 FL (ref 5–10.5)
PMV BLD AUTO: 7.6 FL (ref 5–10.5)
PMV BLD AUTO: 7.7 FL (ref 5–10.5)
PMV BLD AUTO: 7.7 FL (ref 5–10.5)
PMV BLD AUTO: 7.8 FL (ref 5–10.5)
PMV BLD AUTO: 7.8 FL (ref 5–10.5)
PMV BLD AUTO: 7.9 FL (ref 5–10.5)
PMV BLD AUTO: 7.9 FL (ref 5–10.5)
PMV BLD AUTO: 8 FL (ref 5–10.5)
PMV BLD AUTO: 8 FL (ref 5–10.5)
PMV BLD AUTO: 8.1 FL (ref 5–10.5)
PMV BLD AUTO: 8.3 FL (ref 5–10.5)
PMV BLD AUTO: 8.5 FL (ref 5–10.5)
PMV BLD AUTO: 8.5 FL (ref 5–10.5)
PMV BLD AUTO: 9 FL (ref 5–10.5)
PO2 ARTERIAL: 115.2 MMHG (ref 75–108)
PO2 ARTERIAL: 125.6 MMHG (ref 75–108)
PO2 ARTERIAL: 215.2 MMHG (ref 75–108)
PO2 ARTERIAL: 57.9 MMHG (ref 75–108)
PO2 ARTERIAL: 71.5 MMHG (ref 75–108)
PO2 ARTERIAL: 78.2 MMHG (ref 75–108)
PO2 ARTERIAL: 85.2 MMHG (ref 75–108)
POC ACT LR: 145 SEC
POC ACT LR: 251 SEC
POTASSIUM REFLEX MAGNESIUM: 3.4 MMOL/L (ref 3.5–5.1)
POTASSIUM REFLEX MAGNESIUM: 3.5 MMOL/L (ref 3.5–5.1)
POTASSIUM REFLEX MAGNESIUM: 4 MMOL/L (ref 3.5–5.1)
POTASSIUM REFLEX MAGNESIUM: 4.1 MMOL/L (ref 3.5–5.1)
POTASSIUM REFLEX MAGNESIUM: 4.3 MMOL/L (ref 3.5–5.1)
POTASSIUM REFLEX MAGNESIUM: 4.4 MMOL/L (ref 3.5–5.1)
POTASSIUM REFLEX MAGNESIUM: 4.6 MMOL/L (ref 3.5–5.1)
POTASSIUM REFLEX MAGNESIUM: 4.9 MMOL/L (ref 3.5–5.1)
POTASSIUM REFLEX MAGNESIUM: 5.1 MMOL/L (ref 3.5–5.1)
POTASSIUM REFLEX MAGNESIUM: 5.2 MMOL/L (ref 3.5–5.1)
POTASSIUM REFLEX MAGNESIUM: 5.7 MMOL/L (ref 3.5–5.1)
POTASSIUM SERPL-SCNC: 3.7 MMOL/L (ref 3.5–5.1)
POTASSIUM SERPL-SCNC: 3.9 MMOL/L (ref 3.5–5.1)
POTASSIUM SERPL-SCNC: 4.1 MMOL/L (ref 3.5–5.1)
POTASSIUM SERPL-SCNC: 4.1 MMOL/L (ref 3.5–5.1)
POTASSIUM SERPL-SCNC: 4.2 MMOL/L (ref 3.5–5.1)
POTASSIUM SERPL-SCNC: 4.2 MMOL/L (ref 3.5–5.1)
POTASSIUM SERPL-SCNC: 4.5 MMOL/L (ref 3.5–5.1)
POTASSIUM SERPL-SCNC: 4.6 MMOL/L (ref 3.5–5.1)
POTASSIUM SERPL-SCNC: 4.7 MMOL/L (ref 3.5–5.1)
POTASSIUM SERPL-SCNC: 4.7 MMOL/L (ref 3.5–5.1)
POTASSIUM SERPL-SCNC: 4.8 MMOL/L (ref 3.5–5.1)
POTASSIUM SERPL-SCNC: 4.9 MMOL/L (ref 3.5–5.1)
POTASSIUM SERPL-SCNC: 5 MMOL/L (ref 3.5–5.1)
POTASSIUM SERPL-SCNC: 5.4 MMOL/L (ref 3.5–5.1)
POTASSIUM SERPL-SCNC: 5.5 MMOL/L (ref 3.5–5.1)
POTASSIUM SERPL-SCNC: 5.5 MMOL/L (ref 3.5–5.1)
POTASSIUM SERPL-SCNC: 5.6 MMOL/L (ref 3.5–5.1)
POTASSIUM SERPL-SCNC: 5.9 MMOL/L (ref 3.5–5.1)
POTASSIUM SERPL-SCNC: 5.9 MMOL/L (ref 3.5–5.1)
PRO-BNP: 192 PG/ML (ref 0–449)
PRO-BNP: 269 PG/ML (ref 0–449)
PRO-BNP: 331 PG/ML (ref 0–449)
PRO-BNP: 404 PG/ML (ref 0–449)
PRO-BNP: 614 PG/ML (ref 0–449)
PROCALCITONIN: 0.06 NG/ML (ref 0–0.15)
PROCALCITONIN: 0.13 NG/ML (ref 0–0.15)
PROSTATE SPECIFIC ANTIGEN FREE: 0.1 UG/L
PROSTATE SPECIFIC ANTIGEN PERCENT FREE: 20 %
PROSTATE SPECIFIC ANTIGEN: 0.5 UG/L (ref 0–4)
PROTEIN UA: NEGATIVE MG/DL
PROTEIN UA: NEGATIVE MG/DL
PROTHROMBIN TIME: 11.3 SEC (ref 9.8–13)
PROTHROMBIN TIME: 13 SEC (ref 9.8–13)
PROTHROMBIN TIME: 13.2 SEC (ref 9.8–13)
RAPID INFLUENZA  B AGN: NEGATIVE
RAPID INFLUENZA A AGN: NEGATIVE
RBC # BLD: 3.76 M/UL (ref 4.2–5.9)
RBC # BLD: 3.76 M/UL (ref 4.2–5.9)
RBC # BLD: 3.87 M/UL (ref 4.2–5.9)
RBC # BLD: 3.94 M/UL (ref 4.2–5.9)
RBC # BLD: 3.98 M/UL (ref 4.2–5.9)
RBC # BLD: 4.01 M/UL (ref 4.2–5.9)
RBC # BLD: 4.05 M/UL (ref 4.2–5.9)
RBC # BLD: 4.12 M/UL (ref 4.2–5.9)
RBC # BLD: 4.15 M/UL (ref 4.2–5.9)
RBC # BLD: 4.17 M/UL (ref 4.2–5.9)
RBC # BLD: 4.18 M/UL (ref 4.2–5.9)
RBC # BLD: 4.18 M/UL (ref 4.2–5.9)
RBC # BLD: 4.24 M/UL (ref 4.2–5.9)
RBC # BLD: 4.24 M/UL (ref 4.2–5.9)
RBC # BLD: 4.27 M/UL (ref 4.2–5.9)
RBC # BLD: 4.31 M/UL (ref 4.2–5.9)
RBC # BLD: 4.36 M/UL (ref 4.2–5.9)
RBC # BLD: 4.41 M/UL (ref 4.2–5.9)
RBC # BLD: 4.43 M/UL (ref 4.2–5.9)
RBC # BLD: 4.44 M/UL (ref 4.2–5.9)
RBC # BLD: 4.47 M/UL (ref 4.2–5.9)
RBC # BLD: 4.52 M/UL (ref 4.2–5.9)
RBC UA: ABNORMAL /HPF (ref 0–2)
RBC UA: NORMAL /HPF (ref 0–2)
REPORT: NORMAL
REPORT: NORMAL
SLIDE REVIEW: ABNORMAL
SLIDE REVIEW: ABNORMAL
SODIUM BLD-SCNC: 132 MMOL/L (ref 136–145)
SODIUM BLD-SCNC: 132 MMOL/L (ref 136–145)
SODIUM BLD-SCNC: 133 MMOL/L (ref 136–145)
SODIUM BLD-SCNC: 133 MMOL/L (ref 136–145)
SODIUM BLD-SCNC: 134 MMOL/L (ref 136–145)
SODIUM BLD-SCNC: 135 MMOL/L (ref 136–145)
SODIUM BLD-SCNC: 136 MMOL/L (ref 136–145)
SODIUM BLD-SCNC: 137 MMOL/L (ref 136–145)
SODIUM BLD-SCNC: 138 MMOL/L (ref 136–145)
SODIUM BLD-SCNC: 139 MMOL/L (ref 136–145)
SPECIFIC GRAVITY UA: 1.01 (ref 1–1.03)
SPECIFIC GRAVITY UA: 1.02 (ref 1–1.03)
STREP PNEUMONIAE ANTIGEN, URINE: NORMAL
TCO2 ARTERIAL: 24.9 MMOL/L
TCO2 ARTERIAL: 28.4 MMOL/L
TCO2 ARTERIAL: 28.9 MMOL/L
TCO2 ARTERIAL: 29 MMOL/L
TCO2 ARTERIAL: 29.7 MMOL/L
TCO2 ARTERIAL: 30.2 MMOL/L
TCO2 ARTERIAL: 41.7 MMOL/L
TOTAL PROTEIN: 7.3 G/DL (ref 6.4–8.2)
TOTAL PROTEIN: 7.4 G/DL (ref 6.4–8.2)
TOTAL PROTEIN: 7.6 G/DL (ref 6.4–8.2)
TOTAL PROTEIN: 7.8 G/DL (ref 6.4–8.2)
TOTAL PROTEIN: 7.8 G/DL (ref 6.4–8.2)
TOTAL PROTEIN: 7.9 G/DL (ref 6.4–8.2)
TOTAL PROTEIN: 7.9 G/DL (ref 6.4–8.2)
TOTAL PROTEIN: 8.6 G/DL (ref 6.4–8.2)
TRIGL SERPL-MCNC: 210 MG/DL (ref 0–150)
TROPONIN: <0.01 NG/ML
URINE CULTURE, ROUTINE: NORMAL
UROBILINOGEN, URINE: 0.2 E.U./DL
UROBILINOGEN, URINE: 0.2 E.U./DL
VLDLC SERPL CALC-MCNC: 42 MG/DL
WBC # BLD: 11 K/UL (ref 4–11)
WBC # BLD: 12.7 K/UL (ref 4–11)
WBC # BLD: 4.3 K/UL (ref 4–11)
WBC # BLD: 5 K/UL (ref 4–11)
WBC # BLD: 5.1 K/UL (ref 4–11)
WBC # BLD: 5.2 K/UL (ref 4–11)
WBC # BLD: 5.2 K/UL (ref 4–11)
WBC # BLD: 5.4 K/UL (ref 4–11)
WBC # BLD: 5.7 K/UL (ref 4–11)
WBC # BLD: 5.9 K/UL (ref 4–11)
WBC # BLD: 6 K/UL (ref 4–11)
WBC # BLD: 6.6 K/UL (ref 4–11)
WBC # BLD: 6.7 K/UL (ref 4–11)
WBC # BLD: 7 K/UL (ref 4–11)
WBC # BLD: 7.2 K/UL (ref 4–11)
WBC # BLD: 7.5 K/UL (ref 4–11)
WBC # BLD: 7.7 K/UL (ref 4–11)
WBC # BLD: 8.5 K/UL (ref 4–11)
WBC # BLD: 8.7 K/UL (ref 4–11)
WBC # BLD: 9.6 K/UL (ref 4–11)
WBC # BLD: 9.7 K/UL (ref 4–11)
WBC # BLD: 9.8 K/UL (ref 4–11)
WBC UA: ABNORMAL /HPF (ref 0–5)
WBC UA: NORMAL /HPF (ref 0–5)

## 2019-01-01 PROCEDURE — 82803 BLOOD GASES ANY COMBINATION: CPT

## 2019-01-01 PROCEDURE — 2500000003 HC RX 250 WO HCPCS: Performed by: EMERGENCY MEDICINE

## 2019-01-01 PROCEDURE — 2580000003 HC RX 258: Performed by: INTERNAL MEDICINE

## 2019-01-01 PROCEDURE — 6370000000 HC RX 637 (ALT 250 FOR IP): Performed by: INTERNAL MEDICINE

## 2019-01-01 PROCEDURE — G8427 DOCREV CUR MEDS BY ELIG CLIN: HCPCS | Performed by: PHYSICIAN ASSISTANT

## 2019-01-01 PROCEDURE — 6370000000 HC RX 637 (ALT 250 FOR IP): Performed by: NURSE PRACTITIONER

## 2019-01-01 PROCEDURE — 1036F TOBACCO NON-USER: CPT | Performed by: INTERNAL MEDICINE

## 2019-01-01 PROCEDURE — G8598 ASA/ANTIPLAT THER USED: HCPCS | Performed by: INTERNAL MEDICINE

## 2019-01-01 PROCEDURE — 93308 TTE F-UP OR LMTD: CPT

## 2019-01-01 PROCEDURE — 4040F PNEUMOC VAC/ADMIN/RCVD: CPT | Performed by: FAMILY MEDICINE

## 2019-01-01 PROCEDURE — 87449 NOS EACH ORGANISM AG IA: CPT

## 2019-01-01 PROCEDURE — G8427 DOCREV CUR MEDS BY ELIG CLIN: HCPCS | Performed by: NURSE PRACTITIONER

## 2019-01-01 PROCEDURE — 3017F COLORECTAL CA SCREEN DOC REV: CPT | Performed by: PHYSICIAN ASSISTANT

## 2019-01-01 PROCEDURE — G8417 CALC BMI ABV UP PARAM F/U: HCPCS | Performed by: INTERNAL MEDICINE

## 2019-01-01 PROCEDURE — G0378 HOSPITAL OBSERVATION PER HR: HCPCS

## 2019-01-01 PROCEDURE — 6360000002 HC RX W HCPCS: Performed by: INTERNAL MEDICINE

## 2019-01-01 PROCEDURE — 2700000000 HC OXYGEN THERAPY PER DAY

## 2019-01-01 PROCEDURE — 4040F PNEUMOC VAC/ADMIN/RCVD: CPT | Performed by: PHYSICIAN ASSISTANT

## 2019-01-01 PROCEDURE — 85025 COMPLETE CBC W/AUTO DIFF WBC: CPT

## 2019-01-01 PROCEDURE — 1200000000 HC SEMI PRIVATE

## 2019-01-01 PROCEDURE — 1111F DSCHRG MED/CURRENT MED MERGE: CPT | Performed by: INTERNAL MEDICINE

## 2019-01-01 PROCEDURE — 94640 AIRWAY INHALATION TREATMENT: CPT

## 2019-01-01 PROCEDURE — 87801 DETECT AGNT MULT DNA AMPLI: CPT

## 2019-01-01 PROCEDURE — 85027 COMPLETE CBC AUTOMATED: CPT

## 2019-01-01 PROCEDURE — 36600 WITHDRAWAL OF ARTERIAL BLOOD: CPT

## 2019-01-01 PROCEDURE — 3017F COLORECTAL CA SCREEN DOC REV: CPT | Performed by: NURSE PRACTITIONER

## 2019-01-01 PROCEDURE — 1101F PT FALLS ASSESS-DOCD LE1/YR: CPT | Performed by: NURSE PRACTITIONER

## 2019-01-01 PROCEDURE — 71046 X-RAY EXAM CHEST 2 VIEWS: CPT

## 2019-01-01 PROCEDURE — 36415 COLL VENOUS BLD VENIPUNCTURE: CPT

## 2019-01-01 PROCEDURE — 80053 COMPREHEN METABOLIC PANEL: CPT

## 2019-01-01 PROCEDURE — 1036F TOBACCO NON-USER: CPT | Performed by: PHYSICIAN ASSISTANT

## 2019-01-01 PROCEDURE — 99233 SBSQ HOSP IP/OBS HIGH 50: CPT | Performed by: INTERNAL MEDICINE

## 2019-01-01 PROCEDURE — 84484 ASSAY OF TROPONIN QUANT: CPT

## 2019-01-01 PROCEDURE — 2580000003 HC RX 258: Performed by: NURSE PRACTITIONER

## 2019-01-01 PROCEDURE — 76770 US EXAM ABDO BACK WALL COMP: CPT

## 2019-01-01 PROCEDURE — 94669 MECHANICAL CHEST WALL OSCILL: CPT

## 2019-01-01 PROCEDURE — G8427 DOCREV CUR MEDS BY ELIG CLIN: HCPCS | Performed by: INTERNAL MEDICINE

## 2019-01-01 PROCEDURE — 94668 MNPJ CHEST WALL SBSQ: CPT

## 2019-01-01 PROCEDURE — G8926 SPIRO NO PERF OR DOC: HCPCS | Performed by: INTERNAL MEDICINE

## 2019-01-01 PROCEDURE — 99024 POSTOP FOLLOW-UP VISIT: CPT | Performed by: INTERNAL MEDICINE

## 2019-01-01 PROCEDURE — 2580000003 HC RX 258: Performed by: EMERGENCY MEDICINE

## 2019-01-01 PROCEDURE — 6360000004 HC RX CONTRAST MEDICATION: Performed by: EMERGENCY MEDICINE

## 2019-01-01 PROCEDURE — 80048 BASIC METABOLIC PNL TOTAL CA: CPT

## 2019-01-01 PROCEDURE — 1123F ACP DISCUSS/DSCN MKR DOCD: CPT | Performed by: NURSE PRACTITIONER

## 2019-01-01 PROCEDURE — 99214 OFFICE O/P EST MOD 30 MIN: CPT | Performed by: NURSE PRACTITIONER

## 2019-01-01 PROCEDURE — 1123F ACP DISCUSS/DSCN MKR DOCD: CPT | Performed by: PHYSICIAN ASSISTANT

## 2019-01-01 PROCEDURE — 6360000002 HC RX W HCPCS: Performed by: EMERGENCY MEDICINE

## 2019-01-01 PROCEDURE — G8598 ASA/ANTIPLAT THER USED: HCPCS | Performed by: PHYSICIAN ASSISTANT

## 2019-01-01 PROCEDURE — G8417 CALC BMI ABV UP PARAM F/U: HCPCS | Performed by: NURSE PRACTITIONER

## 2019-01-01 PROCEDURE — 83605 ASSAY OF LACTIC ACID: CPT

## 2019-01-01 PROCEDURE — 94761 N-INVAS EAR/PLS OXIMETRY MLT: CPT

## 2019-01-01 PROCEDURE — 6370000000 HC RX 637 (ALT 250 FOR IP): Performed by: EMERGENCY MEDICINE

## 2019-01-01 PROCEDURE — G8598 ASA/ANTIPLAT THER USED: HCPCS | Performed by: NURSE PRACTITIONER

## 2019-01-01 PROCEDURE — 1111F DSCHRG MED/CURRENT MED MERGE: CPT | Performed by: NURSE PRACTITIONER

## 2019-01-01 PROCEDURE — C1898 LEAD, PMKR, OTHER THAN TRANS: HCPCS

## 2019-01-01 PROCEDURE — 85610 PROTHROMBIN TIME: CPT

## 2019-01-01 PROCEDURE — 1123F ACP DISCUSS/DSCN MKR DOCD: CPT | Performed by: INTERNAL MEDICINE

## 2019-01-01 PROCEDURE — C9113 INJ PANTOPRAZOLE SODIUM, VIA: HCPCS | Performed by: INTERNAL MEDICINE

## 2019-01-01 PROCEDURE — 87040 BLOOD CULTURE FOR BACTERIA: CPT

## 2019-01-01 PROCEDURE — 93005 ELECTROCARDIOGRAM TRACING: CPT | Performed by: EMERGENCY MEDICINE

## 2019-01-01 PROCEDURE — 1111F DSCHRG MED/CURRENT MED MERGE: CPT | Performed by: PHYSICIAN ASSISTANT

## 2019-01-01 PROCEDURE — 2500000003 HC RX 250 WO HCPCS

## 2019-01-01 PROCEDURE — 3023F SPIROM DOC REV: CPT | Performed by: PHYSICIAN ASSISTANT

## 2019-01-01 PROCEDURE — 94150 VITAL CAPACITY TEST: CPT

## 2019-01-01 PROCEDURE — 96375 TX/PRO/DX INJ NEW DRUG ADDON: CPT

## 2019-01-01 PROCEDURE — G8417 CALC BMI ABV UP PARAM F/U: HCPCS | Performed by: FAMILY MEDICINE

## 2019-01-01 PROCEDURE — 83735 ASSAY OF MAGNESIUM: CPT

## 2019-01-01 PROCEDURE — 1036F TOBACCO NON-USER: CPT | Performed by: FAMILY MEDICINE

## 2019-01-01 PROCEDURE — 93005 ELECTROCARDIOGRAM TRACING: CPT | Performed by: INTERNAL MEDICINE

## 2019-01-01 PROCEDURE — 3023F SPIROM DOC REV: CPT | Performed by: INTERNAL MEDICINE

## 2019-01-01 PROCEDURE — 87205 SMEAR GRAM STAIN: CPT

## 2019-01-01 PROCEDURE — 1123F ACP DISCUSS/DSCN MKR DOCD: CPT | Performed by: FAMILY MEDICINE

## 2019-01-01 PROCEDURE — 93296 REM INTERROG EVL PM/IDS: CPT | Performed by: INTERNAL MEDICINE

## 2019-01-01 PROCEDURE — 31624 DX BRONCHOSCOPE/LAVAGE: CPT | Performed by: INTERNAL MEDICINE

## 2019-01-01 PROCEDURE — 71045 X-RAY EXAM CHEST 1 VIEW: CPT

## 2019-01-01 PROCEDURE — 81001 URINALYSIS AUTO W/SCOPE: CPT

## 2019-01-01 PROCEDURE — 1101F PT FALLS ASSESS-DOCD LE1/YR: CPT | Performed by: INTERNAL MEDICINE

## 2019-01-01 PROCEDURE — 96368 THER/DIAG CONCURRENT INF: CPT

## 2019-01-01 PROCEDURE — 1111F DSCHRG MED/CURRENT MED MERGE: CPT | Performed by: FAMILY MEDICINE

## 2019-01-01 PROCEDURE — 33208 INSRT HEART PM ATRIAL & VENT: CPT | Performed by: INTERNAL MEDICINE

## 2019-01-01 PROCEDURE — 99232 SBSQ HOSP IP/OBS MODERATE 35: CPT | Performed by: INTERNAL MEDICINE

## 2019-01-01 PROCEDURE — 3023F SPIROM DOC REV: CPT | Performed by: NURSE PRACTITIONER

## 2019-01-01 PROCEDURE — 0BJ08ZZ INSPECTION OF TRACHEOBRONCHIAL TREE, VIA NATURAL OR ARTIFICIAL OPENING ENDOSCOPIC: ICD-10-PCS | Performed by: INTERNAL MEDICINE

## 2019-01-01 PROCEDURE — 93010 ELECTROCARDIOGRAM REPORT: CPT | Performed by: INTERNAL MEDICINE

## 2019-01-01 PROCEDURE — 4040F PNEUMOC VAC/ADMIN/RCVD: CPT | Performed by: NURSE PRACTITIONER

## 2019-01-01 PROCEDURE — G8427 DOCREV CUR MEDS BY ELIG CLIN: HCPCS | Performed by: FAMILY MEDICINE

## 2019-01-01 PROCEDURE — G8417 CALC BMI ABV UP PARAM F/U: HCPCS | Performed by: PHYSICIAN ASSISTANT

## 2019-01-01 PROCEDURE — 99291 CRITICAL CARE FIRST HOUR: CPT

## 2019-01-01 PROCEDURE — 99153 MOD SED SAME PHYS/QHP EA: CPT

## 2019-01-01 PROCEDURE — 2580000003 HC RX 258

## 2019-01-01 PROCEDURE — 93000 ELECTROCARDIOGRAM COMPLETE: CPT | Performed by: INTERNAL MEDICINE

## 2019-01-01 PROCEDURE — 71250 CT THORAX DX C-: CPT

## 2019-01-01 PROCEDURE — 99285 EMERGENCY DEPT VISIT HI MDM: CPT

## 2019-01-01 PROCEDURE — 36592 COLLECT BLOOD FROM PICC: CPT

## 2019-01-01 PROCEDURE — 97161 PT EVAL LOW COMPLEX 20 MIN: CPT

## 2019-01-01 PROCEDURE — C1894 INTRO/SHEATH, NON-LASER: HCPCS

## 2019-01-01 PROCEDURE — 36591 DRAW BLOOD OFF VENOUS DEVICE: CPT

## 2019-01-01 PROCEDURE — 1101F PT FALLS ASSESS-DOCD LE1/YR: CPT | Performed by: FAMILY MEDICINE

## 2019-01-01 PROCEDURE — 6360000004 HC RX CONTRAST MEDICATION: Performed by: FAMILY MEDICINE

## 2019-01-01 PROCEDURE — 02H63JZ INSERTION OF PACEMAKER LEAD INTO RIGHT ATRIUM, PERCUTANEOUS APPROACH: ICD-10-PCS | Performed by: INTERNAL MEDICINE

## 2019-01-01 PROCEDURE — 2580000003 HC RX 258: Performed by: PHYSICIAN ASSISTANT

## 2019-01-01 PROCEDURE — 0JH606Z INSERTION OF PACEMAKER, DUAL CHAMBER INTO CHEST SUBCUTANEOUS TISSUE AND FASCIA, OPEN APPROACH: ICD-10-PCS | Performed by: INTERNAL MEDICINE

## 2019-01-01 PROCEDURE — 2000000000 HC ICU R&B

## 2019-01-01 PROCEDURE — 02HK3JZ INSERTION OF PACEMAKER LEAD INTO RIGHT VENTRICLE, PERCUTANEOUS APPROACH: ICD-10-PCS | Performed by: INTERNAL MEDICINE

## 2019-01-01 PROCEDURE — 96376 TX/PRO/DX INJ SAME DRUG ADON: CPT

## 2019-01-01 PROCEDURE — G8926 SPIRO NO PERF OR DOC: HCPCS | Performed by: FAMILY MEDICINE

## 2019-01-01 PROCEDURE — 1036F TOBACCO NON-USER: CPT | Performed by: NURSE PRACTITIONER

## 2019-01-01 PROCEDURE — 2022F DILAT RTA XM EVC RTNOPTHY: CPT | Performed by: NURSE PRACTITIONER

## 2019-01-01 PROCEDURE — G8598 ASA/ANTIPLAT THER USED: HCPCS | Performed by: FAMILY MEDICINE

## 2019-01-01 PROCEDURE — G8484 FLU IMMUNIZE NO ADMIN: HCPCS | Performed by: NURSE PRACTITIONER

## 2019-01-01 PROCEDURE — 84132 ASSAY OF SERUM POTASSIUM: CPT

## 2019-01-01 PROCEDURE — 6360000004 HC RX CONTRAST MEDICATION

## 2019-01-01 PROCEDURE — 99214 OFFICE O/P EST MOD 30 MIN: CPT | Performed by: INTERNAL MEDICINE

## 2019-01-01 PROCEDURE — 99222 1ST HOSP IP/OBS MODERATE 55: CPT | Performed by: INTERNAL MEDICINE

## 2019-01-01 PROCEDURE — 4040F PNEUMOC VAC/ADMIN/RCVD: CPT | Performed by: INTERNAL MEDICINE

## 2019-01-01 PROCEDURE — G8926 SPIRO NO PERF OR DOC: HCPCS | Performed by: NURSE PRACTITIONER

## 2019-01-01 PROCEDURE — 6360000002 HC RX W HCPCS: Performed by: NURSE PRACTITIONER

## 2019-01-01 PROCEDURE — 99215 OFFICE O/P EST HI 40 MIN: CPT | Performed by: INTERNAL MEDICINE

## 2019-01-01 PROCEDURE — 99152 MOD SED SAME PHYS/QHP 5/>YRS: CPT | Performed by: INTERNAL MEDICINE

## 2019-01-01 PROCEDURE — 97116 GAIT TRAINING THERAPY: CPT

## 2019-01-01 PROCEDURE — 97535 SELF CARE MNGMENT TRAINING: CPT

## 2019-01-01 PROCEDURE — 96374 THER/PROPH/DIAG INJ IV PUSH: CPT

## 2019-01-01 PROCEDURE — 82044 UR ALBUMIN SEMIQUANTITATIVE: CPT | Performed by: NURSE PRACTITIONER

## 2019-01-01 PROCEDURE — 51702 INSERT TEMP BLADDER CATH: CPT

## 2019-01-01 PROCEDURE — 83690 ASSAY OF LIPASE: CPT

## 2019-01-01 PROCEDURE — 99214 OFFICE O/P EST MOD 30 MIN: CPT | Performed by: PHYSICIAN ASSISTANT

## 2019-01-01 PROCEDURE — 6360000002 HC RX W HCPCS: Performed by: PHYSICIAN ASSISTANT

## 2019-01-01 PROCEDURE — 3017F COLORECTAL CA SCREEN DOC REV: CPT | Performed by: FAMILY MEDICINE

## 2019-01-01 PROCEDURE — 99233 SBSQ HOSP IP/OBS HIGH 50: CPT | Performed by: NURSE PRACTITIONER

## 2019-01-01 PROCEDURE — 94770 HC ETCO2 MONITOR DAILY: CPT

## 2019-01-01 PROCEDURE — 84145 PROCALCITONIN (PCT): CPT

## 2019-01-01 PROCEDURE — 93017 CV STRESS TEST TRACING ONLY: CPT

## 2019-01-01 PROCEDURE — 78452 HT MUSCLE IMAGE SPECT MULT: CPT

## 2019-01-01 PROCEDURE — 2060000000 HC ICU INTERMEDIATE R&B

## 2019-01-01 PROCEDURE — 93000 ELECTROCARDIOGRAM COMPLETE: CPT | Performed by: NURSE PRACTITIONER

## 2019-01-01 PROCEDURE — 99291 CRITICAL CARE FIRST HOUR: CPT | Performed by: INTERNAL MEDICINE

## 2019-01-01 PROCEDURE — G8484 FLU IMMUNIZE NO ADMIN: HCPCS | Performed by: FAMILY MEDICINE

## 2019-01-01 PROCEDURE — 87804 INFLUENZA ASSAY W/OPTIC: CPT

## 2019-01-01 PROCEDURE — C8929 TTE W OR WO FOL WCON,DOPPLER: HCPCS

## 2019-01-01 PROCEDURE — 93268 ECG RECORD/REVIEW: CPT | Performed by: INTERNAL MEDICINE

## 2019-01-01 PROCEDURE — 70450 CT HEAD/BRAIN W/O DYE: CPT

## 2019-01-01 PROCEDURE — 3017F COLORECTAL CA SCREEN DOC REV: CPT | Performed by: INTERNAL MEDICINE

## 2019-01-01 PROCEDURE — 6370000000 HC RX 637 (ALT 250 FOR IP): Performed by: PHYSICIAN ASSISTANT

## 2019-01-01 PROCEDURE — G8484 FLU IMMUNIZE NO ADMIN: HCPCS | Performed by: INTERNAL MEDICINE

## 2019-01-01 PROCEDURE — 96365 THER/PROPH/DIAG IV INF INIT: CPT

## 2019-01-01 PROCEDURE — 83880 ASSAY OF NATRIURETIC PEPTIDE: CPT

## 2019-01-01 PROCEDURE — 94750 HC PULMONARY COMPLIANCE STUDY: CPT

## 2019-01-01 PROCEDURE — 99152 MOD SED SAME PHYS/QHP 5/>YRS: CPT

## 2019-01-01 PROCEDURE — 94618 PULMONARY STRESS TESTING: CPT

## 2019-01-01 PROCEDURE — 99214 OFFICE O/P EST MOD 30 MIN: CPT | Performed by: FAMILY MEDICINE

## 2019-01-01 PROCEDURE — 99213 OFFICE O/P EST LOW 20 MIN: CPT | Performed by: PHYSICIAN ASSISTANT

## 2019-01-01 PROCEDURE — 85730 THROMBOPLASTIN TIME PARTIAL: CPT

## 2019-01-01 PROCEDURE — 87186 SC STD MICRODIL/AGAR DIL: CPT

## 2019-01-01 PROCEDURE — 99223 1ST HOSP IP/OBS HIGH 75: CPT | Performed by: INTERNAL MEDICINE

## 2019-01-01 PROCEDURE — 99283 EMERGENCY DEPT VISIT LOW MDM: CPT

## 2019-01-01 PROCEDURE — G8599 NO ASA/ANTIPLAT THER USE RNG: HCPCS | Performed by: INTERNAL MEDICINE

## 2019-01-01 PROCEDURE — 6360000002 HC RX W HCPCS

## 2019-01-01 PROCEDURE — 87070 CULTURE OTHR SPECIMN AEROBIC: CPT

## 2019-01-01 PROCEDURE — 89220 SPUTUM SPECIMEN COLLECTION: CPT

## 2019-01-01 PROCEDURE — 3046F HEMOGLOBIN A1C LEVEL >9.0%: CPT | Performed by: FAMILY MEDICINE

## 2019-01-01 PROCEDURE — 99238 HOSP IP/OBS DSCHRG MGMT 30/<: CPT | Performed by: INTERNAL MEDICINE

## 2019-01-01 PROCEDURE — 99292 CRITICAL CARE ADDL 30 MIN: CPT

## 2019-01-01 PROCEDURE — 71260 CT THORAX DX C+: CPT

## 2019-01-01 PROCEDURE — 6370000000 HC RX 637 (ALT 250 FOR IP): Performed by: HOSPITALIST

## 2019-01-01 PROCEDURE — 97166 OT EVAL MOD COMPLEX 45 MIN: CPT

## 2019-01-01 PROCEDURE — 37799 UNLISTED PX VASCULAR SURGERY: CPT

## 2019-01-01 PROCEDURE — 96366 THER/PROPH/DIAG IV INF ADDON: CPT

## 2019-01-01 PROCEDURE — 99284 EMERGENCY DEPT VISIT MOD MDM: CPT

## 2019-01-01 PROCEDURE — 93005 ELECTROCARDIOGRAM TRACING: CPT | Performed by: NURSE PRACTITIONER

## 2019-01-01 PROCEDURE — 2500000003 HC RX 250 WO HCPCS: Performed by: INTERNAL MEDICINE

## 2019-01-01 PROCEDURE — 3430000000 HC RX DIAGNOSTIC RADIOPHARMACEUTICAL: Performed by: INTERNAL MEDICINE

## 2019-01-01 PROCEDURE — 3046F HEMOGLOBIN A1C LEVEL >9.0%: CPT | Performed by: NURSE PRACTITIONER

## 2019-01-01 PROCEDURE — 93294 REM INTERROG EVL PM/LDLS PM: CPT | Performed by: INTERNAL MEDICINE

## 2019-01-01 PROCEDURE — 96372 THER/PROPH/DIAG INJ SC/IM: CPT

## 2019-01-01 PROCEDURE — C1887 CATHETER, GUIDING: HCPCS

## 2019-01-01 PROCEDURE — 74018 RADEX ABDOMEN 1 VIEW: CPT

## 2019-01-01 PROCEDURE — 85347 COAGULATION TIME ACTIVATED: CPT

## 2019-01-01 PROCEDURE — C1769 GUIDE WIRE: HCPCS

## 2019-01-01 PROCEDURE — 93458 L HRT ARTERY/VENTRICLE ANGIO: CPT | Performed by: INTERNAL MEDICINE

## 2019-01-01 PROCEDURE — 94667 MNPJ CHEST WALL 1ST: CPT

## 2019-01-01 PROCEDURE — 2022F DILAT RTA XM EVC RTNOPTHY: CPT | Performed by: FAMILY MEDICINE

## 2019-01-01 PROCEDURE — G8926 SPIRO NO PERF OR DOC: HCPCS | Performed by: PHYSICIAN ASSISTANT

## 2019-01-01 PROCEDURE — 99238 HOSP IP/OBS DSCHRG MGMT 30/<: CPT | Performed by: NURSE PRACTITIONER

## 2019-01-01 PROCEDURE — 93460 R&L HRT ART/VENTRICLE ANGIO: CPT

## 2019-01-01 PROCEDURE — A9502 TC99M TETROFOSMIN: HCPCS | Performed by: INTERNAL MEDICINE

## 2019-01-01 PROCEDURE — 4500000026 HC ED CRITICAL CARE PROCEDURE

## 2019-01-01 PROCEDURE — 31622 DX BRONCHOSCOPE/WASH: CPT

## 2019-01-01 PROCEDURE — 33208 INSRT HEART PM ATRIAL & VENT: CPT

## 2019-01-01 PROCEDURE — 3023F SPIROM DOC REV: CPT | Performed by: FAMILY MEDICINE

## 2019-01-01 PROCEDURE — 81001 URINALYSIS AUTO W/SCOPE: CPT | Performed by: FAMILY MEDICINE

## 2019-01-01 PROCEDURE — 93971 EXTREMITY STUDY: CPT

## 2019-01-01 PROCEDURE — C1785 PMKR, DUAL, RATE-RESP: HCPCS

## 2019-01-01 PROCEDURE — 83036 HEMOGLOBIN GLYCOSYLATED A1C: CPT

## 2019-01-01 PROCEDURE — 5A1945Z RESPIRATORY VENTILATION, 24-96 CONSECUTIVE HOURS: ICD-10-PCS | Performed by: HOSPITALIST

## 2019-01-01 RX ORDER — FENTANYL CITRATE 50 UG/ML
INJECTION, SOLUTION INTRAMUSCULAR; INTRAVENOUS
Status: COMPLETED | OUTPATIENT
Start: 2019-01-01 | End: 2019-01-01

## 2019-01-01 RX ORDER — TRAMADOL HYDROCHLORIDE 50 MG/1
50 TABLET ORAL EVERY 12 HOURS PRN
Status: ON HOLD | COMMUNITY
End: 2019-01-01 | Stop reason: HOSPADM

## 2019-01-01 RX ORDER — PREDNISONE 20 MG/1
40 TABLET ORAL DAILY
Qty: 10 TABLET | Refills: 0 | Status: ON HOLD | OUTPATIENT
Start: 2019-01-01 | End: 2019-01-01 | Stop reason: HOSPADM

## 2019-01-01 RX ORDER — TAMSULOSIN HYDROCHLORIDE 0.4 MG/1
0.4 CAPSULE ORAL DAILY
Status: DISCONTINUED | OUTPATIENT
Start: 2019-01-01 | End: 2019-01-01 | Stop reason: HOSPADM

## 2019-01-01 RX ORDER — SODIUM CHLORIDE 0.9 % (FLUSH) 0.9 %
10 SYRINGE (ML) INJECTION PRN
Status: DISCONTINUED | OUTPATIENT
Start: 2019-01-01 | End: 2019-01-01 | Stop reason: HOSPADM

## 2019-01-01 RX ORDER — DEXTROSE MONOHYDRATE 50 MG/ML
100 INJECTION, SOLUTION INTRAVENOUS PRN
Status: DISCONTINUED | OUTPATIENT
Start: 2019-01-01 | End: 2019-01-01 | Stop reason: HOSPADM

## 2019-01-01 RX ORDER — PREDNISONE 10 MG/1
40 TABLET ORAL DAILY
Status: DISCONTINUED | OUTPATIENT
Start: 2019-01-01 | End: 2019-01-01

## 2019-01-01 RX ORDER — FUROSEMIDE 40 MG/1
40 TABLET ORAL DAILY
Status: DISCONTINUED | OUTPATIENT
Start: 2019-01-01 | End: 2019-01-01 | Stop reason: HOSPADM

## 2019-01-01 RX ORDER — SODIUM CHLORIDE 9 MG/ML
INJECTION, SOLUTION INTRAVENOUS CONTINUOUS
Status: DISCONTINUED | OUTPATIENT
Start: 2019-01-01 | End: 2019-01-01 | Stop reason: SDUPTHER

## 2019-01-01 RX ORDER — IPRATROPIUM BROMIDE AND ALBUTEROL SULFATE 2.5; .5 MG/3ML; MG/3ML
1 SOLUTION RESPIRATORY (INHALATION) ONCE
Status: COMPLETED | OUTPATIENT
Start: 2019-01-01 | End: 2019-01-01

## 2019-01-01 RX ORDER — PREDNISONE 20 MG/1
40 TABLET ORAL DAILY
Status: DISCONTINUED | OUTPATIENT
Start: 2019-01-01 | End: 2019-01-01 | Stop reason: HOSPADM

## 2019-01-01 RX ORDER — PRAMIPEXOLE DIHYDROCHLORIDE 1 MG/1
1 TABLET ORAL NIGHTLY
Status: DISCONTINUED | OUTPATIENT
Start: 2019-01-01 | End: 2019-01-01 | Stop reason: HOSPADM

## 2019-01-01 RX ORDER — CYCLOBENZAPRINE HCL 10 MG
10 TABLET ORAL 3 TIMES DAILY PRN
Status: DISCONTINUED | OUTPATIENT
Start: 2019-01-01 | End: 2019-01-01 | Stop reason: HOSPADM

## 2019-01-01 RX ORDER — DOBUTAMINE HYDROCHLORIDE 200 MG/100ML
10 INJECTION INTRAVENOUS CONTINUOUS
Status: DISCONTINUED | OUTPATIENT
Start: 2019-01-01 | End: 2019-01-01

## 2019-01-01 RX ORDER — IPRATROPIUM BROMIDE AND ALBUTEROL SULFATE 2.5; .5 MG/3ML; MG/3ML
1 SOLUTION RESPIRATORY (INHALATION)
Status: DISCONTINUED | OUTPATIENT
Start: 2019-01-01 | End: 2019-01-01

## 2019-01-01 RX ORDER — ASPIRIN 81 MG/1
324 TABLET, CHEWABLE ORAL ONCE
Status: COMPLETED | OUTPATIENT
Start: 2019-01-01 | End: 2019-01-01

## 2019-01-01 RX ORDER — NICOTINE POLACRILEX 4 MG
15 LOZENGE BUCCAL PRN
Status: DISCONTINUED | OUTPATIENT
Start: 2019-01-01 | End: 2019-01-01 | Stop reason: HOSPADM

## 2019-01-01 RX ORDER — DEXTROSE MONOHYDRATE 25 G/50ML
12.5 INJECTION, SOLUTION INTRAVENOUS PRN
Status: DISCONTINUED | OUTPATIENT
Start: 2019-01-01 | End: 2019-01-01 | Stop reason: HOSPADM

## 2019-01-01 RX ORDER — POTASSIUM CHLORIDE 20 MEQ/1
20 TABLET, EXTENDED RELEASE ORAL ONCE
Status: COMPLETED | OUTPATIENT
Start: 2019-01-01 | End: 2019-01-01

## 2019-01-01 RX ORDER — CHOLESTYRAMINE LIGHT 4 G/5.7G
4 POWDER, FOR SUSPENSION ORAL DAILY
Status: DISCONTINUED | OUTPATIENT
Start: 2019-01-01 | End: 2019-01-01

## 2019-01-01 RX ORDER — MORPHINE SULFATE 2 MG/ML
2 INJECTION, SOLUTION INTRAMUSCULAR; INTRAVENOUS
Status: DISCONTINUED | OUTPATIENT
Start: 2019-01-01 | End: 2019-01-01 | Stop reason: HOSPADM

## 2019-01-01 RX ORDER — MIDAZOLAM HYDROCHLORIDE 5 MG/ML
INJECTION INTRAMUSCULAR; INTRAVENOUS
Status: COMPLETED | OUTPATIENT
Start: 2019-01-01 | End: 2019-01-01

## 2019-01-01 RX ORDER — POTASSIUM CHLORIDE 20 MEQ/1
20 TABLET, EXTENDED RELEASE ORAL ONCE
Status: DISCONTINUED | OUTPATIENT
Start: 2019-01-01 | End: 2019-01-01 | Stop reason: HOSPADM

## 2019-01-01 RX ORDER — DONEPEZIL HYDROCHLORIDE 5 MG/1
10 TABLET, FILM COATED ORAL NIGHTLY
Status: DISCONTINUED | OUTPATIENT
Start: 2019-01-01 | End: 2019-01-01 | Stop reason: HOSPADM

## 2019-01-01 RX ORDER — MESALAMINE 400 MG/1
400 CAPSULE, DELAYED RELEASE ORAL 3 TIMES DAILY
Status: DISCONTINUED | OUTPATIENT
Start: 2019-01-01 | End: 2019-01-01 | Stop reason: HOSPADM

## 2019-01-01 RX ORDER — PREDNISONE 20 MG/1
40 TABLET ORAL DAILY
Qty: 10 TABLET | Refills: 0 | Status: SHIPPED | OUTPATIENT
Start: 2019-01-01 | End: 2019-01-01

## 2019-01-01 RX ORDER — SODIUM CHLORIDE 0.9 % (FLUSH) 0.9 %
10 SYRINGE (ML) INJECTION EVERY 12 HOURS SCHEDULED
Status: DISCONTINUED | OUTPATIENT
Start: 2019-01-01 | End: 2019-01-01 | Stop reason: HOSPADM

## 2019-01-01 RX ORDER — BENZONATATE 100 MG/1
100 CAPSULE ORAL 3 TIMES DAILY PRN
Status: DISCONTINUED | OUTPATIENT
Start: 2019-01-01 | End: 2019-01-01 | Stop reason: HOSPADM

## 2019-01-01 RX ORDER — DILTIAZEM HYDROCHLORIDE 120 MG/1
120 CAPSULE, COATED, EXTENDED RELEASE ORAL DAILY
Qty: 90 CAPSULE | Refills: 3 | Status: ON HOLD | OUTPATIENT
Start: 2019-01-01 | End: 2019-01-01 | Stop reason: HOSPADM

## 2019-01-01 RX ORDER — CHOLESTYRAMINE 4 G/9G
4 POWDER, FOR SUSPENSION ORAL DAILY
Status: DISCONTINUED | OUTPATIENT
Start: 2019-01-01 | End: 2019-01-01 | Stop reason: HOSPADM

## 2019-01-01 RX ORDER — HEPARIN SODIUM 5000 [USP'U]/ML
5000 INJECTION, SOLUTION INTRAVENOUS; SUBCUTANEOUS EVERY 8 HOURS SCHEDULED
Status: DISCONTINUED | OUTPATIENT
Start: 2019-01-01 | End: 2019-01-01

## 2019-01-01 RX ORDER — PNV NO.95/FERROUS FUM/FOLIC AC 28MG-0.8MG
325 TABLET ORAL DAILY
COMMUNITY
Start: 2015-10-01

## 2019-01-01 RX ORDER — ONDANSETRON 2 MG/ML
4 INJECTION INTRAMUSCULAR; INTRAVENOUS EVERY 6 HOURS PRN
Status: DISCONTINUED | OUTPATIENT
Start: 2019-01-01 | End: 2019-01-01 | Stop reason: ALTCHOICE

## 2019-01-01 RX ORDER — PRAMIPEXOLE DIHYDROCHLORIDE 1 MG/1
2 TABLET ORAL ONCE
Status: COMPLETED | OUTPATIENT
Start: 2019-01-01 | End: 2019-01-01

## 2019-01-01 RX ORDER — FINASTERIDE 5 MG/1
5 TABLET, FILM COATED ORAL DAILY
Status: DISCONTINUED | OUTPATIENT
Start: 2019-01-01 | End: 2019-01-01 | Stop reason: HOSPADM

## 2019-01-01 RX ORDER — IPRATROPIUM BROMIDE AND ALBUTEROL SULFATE 2.5; .5 MG/3ML; MG/3ML
1 SOLUTION RESPIRATORY (INHALATION) 4 TIMES DAILY
Status: DISCONTINUED | OUTPATIENT
Start: 2019-01-01 | End: 2019-01-01 | Stop reason: HOSPADM

## 2019-01-01 RX ORDER — DILTIAZEM HYDROCHLORIDE 5 MG/ML
10 INJECTION INTRAVENOUS ONCE
Status: COMPLETED | OUTPATIENT
Start: 2019-01-01 | End: 2019-01-01

## 2019-01-01 RX ORDER — AMLODIPINE BESYLATE 5 MG/1
5 TABLET ORAL DAILY
Qty: 30 TABLET | Refills: 1 | Status: SHIPPED | OUTPATIENT
Start: 2019-01-01 | End: 2019-01-01 | Stop reason: SDUPTHER

## 2019-01-01 RX ORDER — MONTELUKAST SODIUM 10 MG/1
10 TABLET ORAL NIGHTLY
Status: DISCONTINUED | OUTPATIENT
Start: 2019-01-01 | End: 2019-01-01 | Stop reason: HOSPADM

## 2019-01-01 RX ORDER — METOPROLOL TARTRATE 5 MG/5ML
5 INJECTION INTRAVENOUS ONCE
Status: COMPLETED | OUTPATIENT
Start: 2019-01-01 | End: 2019-01-01

## 2019-01-01 RX ORDER — ONDANSETRON 2 MG/ML
4 INJECTION INTRAMUSCULAR; INTRAVENOUS EVERY 6 HOURS PRN
Status: DISCONTINUED | OUTPATIENT
Start: 2019-01-01 | End: 2019-01-01 | Stop reason: HOSPADM

## 2019-01-01 RX ORDER — METHOCARBAMOL 750 MG/1
750 TABLET, FILM COATED ORAL 4 TIMES DAILY
Qty: 40 TABLET | Refills: 0 | Status: SHIPPED | OUTPATIENT
Start: 2019-01-01 | End: 2019-01-01

## 2019-01-01 RX ORDER — DONEPEZIL HYDROCHLORIDE 10 MG/1
10 TABLET, FILM COATED ORAL NIGHTLY
Qty: 90 TABLET | Refills: 1 | Status: SHIPPED | OUTPATIENT
Start: 2019-01-01 | End: 2019-01-01 | Stop reason: SDUPTHER

## 2019-01-01 RX ORDER — IPRATROPIUM BROMIDE AND ALBUTEROL SULFATE 2.5; .5 MG/3ML; MG/3ML
1 SOLUTION RESPIRATORY (INHALATION)
Status: DISCONTINUED | OUTPATIENT
Start: 2019-01-01 | End: 2019-01-01 | Stop reason: HOSPADM

## 2019-01-01 RX ORDER — DILTIAZEM HYDROCHLORIDE 60 MG/1
30 TABLET, FILM COATED ORAL EVERY 6 HOURS SCHEDULED
Status: DISCONTINUED | OUTPATIENT
Start: 2019-01-01 | End: 2019-01-01

## 2019-01-01 RX ORDER — FUROSEMIDE 40 MG/1
30 TABLET ORAL DAILY
Qty: 90 TABLET | Refills: 3 | Status: SHIPPED | OUTPATIENT
Start: 2019-01-01 | End: 2019-01-01 | Stop reason: SDUPTHER

## 2019-01-01 RX ORDER — SODIUM CHLORIDE 9 MG/ML
INJECTION, SOLUTION INTRAVENOUS CONTINUOUS
Status: DISCONTINUED | OUTPATIENT
Start: 2019-01-01 | End: 2019-01-01 | Stop reason: HOSPADM

## 2019-01-01 RX ORDER — METHYLPREDNISOLONE SODIUM SUCCINATE 40 MG/ML
40 INJECTION, POWDER, LYOPHILIZED, FOR SOLUTION INTRAMUSCULAR; INTRAVENOUS EVERY 8 HOURS
Status: DISCONTINUED | OUTPATIENT
Start: 2019-01-01 | End: 2019-01-01 | Stop reason: HOSPADM

## 2019-01-01 RX ORDER — PANTOPRAZOLE SODIUM 40 MG/1
40 TABLET, DELAYED RELEASE ORAL
Status: DISCONTINUED | OUTPATIENT
Start: 2019-01-01 | End: 2019-01-01 | Stop reason: HOSPADM

## 2019-01-01 RX ORDER — ACETAMINOPHEN 325 MG/1
650 TABLET ORAL EVERY 4 HOURS PRN
Status: DISCONTINUED | OUTPATIENT
Start: 2019-01-01 | End: 2019-01-01 | Stop reason: HOSPADM

## 2019-01-01 RX ORDER — FUROSEMIDE 40 MG/1
40 TABLET ORAL SEE ADMIN INSTRUCTIONS
Qty: 90 TABLET | Refills: 0
Start: 2019-01-01

## 2019-01-01 RX ORDER — NITROGLYCERIN 0.4 MG/1
0.4 TABLET SUBLINGUAL EVERY 5 MIN PRN
Status: DISCONTINUED | OUTPATIENT
Start: 2019-01-01 | End: 2019-01-01 | Stop reason: HOSPADM

## 2019-01-01 RX ORDER — CHOLECALCIFEROL (VITAMIN D3) 125 MCG
5 CAPSULE ORAL NIGHTLY PRN
Status: DISCONTINUED | OUTPATIENT
Start: 2019-01-01 | End: 2019-01-01 | Stop reason: HOSPADM

## 2019-01-01 RX ORDER — SOTALOL HYDROCHLORIDE 80 MG/1
80 TABLET ORAL 2 TIMES DAILY
Status: DISCONTINUED | OUTPATIENT
Start: 2019-01-01 | End: 2019-01-01 | Stop reason: HOSPADM

## 2019-01-01 RX ORDER — CARBOXYMETHYLCELLULOSE SODIUM 10 MG/ML
1 GEL OPHTHALMIC 3 TIMES DAILY
Status: DISCONTINUED | OUTPATIENT
Start: 2019-01-01 | End: 2019-01-01

## 2019-01-01 RX ORDER — DILTIAZEM HYDROCHLORIDE 120 MG/1
120 CAPSULE, COATED, EXTENDED RELEASE ORAL DAILY
Status: DISCONTINUED | OUTPATIENT
Start: 2019-01-01 | End: 2019-01-01 | Stop reason: HOSPADM

## 2019-01-01 RX ORDER — COLESEVELAM 180 1/1
1875 TABLET ORAL 2 TIMES DAILY WITH MEALS
Status: DISCONTINUED | OUTPATIENT
Start: 2019-01-01 | End: 2019-01-01 | Stop reason: HOSPADM

## 2019-01-01 RX ORDER — AMLODIPINE BESYLATE 5 MG/1
5 TABLET ORAL DAILY
Status: DISCONTINUED | OUTPATIENT
Start: 2019-01-01 | End: 2019-01-01 | Stop reason: HOSPADM

## 2019-01-01 RX ORDER — SODIUM CHLORIDE 9 MG/ML
INJECTION, SOLUTION INTRAVENOUS CONTINUOUS
Status: DISCONTINUED | OUTPATIENT
Start: 2019-01-01 | End: 2019-01-01

## 2019-01-01 RX ORDER — PROPOFOL 10 MG/ML
10 INJECTION, EMULSION INTRAVENOUS
Status: DISCONTINUED | OUTPATIENT
Start: 2019-01-01 | End: 2019-01-01 | Stop reason: HOSPADM

## 2019-01-01 RX ORDER — ALBUTEROL SULFATE 2.5 MG/3ML
2.5 SOLUTION RESPIRATORY (INHALATION)
Status: DISCONTINUED | OUTPATIENT
Start: 2019-01-01 | End: 2019-01-01

## 2019-01-01 RX ORDER — METHYLPREDNISOLONE SODIUM SUCCINATE 40 MG/ML
40 INJECTION, POWDER, LYOPHILIZED, FOR SOLUTION INTRAMUSCULAR; INTRAVENOUS EVERY 12 HOURS
Status: COMPLETED | OUTPATIENT
Start: 2019-01-01 | End: 2019-01-01

## 2019-01-01 RX ORDER — MIDAZOLAM HYDROCHLORIDE 1 MG/ML
INJECTION INTRAMUSCULAR; INTRAVENOUS
Status: COMPLETED | OUTPATIENT
Start: 2019-01-01 | End: 2019-01-01

## 2019-01-01 RX ORDER — FUROSEMIDE 10 MG/ML
20 INJECTION INTRAMUSCULAR; INTRAVENOUS ONCE
Status: COMPLETED | OUTPATIENT
Start: 2019-01-01 | End: 2019-01-01

## 2019-01-01 RX ORDER — MONTELUKAST SODIUM 10 MG/1
10 TABLET ORAL NIGHTLY
Qty: 90 TABLET | Refills: 3 | Status: SHIPPED | OUTPATIENT
Start: 2019-01-01

## 2019-01-01 RX ORDER — TRAMADOL HYDROCHLORIDE 50 MG/1
50 TABLET ORAL EVERY 12 HOURS PRN
Status: DISCONTINUED | OUTPATIENT
Start: 2019-01-01 | End: 2019-01-01 | Stop reason: HOSPADM

## 2019-01-01 RX ORDER — METHYLPREDNISOLONE SODIUM SUCCINATE 125 MG/2ML
80 INJECTION, POWDER, LYOPHILIZED, FOR SOLUTION INTRAMUSCULAR; INTRAVENOUS ONCE
Status: COMPLETED | OUTPATIENT
Start: 2019-01-01 | End: 2019-01-01

## 2019-01-01 RX ORDER — ROCURONIUM BROMIDE 10 MG/ML
50 INJECTION, SOLUTION INTRAVENOUS ONCE
Status: COMPLETED | OUTPATIENT
Start: 2019-01-01 | End: 2019-01-01

## 2019-01-01 RX ORDER — PANTOPRAZOLE SODIUM 40 MG/10ML
40 INJECTION, POWDER, LYOPHILIZED, FOR SOLUTION INTRAVENOUS DAILY
Status: DISCONTINUED | OUTPATIENT
Start: 2019-01-01 | End: 2019-01-01

## 2019-01-01 RX ORDER — ALBUTEROL SULFATE 90 UG/1
AEROSOL, METERED RESPIRATORY (INHALATION)
Status: DISCONTINUED
Start: 2019-01-01 | End: 2019-01-01 | Stop reason: HOSPADM

## 2019-01-01 RX ORDER — TRAMADOL HYDROCHLORIDE 50 MG/1
50 TABLET ORAL EVERY 4 HOURS PRN
Status: DISCONTINUED | OUTPATIENT
Start: 2019-01-01 | End: 2019-01-01 | Stop reason: HOSPADM

## 2019-01-01 RX ORDER — PROPOFOL 10 MG/ML
INJECTION, EMULSION INTRAVENOUS
Status: COMPLETED
Start: 2019-01-01 | End: 2019-01-01

## 2019-01-01 RX ORDER — OXYCODONE AND ACETAMINOPHEN 10; 325 MG/1; MG/1
1 TABLET ORAL EVERY 6 HOURS PRN
Status: DISCONTINUED | OUTPATIENT
Start: 2019-01-01 | End: 2019-01-01 | Stop reason: HOSPADM

## 2019-01-01 RX ORDER — PREDNISONE 20 MG/1
40 TABLET ORAL DAILY
Qty: 8 TABLET | Refills: 0 | Status: SHIPPED | OUTPATIENT
Start: 2019-01-01 | End: 2019-01-01

## 2019-01-01 RX ORDER — ETOMIDATE 2 MG/ML
20 INJECTION INTRAVENOUS ONCE
Status: COMPLETED | OUTPATIENT
Start: 2019-01-01 | End: 2019-01-01

## 2019-01-01 RX ORDER — NALOXONE HYDROCHLORIDE 1 MG/ML
2 INJECTION INTRAMUSCULAR; INTRAVENOUS; SUBCUTANEOUS ONCE
Status: COMPLETED | OUTPATIENT
Start: 2019-01-01 | End: 2019-01-01

## 2019-01-01 RX ORDER — PROPOFOL 10 MG/ML
10 INJECTION, EMULSION INTRAVENOUS
Status: DISCONTINUED | OUTPATIENT
Start: 2019-01-01 | End: 2019-01-01

## 2019-01-01 RX ORDER — DONEPEZIL HYDROCHLORIDE 10 MG/1
TABLET, FILM COATED ORAL
Qty: 90 TABLET | Refills: 1 | Status: SHIPPED | OUTPATIENT
Start: 2019-01-01

## 2019-01-01 RX ORDER — OXYCODONE HYDROCHLORIDE AND ACETAMINOPHEN 5; 325 MG/1; MG/1
2 TABLET ORAL EVERY 4 HOURS PRN
Status: DISCONTINUED | OUTPATIENT
Start: 2019-01-01 | End: 2019-01-01 | Stop reason: HOSPADM

## 2019-01-01 RX ORDER — ASPIRIN 81 MG/1
81 TABLET ORAL DAILY
Status: DISCONTINUED | OUTPATIENT
Start: 2019-01-01 | End: 2019-01-01 | Stop reason: HOSPADM

## 2019-01-01 RX ORDER — ALBUTEROL SULFATE 2.5 MG/3ML
2.5 SOLUTION RESPIRATORY (INHALATION) EVERY 6 HOURS PRN
Status: DISCONTINUED | OUTPATIENT
Start: 2019-01-01 | End: 2019-01-01 | Stop reason: HOSPADM

## 2019-01-01 RX ORDER — SERTRALINE HYDROCHLORIDE 100 MG/1
100 TABLET, FILM COATED ORAL DAILY
Qty: 90 TABLET | Refills: 3 | Status: SHIPPED | OUTPATIENT
Start: 2019-01-01

## 2019-01-01 RX ORDER — IPRATROPIUM BROMIDE AND ALBUTEROL SULFATE 2.5; .5 MG/3ML; MG/3ML
3 SOLUTION RESPIRATORY (INHALATION) 4 TIMES DAILY
Qty: 360 ML | Refills: 2 | Status: SHIPPED | OUTPATIENT
Start: 2019-01-01 | End: 2019-01-01 | Stop reason: ALTCHOICE

## 2019-01-01 RX ORDER — FERROUS SULFATE 325(65) MG
325 TABLET ORAL
Qty: 90 TABLET | Refills: 2 | Status: SHIPPED | OUTPATIENT
Start: 2019-01-01 | End: 2019-01-01 | Stop reason: SDUPTHER

## 2019-01-01 RX ORDER — IPRATROPIUM BROMIDE AND ALBUTEROL SULFATE 2.5; .5 MG/3ML; MG/3ML
1 SOLUTION RESPIRATORY (INHALATION) 4 TIMES DAILY
Status: DISCONTINUED | OUTPATIENT
Start: 2019-01-01 | End: 2019-01-01

## 2019-01-01 RX ORDER — SOTALOL HYDROCHLORIDE 80 MG/1
80 TABLET ORAL 2 TIMES DAILY
Qty: 60 TABLET | Refills: 5 | Status: ON HOLD | OUTPATIENT
Start: 2019-01-01 | End: 2019-01-01 | Stop reason: SDUPTHER

## 2019-01-01 RX ORDER — GUAIFENESIN 600 MG/1
600 TABLET, EXTENDED RELEASE ORAL 2 TIMES DAILY
Status: DISCONTINUED | OUTPATIENT
Start: 2019-01-01 | End: 2019-01-01 | Stop reason: HOSPADM

## 2019-01-01 RX ORDER — FUROSEMIDE 20 MG/1
30 TABLET ORAL DAILY
Status: DISCONTINUED | OUTPATIENT
Start: 2019-01-01 | End: 2019-01-01

## 2019-01-01 RX ORDER — ASPIRIN 81 MG/1
81 TABLET, CHEWABLE ORAL DAILY
Status: DISCONTINUED | OUTPATIENT
Start: 2019-01-01 | End: 2019-01-01 | Stop reason: HOSPADM

## 2019-01-01 RX ORDER — PRAMIPEXOLE DIHYDROCHLORIDE 1 MG/1
2 TABLET ORAL 2 TIMES DAILY
Status: DISCONTINUED | OUTPATIENT
Start: 2019-01-01 | End: 2019-01-01 | Stop reason: HOSPADM

## 2019-01-01 RX ORDER — CHLORHEXIDINE GLUCONATE 0.12 MG/ML
15 RINSE ORAL 2 TIMES DAILY
Status: DISCONTINUED | OUTPATIENT
Start: 2019-01-01 | End: 2019-01-01

## 2019-01-01 RX ORDER — SOTALOL HYDROCHLORIDE 120 MG/1
120 TABLET ORAL 2 TIMES DAILY
Status: DISCONTINUED | OUTPATIENT
Start: 2019-01-01 | End: 2019-01-01 | Stop reason: HOSPADM

## 2019-01-01 RX ORDER — HYDRALAZINE HYDROCHLORIDE 20 MG/ML
5 INJECTION INTRAMUSCULAR; INTRAVENOUS EVERY 4 HOURS PRN
Status: DISCONTINUED | OUTPATIENT
Start: 2019-01-01 | End: 2019-01-01 | Stop reason: HOSPADM

## 2019-01-01 RX ORDER — APIXABAN 5 MG/1
TABLET, FILM COATED ORAL
Qty: 180 TABLET | Refills: 1 | Status: SHIPPED | OUTPATIENT
Start: 2019-01-01

## 2019-01-01 RX ORDER — SOTALOL HYDROCHLORIDE 120 MG/1
120 TABLET ORAL 2 TIMES DAILY
Qty: 60 TABLET | Refills: 5 | Status: SHIPPED | OUTPATIENT
Start: 2019-01-01 | End: 2019-01-01 | Stop reason: SDUPTHER

## 2019-01-01 RX ORDER — DILTIAZEM HYDROCHLORIDE 120 MG/1
120 CAPSULE, COATED, EXTENDED RELEASE ORAL DAILY
Qty: 90 CAPSULE | Refills: 3 | Status: SHIPPED | OUTPATIENT
Start: 2019-01-01 | End: 2019-01-01 | Stop reason: SDUPTHER

## 2019-01-01 RX ORDER — GLUCOSAM/CHON-MSM1/C/MANG/BOSW 500-416.6
1 TABLET ORAL DAILY
Status: DISCONTINUED | OUTPATIENT
Start: 2019-01-01 | End: 2019-01-01 | Stop reason: RX

## 2019-01-01 RX ORDER — HYDRALAZINE HYDROCHLORIDE 20 MG/ML
10 INJECTION INTRAMUSCULAR; INTRAVENOUS
Status: DISCONTINUED | OUTPATIENT
Start: 2019-01-01 | End: 2019-01-01 | Stop reason: HOSPADM

## 2019-01-01 RX ORDER — LIDOCAINE 4 G/G
1 PATCH TOPICAL EVERY 24 HOURS
Qty: 30 PATCH | Refills: 0 | Status: SHIPPED | OUTPATIENT
Start: 2019-01-01

## 2019-01-01 RX ORDER — PREDNISONE 10 MG/1
TABLET ORAL
Qty: 30 TABLET | Refills: 0 | Status: SHIPPED | OUTPATIENT
Start: 2019-01-01 | End: 2019-01-01 | Stop reason: ALTCHOICE

## 2019-01-01 RX ORDER — OXYCODONE AND ACETAMINOPHEN 10; 325 MG/1; MG/1
1 TABLET ORAL EVERY 6 HOURS PRN
Status: DISCONTINUED | OUTPATIENT
Start: 2019-01-01 | End: 2019-01-01

## 2019-01-01 RX ORDER — SOTALOL HYDROCHLORIDE 80 MG/1
120 TABLET ORAL 2 TIMES DAILY
Status: DISCONTINUED | OUTPATIENT
Start: 2019-01-01 | End: 2019-01-01 | Stop reason: HOSPADM

## 2019-01-01 RX ORDER — SOTALOL HYDROCHLORIDE 80 MG/1
80 TABLET ORAL EVERY 8 HOURS
Status: DISCONTINUED | OUTPATIENT
Start: 2019-01-01 | End: 2019-01-01

## 2019-01-01 RX ORDER — CALCIUM CARBONATE 200(500)MG
500 TABLET,CHEWABLE ORAL 3 TIMES DAILY PRN
Status: DISCONTINUED | OUTPATIENT
Start: 2019-01-01 | End: 2019-01-01 | Stop reason: HOSPADM

## 2019-01-01 RX ORDER — 0.9 % SODIUM CHLORIDE 0.9 %
500 INTRAVENOUS SOLUTION INTRAVENOUS ONCE
Status: COMPLETED | OUTPATIENT
Start: 2019-01-01 | End: 2019-01-01

## 2019-01-01 RX ORDER — OXYCODONE HYDROCHLORIDE AND ACETAMINOPHEN 5; 325 MG/1; MG/1
1 TABLET ORAL EVERY 4 HOURS PRN
Status: DISCONTINUED | OUTPATIENT
Start: 2019-01-01 | End: 2019-01-01 | Stop reason: HOSPADM

## 2019-01-01 RX ORDER — MESALAMINE 1.2 G/1
TABLET, DELAYED RELEASE ORAL
Qty: 270 TABLET | Refills: 1 | Status: SHIPPED | OUTPATIENT
Start: 2019-01-01

## 2019-01-01 RX ORDER — MORPHINE SULFATE 4 MG/ML
4 INJECTION, SOLUTION INTRAMUSCULAR; INTRAVENOUS ONCE
Status: COMPLETED | OUTPATIENT
Start: 2019-01-01 | End: 2019-01-01

## 2019-01-01 RX ORDER — IPRATROPIUM BROMIDE AND ALBUTEROL SULFATE 2.5; .5 MG/3ML; MG/3ML
3 SOLUTION RESPIRATORY (INHALATION) ONCE
Status: COMPLETED | OUTPATIENT
Start: 2019-01-01 | End: 2019-01-01

## 2019-01-01 RX ORDER — MESALAMINE 1.2 G/1
TABLET, DELAYED RELEASE ORAL
Qty: 270 TABLET | Refills: 1 | Status: SHIPPED | OUTPATIENT
Start: 2019-01-01 | End: 2019-01-01 | Stop reason: SDUPTHER

## 2019-01-01 RX ORDER — FUROSEMIDE 40 MG/1
30 TABLET ORAL DAILY
Qty: 90 TABLET | Refills: 3 | Status: SHIPPED | OUTPATIENT
Start: 2019-01-01 | End: 2019-01-01 | Stop reason: ALTCHOICE

## 2019-01-01 RX ORDER — COLESEVELAM 180 1/1
TABLET ORAL
Qty: 540 TABLET | Refills: 1 | Status: SHIPPED | OUTPATIENT
Start: 2019-01-01

## 2019-01-01 RX ORDER — SODIUM CHLORIDE 9 MG/ML
INJECTION, SOLUTION INTRAVENOUS
Status: DISCONTINUED
Start: 2019-01-01 | End: 2019-01-01

## 2019-01-01 RX ORDER — LEVOFLOXACIN 500 MG/1
500 TABLET, FILM COATED ORAL DAILY
Qty: 5 TABLET | Refills: 0 | Status: SHIPPED | OUTPATIENT
Start: 2019-01-01 | End: 2019-01-01 | Stop reason: ALTCHOICE

## 2019-01-01 RX ORDER — AMLODIPINE BESYLATE 5 MG/1
5 TABLET ORAL DAILY
Qty: 30 TABLET | Refills: 5 | Status: SHIPPED | OUTPATIENT
Start: 2019-01-01

## 2019-01-01 RX ORDER — PREDNISONE 20 MG/1
20 TABLET ORAL DAILY
Status: DISCONTINUED | OUTPATIENT
Start: 2019-01-01 | End: 2019-01-01

## 2019-01-01 RX ORDER — FUROSEMIDE 40 MG/1
40 TABLET ORAL DAILY
Qty: 90 TABLET | Refills: 1 | Status: SHIPPED | OUTPATIENT
Start: 2019-01-01 | End: 2019-01-01 | Stop reason: SDUPTHER

## 2019-01-01 RX ORDER — DILTIAZEM HYDROCHLORIDE 60 MG/1
30 TABLET, FILM COATED ORAL EVERY 6 HOURS SCHEDULED
Status: COMPLETED | OUTPATIENT
Start: 2019-01-01 | End: 2019-01-01

## 2019-01-01 RX ORDER — IBUPROFEN 400 MG/1
400 TABLET ORAL ONCE
Status: COMPLETED | OUTPATIENT
Start: 2019-01-01 | End: 2019-01-01

## 2019-01-01 RX ORDER — CYCLOBENZAPRINE HCL 10 MG
10 TABLET ORAL 3 TIMES DAILY PRN
Qty: 270 TABLET | Refills: 0 | Status: SHIPPED | OUTPATIENT
Start: 2019-01-01 | End: 2019-01-01 | Stop reason: SDUPTHER

## 2019-01-01 RX ORDER — PRAMIPEXOLE DIHYDROCHLORIDE 1 MG/1
1 TABLET ORAL NIGHTLY
Status: DISCONTINUED | OUTPATIENT
Start: 2019-01-01 | End: 2019-01-01

## 2019-01-01 RX ORDER — PROCHLORPERAZINE EDISYLATE 5 MG/ML
10 INJECTION INTRAMUSCULAR; INTRAVENOUS EVERY 6 HOURS PRN
Status: DISCONTINUED | OUTPATIENT
Start: 2019-01-01 | End: 2019-01-01 | Stop reason: HOSPADM

## 2019-01-01 RX ORDER — LABETALOL HYDROCHLORIDE 5 MG/ML
5 INJECTION, SOLUTION INTRAVENOUS EVERY 4 HOURS PRN
Status: DISCONTINUED | OUTPATIENT
Start: 2019-01-01 | End: 2019-01-01 | Stop reason: HOSPADM

## 2019-01-01 RX ORDER — PRAMIPEXOLE DIHYDROCHLORIDE 1 MG/1
1 TABLET ORAL NIGHTLY
Qty: 90 TABLET | Refills: 3 | Status: SHIPPED | OUTPATIENT
Start: 2019-01-01

## 2019-01-01 RX ORDER — PROCHLORPERAZINE MALEATE 5 MG/1
5 TABLET ORAL EVERY 6 HOURS PRN
Status: DISCONTINUED | OUTPATIENT
Start: 2019-01-01 | End: 2019-01-01

## 2019-01-01 RX ORDER — ORPHENADRINE CITRATE 30 MG/ML
60 INJECTION INTRAMUSCULAR; INTRAVENOUS ONCE
Status: COMPLETED | OUTPATIENT
Start: 2019-01-01 | End: 2019-01-01

## 2019-01-01 RX ORDER — PREDNISONE 10 MG/1
TABLET ORAL
Qty: 30 TABLET | Refills: 0 | Status: ON HOLD | OUTPATIENT
Start: 2019-01-01 | End: 2019-01-01 | Stop reason: HOSPADM

## 2019-01-01 RX ORDER — FERROUS SULFATE 325(65) MG
325 TABLET ORAL
Status: DISCONTINUED | OUTPATIENT
Start: 2019-01-01 | End: 2019-01-01 | Stop reason: HOSPADM

## 2019-01-01 RX ORDER — CYCLOBENZAPRINE HCL 10 MG
10 TABLET ORAL 3 TIMES DAILY PRN
Qty: 270 TABLET | Refills: 0 | Status: SHIPPED | OUTPATIENT
Start: 2019-01-01

## 2019-01-01 RX ORDER — DILTIAZEM HYDROCHLORIDE 120 MG/1
120 CAPSULE, COATED, EXTENDED RELEASE ORAL DAILY
Status: DISCONTINUED | OUTPATIENT
Start: 2019-01-01 | End: 2019-01-01

## 2019-01-01 RX ORDER — DIGOXIN 0.25 MG/ML
500 INJECTION INTRAMUSCULAR; INTRAVENOUS ONCE
Status: COMPLETED | OUTPATIENT
Start: 2019-01-01 | End: 2019-01-01

## 2019-01-01 RX ORDER — PRAMIPEXOLE DIHYDROCHLORIDE 0.25 MG/1
1 TABLET ORAL NIGHTLY
Status: DISCONTINUED | OUTPATIENT
Start: 2019-01-01 | End: 2019-01-01 | Stop reason: HOSPADM

## 2019-01-01 RX ORDER — SODIUM CHLORIDE 9 MG/ML
1000 INJECTION, SOLUTION INTRAVENOUS CONTINUOUS
Status: DISCONTINUED | OUTPATIENT
Start: 2019-01-01 | End: 2019-01-01 | Stop reason: HOSPADM

## 2019-01-01 RX ORDER — SOTALOL HYDROCHLORIDE 120 MG/1
120 TABLET ORAL 2 TIMES DAILY
Qty: 60 TABLET | Refills: 1 | Status: SHIPPED | OUTPATIENT
Start: 2019-01-01

## 2019-01-01 RX ORDER — ATORVASTATIN CALCIUM 40 MG/1
40 TABLET, FILM COATED ORAL NIGHTLY
Status: DISCONTINUED | OUTPATIENT
Start: 2019-01-01 | End: 2019-01-01 | Stop reason: HOSPADM

## 2019-01-01 RX ORDER — IPRATROPIUM BROMIDE AND ALBUTEROL SULFATE 2.5; .5 MG/3ML; MG/3ML
1 SOLUTION RESPIRATORY (INHALATION) EVERY 4 HOURS PRN
Status: DISCONTINUED | OUTPATIENT
Start: 2019-01-01 | End: 2019-01-01 | Stop reason: HOSPADM

## 2019-01-01 RX ORDER — CHOLESTYRAMINE 4 G/9G
4 POWDER, FOR SUSPENSION ORAL DAILY
Status: DISCONTINUED | OUTPATIENT
Start: 2019-01-01 | End: 2019-01-01

## 2019-01-01 RX ORDER — MAGNESIUM SULFATE IN WATER 40 MG/ML
2 INJECTION, SOLUTION INTRAVENOUS ONCE
Status: COMPLETED | OUTPATIENT
Start: 2019-01-01 | End: 2019-01-01

## 2019-01-01 RX ORDER — IPRATROPIUM BROMIDE AND ALBUTEROL SULFATE 2.5; .5 MG/3ML; MG/3ML
1 SOLUTION RESPIRATORY (INHALATION) 4 TIMES DAILY
Qty: 1620 ML | Refills: 1 | Status: ON HOLD | OUTPATIENT
Start: 2019-01-01 | End: 2019-01-01 | Stop reason: ALTCHOICE

## 2019-01-01 RX ORDER — OXYCODONE HYDROCHLORIDE AND ACETAMINOPHEN 5; 325 MG/1; MG/1
1 TABLET ORAL EVERY 6 HOURS PRN
Status: DISCONTINUED | OUTPATIENT
Start: 2019-01-01 | End: 2019-01-01

## 2019-01-01 RX ORDER — SODIUM CHLORIDE FOR INHALATION 0.9 %
3 VIAL, NEBULIZER (ML) INHALATION 3 TIMES DAILY
Status: COMPLETED | OUTPATIENT
Start: 2019-01-01 | End: 2019-01-01

## 2019-01-01 RX ORDER — SIMETHICONE 80 MG
80 TABLET,CHEWABLE ORAL EVERY 6 HOURS PRN
Status: DISCONTINUED | OUTPATIENT
Start: 2019-01-01 | End: 2019-01-01 | Stop reason: HOSPADM

## 2019-01-01 RX ORDER — METHYLPREDNISOLONE SODIUM SUCCINATE 125 MG/2ML
125 INJECTION, POWDER, LYOPHILIZED, FOR SOLUTION INTRAMUSCULAR; INTRAVENOUS ONCE
Status: COMPLETED | OUTPATIENT
Start: 2019-01-01 | End: 2019-01-01

## 2019-01-01 RX ORDER — THEOPHYLLINE 300 MG/1
300 TABLET, EXTENDED RELEASE ORAL EVERY 12 HOURS SCHEDULED
Status: COMPLETED | OUTPATIENT
Start: 2019-01-01 | End: 2019-01-01

## 2019-01-01 RX ORDER — METHYLPREDNISOLONE SODIUM SUCCINATE 40 MG/ML
40 INJECTION, POWDER, LYOPHILIZED, FOR SOLUTION INTRAMUSCULAR; INTRAVENOUS EVERY 8 HOURS
Status: DISCONTINUED | OUTPATIENT
Start: 2019-01-01 | End: 2019-01-01

## 2019-01-01 RX ORDER — AMLODIPINE BESYLATE 5 MG/1
5 TABLET ORAL DAILY
Qty: 30 TABLET | Refills: 5 | Status: SHIPPED | OUTPATIENT
Start: 2019-01-01 | End: 2019-01-01 | Stop reason: SDUPTHER

## 2019-01-01 RX ORDER — SODIUM CHLORIDE 9 MG/ML
INJECTION, SOLUTION INTRAVENOUS
Status: COMPLETED
Start: 2019-01-01 | End: 2019-01-01

## 2019-01-01 RX ORDER — LIDOCAINE 4 G/G
1 PATCH TOPICAL EVERY 24 HOURS
Status: DISCONTINUED | OUTPATIENT
Start: 2019-01-01 | End: 2019-01-01 | Stop reason: HOSPADM

## 2019-01-01 RX ORDER — IPRATROPIUM BROMIDE AND ALBUTEROL SULFATE 2.5; .5 MG/3ML; MG/3ML
1 SOLUTION RESPIRATORY (INHALATION) EVERY 4 HOURS
Status: DISCONTINUED | OUTPATIENT
Start: 2019-01-01 | End: 2019-01-01

## 2019-01-01 RX ORDER — AZITHROMYCIN 250 MG/1
250 TABLET, FILM COATED ORAL DAILY
Status: DISCONTINUED | OUTPATIENT
Start: 2019-01-01 | End: 2019-01-01 | Stop reason: HOSPADM

## 2019-01-01 RX ADMIN — INSULIN LISPRO 1 UNITS: 100 INJECTION, SOLUTION INTRAVENOUS; SUBCUTANEOUS at 12:57

## 2019-01-01 RX ADMIN — METHYLPREDNISOLONE SODIUM SUCCINATE 40 MG: 40 INJECTION, POWDER, FOR SOLUTION INTRAMUSCULAR; INTRAVENOUS at 17:45

## 2019-01-01 RX ADMIN — OXYCODONE AND ACETAMINOPHEN 1 TABLET: 10; 325 TABLET ORAL at 02:25

## 2019-01-01 RX ADMIN — AMLODIPINE BESYLATE 5 MG: 5 TABLET ORAL at 11:21

## 2019-01-01 RX ADMIN — PANTOPRAZOLE SODIUM 40 MG: 40 TABLET, DELAYED RELEASE ORAL at 07:07

## 2019-01-01 RX ADMIN — AZITHROMYCIN 250 MG: 250 TABLET, FILM COATED ORAL at 08:33

## 2019-01-01 RX ADMIN — IPRATROPIUM BROMIDE AND ALBUTEROL SULFATE 1 AMPULE: .5; 3 SOLUTION RESPIRATORY (INHALATION) at 19:52

## 2019-01-01 RX ADMIN — PRAMIPEXOLE DIHYDROCHLORIDE 2 MG: 1 TABLET ORAL at 10:43

## 2019-01-01 RX ADMIN — DILTIAZEM HYDROCHLORIDE 30 MG: 60 TABLET, FILM COATED ORAL at 11:34

## 2019-01-01 RX ADMIN — Medication 10 ML: at 08:48

## 2019-01-01 RX ADMIN — IPRATROPIUM BROMIDE AND ALBUTEROL SULFATE 1 AMPULE: .5; 3 SOLUTION RESPIRATORY (INHALATION) at 23:51

## 2019-01-01 RX ADMIN — IPRATROPIUM BROMIDE AND ALBUTEROL SULFATE 1 AMPULE: .5; 3 SOLUTION RESPIRATORY (INHALATION) at 00:31

## 2019-01-01 RX ADMIN — DILTIAZEM HYDROCHLORIDE 120 MG: 120 CAPSULE, COATED, EXTENDED RELEASE ORAL at 14:37

## 2019-01-01 RX ADMIN — OXYCODONE HYDROCHLORIDE AND ACETAMINOPHEN 1 TABLET: 5; 325 TABLET ORAL at 08:47

## 2019-01-01 RX ADMIN — INSULIN LISPRO 1 UNITS: 100 INJECTION, SOLUTION INTRAVENOUS; SUBCUTANEOUS at 01:49

## 2019-01-01 RX ADMIN — CYCLOBENZAPRINE HYDROCHLORIDE 10 MG: 10 TABLET, FILM COATED ORAL at 03:01

## 2019-01-01 RX ADMIN — Medication 10 ML: at 20:17

## 2019-01-01 RX ADMIN — INSULIN LISPRO 1 UNITS: 100 INJECTION, SOLUTION INTRAVENOUS; SUBCUTANEOUS at 08:56

## 2019-01-01 RX ADMIN — CYCLOBENZAPRINE HYDROCHLORIDE 10 MG: 10 TABLET, FILM COATED ORAL at 11:22

## 2019-01-01 RX ADMIN — ASPIRIN 81 MG: 81 TABLET ORAL at 08:29

## 2019-01-01 RX ADMIN — PRAMIPEXOLE DIHYDROCHLORIDE 1 MG: 1 TABLET ORAL at 20:51

## 2019-01-01 RX ADMIN — SERTRALINE HYDROCHLORIDE 50 MG: 50 TABLET ORAL at 08:34

## 2019-01-01 RX ADMIN — MESALAMINE 400 MG: 400 CAPSULE, DELAYED RELEASE ORAL at 09:31

## 2019-01-01 RX ADMIN — IPRATROPIUM BROMIDE AND ALBUTEROL SULFATE 1 AMPULE: .5; 3 SOLUTION RESPIRATORY (INHALATION) at 12:03

## 2019-01-01 RX ADMIN — IPRATROPIUM BROMIDE AND ALBUTEROL SULFATE 1 AMPULE: .5; 3 SOLUTION RESPIRATORY (INHALATION) at 02:45

## 2019-01-01 RX ADMIN — DILTIAZEM HYDROCHLORIDE 30 MG: 60 TABLET, FILM COATED ORAL at 18:29

## 2019-01-01 RX ADMIN — SERTRALINE HYDROCHLORIDE 100 MG: 50 TABLET ORAL at 08:52

## 2019-01-01 RX ADMIN — SOTALOL HYDROCHLORIDE 80 MG: 80 TABLET ORAL at 08:11

## 2019-01-01 RX ADMIN — APIXABAN 5 MG: 5 TABLET, FILM COATED ORAL at 20:38

## 2019-01-01 RX ADMIN — PRAMIPEXOLE DIHYDROCHLORIDE 1 MG: 1 TABLET ORAL at 17:45

## 2019-01-01 RX ADMIN — ASPIRIN 81 MG: 81 TABLET ORAL at 12:03

## 2019-01-01 RX ADMIN — PANTOPRAZOLE SODIUM 40 MG: 40 TABLET, DELAYED RELEASE ORAL at 06:06

## 2019-01-01 RX ADMIN — IPRATROPIUM BROMIDE AND ALBUTEROL SULFATE 1 AMPULE: .5; 3 SOLUTION RESPIRATORY (INHALATION) at 07:11

## 2019-01-01 RX ADMIN — OXYCODONE HYDROCHLORIDE AND ACETAMINOPHEN 1 TABLET: 10; 325 TABLET ORAL at 20:51

## 2019-01-01 RX ADMIN — CYCLOBENZAPRINE HYDROCHLORIDE 10 MG: 10 TABLET, FILM COATED ORAL at 23:35

## 2019-01-01 RX ADMIN — SOTALOL HYDROCHLORIDE 120 MG: 80 TABLET ORAL at 09:24

## 2019-01-01 RX ADMIN — PREDNISONE 40 MG: 20 TABLET ORAL at 09:03

## 2019-01-01 RX ADMIN — TRAMADOL HYDROCHLORIDE 50 MG: 50 TABLET, FILM COATED ORAL at 04:40

## 2019-01-01 RX ADMIN — MESALAMINE 400 MG: 400 CAPSULE, DELAYED RELEASE ORAL at 09:51

## 2019-01-01 RX ADMIN — Medication 10 ML: at 08:56

## 2019-01-01 RX ADMIN — OXYCODONE HYDROCHLORIDE AND ACETAMINOPHEN 1 TABLET: 10; 325 TABLET ORAL at 20:38

## 2019-01-01 RX ADMIN — SOTALOL HYDROCHLORIDE 80 MG: 80 TABLET ORAL at 19:49

## 2019-01-01 RX ADMIN — OXYCODONE HYDROCHLORIDE AND ACETAMINOPHEN 1 TABLET: 10; 325 TABLET ORAL at 23:33

## 2019-01-01 RX ADMIN — CHOLESTYRAMINE 4 G: 4 POWDER, FOR SUSPENSION ORAL at 08:48

## 2019-01-01 RX ADMIN — PANTOPRAZOLE SODIUM 40 MG: 40 INJECTION, POWDER, FOR SOLUTION INTRAVENOUS at 09:00

## 2019-01-01 RX ADMIN — FENTANYL CITRATE 50 MCG: 50 INJECTION, SOLUTION INTRAMUSCULAR; INTRAVENOUS at 09:29

## 2019-01-01 RX ADMIN — MONTELUKAST SODIUM 10 MG: 10 TABLET ORAL at 20:51

## 2019-01-01 RX ADMIN — DILTIAZEM HYDROCHLORIDE 30 MG: 60 TABLET, FILM COATED ORAL at 06:26

## 2019-01-01 RX ADMIN — PRAMIPEXOLE DIHYDROCHLORIDE 1 MG: 1 TABLET ORAL at 20:48

## 2019-01-01 RX ADMIN — DILTIAZEM HYDROCHLORIDE 30 MG: 60 TABLET, FILM COATED ORAL at 08:47

## 2019-01-01 RX ADMIN — IOPAMIDOL 85 ML: 755 INJECTION, SOLUTION INTRAVENOUS at 23:46

## 2019-01-01 RX ADMIN — MESALAMINE 400 MG: 400 CAPSULE, DELAYED RELEASE ORAL at 09:03

## 2019-01-01 RX ADMIN — INSULIN LISPRO 2 UNITS: 100 INJECTION, SOLUTION INTRAVENOUS; SUBCUTANEOUS at 16:50

## 2019-01-01 RX ADMIN — MORPHINE SULFATE 4 MG: 4 INJECTION, SOLUTION INTRAMUSCULAR; INTRAVENOUS at 13:11

## 2019-01-01 RX ADMIN — TRAMADOL HYDROCHLORIDE 50 MG: 50 TABLET, FILM COATED ORAL at 13:18

## 2019-01-01 RX ADMIN — TAMSULOSIN HYDROCHLORIDE 0.4 MG: 0.4 CAPSULE ORAL at 08:29

## 2019-01-01 RX ADMIN — SERTRALINE HYDROCHLORIDE 50 MG: 50 TABLET ORAL at 09:30

## 2019-01-01 RX ADMIN — FERROUS SULFATE TAB 325 MG (65 MG ELEMENTAL FE) 325 MG: 325 (65 FE) TAB at 07:44

## 2019-01-01 RX ADMIN — SIMETHICONE CHEW TAB 80 MG 80 MG: 80 TABLET ORAL at 02:22

## 2019-01-01 RX ADMIN — THEOPHYLLINE 300 MG: 300 TABLET, EXTENDED RELEASE ORAL at 20:38

## 2019-01-01 RX ADMIN — MELATONIN TAB 5 MG 5 MG: 5 TAB at 20:05

## 2019-01-01 RX ADMIN — OXYCODONE AND ACETAMINOPHEN 1 TABLET: 10; 325 TABLET ORAL at 23:12

## 2019-01-01 RX ADMIN — OXYCODONE HYDROCHLORIDE AND ACETAMINOPHEN 1 TABLET: 10; 325 TABLET ORAL at 21:16

## 2019-01-01 RX ADMIN — OXYCODONE AND ACETAMINOPHEN 2 TABLET: 5; 325 TABLET ORAL at 05:45

## 2019-01-01 RX ADMIN — TAMSULOSIN HYDROCHLORIDE 0.4 MG: 0.4 CAPSULE ORAL at 08:47

## 2019-01-01 RX ADMIN — SOTALOL HYDROCHLORIDE 80 MG: 80 TABLET ORAL at 09:05

## 2019-01-01 RX ADMIN — IPRATROPIUM BROMIDE AND ALBUTEROL SULFATE 3 ML: .5; 3 SOLUTION RESPIRATORY (INHALATION) at 11:36

## 2019-01-01 RX ADMIN — SERTRALINE 100 MG: 50 TABLET, FILM COATED ORAL at 09:24

## 2019-01-01 RX ADMIN — IPRATROPIUM BROMIDE AND ALBUTEROL SULFATE 1 AMPULE: .5; 3 SOLUTION RESPIRATORY (INHALATION) at 07:53

## 2019-01-01 RX ADMIN — IPRATROPIUM BROMIDE AND ALBUTEROL SULFATE 1 AMPULE: .5; 3 SOLUTION RESPIRATORY (INHALATION) at 11:59

## 2019-01-01 RX ADMIN — CHOLESTYRAMINE 4 G: 4 POWDER, FOR SUSPENSION ORAL at 09:09

## 2019-01-01 RX ADMIN — OXYCODONE HYDROCHLORIDE AND ACETAMINOPHEN 1 TABLET: 10; 325 TABLET ORAL at 22:02

## 2019-01-01 RX ADMIN — IPRATROPIUM BROMIDE AND ALBUTEROL SULFATE 1 AMPULE: .5; 3 SOLUTION RESPIRATORY (INHALATION) at 20:21

## 2019-01-01 RX ADMIN — SOTALOL HYDROCHLORIDE 80 MG: 80 TABLET ORAL at 09:44

## 2019-01-01 RX ADMIN — APIXABAN 5 MG: 5 TABLET, FILM COATED ORAL at 20:07

## 2019-01-01 RX ADMIN — PROCHLORPERAZINE EDISYLATE 10 MG: 5 INJECTION INTRAMUSCULAR; INTRAVENOUS at 06:27

## 2019-01-01 RX ADMIN — MESALAMINE 400 MG: 400 CAPSULE, DELAYED RELEASE ORAL at 13:53

## 2019-01-01 RX ADMIN — IPRATROPIUM BROMIDE AND ALBUTEROL SULFATE 1 AMPULE: .5; 3 SOLUTION RESPIRATORY (INHALATION) at 11:42

## 2019-01-01 RX ADMIN — IPRATROPIUM BROMIDE AND ALBUTEROL SULFATE 1 AMPULE: .5; 3 SOLUTION RESPIRATORY (INHALATION) at 12:15

## 2019-01-01 RX ADMIN — GUAIFENESIN 600 MG: 600 TABLET, EXTENDED RELEASE ORAL at 22:32

## 2019-01-01 RX ADMIN — FUROSEMIDE 20 MG: 10 INJECTION, SOLUTION INTRAVENOUS at 22:44

## 2019-01-01 RX ADMIN — MESALAMINE 400 MG: 400 CAPSULE, DELAYED RELEASE ORAL at 20:17

## 2019-01-01 RX ADMIN — MELATONIN TAB 5 MG 5 MG: 5 TAB at 23:32

## 2019-01-01 RX ADMIN — SOTALOL HYDROCHLORIDE 80 MG: 80 TABLET ORAL at 13:45

## 2019-01-01 RX ADMIN — SOTALOL HYDROCHLORIDE 80 MG: 80 TABLET ORAL at 08:58

## 2019-01-01 RX ADMIN — METHYLPREDNISOLONE SODIUM SUCCINATE 40 MG: 40 INJECTION, POWDER, FOR SOLUTION INTRAMUSCULAR; INTRAVENOUS at 00:08

## 2019-01-01 RX ADMIN — METHYLPREDNISOLONE SODIUM SUCCINATE 40 MG: 40 INJECTION, POWDER, FOR SOLUTION INTRAMUSCULAR; INTRAVENOUS at 08:56

## 2019-01-01 RX ADMIN — IPRATROPIUM BROMIDE AND ALBUTEROL SULFATE 1 AMPULE: .5; 3 SOLUTION RESPIRATORY (INHALATION) at 11:21

## 2019-01-01 RX ADMIN — MONTELUKAST SODIUM 10 MG: 10 TABLET, FILM COATED ORAL at 22:32

## 2019-01-01 RX ADMIN — MESALAMINE 400 MG: 400 CAPSULE, DELAYED RELEASE ORAL at 07:52

## 2019-01-01 RX ADMIN — MESALAMINE 400 MG: 400 CAPSULE, DELAYED RELEASE ORAL at 08:34

## 2019-01-01 RX ADMIN — PANTOPRAZOLE SODIUM 40 MG: 40 INJECTION, POWDER, FOR SOLUTION INTRAVENOUS at 06:00

## 2019-01-01 RX ADMIN — Medication 15 ML: at 20:18

## 2019-01-01 RX ADMIN — SERTRALINE HYDROCHLORIDE 100 MG: 50 TABLET ORAL at 09:45

## 2019-01-01 RX ADMIN — REGADENOSON 0.4 MG: 0.08 INJECTION, SOLUTION INTRAVENOUS at 13:23

## 2019-01-01 RX ADMIN — PANTOPRAZOLE SODIUM 40 MG: 40 TABLET, DELAYED RELEASE ORAL at 06:55

## 2019-01-01 RX ADMIN — MESALAMINE 400 MG: 400 CAPSULE, DELAYED RELEASE ORAL at 15:17

## 2019-01-01 RX ADMIN — PROPOFOL 10 MCG/KG/MIN: 10 INJECTION, EMULSION INTRAVENOUS at 22:28

## 2019-01-01 RX ADMIN — ISODIUM CHLORIDE 3 ML: 0.03 SOLUTION RESPIRATORY (INHALATION) at 12:00

## 2019-01-01 RX ADMIN — MUPIROCIN: 20 OINTMENT TOPICAL at 08:59

## 2019-01-01 RX ADMIN — SOTALOL HYDROCHLORIDE 120 MG: 80 TABLET ORAL at 20:51

## 2019-01-01 RX ADMIN — DONEPEZIL HYDROCHLORIDE 10 MG: 5 TABLET, FILM COATED ORAL at 20:55

## 2019-01-01 RX ADMIN — MONTELUKAST SODIUM 10 MG: 10 TABLET, FILM COATED ORAL at 20:17

## 2019-01-01 RX ADMIN — DILTIAZEM HYDROCHLORIDE 120 MG: 120 CAPSULE, COATED, EXTENDED RELEASE ORAL at 09:02

## 2019-01-01 RX ADMIN — SOTALOL HYDROCHLORIDE 120 MG: 80 TABLET ORAL at 22:02

## 2019-01-01 RX ADMIN — SOTALOL HYDROCHLORIDE 80 MG: 80 TABLET ORAL at 06:06

## 2019-01-01 RX ADMIN — METHYLPREDNISOLONE SODIUM SUCCINATE 40 MG: 40 INJECTION, POWDER, FOR SOLUTION INTRAMUSCULAR; INTRAVENOUS at 00:16

## 2019-01-01 RX ADMIN — MESALAMINE 400 MG: 400 CAPSULE, DELAYED RELEASE ORAL at 20:07

## 2019-01-01 RX ADMIN — APIXABAN 5 MG: 5 TABLET, FILM COATED ORAL at 08:26

## 2019-01-01 RX ADMIN — PRAMIPEXOLE DIHYDROCHLORIDE 2 MG: 1 TABLET ORAL at 13:12

## 2019-01-01 RX ADMIN — MESALAMINE 400 MG: 400 CAPSULE, DELAYED RELEASE ORAL at 22:30

## 2019-01-01 RX ADMIN — Medication 10 ML: at 19:49

## 2019-01-01 RX ADMIN — SERTRALINE HYDROCHLORIDE 100 MG: 50 TABLET ORAL at 09:04

## 2019-01-01 RX ADMIN — PROPOFOL 35 MCG/KG/MIN: 10 INJECTION, EMULSION INTRAVENOUS at 21:35

## 2019-01-01 RX ADMIN — PANTOPRAZOLE SODIUM 40 MG: 40 INJECTION, POWDER, FOR SOLUTION INTRAVENOUS at 08:47

## 2019-01-01 RX ADMIN — DONEPEZIL HYDROCHLORIDE 10 MG: 5 TABLET, FILM COATED ORAL at 20:48

## 2019-01-01 RX ADMIN — PREDNISONE 40 MG: 10 TABLET ORAL at 09:08

## 2019-01-01 RX ADMIN — IPRATROPIUM BROMIDE AND ALBUTEROL SULFATE 1 AMPULE: .5; 3 SOLUTION RESPIRATORY (INHALATION) at 08:40

## 2019-01-01 RX ADMIN — IPRATROPIUM BROMIDE AND ALBUTEROL SULFATE 1 AMPULE: .5; 3 SOLUTION RESPIRATORY (INHALATION) at 15:24

## 2019-01-01 RX ADMIN — APIXABAN 5 MG: 5 TABLET, FILM COATED ORAL at 09:31

## 2019-01-01 RX ADMIN — PROPOFOL 30 MCG/KG/MIN: 10 INJECTION, EMULSION INTRAVENOUS at 16:46

## 2019-01-01 RX ADMIN — CYCLOBENZAPRINE HYDROCHLORIDE 10 MG: 10 TABLET, FILM COATED ORAL at 01:22

## 2019-01-01 RX ADMIN — IPRATROPIUM BROMIDE AND ALBUTEROL SULFATE 1 AMPULE: .5; 3 SOLUTION RESPIRATORY (INHALATION) at 20:25

## 2019-01-01 RX ADMIN — IPRATROPIUM BROMIDE AND ALBUTEROL SULFATE 1 AMPULE: .5; 3 SOLUTION RESPIRATORY (INHALATION) at 07:50

## 2019-01-01 RX ADMIN — IPRATROPIUM BROMIDE AND ALBUTEROL SULFATE 1 AMPULE: .5; 3 SOLUTION RESPIRATORY (INHALATION) at 12:26

## 2019-01-01 RX ADMIN — MESALAMINE 400 MG: 400 CAPSULE, DELAYED RELEASE ORAL at 09:02

## 2019-01-01 RX ADMIN — TAMSULOSIN HYDROCHLORIDE 0.4 MG: 0.4 CAPSULE ORAL at 08:56

## 2019-01-01 RX ADMIN — PRAMIPEXOLE DIHYDROCHLORIDE 1 MG: 1 TABLET ORAL at 01:43

## 2019-01-01 RX ADMIN — IPRATROPIUM BROMIDE AND ALBUTEROL SULFATE 1 AMPULE: .5; 3 SOLUTION RESPIRATORY (INHALATION) at 08:16

## 2019-01-01 RX ADMIN — AZITHROMYCIN 250 MG: 250 TABLET, FILM COATED ORAL at 09:01

## 2019-01-01 RX ADMIN — OXYCODONE AND ACETAMINOPHEN 1 TABLET: 10; 325 TABLET ORAL at 21:29

## 2019-01-01 RX ADMIN — INSULIN LISPRO 2 UNITS: 100 INJECTION, SOLUTION INTRAVENOUS; SUBCUTANEOUS at 19:15

## 2019-01-01 RX ADMIN — PANTOPRAZOLE SODIUM 40 MG: 40 TABLET, DELAYED RELEASE ORAL at 05:46

## 2019-01-01 RX ADMIN — ASPIRIN 81 MG 81 MG: 81 TABLET ORAL at 09:32

## 2019-01-01 RX ADMIN — OXYCODONE HYDROCHLORIDE AND ACETAMINOPHEN 1 TABLET: 10; 325 TABLET ORAL at 23:32

## 2019-01-01 RX ADMIN — OXYCODONE HYDROCHLORIDE AND ACETAMINOPHEN 1 TABLET: 10; 325 TABLET ORAL at 03:21

## 2019-01-01 RX ADMIN — AMLODIPINE BESYLATE 5 MG: 5 TABLET ORAL at 09:25

## 2019-01-01 RX ADMIN — MONTELUKAST SODIUM 10 MG: 10 TABLET, FILM COATED ORAL at 21:29

## 2019-01-01 RX ADMIN — Medication 10 ML: at 09:07

## 2019-01-01 RX ADMIN — Medication 10 ML: at 08:34

## 2019-01-01 RX ADMIN — Medication 10 ML: at 08:58

## 2019-01-01 RX ADMIN — MUPIROCIN: 20 OINTMENT TOPICAL at 09:07

## 2019-01-01 RX ADMIN — TETROFOSMIN 10.8 MILLICURIE: 0.23 INJECTION, POWDER, LYOPHILIZED, FOR SOLUTION INTRAVENOUS at 12:27

## 2019-01-01 RX ADMIN — IPRATROPIUM BROMIDE AND ALBUTEROL SULFATE 1 AMPULE: .5; 3 SOLUTION RESPIRATORY (INHALATION) at 16:46

## 2019-01-01 RX ADMIN — IPRATROPIUM BROMIDE AND ALBUTEROL SULFATE 1 AMPULE: .5; 3 SOLUTION RESPIRATORY (INHALATION) at 15:31

## 2019-01-01 RX ADMIN — FERROUS SULFATE TAB 325 MG (65 MG ELEMENTAL FE) 325 MG: 325 (65 FE) TAB at 09:25

## 2019-01-01 RX ADMIN — METHYLPREDNISOLONE SODIUM SUCCINATE 40 MG: 40 INJECTION, POWDER, FOR SOLUTION INTRAMUSCULAR; INTRAVENOUS at 09:31

## 2019-01-01 RX ADMIN — OXYCODONE HYDROCHLORIDE AND ACETAMINOPHEN 1 TABLET: 5; 325 TABLET ORAL at 06:26

## 2019-01-01 RX ADMIN — DONEPEZIL HYDROCHLORIDE 10 MG: 5 TABLET, FILM COATED ORAL at 20:42

## 2019-01-01 RX ADMIN — IPRATROPIUM BROMIDE AND ALBUTEROL SULFATE 1 AMPULE: .5; 3 SOLUTION RESPIRATORY (INHALATION) at 16:01

## 2019-01-01 RX ADMIN — APIXABAN 5 MG: 5 TABLET, FILM COATED ORAL at 20:55

## 2019-01-01 RX ADMIN — SERTRALINE 100 MG: 50 TABLET, FILM COATED ORAL at 08:29

## 2019-01-01 RX ADMIN — ASPIRIN 81 MG: 81 TABLET ORAL at 08:57

## 2019-01-01 RX ADMIN — APIXABAN 5 MG: 5 TABLET, FILM COATED ORAL at 19:49

## 2019-01-01 RX ADMIN — METHYLPREDNISOLONE SODIUM SUCCINATE 40 MG: 40 INJECTION, POWDER, FOR SOLUTION INTRAMUSCULAR; INTRAVENOUS at 18:05

## 2019-01-01 RX ADMIN — Medication 10 ML: at 09:09

## 2019-01-01 RX ADMIN — OXYCODONE AND ACETAMINOPHEN 1 TABLET: 10; 325 TABLET ORAL at 06:17

## 2019-01-01 RX ADMIN — SOTALOL HYDROCHLORIDE 80 MG: 80 TABLET ORAL at 08:47

## 2019-01-01 RX ADMIN — IPRATROPIUM BROMIDE AND ALBUTEROL SULFATE 1 AMPULE: .5; 3 SOLUTION RESPIRATORY (INHALATION) at 08:23

## 2019-01-01 RX ADMIN — AMLODIPINE BESYLATE 5 MG: 5 TABLET ORAL at 08:29

## 2019-01-01 RX ADMIN — APIXABAN 5 MG: 5 TABLET, FILM COATED ORAL at 21:09

## 2019-01-01 RX ADMIN — IPRATROPIUM BROMIDE AND ALBUTEROL SULFATE 1 AMPULE: .5; 3 SOLUTION RESPIRATORY (INHALATION) at 15:48

## 2019-01-01 RX ADMIN — DONEPEZIL HYDROCHLORIDE 10 MG: 5 TABLET, FILM COATED ORAL at 20:56

## 2019-01-01 RX ADMIN — AMLODIPINE BESYLATE 5 MG: 5 TABLET ORAL at 08:47

## 2019-01-01 RX ADMIN — SERTRALINE HYDROCHLORIDE 100 MG: 50 TABLET ORAL at 11:21

## 2019-01-01 RX ADMIN — Medication 10 ML: at 09:25

## 2019-01-01 RX ADMIN — SOTALOL HYDROCHLORIDE 80 MG: 80 TABLET ORAL at 20:55

## 2019-01-01 RX ADMIN — FINASTERIDE 5 MG: 5 TABLET, FILM COATED ORAL at 09:01

## 2019-01-01 RX ADMIN — FUROSEMIDE 40 MG: 40 TABLET ORAL at 09:24

## 2019-01-01 RX ADMIN — HYDRALAZINE HYDROCHLORIDE 10 MG: 20 INJECTION INTRAMUSCULAR; INTRAVENOUS at 05:36

## 2019-01-01 RX ADMIN — IPRATROPIUM BROMIDE AND ALBUTEROL SULFATE 1 AMPULE: .5; 3 SOLUTION RESPIRATORY (INHALATION) at 16:37

## 2019-01-01 RX ADMIN — OXYCODONE HYDROCHLORIDE AND ACETAMINOPHEN 1 TABLET: 5; 325 TABLET ORAL at 21:14

## 2019-01-01 RX ADMIN — Medication 10 ML: at 20:43

## 2019-01-01 RX ADMIN — AMIODARONE HYDROCHLORIDE 150 MG: 50 INJECTION, SOLUTION INTRAVENOUS at 23:41

## 2019-01-01 RX ADMIN — PANTOPRAZOLE SODIUM 40 MG: 40 TABLET, DELAYED RELEASE ORAL at 06:46

## 2019-01-01 RX ADMIN — NITROGLYCERIN 0.4 MG: 0.4 TABLET, ORALLY DISINTEGRATING SUBLINGUAL at 08:52

## 2019-01-01 RX ADMIN — TAMSULOSIN HYDROCHLORIDE 0.4 MG: 0.4 CAPSULE ORAL at 11:21

## 2019-01-01 RX ADMIN — MUPIROCIN: 20 OINTMENT TOPICAL at 20:17

## 2019-01-01 RX ADMIN — IPRATROPIUM BROMIDE AND ALBUTEROL SULFATE 1 AMPULE: .5; 3 SOLUTION RESPIRATORY (INHALATION) at 19:10

## 2019-01-01 RX ADMIN — CHOLESTYRAMINE 4 G: 4 POWDER, FOR SUSPENSION ORAL at 08:56

## 2019-01-01 RX ADMIN — APIXABAN 5 MG: 5 TABLET, FILM COATED ORAL at 09:08

## 2019-01-01 RX ADMIN — SOTALOL HYDROCHLORIDE 120 MG: 80 TABLET ORAL at 20:37

## 2019-01-01 RX ADMIN — SOTALOL HYDROCHLORIDE 120 MG: 120 TABLET ORAL at 08:47

## 2019-01-01 RX ADMIN — MESALAMINE 400 MG: 400 CAPSULE, DELAYED RELEASE ORAL at 14:09

## 2019-01-01 RX ADMIN — INSULIN LISPRO 2 UNITS: 100 INJECTION, SOLUTION INTRAVENOUS; SUBCUTANEOUS at 17:03

## 2019-01-01 RX ADMIN — TRAMADOL HYDROCHLORIDE 50 MG: 50 TABLET, FILM COATED ORAL at 01:22

## 2019-01-01 RX ADMIN — FENTANYL CITRATE 25 MCG: 50 INJECTION, SOLUTION INTRAMUSCULAR; INTRAVENOUS at 09:41

## 2019-01-01 RX ADMIN — SOTALOL HYDROCHLORIDE 80 MG: 80 TABLET ORAL at 20:48

## 2019-01-01 RX ADMIN — SIMETHICONE CHEW TAB 80 MG 80 MG: 80 TABLET ORAL at 18:32

## 2019-01-01 RX ADMIN — TAMSULOSIN HYDROCHLORIDE 0.4 MG: 0.4 CAPSULE ORAL at 08:58

## 2019-01-01 RX ADMIN — TAMSULOSIN HYDROCHLORIDE 0.4 MG: 0.4 CAPSULE ORAL at 08:33

## 2019-01-01 RX ADMIN — SERTRALINE HYDROCHLORIDE 100 MG: 50 TABLET ORAL at 08:56

## 2019-01-01 RX ADMIN — TAMSULOSIN HYDROCHLORIDE 0.4 MG: 0.4 CAPSULE ORAL at 08:57

## 2019-01-01 RX ADMIN — IPRATROPIUM BROMIDE AND ALBUTEROL SULFATE 1 AMPULE: .5; 3 SOLUTION RESPIRATORY (INHALATION) at 16:23

## 2019-01-01 RX ADMIN — MESALAMINE 400 MG: 400 CAPSULE, DELAYED RELEASE ORAL at 08:47

## 2019-01-01 RX ADMIN — PRAMIPEXOLE DIHYDROCHLORIDE 1 MG: 1 TABLET ORAL at 20:16

## 2019-01-01 RX ADMIN — PANTOPRAZOLE SODIUM 40 MG: 40 INJECTION, POWDER, FOR SOLUTION INTRAVENOUS at 08:26

## 2019-01-01 RX ADMIN — IPRATROPIUM BROMIDE AND ALBUTEROL SULFATE 1 AMPULE: .5; 3 SOLUTION RESPIRATORY (INHALATION) at 22:57

## 2019-01-01 RX ADMIN — METHYLPREDNISOLONE SODIUM SUCCINATE 40 MG: 40 INJECTION, POWDER, FOR SOLUTION INTRAMUSCULAR; INTRAVENOUS at 11:29

## 2019-01-01 RX ADMIN — IPRATROPIUM BROMIDE AND ALBUTEROL SULFATE 1 AMPULE: .5; 3 SOLUTION RESPIRATORY (INHALATION) at 08:38

## 2019-01-01 RX ADMIN — TAMSULOSIN HYDROCHLORIDE 0.4 MG: 0.4 CAPSULE ORAL at 09:30

## 2019-01-01 RX ADMIN — SODIUM CHLORIDE, PRESERVATIVE FREE 10 ML: 5 INJECTION INTRAVENOUS at 08:55

## 2019-01-01 RX ADMIN — PRAMIPEXOLE DIHYDROCHLORIDE 1 MG: 1 TABLET ORAL at 21:30

## 2019-01-01 RX ADMIN — IPRATROPIUM BROMIDE AND ALBUTEROL SULFATE 1 AMPULE: .5; 3 SOLUTION RESPIRATORY (INHALATION) at 12:29

## 2019-01-01 RX ADMIN — APIXABAN 5 MG: 5 TABLET, FILM COATED ORAL at 09:05

## 2019-01-01 RX ADMIN — ALBUTEROL SULFATE 2.5 MG: 2.5 SOLUTION RESPIRATORY (INHALATION) at 23:59

## 2019-01-01 RX ADMIN — SOTALOL HYDROCHLORIDE 80 MG: 80 TABLET ORAL at 20:26

## 2019-01-01 RX ADMIN — INSULIN LISPRO 2 UNITS: 100 INJECTION, SOLUTION INTRAVENOUS; SUBCUTANEOUS at 18:05

## 2019-01-01 RX ADMIN — MORPHINE SULFATE 2 MG: 2 INJECTION, SOLUTION INTRAMUSCULAR; INTRAVENOUS at 14:23

## 2019-01-01 RX ADMIN — APIXABAN 5 MG: 5 TABLET, FILM COATED ORAL at 09:26

## 2019-01-01 RX ADMIN — MIDAZOLAM HYDROCHLORIDE 2 MG: 5 INJECTION INTRAMUSCULAR; INTRAVENOUS at 09:41

## 2019-01-01 RX ADMIN — MESALAMINE 400 MG: 400 CAPSULE, DELAYED RELEASE ORAL at 13:15

## 2019-01-01 RX ADMIN — DONEPEZIL HYDROCHLORIDE 10 MG: 5 TABLET, FILM COATED ORAL at 20:51

## 2019-01-01 RX ADMIN — PROPOFOL 30 MCG/KG/MIN: 10 INJECTION, EMULSION INTRAVENOUS at 02:34

## 2019-01-01 RX ADMIN — IPRATROPIUM BROMIDE AND ALBUTEROL SULFATE 1 AMPULE: .5; 3 SOLUTION RESPIRATORY (INHALATION) at 07:15

## 2019-01-01 RX ADMIN — INSULIN LISPRO 1 UNITS: 100 INJECTION, SOLUTION INTRAVENOUS; SUBCUTANEOUS at 12:03

## 2019-01-01 RX ADMIN — PROCHLORPERAZINE EDISYLATE 10 MG: 5 INJECTION INTRAMUSCULAR; INTRAVENOUS at 19:18

## 2019-01-01 RX ADMIN — INSULIN LISPRO 1 UNITS: 100 INJECTION, SOLUTION INTRAVENOUS; SUBCUTANEOUS at 08:03

## 2019-01-01 RX ADMIN — CYCLOBENZAPRINE HYDROCHLORIDE 10 MG: 10 TABLET, FILM COATED ORAL at 08:47

## 2019-01-01 RX ADMIN — APIXABAN 5 MG: 5 TABLET, FILM COATED ORAL at 08:47

## 2019-01-01 RX ADMIN — OXYCODONE HYDROCHLORIDE AND ACETAMINOPHEN 1 TABLET: 10; 325 TABLET ORAL at 07:44

## 2019-01-01 RX ADMIN — PROPOFOL 50 MCG/KG/MIN: 10 INJECTION, EMULSION INTRAVENOUS at 07:45

## 2019-01-01 RX ADMIN — PANTOPRAZOLE SODIUM 40 MG: 40 TABLET, DELAYED RELEASE ORAL at 06:26

## 2019-01-01 RX ADMIN — PRAMIPEXOLE DIHYDROCHLORIDE 1 MG: 1 TABLET ORAL at 22:32

## 2019-01-01 RX ADMIN — SOTALOL HYDROCHLORIDE 120 MG: 80 TABLET ORAL at 23:32

## 2019-01-01 RX ADMIN — IPRATROPIUM BROMIDE AND ALBUTEROL SULFATE 1 AMPULE: .5; 3 SOLUTION RESPIRATORY (INHALATION) at 08:35

## 2019-01-01 RX ADMIN — FINASTERIDE 5 MG: 5 TABLET, FILM COATED ORAL at 08:34

## 2019-01-01 RX ADMIN — DONEPEZIL HYDROCHLORIDE 10 MG: 5 TABLET, FILM COATED ORAL at 22:31

## 2019-01-01 RX ADMIN — INSULIN LISPRO 1 UNITS: 100 INJECTION, SOLUTION INTRAVENOUS; SUBCUTANEOUS at 15:22

## 2019-01-01 RX ADMIN — IOPAMIDOL 75 ML: 755 INJECTION, SOLUTION INTRAVENOUS at 21:46

## 2019-01-01 RX ADMIN — APIXABAN 5 MG: 5 TABLET, FILM COATED ORAL at 20:51

## 2019-01-01 RX ADMIN — CEFTRIAXONE SODIUM 1 G: 1 INJECTION, POWDER, FOR SOLUTION INTRAMUSCULAR; INTRAVENOUS at 17:52

## 2019-01-01 RX ADMIN — Medication 10 ML: at 22:04

## 2019-01-01 RX ADMIN — PREDNISONE 40 MG: 20 TABLET ORAL at 08:57

## 2019-01-01 RX ADMIN — IPRATROPIUM BROMIDE AND ALBUTEROL SULFATE 3 ML: .5; 3 SOLUTION RESPIRATORY (INHALATION) at 07:57

## 2019-01-01 RX ADMIN — MIDAZOLAM HYDROCHLORIDE 2 MG: 5 INJECTION INTRAMUSCULAR; INTRAVENOUS at 10:08

## 2019-01-01 RX ADMIN — METHYLPREDNISOLONE SODIUM SUCCINATE 40 MG: 40 INJECTION, POWDER, FOR SOLUTION INTRAMUSCULAR; INTRAVENOUS at 22:32

## 2019-01-01 RX ADMIN — CEFTRIAXONE SODIUM 1 G: 1 INJECTION, POWDER, FOR SOLUTION INTRAMUSCULAR; INTRAVENOUS at 16:46

## 2019-01-01 RX ADMIN — IPRATROPIUM BROMIDE AND ALBUTEROL SULFATE 1 AMPULE: .5; 3 SOLUTION RESPIRATORY (INHALATION) at 16:10

## 2019-01-01 RX ADMIN — INSULIN LISPRO 1 UNITS: 100 INJECTION, SOLUTION INTRAVENOUS; SUBCUTANEOUS at 20:32

## 2019-01-01 RX ADMIN — DONEPEZIL HYDROCHLORIDE 10 MG: 5 TABLET, FILM COATED ORAL at 20:37

## 2019-01-01 RX ADMIN — SERTRALINE HYDROCHLORIDE 50 MG: 50 TABLET ORAL at 09:01

## 2019-01-01 RX ADMIN — CYCLOBENZAPRINE HYDROCHLORIDE 10 MG: 10 TABLET, FILM COATED ORAL at 15:45

## 2019-01-01 RX ADMIN — MESALAMINE 400 MG: 400 CAPSULE, DELAYED RELEASE ORAL at 20:50

## 2019-01-01 RX ADMIN — IPRATROPIUM BROMIDE AND ALBUTEROL SULFATE 1 AMPULE: .5; 3 SOLUTION RESPIRATORY (INHALATION) at 15:47

## 2019-01-01 RX ADMIN — OXYCODONE HYDROCHLORIDE AND ACETAMINOPHEN 1 TABLET: 10; 325 TABLET ORAL at 21:01

## 2019-01-01 RX ADMIN — MAGNESIUM SULFATE HEPTAHYDRATE 2 G: 40 INJECTION, SOLUTION INTRAVENOUS at 13:10

## 2019-01-01 RX ADMIN — MELATONIN TAB 5 MG 5 MG: 5 TAB at 00:46

## 2019-01-01 RX ADMIN — PATIROMER 8.4 G: 8.4 POWDER, FOR SUSPENSION ORAL at 11:27

## 2019-01-01 RX ADMIN — POTASSIUM CHLORIDE 20 MEQ: 20 TABLET, EXTENDED RELEASE ORAL at 08:54

## 2019-01-01 RX ADMIN — METHYLPREDNISOLONE SODIUM SUCCINATE 40 MG: 40 INJECTION, POWDER, FOR SOLUTION INTRAMUSCULAR; INTRAVENOUS at 01:41

## 2019-01-01 RX ADMIN — TAMSULOSIN HYDROCHLORIDE 0.4 MG: 0.4 CAPSULE ORAL at 21:30

## 2019-01-01 RX ADMIN — OXYCODONE HYDROCHLORIDE AND ACETAMINOPHEN 1 TABLET: 10; 325 TABLET ORAL at 20:32

## 2019-01-01 RX ADMIN — ASPIRIN 81 MG 324 MG: 81 TABLET ORAL at 18:02

## 2019-01-01 RX ADMIN — ORPHENADRINE CITRATE 60 MG: 30 INJECTION INTRAMUSCULAR; INTRAVENOUS at 13:11

## 2019-01-01 RX ADMIN — FENTANYL CITRATE 25 MCG: 50 INJECTION, SOLUTION INTRAMUSCULAR; INTRAVENOUS at 10:08

## 2019-01-01 RX ADMIN — OXYCODONE HYDROCHLORIDE AND ACETAMINOPHEN 1 TABLET: 10; 325 TABLET ORAL at 03:09

## 2019-01-01 RX ADMIN — DONEPEZIL HYDROCHLORIDE 10 MG: 5 TABLET, FILM COATED ORAL at 21:30

## 2019-01-01 RX ADMIN — METOPROLOL TARTRATE 5 MG: 5 INJECTION INTRAVENOUS at 03:01

## 2019-01-01 RX ADMIN — PREDNISONE 40 MG: 20 TABLET ORAL at 07:52

## 2019-01-01 RX ADMIN — DIGOXIN 500 MCG: 250 INJECTION, SOLUTION INTRAMUSCULAR; INTRAVENOUS; PARENTERAL at 22:50

## 2019-01-01 RX ADMIN — IPRATROPIUM BROMIDE AND ALBUTEROL SULFATE 1 AMPULE: .5; 3 SOLUTION RESPIRATORY (INHALATION) at 08:18

## 2019-01-01 RX ADMIN — OXYCODONE HYDROCHLORIDE AND ACETAMINOPHEN 1 TABLET: 10; 325 TABLET ORAL at 11:21

## 2019-01-01 RX ADMIN — ALBUTEROL SULFATE 2.5 MG: 2.5 SOLUTION RESPIRATORY (INHALATION) at 08:12

## 2019-01-01 RX ADMIN — MONTELUKAST SODIUM 10 MG: 10 TABLET, FILM COATED ORAL at 20:56

## 2019-01-01 RX ADMIN — MONTELUKAST SODIUM 10 MG: 10 TABLET, FILM COATED ORAL at 20:42

## 2019-01-01 RX ADMIN — DILTIAZEM HYDROCHLORIDE 30 MG: 60 TABLET, FILM COATED ORAL at 12:35

## 2019-01-01 RX ADMIN — SOTALOL HYDROCHLORIDE 120 MG: 80 TABLET ORAL at 09:31

## 2019-01-01 RX ADMIN — PANTOPRAZOLE SODIUM 40 MG: 40 TABLET, DELAYED RELEASE ORAL at 06:40

## 2019-01-01 RX ADMIN — INSULIN LISPRO 1 UNITS: 100 INJECTION, SOLUTION INTRAVENOUS; SUBCUTANEOUS at 09:05

## 2019-01-01 RX ADMIN — MONTELUKAST SODIUM 10 MG: 10 TABLET, FILM COATED ORAL at 01:43

## 2019-01-01 RX ADMIN — AMLODIPINE BESYLATE 5 MG: 5 TABLET ORAL at 08:57

## 2019-01-01 RX ADMIN — DONEPEZIL HYDROCHLORIDE 10 MG: 5 TABLET, FILM COATED ORAL at 23:32

## 2019-01-01 RX ADMIN — CHOLESTYRAMINE 4 G: 4 POWDER, FOR SUSPENSION ORAL at 09:25

## 2019-01-01 RX ADMIN — MESALAMINE 400 MG: 400 CAPSULE, DELAYED RELEASE ORAL at 14:20

## 2019-01-01 RX ADMIN — Medication 10 ML: at 08:49

## 2019-01-01 RX ADMIN — Medication 10 ML: at 21:11

## 2019-01-01 RX ADMIN — CARBOXYMETHYLCELLULOSE SODIUM 1 DROP: 10 GEL OPHTHALMIC at 20:17

## 2019-01-01 RX ADMIN — DILTIAZEM HYDROCHLORIDE 30 MG: 60 TABLET, FILM COATED ORAL at 18:32

## 2019-01-01 RX ADMIN — METHYLPREDNISOLONE SODIUM SUCCINATE 80 MG: 125 INJECTION, POWDER, FOR SOLUTION INTRAMUSCULAR; INTRAVENOUS at 16:22

## 2019-01-01 RX ADMIN — DILTIAZEM HYDROCHLORIDE 30 MG: 60 TABLET, FILM COATED ORAL at 00:15

## 2019-01-01 RX ADMIN — CHOLESTYRAMINE 4 G: 4 POWDER, FOR SUSPENSION ORAL at 08:46

## 2019-01-01 RX ADMIN — PATIROMER 8.4 G: 8.4 POWDER, FOR SUSPENSION ORAL at 13:08

## 2019-01-01 RX ADMIN — MESALAMINE 400 MG: 400 CAPSULE, DELAYED RELEASE ORAL at 09:00

## 2019-01-01 RX ADMIN — INSULIN LISPRO 1 UNITS: 100 INJECTION, SOLUTION INTRAVENOUS; SUBCUTANEOUS at 23:08

## 2019-01-01 RX ADMIN — IPRATROPIUM BROMIDE AND ALBUTEROL SULFATE 3 ML: .5; 3 SOLUTION RESPIRATORY (INHALATION) at 19:55

## 2019-01-01 RX ADMIN — OXYCODONE AND ACETAMINOPHEN 2 TABLET: 5; 325 TABLET ORAL at 00:28

## 2019-01-01 RX ADMIN — AMLODIPINE BESYLATE 5 MG: 5 TABLET ORAL at 08:48

## 2019-01-01 RX ADMIN — IPRATROPIUM BROMIDE AND ALBUTEROL SULFATE 1 AMPULE: .5; 3 SOLUTION RESPIRATORY (INHALATION) at 16:03

## 2019-01-01 RX ADMIN — SOTALOL HYDROCHLORIDE 80 MG: 80 TABLET ORAL at 14:04

## 2019-01-01 RX ADMIN — PRAMIPEXOLE DIHYDROCHLORIDE 1 MG: 1 TABLET ORAL at 20:56

## 2019-01-01 RX ADMIN — PANTOPRAZOLE SODIUM 40 MG: 40 TABLET, DELAYED RELEASE ORAL at 06:36

## 2019-01-01 RX ADMIN — IPRATROPIUM BROMIDE AND ALBUTEROL SULFATE 1 AMPULE: .5; 3 SOLUTION RESPIRATORY (INHALATION) at 12:23

## 2019-01-01 RX ADMIN — APIXABAN 5 MG: 5 TABLET, FILM COATED ORAL at 08:33

## 2019-01-01 RX ADMIN — APIXABAN 10 MG: 5 TABLET, FILM COATED ORAL at 14:05

## 2019-01-01 RX ADMIN — FUROSEMIDE 40 MG: 40 TABLET ORAL at 08:57

## 2019-01-01 RX ADMIN — CHOLESTYRAMINE 4 G: 4 POWDER, FOR SUSPENSION ORAL at 08:51

## 2019-01-01 RX ADMIN — IPRATROPIUM BROMIDE AND ALBUTEROL SULFATE 1 AMPULE: .5; 3 SOLUTION RESPIRATORY (INHALATION) at 04:05

## 2019-01-01 RX ADMIN — ISODIUM CHLORIDE 3 ML: 0.03 SOLUTION RESPIRATORY (INHALATION) at 08:23

## 2019-01-01 RX ADMIN — APIXABAN 5 MG: 5 TABLET, FILM COATED ORAL at 20:42

## 2019-01-01 RX ADMIN — IPRATROPIUM BROMIDE AND ALBUTEROL SULFATE 1 AMPULE: .5; 3 SOLUTION RESPIRATORY (INHALATION) at 12:42

## 2019-01-01 RX ADMIN — SOTALOL HYDROCHLORIDE 120 MG: 80 TABLET ORAL at 20:32

## 2019-01-01 RX ADMIN — SOTALOL HYDROCHLORIDE 120 MG: 80 TABLET ORAL at 08:48

## 2019-01-01 RX ADMIN — DILTIAZEM HYDROCHLORIDE 30 MG: 60 TABLET, FILM COATED ORAL at 00:29

## 2019-01-01 RX ADMIN — SERTRALINE HYDROCHLORIDE 100 MG: 50 TABLET ORAL at 09:31

## 2019-01-01 RX ADMIN — SODIUM CHLORIDE 1000 ML: 9 INJECTION, SOLUTION INTRAVENOUS at 08:15

## 2019-01-01 RX ADMIN — Medication 10 ML: at 09:44

## 2019-01-01 RX ADMIN — PRAMIPEXOLE DIHYDROCHLORIDE 1 MG: 0.25 TABLET ORAL at 21:09

## 2019-01-01 RX ADMIN — APIXABAN 5 MG: 5 TABLET, FILM COATED ORAL at 20:56

## 2019-01-01 RX ADMIN — FERROUS SULFATE TAB 325 MG (65 MG ELEMENTAL FE) 325 MG: 325 (65 FE) TAB at 08:48

## 2019-01-01 RX ADMIN — OXYCODONE AND ACETAMINOPHEN 2 TABLET: 5; 325 TABLET ORAL at 20:05

## 2019-01-01 RX ADMIN — METHYLPREDNISOLONE SODIUM SUCCINATE 40 MG: 40 INJECTION, POWDER, FOR SOLUTION INTRAMUSCULAR; INTRAVENOUS at 09:24

## 2019-01-01 RX ADMIN — SODIUM CHLORIDE, PRESERVATIVE FREE 10 ML: 5 INJECTION INTRAVENOUS at 20:51

## 2019-01-01 RX ADMIN — APIXABAN 5 MG: 5 TABLET, FILM COATED ORAL at 08:29

## 2019-01-01 RX ADMIN — SODIUM CHLORIDE 500 ML: 9 INJECTION, SOLUTION INTRAVENOUS at 17:53

## 2019-01-01 RX ADMIN — DONEPEZIL HYDROCHLORIDE 10 MG: 5 TABLET, FILM COATED ORAL at 20:16

## 2019-01-01 RX ADMIN — MESALAMINE 400 MG: 400 CAPSULE, DELAYED RELEASE ORAL at 20:05

## 2019-01-01 RX ADMIN — INSULIN LISPRO 2 UNITS: 100 INJECTION, SOLUTION INTRAVENOUS; SUBCUTANEOUS at 16:46

## 2019-01-01 RX ADMIN — MESALAMINE 400 MG: 400 CAPSULE, DELAYED RELEASE ORAL at 21:00

## 2019-01-01 RX ADMIN — INSULIN LISPRO 1 UNITS: 100 INJECTION, SOLUTION INTRAVENOUS; SUBCUTANEOUS at 22:33

## 2019-01-01 RX ADMIN — SERTRALINE 100 MG: 50 TABLET, FILM COATED ORAL at 08:56

## 2019-01-01 RX ADMIN — MUPIROCIN: 20 OINTMENT TOPICAL at 08:48

## 2019-01-01 RX ADMIN — SERTRALINE HYDROCHLORIDE 100 MG: 50 TABLET ORAL at 07:51

## 2019-01-01 RX ADMIN — POTASSIUM CHLORIDE 20 MEQ: 20 TABLET, EXTENDED RELEASE ORAL at 13:10

## 2019-01-01 RX ADMIN — APIXABAN 5 MG: 5 TABLET, FILM COATED ORAL at 08:58

## 2019-01-01 RX ADMIN — MIDAZOLAM HYDROCHLORIDE 2 MG: 5 INJECTION INTRAMUSCULAR; INTRAVENOUS at 09:29

## 2019-01-01 RX ADMIN — APIXABAN 5 MG: 5 TABLET, FILM COATED ORAL at 08:48

## 2019-01-01 RX ADMIN — IPRATROPIUM BROMIDE AND ALBUTEROL SULFATE 1 AMPULE: .5; 3 SOLUTION RESPIRATORY (INHALATION) at 12:14

## 2019-01-01 RX ADMIN — DILTIAZEM HYDROCHLORIDE 30 MG: 60 TABLET, FILM COATED ORAL at 12:56

## 2019-01-01 RX ADMIN — Medication 10 ML: at 20:27

## 2019-01-01 RX ADMIN — DILTIAZEM HYDROCHLORIDE 10 MG: 5 INJECTION INTRAVENOUS at 20:48

## 2019-01-01 RX ADMIN — SERTRALINE HYDROCHLORIDE 100 MG: 50 TABLET ORAL at 08:11

## 2019-01-01 RX ADMIN — DILTIAZEM HYDROCHLORIDE 30 MG: 60 TABLET, FILM COATED ORAL at 17:45

## 2019-01-01 RX ADMIN — GUAIFENESIN 600 MG: 600 TABLET, EXTENDED RELEASE ORAL at 20:16

## 2019-01-01 RX ADMIN — OXYCODONE AND ACETAMINOPHEN 2 TABLET: 5; 325 TABLET ORAL at 10:25

## 2019-01-01 RX ADMIN — FENTANYL CITRATE 50 MCG/HR: 50 INJECTION, SOLUTION INTRAMUSCULAR; INTRAVENOUS at 10:28

## 2019-01-01 RX ADMIN — FERROUS SULFATE TAB 325 MG (65 MG ELEMENTAL FE) 325 MG: 325 (65 FE) TAB at 08:11

## 2019-01-01 RX ADMIN — Medication 10 ML: at 20:32

## 2019-01-01 RX ADMIN — IPRATROPIUM BROMIDE AND ALBUTEROL SULFATE 1 AMPULE: .5; 3 SOLUTION RESPIRATORY (INHALATION) at 21:04

## 2019-01-01 RX ADMIN — PERFLUTREN 2.2 MG: 6.52 INJECTION, SUSPENSION INTRAVENOUS at 14:57

## 2019-01-01 RX ADMIN — SOTALOL HYDROCHLORIDE 120 MG: 120 TABLET ORAL at 20:56

## 2019-01-01 RX ADMIN — Medication 10 ML: at 23:54

## 2019-01-01 RX ADMIN — IPRATROPIUM BROMIDE AND ALBUTEROL SULFATE 1 AMPULE: .5; 3 SOLUTION RESPIRATORY (INHALATION) at 08:54

## 2019-01-01 RX ADMIN — AMIODARONE HYDROCHLORIDE 0.5 MG/MIN: 50 INJECTION, SOLUTION INTRAVENOUS at 05:45

## 2019-01-01 RX ADMIN — TAMSULOSIN HYDROCHLORIDE 0.4 MG: 0.4 CAPSULE ORAL at 09:08

## 2019-01-01 RX ADMIN — OXYCODONE HYDROCHLORIDE AND ACETAMINOPHEN 1 TABLET: 10; 325 TABLET ORAL at 14:05

## 2019-01-01 RX ADMIN — FUROSEMIDE 40 MG: 40 TABLET ORAL at 08:48

## 2019-01-01 RX ADMIN — APIXABAN 5 MG: 5 TABLET, FILM COATED ORAL at 08:57

## 2019-01-01 RX ADMIN — IBUPROFEN 400 MG: 400 TABLET ORAL at 03:09

## 2019-01-01 RX ADMIN — HEPARIN SODIUM 5000 UNITS: 5000 INJECTION INTRAVENOUS; SUBCUTANEOUS at 06:00

## 2019-01-01 RX ADMIN — TRAMADOL HYDROCHLORIDE 50 MG: 50 TABLET, FILM COATED ORAL at 17:18

## 2019-01-01 RX ADMIN — SERTRALINE HYDROCHLORIDE 100 MG: 50 TABLET ORAL at 16:58

## 2019-01-01 RX ADMIN — ANTACID TABLETS 500 MG: 500 TABLET, CHEWABLE ORAL at 13:52

## 2019-01-01 RX ADMIN — INSULIN LISPRO 2 UNITS: 100 INJECTION, SOLUTION INTRAVENOUS; SUBCUTANEOUS at 17:09

## 2019-01-01 RX ADMIN — IPRATROPIUM BROMIDE AND ALBUTEROL SULFATE 1 AMPULE: .5; 3 SOLUTION RESPIRATORY (INHALATION) at 18:36

## 2019-01-01 RX ADMIN — METHYLPREDNISOLONE SODIUM SUCCINATE 40 MG: 40 INJECTION, POWDER, FOR SOLUTION INTRAMUSCULAR; INTRAVENOUS at 18:00

## 2019-01-01 RX ADMIN — OXYCODONE AND ACETAMINOPHEN 1 TABLET: 10; 325 TABLET ORAL at 19:10

## 2019-01-01 RX ADMIN — INSULIN LISPRO 2 UNITS: 100 INJECTION, SOLUTION INTRAVENOUS; SUBCUTANEOUS at 12:46

## 2019-01-01 RX ADMIN — APIXABAN 5 MG: 5 TABLET, FILM COATED ORAL at 09:02

## 2019-01-01 RX ADMIN — APIXABAN 5 MG: 5 TABLET, FILM COATED ORAL at 20:15

## 2019-01-01 RX ADMIN — OXYCODONE HYDROCHLORIDE AND ACETAMINOPHEN 1 TABLET: 10; 325 TABLET ORAL at 09:31

## 2019-01-01 RX ADMIN — PRAMIPEXOLE DIHYDROCHLORIDE 2 MG: 1 TABLET ORAL at 07:52

## 2019-01-01 RX ADMIN — APIXABAN 5 MG: 5 TABLET, FILM COATED ORAL at 20:48

## 2019-01-01 RX ADMIN — METHYLPREDNISOLONE SODIUM SUCCINATE 40 MG: 40 INJECTION, POWDER, FOR SOLUTION INTRAMUSCULAR; INTRAVENOUS at 08:34

## 2019-01-01 RX ADMIN — PROPOFOL 45 MCG/KG/MIN: 10 INJECTION, EMULSION INTRAVENOUS at 06:13

## 2019-01-01 RX ADMIN — Medication 10 ML: at 09:02

## 2019-01-01 RX ADMIN — FERROUS SULFATE TAB 325 MG (65 MG ELEMENTAL FE) 325 MG: 325 (65 FE) TAB at 08:52

## 2019-01-01 RX ADMIN — FINASTERIDE 5 MG: 5 TABLET, FILM COATED ORAL at 09:30

## 2019-01-01 RX ADMIN — SERTRALINE 100 MG: 50 TABLET, FILM COATED ORAL at 08:48

## 2019-01-01 RX ADMIN — FUROSEMIDE 40 MG: 40 TABLET ORAL at 08:29

## 2019-01-01 RX ADMIN — SODIUM CHLORIDE, PRESERVATIVE FREE 10 ML: 5 INJECTION INTRAVENOUS at 22:04

## 2019-01-01 RX ADMIN — CYCLOBENZAPRINE HYDROCHLORIDE 10 MG: 10 TABLET, FILM COATED ORAL at 23:33

## 2019-01-01 RX ADMIN — FERROUS SULFATE TAB 325 MG (65 MG ELEMENTAL FE) 325 MG: 325 (65 FE) TAB at 08:47

## 2019-01-01 RX ADMIN — CEFTRIAXONE SODIUM 1 G: 1 INJECTION, POWDER, FOR SOLUTION INTRAMUSCULAR; INTRAVENOUS at 16:58

## 2019-01-01 RX ADMIN — AMPICILLIN SODIUM AND SULBACTAM SODIUM 1.5 G: 1; .5 INJECTION, POWDER, FOR SOLUTION INTRAMUSCULAR; INTRAVENOUS at 03:58

## 2019-01-01 RX ADMIN — PRAMIPEXOLE DIHYDROCHLORIDE 2 MG: 1 TABLET ORAL at 20:07

## 2019-01-01 RX ADMIN — OXYCODONE HYDROCHLORIDE AND ACETAMINOPHEN 1 TABLET: 10; 325 TABLET ORAL at 19:16

## 2019-01-01 RX ADMIN — PREDNISONE 40 MG: 20 TABLET ORAL at 10:56

## 2019-01-01 RX ADMIN — METHYLPREDNISOLONE SODIUM SUCCINATE 40 MG: 40 INJECTION, POWDER, FOR SOLUTION INTRAMUSCULAR; INTRAVENOUS at 18:47

## 2019-01-01 RX ADMIN — Medication 10 ML: at 08:29

## 2019-01-01 RX ADMIN — MONTELUKAST SODIUM 10 MG: 10 TABLET, FILM COATED ORAL at 20:48

## 2019-01-01 RX ADMIN — APIXABAN 5 MG: 5 TABLET, FILM COATED ORAL at 23:32

## 2019-01-01 RX ADMIN — APIXABAN 5 MG: 5 TABLET, FILM COATED ORAL at 22:02

## 2019-01-01 RX ADMIN — Medication 10 ML: at 21:30

## 2019-01-01 RX ADMIN — FENTANYL CITRATE 50 MCG/HR: 50 INJECTION, SOLUTION INTRAMUSCULAR; INTRAVENOUS at 09:11

## 2019-01-01 RX ADMIN — METHYLPREDNISOLONE SODIUM SUCCINATE 40 MG: 40 INJECTION, POWDER, FOR SOLUTION INTRAMUSCULAR; INTRAVENOUS at 20:17

## 2019-01-01 RX ADMIN — MESALAMINE 400 MG: 400 CAPSULE, DELAYED RELEASE ORAL at 14:04

## 2019-01-01 RX ADMIN — PRAMIPEXOLE DIHYDROCHLORIDE 1 MG: 0.25 TABLET ORAL at 23:32

## 2019-01-01 RX ADMIN — GUAIFENESIN 600 MG: 600 TABLET, EXTENDED RELEASE ORAL at 08:34

## 2019-01-01 RX ADMIN — MESALAMINE 400 MG: 400 CAPSULE, DELAYED RELEASE ORAL at 20:27

## 2019-01-01 RX ADMIN — METFORMIN HYDROCHLORIDE 500 MG: 500 TABLET ORAL at 17:18

## 2019-01-01 RX ADMIN — IPRATROPIUM BROMIDE AND ALBUTEROL SULFATE 1 AMPULE: .5; 3 SOLUTION RESPIRATORY (INHALATION) at 19:34

## 2019-01-01 RX ADMIN — OXYCODONE AND ACETAMINOPHEN 1 TABLET: 10; 325 TABLET ORAL at 13:56

## 2019-01-01 RX ADMIN — MESALAMINE 400 MG: 400 CAPSULE, DELAYED RELEASE ORAL at 20:43

## 2019-01-01 RX ADMIN — SERTRALINE HYDROCHLORIDE 100 MG: 50 TABLET ORAL at 08:26

## 2019-01-01 RX ADMIN — MUPIROCIN: 20 OINTMENT TOPICAL at 08:03

## 2019-01-01 RX ADMIN — INSULIN LISPRO 1 UNITS: 100 INJECTION, SOLUTION INTRAVENOUS; SUBCUTANEOUS at 08:57

## 2019-01-01 RX ADMIN — FERROUS SULFATE TAB 325 MG (65 MG ELEMENTAL FE) 325 MG: 325 (65 FE) TAB at 08:29

## 2019-01-01 RX ADMIN — DONEPEZIL HYDROCHLORIDE 10 MG: 5 TABLET, FILM COATED ORAL at 20:32

## 2019-01-01 RX ADMIN — APIXABAN 5 MG: 5 TABLET, FILM COATED ORAL at 08:51

## 2019-01-01 RX ADMIN — INSULIN LISPRO 1 UNITS: 100 INJECTION, SOLUTION INTRAVENOUS; SUBCUTANEOUS at 18:27

## 2019-01-01 RX ADMIN — PREDNISONE 20 MG: 20 TABLET ORAL at 08:52

## 2019-01-01 RX ADMIN — PREDNISONE 20 MG: 20 TABLET ORAL at 08:56

## 2019-01-01 RX ADMIN — Medication 10 ML: at 09:31

## 2019-01-01 RX ADMIN — SOTALOL HYDROCHLORIDE 120 MG: 80 TABLET ORAL at 11:25

## 2019-01-01 RX ADMIN — ATORVASTATIN CALCIUM 40 MG: 40 TABLET, FILM COATED ORAL at 20:51

## 2019-01-01 RX ADMIN — IPRATROPIUM BROMIDE AND ALBUTEROL SULFATE 1 AMPULE: .5; 3 SOLUTION RESPIRATORY (INHALATION) at 16:44

## 2019-01-01 RX ADMIN — DILTIAZEM HYDROCHLORIDE 120 MG: 120 CAPSULE, COATED, EXTENDED RELEASE ORAL at 08:33

## 2019-01-01 RX ADMIN — ROCURONIUM BROMIDE 50 MG: 10 INJECTION, SOLUTION INTRAVENOUS at 21:25

## 2019-01-01 RX ADMIN — POTASSIUM CHLORIDE 20 MEQ: 20 TABLET, EXTENDED RELEASE ORAL at 11:22

## 2019-01-01 RX ADMIN — MESALAMINE 400 MG: 400 CAPSULE, DELAYED RELEASE ORAL at 19:49

## 2019-01-01 RX ADMIN — MESALAMINE 400 MG: 400 CAPSULE, DELAYED RELEASE ORAL at 08:57

## 2019-01-01 RX ADMIN — AMPICILLIN SODIUM AND SULBACTAM SODIUM 1.5 G: 1; .5 INJECTION, POWDER, FOR SOLUTION INTRAMUSCULAR; INTRAVENOUS at 15:19

## 2019-01-01 RX ADMIN — FUROSEMIDE 20 MG: 10 INJECTION, SOLUTION INTRAVENOUS at 13:03

## 2019-01-01 RX ADMIN — SERTRALINE HYDROCHLORIDE 100 MG: 50 TABLET ORAL at 09:08

## 2019-01-01 RX ADMIN — APIXABAN 5 MG: 5 TABLET, FILM COATED ORAL at 20:26

## 2019-01-01 RX ADMIN — INSULIN LISPRO 2 UNITS: 100 INJECTION, SOLUTION INTRAVENOUS; SUBCUTANEOUS at 17:48

## 2019-01-01 RX ADMIN — SERTRALINE HYDROCHLORIDE 100 MG: 50 TABLET ORAL at 08:47

## 2019-01-01 RX ADMIN — FENTANYL CITRATE 75 MCG/HR: 50 INJECTION, SOLUTION INTRAMUSCULAR; INTRAVENOUS at 20:22

## 2019-01-01 RX ADMIN — GUAIFENESIN 600 MG: 600 TABLET, EXTENDED RELEASE ORAL at 09:01

## 2019-01-01 RX ADMIN — IPRATROPIUM BROMIDE AND ALBUTEROL SULFATE 1 AMPULE: .5; 3 SOLUTION RESPIRATORY (INHALATION) at 20:38

## 2019-01-01 RX ADMIN — TAMSULOSIN HYDROCHLORIDE 0.4 MG: 0.4 CAPSULE ORAL at 09:34

## 2019-01-01 RX ADMIN — CARBOXYMETHYLCELLULOSE SODIUM 1 DROP: 10 GEL OPHTHALMIC at 08:26

## 2019-01-01 RX ADMIN — DONEPEZIL HYDROCHLORIDE 10 MG: 5 TABLET, FILM COATED ORAL at 22:02

## 2019-01-01 RX ADMIN — FERROUS SULFATE TAB 325 MG (65 MG ELEMENTAL FE) 325 MG: 325 (65 FE) TAB at 09:02

## 2019-01-01 RX ADMIN — MONTELUKAST SODIUM 10 MG: 10 TABLET ORAL at 22:02

## 2019-01-01 RX ADMIN — TAMSULOSIN HYDROCHLORIDE 0.4 MG: 0.4 CAPSULE ORAL at 08:48

## 2019-01-01 RX ADMIN — OXYCODONE AND ACETAMINOPHEN 2 TABLET: 5; 325 TABLET ORAL at 18:31

## 2019-01-01 RX ADMIN — FERROUS SULFATE TAB 325 MG (65 MG ELEMENTAL FE) 325 MG: 325 (65 FE) TAB at 08:57

## 2019-01-01 RX ADMIN — DILTIAZEM HYDROCHLORIDE 30 MG: 60 TABLET, FILM COATED ORAL at 05:46

## 2019-01-01 RX ADMIN — Medication 10 ML: at 20:57

## 2019-01-01 RX ADMIN — DILTIAZEM HYDROCHLORIDE 30 MG: 60 TABLET, FILM COATED ORAL at 23:25

## 2019-01-01 RX ADMIN — METHYLPREDNISOLONE SODIUM SUCCINATE 125 MG: 125 INJECTION, POWDER, FOR SOLUTION INTRAMUSCULAR; INTRAVENOUS at 18:02

## 2019-01-01 RX ADMIN — MIDAZOLAM HYDROCHLORIDE 2 MG/HR: 5 INJECTION, SOLUTION INTRAMUSCULAR; INTRAVENOUS at 01:39

## 2019-01-01 RX ADMIN — PRAMIPEXOLE DIHYDROCHLORIDE 1 MG: 1 TABLET ORAL at 20:55

## 2019-01-01 RX ADMIN — METFORMIN HYDROCHLORIDE 500 MG: 500 TABLET ORAL at 09:26

## 2019-01-01 RX ADMIN — PRAMIPEXOLE DIHYDROCHLORIDE 1 MG: 1 TABLET ORAL at 22:02

## 2019-01-01 RX ADMIN — PROPOFOL 35 MCG/KG/MIN: 10 INJECTION, EMULSION INTRAVENOUS at 01:41

## 2019-01-01 RX ADMIN — FERROUS SULFATE TAB 325 MG (65 MG ELEMENTAL FE) 325 MG: 325 (65 FE) TAB at 08:34

## 2019-01-01 RX ADMIN — AZITHROMYCIN DIHYDRATE 500 MG: 500 INJECTION, POWDER, LYOPHILIZED, FOR SOLUTION INTRAVENOUS at 18:29

## 2019-01-01 RX ADMIN — PANTOPRAZOLE SODIUM 40 MG: 40 TABLET, DELAYED RELEASE ORAL at 08:57

## 2019-01-01 RX ADMIN — OXYCODONE HYDROCHLORIDE AND ACETAMINOPHEN 1 TABLET: 10; 325 TABLET ORAL at 20:42

## 2019-01-01 RX ADMIN — IPRATROPIUM BROMIDE AND ALBUTEROL SULFATE 1 AMPULE: .5; 3 SOLUTION RESPIRATORY (INHALATION) at 19:51

## 2019-01-01 RX ADMIN — OXYCODONE HYDROCHLORIDE AND ACETAMINOPHEN 1 TABLET: 10; 325 TABLET ORAL at 08:51

## 2019-01-01 RX ADMIN — MIDAZOLAM HYDROCHLORIDE 2 MG: 1 INJECTION INTRAMUSCULAR; INTRAVENOUS at 12:55

## 2019-01-01 RX ADMIN — PANTOPRAZOLE SODIUM 40 MG: 40 TABLET, DELAYED RELEASE ORAL at 06:12

## 2019-01-01 RX ADMIN — DILTIAZEM HYDROCHLORIDE 30 MG: 60 TABLET, FILM COATED ORAL at 11:22

## 2019-01-01 RX ADMIN — DILTIAZEM HYDROCHLORIDE 120 MG: 120 CAPSULE, COATED, EXTENDED RELEASE ORAL at 08:11

## 2019-01-01 RX ADMIN — SOTALOL HYDROCHLORIDE 80 MG: 80 TABLET ORAL at 20:07

## 2019-01-01 RX ADMIN — AMLODIPINE BESYLATE 5 MG: 5 TABLET ORAL at 09:32

## 2019-01-01 RX ADMIN — APIXABAN 5 MG: 5 TABLET, FILM COATED ORAL at 21:30

## 2019-01-01 RX ADMIN — SOTALOL HYDROCHLORIDE 80 MG: 80 TABLET ORAL at 20:05

## 2019-01-01 RX ADMIN — MESALAMINE 400 MG: 400 CAPSULE, DELAYED RELEASE ORAL at 14:26

## 2019-01-01 RX ADMIN — IPRATROPIUM BROMIDE AND ALBUTEROL SULFATE 1 AMPULE: .5; 3 SOLUTION RESPIRATORY (INHALATION) at 11:27

## 2019-01-01 RX ADMIN — THEOPHYLLINE 300 MG: 300 TABLET, EXTENDED RELEASE ORAL at 14:25

## 2019-01-01 RX ADMIN — DONEPEZIL HYDROCHLORIDE 10 MG: 5 TABLET, FILM COATED ORAL at 21:09

## 2019-01-01 RX ADMIN — IPRATROPIUM BROMIDE AND ALBUTEROL SULFATE 1 AMPULE: .5; 3 SOLUTION RESPIRATORY (INHALATION) at 16:06

## 2019-01-01 RX ADMIN — OXYCODONE HYDROCHLORIDE AND ACETAMINOPHEN 1 TABLET: 10; 325 TABLET ORAL at 12:55

## 2019-01-01 RX ADMIN — IPRATROPIUM BROMIDE AND ALBUTEROL SULFATE 1 AMPULE: .5; 3 SOLUTION RESPIRATORY (INHALATION) at 08:01

## 2019-01-01 RX ADMIN — IPRATROPIUM BROMIDE AND ALBUTEROL SULFATE 3 ML: .5; 3 SOLUTION RESPIRATORY (INHALATION) at 19:42

## 2019-01-01 RX ADMIN — APIXABAN 5 MG: 5 TABLET, FILM COATED ORAL at 20:17

## 2019-01-01 RX ADMIN — SOTALOL HYDROCHLORIDE 80 MG: 80 TABLET ORAL at 20:17

## 2019-01-01 RX ADMIN — METHYLPREDNISOLONE SODIUM SUCCINATE 40 MG: 40 INJECTION, POWDER, FOR SOLUTION INTRAMUSCULAR; INTRAVENOUS at 09:43

## 2019-01-01 RX ADMIN — CYCLOBENZAPRINE HYDROCHLORIDE 10 MG: 10 TABLET, FILM COATED ORAL at 18:31

## 2019-01-01 RX ADMIN — BENZONATATE 100 MG: 100 CAPSULE ORAL at 00:46

## 2019-01-01 RX ADMIN — TAMSULOSIN HYDROCHLORIDE 0.4 MG: 0.4 CAPSULE ORAL at 09:24

## 2019-01-01 RX ADMIN — IPRATROPIUM BROMIDE AND ALBUTEROL SULFATE 1 AMPULE: .5; 3 SOLUTION RESPIRATORY (INHALATION) at 11:43

## 2019-01-01 RX ADMIN — SODIUM CHLORIDE, PRESERVATIVE FREE 10 ML: 5 INJECTION INTRAVENOUS at 09:33

## 2019-01-01 RX ADMIN — ATORVASTATIN CALCIUM 40 MG: 40 TABLET, FILM COATED ORAL at 22:02

## 2019-01-01 RX ADMIN — Medication 10 ML: at 10:48

## 2019-01-01 RX ADMIN — CEFTRIAXONE SODIUM 1 G: 1 INJECTION, POWDER, FOR SOLUTION INTRAMUSCULAR; INTRAVENOUS at 17:08

## 2019-01-01 RX ADMIN — IPRATROPIUM BROMIDE AND ALBUTEROL SULFATE 1 AMPULE: .5; 3 SOLUTION RESPIRATORY (INHALATION) at 15:25

## 2019-01-01 RX ADMIN — DILTIAZEM HYDROCHLORIDE 120 MG: 120 CAPSULE, COATED, EXTENDED RELEASE ORAL at 08:58

## 2019-01-01 RX ADMIN — MESALAMINE 400 MG: 400 CAPSULE, DELAYED RELEASE ORAL at 12:35

## 2019-01-01 RX ADMIN — FENTANYL CITRATE 25 MCG: 50 INJECTION, SOLUTION INTRAMUSCULAR; INTRAVENOUS at 13:17

## 2019-01-01 RX ADMIN — AMPICILLIN SODIUM AND SULBACTAM SODIUM 1.5 G: 1; .5 INJECTION, POWDER, FOR SOLUTION INTRAMUSCULAR; INTRAVENOUS at 21:30

## 2019-01-01 RX ADMIN — OXYCODONE HYDROCHLORIDE AND ACETAMINOPHEN 1 TABLET: 10; 325 TABLET ORAL at 09:54

## 2019-01-01 RX ADMIN — Medication 10 ML: at 08:11

## 2019-01-01 RX ADMIN — PRAMIPEXOLE DIHYDROCHLORIDE 2 MG: 1 TABLET ORAL at 20:06

## 2019-01-01 RX ADMIN — APIXABAN 5 MG: 5 TABLET, FILM COATED ORAL at 08:56

## 2019-01-01 RX ADMIN — MESALAMINE 400 MG: 400 CAPSULE, DELAYED RELEASE ORAL at 13:07

## 2019-01-01 RX ADMIN — SOTALOL HYDROCHLORIDE 120 MG: 80 TABLET ORAL at 08:29

## 2019-01-01 RX ADMIN — TAMSULOSIN HYDROCHLORIDE 0.4 MG: 0.4 CAPSULE ORAL at 08:12

## 2019-01-01 RX ADMIN — OXYCODONE HYDROCHLORIDE AND ACETAMINOPHEN 1 TABLET: 10; 325 TABLET ORAL at 08:55

## 2019-01-01 RX ADMIN — OXYCODONE AND ACETAMINOPHEN 2 TABLET: 5; 325 TABLET ORAL at 11:22

## 2019-01-01 RX ADMIN — AZITHROMYCIN 250 MG: 250 TABLET, FILM COATED ORAL at 09:31

## 2019-01-01 RX ADMIN — SOTALOL HYDROCHLORIDE 80 MG: 80 TABLET ORAL at 08:25

## 2019-01-01 RX ADMIN — Medication 10 ML: at 20:38

## 2019-01-01 RX ADMIN — MONTELUKAST SODIUM 10 MG: 10 TABLET ORAL at 20:37

## 2019-01-01 RX ADMIN — IPRATROPIUM BROMIDE AND ALBUTEROL SULFATE 1 AMPULE: .5; 3 SOLUTION RESPIRATORY (INHALATION) at 11:55

## 2019-01-01 RX ADMIN — PRAMIPEXOLE DIHYDROCHLORIDE 1 MG: 0.25 TABLET ORAL at 20:32

## 2019-01-01 RX ADMIN — ASPIRIN 81 MG: 81 TABLET ORAL at 09:25

## 2019-01-01 RX ADMIN — IPRATROPIUM BROMIDE AND ALBUTEROL SULFATE 1 AMPULE: .5; 3 SOLUTION RESPIRATORY (INHALATION) at 11:17

## 2019-01-01 RX ADMIN — DILTIAZEM HYDROCHLORIDE 10 MG/HR: 5 INJECTION INTRAVENOUS at 21:04

## 2019-01-01 RX ADMIN — CHOLESTYRAMINE 4 G: 4 POWDER, FOR SUSPENSION ORAL at 08:29

## 2019-01-01 RX ADMIN — IPRATROPIUM BROMIDE AND ALBUTEROL SULFATE 1 AMPULE: .5; 3 SOLUTION RESPIRATORY (INHALATION) at 20:29

## 2019-01-01 RX ADMIN — PANTOPRAZOLE SODIUM 40 MG: 40 TABLET, DELAYED RELEASE ORAL at 05:28

## 2019-01-01 RX ADMIN — APIXABAN 5 MG: 5 TABLET, FILM COATED ORAL at 22:32

## 2019-01-01 RX ADMIN — SOTALOL HYDROCHLORIDE 120 MG: 80 TABLET ORAL at 21:09

## 2019-01-01 RX ADMIN — DILTIAZEM HYDROCHLORIDE 30 MG: 60 TABLET, FILM COATED ORAL at 23:54

## 2019-01-01 RX ADMIN — CYCLOBENZAPRINE HYDROCHLORIDE 10 MG: 10 TABLET, FILM COATED ORAL at 21:14

## 2019-01-01 RX ADMIN — PRAMIPEXOLE DIHYDROCHLORIDE 2 MG: 1 TABLET ORAL at 20:26

## 2019-01-01 RX ADMIN — IPRATROPIUM BROMIDE AND ALBUTEROL SULFATE 3 AMPULE: .5; 3 SOLUTION RESPIRATORY (INHALATION) at 18:04

## 2019-01-01 RX ADMIN — DILTIAZEM HYDROCHLORIDE 30 MG: 60 TABLET, FILM COATED ORAL at 17:29

## 2019-01-01 RX ADMIN — APIXABAN 5 MG: 5 TABLET, FILM COATED ORAL at 07:52

## 2019-01-01 RX ADMIN — FINASTERIDE 5 MG: 5 TABLET, FILM COATED ORAL at 21:30

## 2019-01-01 RX ADMIN — PRAMIPEXOLE DIHYDROCHLORIDE 1 MG: 1 TABLET ORAL at 20:42

## 2019-01-01 RX ADMIN — METHYLPREDNISOLONE SODIUM SUCCINATE 40 MG: 40 INJECTION, POWDER, FOR SOLUTION INTRAMUSCULAR; INTRAVENOUS at 00:04

## 2019-01-01 RX ADMIN — SOTALOL HYDROCHLORIDE 120 MG: 80 TABLET ORAL at 08:57

## 2019-01-01 RX ADMIN — INSULIN LISPRO 1 UNITS: 100 INJECTION, SOLUTION INTRAVENOUS; SUBCUTANEOUS at 16:50

## 2019-01-01 RX ADMIN — DILTIAZEM HYDROCHLORIDE 120 MG: 120 CAPSULE, COATED, EXTENDED RELEASE ORAL at 09:30

## 2019-01-01 RX ADMIN — MESALAMINE 400 MG: 400 CAPSULE, DELAYED RELEASE ORAL at 13:06

## 2019-01-01 RX ADMIN — PROCHLORPERAZINE MALEATE 5 MG: 5 TABLET, FILM COATED ORAL at 02:22

## 2019-01-01 RX ADMIN — SOTALOL HYDROCHLORIDE 80 MG: 80 TABLET ORAL at 07:52

## 2019-01-01 RX ADMIN — MONTELUKAST SODIUM 10 MG: 10 TABLET ORAL at 21:09

## 2019-01-01 RX ADMIN — INSULIN LISPRO 1 UNITS: 100 INJECTION, SOLUTION INTRAVENOUS; SUBCUTANEOUS at 21:11

## 2019-01-01 RX ADMIN — IPRATROPIUM BROMIDE AND ALBUTEROL SULFATE 1 AMPULE: .5; 3 SOLUTION RESPIRATORY (INHALATION) at 15:40

## 2019-01-01 RX ADMIN — METHYLPREDNISOLONE SODIUM SUCCINATE 40 MG: 40 INJECTION, POWDER, FOR SOLUTION INTRAMUSCULAR; INTRAVENOUS at 17:48

## 2019-01-01 RX ADMIN — IPRATROPIUM BROMIDE AND ALBUTEROL SULFATE 1 AMPULE: .5; 3 SOLUTION RESPIRATORY (INHALATION) at 20:34

## 2019-01-01 RX ADMIN — NITROGLYCERIN 0.4 MG: 0.4 TABLET, ORALLY DISINTEGRATING SUBLINGUAL at 08:59

## 2019-01-01 RX ADMIN — Medication 10 ML: at 20:25

## 2019-01-01 RX ADMIN — MESALAMINE 400 MG: 400 CAPSULE, DELAYED RELEASE ORAL at 23:22

## 2019-01-01 RX ADMIN — INSULIN LISPRO 2 UNITS: 100 INJECTION, SOLUTION INTRAVENOUS; SUBCUTANEOUS at 11:56

## 2019-01-01 RX ADMIN — AMPICILLIN SODIUM AND SULBACTAM SODIUM 1.5 G: 1; .5 INJECTION, POWDER, FOR SOLUTION INTRAMUSCULAR; INTRAVENOUS at 04:15

## 2019-01-01 RX ADMIN — DILTIAZEM HYDROCHLORIDE 10 MG: 5 INJECTION INTRAVENOUS at 21:25

## 2019-01-01 RX ADMIN — MESALAMINE 400 MG: 400 CAPSULE, DELAYED RELEASE ORAL at 15:25

## 2019-01-01 RX ADMIN — IPRATROPIUM BROMIDE AND ALBUTEROL SULFATE 1 AMPULE: .5; 3 SOLUTION RESPIRATORY (INHALATION) at 20:12

## 2019-01-01 RX ADMIN — Medication 10 ML: at 20:48

## 2019-01-01 RX ADMIN — IPRATROPIUM BROMIDE AND ALBUTEROL SULFATE 1 AMPULE: .5; 3 SOLUTION RESPIRATORY (INHALATION) at 12:00

## 2019-01-01 RX ADMIN — ETOMIDATE 20 MG: 2 INJECTION INTRAVENOUS at 21:23

## 2019-01-01 RX ADMIN — OXYCODONE HYDROCHLORIDE AND ACETAMINOPHEN 1 TABLET: 10; 325 TABLET ORAL at 08:47

## 2019-01-01 RX ADMIN — AMIODARONE HYDROCHLORIDE 1 MG/MIN: 50 INJECTION, SOLUTION INTRAVENOUS at 23:55

## 2019-01-01 RX ADMIN — IPRATROPIUM BROMIDE AND ALBUTEROL SULFATE 1 AMPULE: .5; 3 SOLUTION RESPIRATORY (INHALATION) at 19:37

## 2019-01-01 RX ADMIN — PROPOFOL 50 MCG/KG/MIN: 10 INJECTION, EMULSION INTRAVENOUS at 04:24

## 2019-01-01 RX ADMIN — METHYLPREDNISOLONE SODIUM SUCCINATE 40 MG: 40 INJECTION, POWDER, FOR SOLUTION INTRAMUSCULAR; INTRAVENOUS at 08:48

## 2019-01-01 RX ADMIN — TAMSULOSIN HYDROCHLORIDE 0.4 MG: 0.4 CAPSULE ORAL at 11:22

## 2019-01-01 RX ADMIN — MONTELUKAST SODIUM 10 MG: 10 TABLET, FILM COATED ORAL at 20:55

## 2019-01-01 RX ADMIN — APIXABAN 5 MG: 5 TABLET, FILM COATED ORAL at 20:34

## 2019-01-01 RX ADMIN — IPRATROPIUM BROMIDE AND ALBUTEROL SULFATE 1 AMPULE: .5; 3 SOLUTION RESPIRATORY (INHALATION) at 07:08

## 2019-01-01 RX ADMIN — FENTANYL CITRATE 50 MCG: 50 INJECTION, SOLUTION INTRAMUSCULAR; INTRAVENOUS at 12:55

## 2019-01-01 RX ADMIN — OXYCODONE AND ACETAMINOPHEN 2 TABLET: 5; 325 TABLET ORAL at 22:40

## 2019-01-01 RX ADMIN — PREDNISONE 40 MG: 20 TABLET ORAL at 09:01

## 2019-01-01 RX ADMIN — SERTRALINE HYDROCHLORIDE 100 MG: 50 TABLET ORAL at 08:57

## 2019-01-01 RX ADMIN — METHYLPREDNISOLONE SODIUM SUCCINATE 40 MG: 40 INJECTION, POWDER, FOR SOLUTION INTRAMUSCULAR; INTRAVENOUS at 23:48

## 2019-01-01 RX ADMIN — IPRATROPIUM BROMIDE AND ALBUTEROL SULFATE 1 AMPULE: .5; 3 SOLUTION RESPIRATORY (INHALATION) at 23:31

## 2019-01-01 RX ADMIN — MESALAMINE 400 MG: 400 CAPSULE, DELAYED RELEASE ORAL at 08:54

## 2019-01-01 RX ADMIN — APIXABAN 5 MG: 5 TABLET, FILM COATED ORAL at 09:24

## 2019-01-01 RX ADMIN — MESALAMINE 400 MG: 400 CAPSULE, DELAYED RELEASE ORAL at 02:56

## 2019-01-01 RX ADMIN — Medication 10 ML: at 20:56

## 2019-01-01 RX ADMIN — IPRATROPIUM BROMIDE AND ALBUTEROL SULFATE 1 AMPULE: .5; 3 SOLUTION RESPIRATORY (INHALATION) at 11:52

## 2019-01-01 RX ADMIN — PROPOFOL 45 MCG/KG/MIN: 10 INJECTION, EMULSION INTRAVENOUS at 07:10

## 2019-01-01 RX ADMIN — SOTALOL HYDROCHLORIDE 120 MG: 120 TABLET ORAL at 08:52

## 2019-01-01 RX ADMIN — GUAIFENESIN 600 MG: 600 TABLET, EXTENDED RELEASE ORAL at 09:39

## 2019-01-01 RX ADMIN — PRAMIPEXOLE DIHYDROCHLORIDE 1 MG: 0.25 TABLET ORAL at 20:38

## 2019-01-01 RX ADMIN — NALOXONE HYDROCHLORIDE 2 MG: 1 INJECTION PARENTERAL at 20:50

## 2019-01-01 RX ADMIN — Medication 10 ML: at 20:07

## 2019-01-01 RX ADMIN — Medication 10 ML: at 07:51

## 2019-01-01 RX ADMIN — TAMSULOSIN HYDROCHLORIDE 0.4 MG: 0.4 CAPSULE ORAL at 08:51

## 2019-01-01 RX ADMIN — ISODIUM CHLORIDE 3 ML: 0.03 SOLUTION RESPIRATORY (INHALATION) at 20:12

## 2019-01-01 RX ADMIN — MESALAMINE 400 MG: 400 CAPSULE, DELAYED RELEASE ORAL at 11:25

## 2019-01-01 RX ADMIN — AMPICILLIN SODIUM AND SULBACTAM SODIUM 1.5 G: 1; .5 INJECTION, POWDER, FOR SOLUTION INTRAMUSCULAR; INTRAVENOUS at 21:15

## 2019-01-01 RX ADMIN — METHYLPREDNISOLONE SODIUM SUCCINATE 40 MG: 40 INJECTION, POWDER, FOR SOLUTION INTRAMUSCULAR; INTRAVENOUS at 08:29

## 2019-01-01 RX ADMIN — IPRATROPIUM BROMIDE AND ALBUTEROL SULFATE 1 AMPULE: .5; 3 SOLUTION RESPIRATORY (INHALATION) at 16:22

## 2019-01-01 RX ADMIN — DONEPEZIL HYDROCHLORIDE 10 MG: 5 TABLET, FILM COATED ORAL at 01:43

## 2019-01-01 RX ADMIN — TAMSULOSIN HYDROCHLORIDE 0.4 MG: 0.4 CAPSULE ORAL at 09:01

## 2019-01-01 RX ADMIN — OXYCODONE HYDROCHLORIDE AND ACETAMINOPHEN 1 TABLET: 10; 325 TABLET ORAL at 13:02

## 2019-01-01 RX ADMIN — CYCLOBENZAPRINE HYDROCHLORIDE 10 MG: 10 TABLET, FILM COATED ORAL at 22:41

## 2019-01-01 RX ADMIN — Medication 15 ML: at 08:03

## 2019-01-01 RX ADMIN — FERROUS SULFATE TAB 325 MG (65 MG ELEMENTAL FE) 325 MG: 325 (65 FE) TAB at 09:30

## 2019-01-01 RX ADMIN — OXYCODONE HYDROCHLORIDE AND ACETAMINOPHEN 1 TABLET: 10; 325 TABLET ORAL at 03:01

## 2019-01-01 RX ADMIN — IPRATROPIUM BROMIDE AND ALBUTEROL SULFATE 1 AMPULE: .5; 3 SOLUTION RESPIRATORY (INHALATION) at 15:32

## 2019-01-01 RX ADMIN — OXYCODONE HYDROCHLORIDE AND ACETAMINOPHEN 1 TABLET: 10; 325 TABLET ORAL at 08:40

## 2019-01-01 RX ADMIN — Medication 10 ML: at 08:53

## 2019-01-01 RX ADMIN — APIXABAN 5 MG: 5 TABLET, FILM COATED ORAL at 20:05

## 2019-01-01 RX ADMIN — DILTIAZEM HYDROCHLORIDE 120 MG: 120 CAPSULE, COATED, EXTENDED RELEASE ORAL at 21:30

## 2019-01-01 RX ADMIN — PANTOPRAZOLE SODIUM 40 MG: 40 TABLET, DELAYED RELEASE ORAL at 07:58

## 2019-01-01 RX ADMIN — MONTELUKAST SODIUM 10 MG: 10 TABLET ORAL at 20:32

## 2019-01-01 RX ADMIN — SODIUM CHLORIDE 250 ML: 9 INJECTION, SOLUTION INTRAVENOUS at 20:24

## 2019-01-01 RX ADMIN — FERROUS SULFATE TAB 325 MG (65 MG ELEMENTAL FE) 325 MG: 325 (65 FE) TAB at 07:58

## 2019-01-01 RX ADMIN — DILTIAZEM HYDROCHLORIDE 30 MG: 60 TABLET, FILM COATED ORAL at 21:29

## 2019-01-01 RX ADMIN — TETROFOSMIN 33.7 MILLICURIE: 0.23 INJECTION, POWDER, LYOPHILIZED, FOR SOLUTION INTRAVENOUS at 13:24

## 2019-01-01 ASSESSMENT — PAIN SCALES - GENERAL
PAINLEVEL_OUTOF10: 8
PAINLEVEL_OUTOF10: 7
PAINLEVEL_OUTOF10: 9
PAINLEVEL_OUTOF10: 7
PAINLEVEL_OUTOF10: 8
PAINLEVEL_OUTOF10: 5
PAINLEVEL_OUTOF10: 8
PAINLEVEL_OUTOF10: 8
PAINLEVEL_OUTOF10: 0
PAINLEVEL_OUTOF10: 9
PAINLEVEL_OUTOF10: 0
PAINLEVEL_OUTOF10: 8
PAINLEVEL_OUTOF10: 7
PAINLEVEL_OUTOF10: 8
PAINLEVEL_OUTOF10: 8
PAINLEVEL_OUTOF10: 0
PAINLEVEL_OUTOF10: 7
PAINLEVEL_OUTOF10: 8
PAINLEVEL_OUTOF10: 6
PAINLEVEL_OUTOF10: 8
PAINLEVEL_OUTOF10: 8
PAINLEVEL_OUTOF10: 3
PAINLEVEL_OUTOF10: 8
PAINLEVEL_OUTOF10: 8
PAINLEVEL_OUTOF10: 5
PAINLEVEL_OUTOF10: 8
PAINLEVEL_OUTOF10: 0
PAINLEVEL_OUTOF10: 0
PAINLEVEL_OUTOF10: 7
PAINLEVEL_OUTOF10: 6
PAINLEVEL_OUTOF10: 0
PAINLEVEL_OUTOF10: 0
PAINLEVEL_OUTOF10: 8
PAINLEVEL_OUTOF10: 0
PAINLEVEL_OUTOF10: 9
PAINLEVEL_OUTOF10: 8
PAINLEVEL_OUTOF10: 5
PAINLEVEL_OUTOF10: 8
PAINLEVEL_OUTOF10: 6
PAINLEVEL_OUTOF10: 0
PAINLEVEL_OUTOF10: 0
PAINLEVEL_OUTOF10: 7
PAINLEVEL_OUTOF10: 7
PAINLEVEL_OUTOF10: 0
PAINLEVEL_OUTOF10: 8
PAINLEVEL_OUTOF10: 8
PAINLEVEL_OUTOF10: 0
PAINLEVEL_OUTOF10: 8
PAINLEVEL_OUTOF10: 8
PAINLEVEL_OUTOF10: 7
PAINLEVEL_OUTOF10: 0
PAINLEVEL_OUTOF10: 5
PAINLEVEL_OUTOF10: 0
PAINLEVEL_OUTOF10: 0
PAINLEVEL_OUTOF10: 5
PAINLEVEL_OUTOF10: 0
PAINLEVEL_OUTOF10: 4
PAINLEVEL_OUTOF10: 0
PAINLEVEL_OUTOF10: 6
PAINLEVEL_OUTOF10: 7
PAINLEVEL_OUTOF10: 10
PAINLEVEL_OUTOF10: 7
PAINLEVEL_OUTOF10: 5
PAINLEVEL_OUTOF10: 0
PAINLEVEL_OUTOF10: 0
PAINLEVEL_OUTOF10: 9
PAINLEVEL_OUTOF10: 8
PAINLEVEL_OUTOF10: 3
PAINLEVEL_OUTOF10: 0
PAINLEVEL_OUTOF10: 9
PAINLEVEL_OUTOF10: 8
PAINLEVEL_OUTOF10: 0
PAINLEVEL_OUTOF10: 0
PAINLEVEL_OUTOF10: 5
PAINLEVEL_OUTOF10: 5
PAINLEVEL_OUTOF10: 0
PAINLEVEL_OUTOF10: 7
PAINLEVEL_OUTOF10: 8
PAINLEVEL_OUTOF10: 6
PAINLEVEL_OUTOF10: 8
PAINLEVEL_OUTOF10: 6
PAINLEVEL_OUTOF10: 7
PAINLEVEL_OUTOF10: 5
PAINLEVEL_OUTOF10: 9
PAINLEVEL_OUTOF10: 6
PAINLEVEL_OUTOF10: 7
PAINLEVEL_OUTOF10: 8
PAINLEVEL_OUTOF10: 3
PAINLEVEL_OUTOF10: 0
PAINLEVEL_OUTOF10: 8
PAINLEVEL_OUTOF10: 7
PAINLEVEL_OUTOF10: 8
PAINLEVEL_OUTOF10: 4
PAINLEVEL_OUTOF10: 8
PAINLEVEL_OUTOF10: 6
PAINLEVEL_OUTOF10: 7
PAINLEVEL_OUTOF10: 4
PAINLEVEL_OUTOF10: 8
PAINLEVEL_OUTOF10: 8
PAINLEVEL_OUTOF10: 0
PAINLEVEL_OUTOF10: 4
PAINLEVEL_OUTOF10: 7
PAINLEVEL_OUTOF10: 8
PAINLEVEL_OUTOF10: 8

## 2019-01-01 ASSESSMENT — PAIN DESCRIPTION - DESCRIPTORS
DESCRIPTORS: CONSTANT;ACHING
DESCRIPTORS: ACHING
DESCRIPTORS: ACHING
DESCRIPTORS: ACHING;THROBBING
DESCRIPTORS: ACHING
DESCRIPTORS: ACHING;DISCOMFORT;SPASM
DESCRIPTORS: ACHING
DESCRIPTORS: DULL
DESCRIPTORS: ACHING
DESCRIPTORS: THROBBING
DESCRIPTORS: ACHING;CONSTANT;DISCOMFORT
DESCRIPTORS: ACHING;CONSTANT
DESCRIPTORS: THROBBING

## 2019-01-01 ASSESSMENT — ENCOUNTER SYMPTOMS
TROUBLE SWALLOWING: 0
STRIDOR: 0
COUGH: 0
BACK PAIN: 0
WHEEZING: 0
ABDOMINAL PAIN: 0
BLOOD IN STOOL: 0
COUGH: 0
ABDOMINAL PAIN: 0
COLOR CHANGE: 0
COLOR CHANGE: 0
COUGH: 0
BLOOD IN STOOL: 0
SHORTNESS OF BREATH: 0
BLOOD IN STOOL: 0
COLOR CHANGE: 0
VOMITING: 0
PHOTOPHOBIA: 0
BLOOD IN STOOL: 0
VOMITING: 0
SHORTNESS OF BREATH: 1
CONSTIPATION: 0
WHEEZING: 0
ABDOMINAL PAIN: 0
COUGH: 0
CHEST TIGHTNESS: 0
NAUSEA: 0
WHEEZING: 0
SHORTNESS OF BREATH: 1
VOMITING: 0
BACK PAIN: 0
DIARRHEA: 0
COUGH: 1
BLOOD IN STOOL: 0
VOICE CHANGE: 0
SHORTNESS OF BREATH: 0
RHINORRHEA: 0
BACK PAIN: 1
COLOR CHANGE: 0
SHORTNESS OF BREATH: 1
NAUSEA: 0
FACIAL SWELLING: 0
NAUSEA: 0
SHORTNESS OF BREATH: 1
ABDOMINAL PAIN: 0

## 2019-01-01 ASSESSMENT — PAIN DESCRIPTION - PROGRESSION
CLINICAL_PROGRESSION: GRADUALLY IMPROVING
CLINICAL_PROGRESSION: NOT CHANGED
CLINICAL_PROGRESSION: NOT CHANGED
CLINICAL_PROGRESSION: GRADUALLY WORSENING
CLINICAL_PROGRESSION: NOT CHANGED
CLINICAL_PROGRESSION: NOT CHANGED

## 2019-01-01 ASSESSMENT — PAIN - FUNCTIONAL ASSESSMENT
PAIN_FUNCTIONAL_ASSESSMENT: PREVENTS OR INTERFERES WITH MANY ACTIVE NOT PASSIVE ACTIVITIES
PAIN_FUNCTIONAL_ASSESSMENT: PREVENTS OR INTERFERES SOME ACTIVE ACTIVITIES AND ADLS

## 2019-01-01 ASSESSMENT — PAIN DESCRIPTION - LOCATION
LOCATION: BACK
LOCATION_2: BACK
LOCATION: BACK
LOCATION: CHEST
LOCATION: BACK
LOCATION: CHEST
LOCATION: CHEST;BACK
LOCATION: BACK

## 2019-01-01 ASSESSMENT — PAIN DESCRIPTION - ORIENTATION
ORIENTATION: LOWER
ORIENTATION: LEFT
ORIENTATION: LOWER
ORIENTATION: LOWER
ORIENTATION: RIGHT;LEFT;MID
ORIENTATION: LOWER
ORIENTATION: MID
ORIENTATION_2: LOWER
ORIENTATION: LOWER
ORIENTATION: RIGHT;LEFT;MID
ORIENTATION: LOWER

## 2019-01-01 ASSESSMENT — PAIN DESCRIPTION - ONSET
ONSET: ON-GOING

## 2019-01-01 ASSESSMENT — PAIN DESCRIPTION - FREQUENCY
FREQUENCY: INTERMITTENT
FREQUENCY: CONTINUOUS
FREQUENCY: INTERMITTENT
FREQUENCY: CONTINUOUS
FREQUENCY: CONTINUOUS
FREQUENCY: INTERMITTENT

## 2019-01-01 ASSESSMENT — PULMONARY FUNCTION TESTS
PIF_VALUE: 34
PIF_VALUE: 12
PIF_VALUE: 37
PIF_VALUE: 41
PIF_VALUE: 27
PIF_VALUE: 31
PIF_VALUE: 38
PIF_VALUE: 30
PIF_VALUE: 33
PIF_VALUE: 34
PIF_VALUE: 37
PIF_VALUE: 41
PIF_VALUE: 28
PIF_VALUE: 36
PIF_VALUE: 12
PIF_VALUE: 39
PIF_VALUE: 38
PIF_VALUE: 35
PIF_VALUE: 21
PIF_VALUE: 28
PIF_VALUE: 49
PIF_VALUE: 33
PIF_VALUE: 43
PIF_VALUE: 50
PIF_VALUE: 32
PIF_VALUE: 35

## 2019-01-01 ASSESSMENT — PAIN DESCRIPTION - PAIN TYPE
TYPE_2: CHRONIC PAIN
TYPE: CHRONIC PAIN
TYPE: SURGICAL PAIN
TYPE: CHRONIC PAIN
TYPE: CHRONIC PAIN
TYPE: ACUTE PAIN

## 2019-01-01 ASSESSMENT — PAIN DESCRIPTION - INTENSITY: RATING_2: 8

## 2019-01-18 NOTE — TELEPHONE ENCOUNTER
Did [facility] with [facility] state if [facility] had a cheaper equivalent option?

## 2019-01-18 NOTE — TELEPHONE ENCOUNTER
[facility] with [facility] [employer] called stating that patient has not be using his Anoro inhaler due to cost ($70) is requesting different medication. Please advise. [facility]    ASSESSMENT:  · Moderately Severe COPD - worse SOB  · Seasonal Allergic Rhinitis  · H/o aflutter and diastolic CHF  · CAD  · DM2     PLAN:   · Continue Anoro   · Continue PRN albuterol  · Start PRN albuterol nebs  · The Pneumovax 23 today.  The Flu Vaccine today  · Pulm rehab declined again  · Call if he becomes worse  · F/u in 6 months

## 2019-04-09 PROBLEM — J44.1 COPD EXACERBATION (HCC): Status: ACTIVE | Noted: 2019-01-01

## 2019-04-09 NOTE — ED PROVIDER NOTES
[facility]  eMERGENCY dEPARTMENT eNCOUnter        Pt Name: Cherelle Brunner  MRN: [id number]  [place] 1945  Date of evaluation: 4/9/2019  Provider: Mary Lou Cowart PA-C  PCP: Belkis Garcia DO    This patient was seen and evaluated by the attending physician Dr. Merlene Miranda       Chief Complaint   Patient presents with    Shortness of Breath     worse over the last 3 days        HISTORY OF PRESENT ILLNESS   (Location/Symptom, Timing/Onset, Context/Setting, Quality, Duration, Modifying Factors, Severity)  Note limiting factors. Cherelle Brunner is a 76 y.o. male presents emergency Department with significant other from his pulmonologist's office for sob. Over the past 2-3 days patient has had increase dyspnea on exertion. Patient unable to walk short distances without becoming winded and having to stop. Patient was trouble walking distance in general however has been more pronounced past 2-3 days. No orthopnea, no weight gain noted. Patient had temperature 100.3 at home today, wife gave him Motrin. He has had increased cough and change in sputum production over the past 2-3 days as well. No upper respiratory symptoms. No chest pain, abdominal pain, nausea, vomiting. No known sick contacts. There was concern for COPD exacerbation the patient had noted hypoxia in office which prompted referral to ED. Patient also has a past medical history of CAD, HDL, HTN, DM 2, HOLLAND, CHF. Nursing Notes were all reviewed and agreed with or any disagreements were addressed  in the HPI. REVIEW OF SYSTEMS    (2-9 systems for level 4, 10 or more for level 5)     Review of Systems   Constitutional: Positive for chills, fatigue and fever. HENT: Negative for congestion and rhinorrhea. Respiratory: Positive for cough and shortness of breath. Negative for chest tightness. Cardiovascular: Negative. Negative for chest pain.    Gastrointestinal: Negative for nausea and vomiting. Genitourinary: Negative. Musculoskeletal: Negative for arthralgias and myalgias. Skin: Negative for color change, pallor, rash and wound. Neurological: Negative for dizziness and light-headedness. Psychiatric/Behavioral: Negative for behavioral problems and confusion. Positives and Pertinent negatives as per HPI. Except as noted abovein the ROS, all other systems were reviewed and negative.        PAST MEDICAL HISTORY     Past Medical History:   Diagnosis Date    Cancer ([facility] [address]     skin    [facility]     Colitis 5/08/2015    Diabetes mellitus ([facility]     Esophagitis     Gastritis     Hyperlipidemia     Hypertension          SURGICAL HISTORY     Past Surgical History:   Procedure Laterality Date    CATARACT REMOVAL WITH IMPLANT Right 05/2016    COLONOSCOPY  5/08/2015    colitis-mild gastritis    COLONOSCOPY N/A 10/25/2018    COLONOSCOPY POLYPECTOMY SNARE/COLD BIOPSY performed by Tony Weeks MD at [address] [place] [address]  09/21/2018    CYSTOSCOPY, DIRECT VISION INTERAL URETHROTOMY     HERNIA REPAIR Right 2012    inguinal    FL OFFICE/OUTPT VISIT,PROCEDURE ONLY N/A 9/21/2018    CYSTOSCOPY, DIRECT VISION INTERAL URETHROTOMY performed by Lit Cronin MD at [facility] Left 2001    Rotator cuff    SKIN CANCER EXCISION Left 5/2016    melanoma    TESTICLE REMOVAL Left 2012    UPPER GASTROINTESTINAL ENDOSCOPY  05/08/2015    Gastritis    UPPER GASTROINTESTINAL ENDOSCOPY N/A 09/08/2016    gastritis-Bx pending    UPPER GASTROINTESTINAL ENDOSCOPY  11/06/2018    UPPER GASTROINTESTINAL ENDOSCOPY N/A 11/6/2018    EGD BIOPSY performed by Tony Weeks MD at [place] [address].       Current Discharge Medication List      CONTINUE these medications which have NOT CHANGED    Details   furosemide (LASIX) 40 MG tablet Take 1 tablet by mouth daily  Qty: 90 tablet, Refills: 3      diltiazem (CARDIZEM CD) 120 MG extended release capsule Take 1 capsule by mouth daily  Qty: 90 capsule, Refills: 3      mesalamine (LIALDA) 1.2 g EC tablet TAKE 1 TABLET THREE TIMES DAILY  Qty: 270 tablet, Refills: 1      sertraline (ZOLOFT) 100 MG tablet Take 1 tablet by mouth daily  Qty: 90 tablet, Refills: 3    Associated Diagnoses: Depressed affect      cyclobenzaprine (FLEXERIL) 10 MG tablet Take 1 tablet by mouth 3 times daily as needed for Muscle spasms  Qty: 270 tablet, Refills: 0      montelukast (SINGULAIR) 10 MG tablet Take 1 tablet by mouth nightly  Qty: 90 tablet, Refills: 3    Associated Diagnoses: COPD, severe ([address]  Perennial allergic rhinitis      pramipexole (MIRAPEX) 1 MG tablet Take 1 tablet by mouth nightly  Qty: 90 tablet, Refills: 3    Associated Diagnoses: Restless legs syndrome (RLS)      donepezil (ARICEPT) 10 MG tablet Take 1 tablet by mouth nightly  Qty: 90 tablet, Refills: 1      ferrous sulfate 325 (65 Fe) MG tablet Take 1 tablet by mouth daily (with breakfast)  Qty: 90 tablet, Refills: 2      colesevelam (WELCHOL) 625 MG tablet TAKE 3 TABLETS TWICE DAILY  Qty: 540 tablet, Refills: 1      apixaban (ELIQUIS) 5 MG TABS tablet Take 1 tablet by mouth 2 times daily  Qty: 180 tablet, Refills: 1      ipratropium-albuterol (DUONEB) 0.5-2.5 (3) MG/3ML SOLN nebulizer solution Inhale 3 mLs into the lungs 4 times daily  Qty: 1620 mL, Refills: 1    Associated Diagnoses: COPD, severe (H[facility])      tamsulosin (FLOMAX) 0.4 MG capsule TAKE 1 CAPSULE EVERY DAY  Qty: 90 capsule, Refills: 3      omeprazole (PRILOSEC) 40 MG delayed release capsule TAKE 1 CAPSULE EVERY DAY  Qty: 90 capsule, Refills: 3    Associated Diagnoses: Gastroesophageal reflux disease without esophagitis      metFORMIN (GLUCOPHAGE) 500 MG tablet TAKE 1 TABLET TWICE DAILY  Qty: 180 tablet, Refills: 1    Associated Diagnoses: Controlled type 2 diabetes mellitus without complication, without long-term current use of insulin ([facility]) oxyCODONE-acetaminophen (PERCOCET)  MG per tablet Take 1 tablet by mouth every 6 hours as needed for Pain. .      finasteride (PROSCAR) 5 MG tablet Take 1 tablet by mouth daily Will shrink prostate; Affects PSA number  Qty: 30 tablet, Refills: 5      albuterol (PROVENTIL) (2.5 MG/3ML) 0.083% nebulizer solution Take 3 mLs by nebulization every 6 hours as needed for Wheezing  Qty: 120 each, Refills: 3    Associated Diagnoses: Moderate COPD (chronic obstructive pulmonary disease) ([facility])      glucose blood VI test strips (ASCENSIA AUTODISC VI;ONE TOUCH ULTRA TEST VI) strip [employer] True Metrix Air Test Strips. FSBS daily. DX E11.9  Qty: 100 strip, Refills: 3    Associated Diagnoses: Controlled type 2 diabetes mellitus without complication, without long-term current use of insulin ([facility])      TRUEPLUS LANCETS 28G MISC 1 each by Does not apply route daily E11.9 Test FBS daily--NEED 33 GAUGE  Qty: 100 each, Refills: 3               ALLERGIES     Patient has no known allergies.     FAMILYHISTORY       Family History   Problem Relation Age of Onset    Diabetes Mother     Early Death Mother     Cancer Mother         stomach    Other Father     Diabetes Son           SOCIAL HISTORY       Social History     Socioeconomic History    Marital status:      Spouse name: None    Number of children: None    Years of education: None    Highest education level: None   Occupational History    None   Social Needs    Financial resource strain: None    Food insecurity:     Worry: None     Inability: None    Transportation needs:     Medical: None     Non-medical: None   Tobacco Use    Smoking status: Former Smoker     Packs/day: 1.50     Years: 20.00     Pack years: 30.00     Types: Cigarettes     Last attempt to quit: 1/10/2000     Years since quittin.2    Smokeless tobacco: Never Used   Substance and Sexual Activity    Alcohol use: No     Alcohol/week: 0.0 oz    Drug use: No    Sexual activity: Yes Partners: Female   Lifestyle    Physical activity:     Days per week: None     Minutes per session: None    Stress: None   Relationships    Social connections:     Talks on phone: None     Gets together: None     Attends Gnosticism service: None     Active member of club or organization: None     Attends meetings of clubs or organizations: None     Relationship status: None    Intimate partner violence:     Fear of current or ex partner: None     Emotionally abused: None     Physically abused: None     Forced sexual activity: None   Other Topics Concern    None   Social History Narrative    None       SCREENINGS    Eyad Coma Scale  Eye Opening: Spontaneous  Best Verbal Response: Oriented  Best Motor Response: Obeys commands  [place] Coma Scale Score: 15        PHYSICAL EXAM    (up to 7 for level 4, 8 or more for level 5)     ED Triage Vitals [04/09/19 1530]   BP Temp Temp Source Pulse Resp SpO2 Height Weight   (!) 97/54 97.4 °F (36.3 °C) Oral 57 18 97 % 5' 10\" (1.778 m) 246 lb (111.6 kg)       Physical Exam   Constitutional: He is oriented to person, place, and time. He appears well-developed and well-nourished. HENT:   Head: Normocephalic and atraumatic. Eyes: Conjunctivae and EOM are normal.   Neck: Normal range of motion. Neck supple. Cardiovascular: Normal rate and regular rhythm. Pulmonary/Chest:   Scattered wheeze audible, diffusely diminished   Musculoskeletal: Normal range of motion. Neurological: He is alert and oriented to person, place, and time. Skin: Skin is warm. He is not diaphoretic. No erythema. Psychiatric: He has a normal mood and affect. His behavior is normal.   Vitals reviewed.       DIAGNOSTIC RESULTS   LABS:    Labs Reviewed   CBC WITH AUTO DIFFERENTIAL - Abnormal; Notable for the following components:       Result Value    RBC 3.76 (*)     Hemoglobin 10.8 (*)     Hematocrit 33.8 (*)     All other components within normal limits    Narrative:     Performed at:  [facility],  [place], [place]   Phone [phone]   COMPREHENSIVE METABOLIC PANEL - Abnormal; Notable for the following components:    Chloride 98 (*)     Glucose 110 (*)     BUN 30 (*)     CREATININE 1.8 (*)     GFR Non- 37 (*)     GFR  45 (*)     All other components within normal limits    Narrative:     Performed at:  [facility] [address],  [place], [place]   Phone [phone]   LACTIC ACID, PLASMA - Abnormal; Notable for the following components:    Lactic Acid 2.2 (*)     All other components within normal limits    Narrative:     Performed at:  [facility] [address],  [place], [place]   Phone [phone]   POCT GLUCOSE - Abnormal; Notable for the following components:    POC Glucose 148 (*)     All other components within normal limits    Narrative:     Performed at:  [facility] [address],  [place], [place]   Phone [phone]   RAPID INFLUENZA A/B ANTIGENS    Narrative:     Performed at:  [facility],  [place], [place]   Phone [phone]   CULTURE BLOOD #1   CULTURE BLOOD #2   RESPIRATORY CULTURE   BRAIN NATRIURETIC PEPTIDE    Narrative:     Performed at:  [facility] [address],  [place], [place]   Phone [phone]   TROPONIN    Narrative:     Performed at:  [facility],  [place], [place]   Phone [phone]   CBC WITH AUTO DIFFERENTIAL   BASIC METABOLIC PANEL W/ REFLEX TO MG FOR LOW K   POCT GLUCOSE       All other labs were within normal range or not returned as of this dictation. EKG:  All EKG's are interpreted by the Emergency Department Physician who either signs orCo-signs this chart in the absence of a cardiologist. Please see their note for interpretation of EKG. RADIOLOGY:   Non-plain film images such as CT, Ultrasound and MRI are read by the radiologist. Plain radiographic images are visualized andpreliminarily interpreted by the  ED Provider with the below findings:        Interpretation perthe Radiologist below, if available at the time of this note:    XR CHEST STANDARD (2 VW)   Final Result   New mild diffuse interstitial lung markings throughout both lungs favored to   represent interstitial pulmonary edema. This along with some pulmonary   vascular congestion suggest acute mild congestive heart failure and/or volume   overload. No results found.       PROCEDURES   Unless otherwise noted below, none     Procedures    CRITICAL CARE TIME   N/A    CONSULTS:  IP CONSULT TO HOSPITALIST  IP CONSULT TO PULMONOLOGY      EMERGENCY DEPARTMENT COURSE and DIFFERENTIALDIAGNOSIS/MDM:   Vitals:    Vitals:    04/09/19 1903 04/1945 04/09/19 1955 04/09/19 2257   BP: (!) 109/54 125/60     Pulse: 67 83     Resp: 19 17 18 18   Temp:  97.1 °F (36.2 °C)     TempSrc:  Oral     SpO2: 95% 92% 93% 92%   Weight:  246 lb 3.2 oz (111.7 kg)     Height:  5' 10\" (1.778 m)         Patient was given thefollowing medications:  Medications   finasteride (PROSCAR) tablet 5 mg (5 mg Oral Given 4/9/19 2130)   oxyCODONE-acetaminophen (PERCOCET)  MG per tablet 1 tablet (1 tablet Oral Given 4/9/19 2129)   tamsulosin (FLOMAX) capsule 0.4 mg (0.4 mg Oral Given 4/9/19 2130)   pantoprazole (PROTONIX) tablet 40 mg (has no administration in time range)   apixaban (ELIQUIS) tablet 5 mg (5 mg Oral Given 4/9/19 2130)   mesalamine (DELZICOL) delayed release capsule 400 mg (has no administration in time range)   sertraline (ZOLOFT) tablet 50 mg (has no administration in time range)   montelukast (SINGULAIR) tablet 10 mg (10 mg Oral Given 4/9/19 2129)   pramipexole (MIRAPEX) tablet 1 mg (1 mg Oral Given 4/9/19 2130)   donepezil (ARICEPT) tablet 10 mg (10 mg Oral Given 4/9/19 2130)   furosemide (LASIX) tablet 30 mg (has no administration in time range)   diltiazem (CARDIZEM CD) extended release capsule 120 mg (120 mg Oral Given 4/9/19 2130)   ferrous sulfate tablet 325 mg (has no administration in time range)   sodium chloride flush 0.9 % injection 10 mL (10 mLs Intravenous Given 4/9/19 2130)   sodium chloride flush 0.9 % injection 10 mL (has no administration in time range)   magnesium hydroxide (MILK OF MAGNESIA) 400 MG/5ML suspension 30 mL (has no administration in time range)   ondansetron (ZOFRAN) injection 4 mg (has no administration in time range)   methylPREDNISolone sodium (SOLU-MEDROL) injection 40 mg (has no administration in time range)     Followed by   predniSONE (DELTASONE) tablet 40 mg (has no administration in time range)   azithromycin (ZITHROMAX) tablet 250 mg (has no administration in time range)   glucose (GLUTOSE) 40 % oral gel 15 g (has no administration in time range)   dextrose 50 % solution 12.5 g (has no administration in time range)   glucagon (rDNA) injection 1 mg (has no administration in time range)   dextrose 5 % solution (has no administration in time range)   insulin lispro (HUMALOG) injection vial 0-6 Units (has no administration in time range)   insulin lispro (HUMALOG) injection vial 0-3 Units (0 Units Subcutaneous Not Given 4/9/19 2130)   ipratropium-albuterol (DUONEB) nebulizer solution 1 ampule (1 ampule Inhalation Given 4/9/19 2257)   ipratropium-albuterol (DUONEB) nebulizer solution 3 mL (has no administration in time range)   ipratropium-albuterol (DUONEB) nebulizer solution 1 ampule (1 ampule Inhalation Given 4/9/19 1623)   ipratropium-albuterol (DUONEB) nebulizer solution 1 ampule (1 ampule Inhalation Given 4/9/19 1622)   methylPREDNISolone sodium (SOLU-MEDROL) injection 80 mg (80 mg Intravenous Given 4/9/19 1622)   0.9 % sodium chloride bolus (0 mLs Intravenous Stopped 4/9/19 1904)   cefTRIAXone (ROCEPHIN) 1 g IVPB in 50 mL D5W minibag (0 g Intravenous Stopped 4/9/19 1827)   azithromycin (ZITHROMAX) 500 mg in D5W 250ml addavial (0 mg Intravenous Stopped 4/9/19 1937)       Patient presents to ED with HPI noted above. He is afebrile upon arrival and ill but nontoxic-appearing. Oxygen saturation 87% on room air. Patient placed on 2 L of oxygen and maintained oxygen saturation in the 90s. Differential diagnosis includes CHF exacerbation, COPD exacerbation, ACS, arrhythmia, valvular disease, pneumonia, bronchitis, influenza, other. Patient given 2 breathing treatments, IV Solu-Medrol. At reevaluation no improvement. Patient taken off supplemental oxygen and his sats 86% on room air. Patient placed back on 2 L. Influenza negative. CBC showed no leukocytosis, mild anemia with hemoglobin 10.8. CMP showed no significant other to laterality. Patient with HPI with No 1.8 and GFR 37. Previous creatinine 0.9. Lactic acid elevated at 2.2. EKG interpreted by my attending. Troponin within normal limits. Chest x-ray showed mild new diffuse interstitial lung markings in both lungs favored to represent edema. However BNP wnl. Given fever, lactic acidosis if symptoms suspect pneumonia. Patient Referred to me acquired pneumonia with ceftriaxone and azithromycin. Given the persistence of hypoxia patient admitted for further inpatient treatment, workup and evaluation. Did give 500 mL IV fluid given FREDI and lactic acidosis. Consultation made to hospitalist. Dr. Erlin Talbot kindly admitted patient. FINAL IMPRESSION      1.  Community acquired pneumonia, unspecified laterality          DISPOSITION/PLAN   DISPOSITION Admitted 04/09/2019 06:19:31 PM      PATIENT REFERREDTO:  Melany Garrett DO  [address] [place] [address]  [phone]            DISCHARGE MEDICATIONS:  Current Discharge Medication List      START taking these medications    Details   Tiotropium Bromide-Olodaterol (STIOLTO RESPIMAT) 2.5-2.5 MCG/ACT AERS 2 inhalations daily  Qty: 3 Inhaler, Refills: 1             DISCONTINUED MEDICATIONS:  Current Discharge Medication List                 (Please note that portions ofthis note were completed with a voice recognition program.  Efforts were made to edit the dictations but occasionally words are mis-transcribed.)    Ken Chappell PA-C (electronically signed)           Ken Chappell PA-C  04/09/19 9898

## 2019-04-09 NOTE — PROGRESS NOTES
Assessment complete. Sidney is alert and oriented. Vital signs are per flowsheet. Respirations are easy and even at rest.  Sidney denies pain or discomfort at this time. Sidney is up in bed with visitors at bedside, 2/4 side rails up, bed in lowest position, call light in easy reach, bed alarm off, and he denies any further needs. No other complications are noted, will continue to monitor throughout shift.

## 2019-04-09 NOTE — ED PROVIDER NOTES
wheezing, slightly more diminished at bases. Abdomen is soft. Patient with signs and symptoms consistent with pneumonia, with hypoxia will admit for further stabilization.   He does have increasing creatinine consistent with acute kidney injury but is not septic at this time, and I do not believe large volume fluid bolus would be beneficial although we will give him gentle fluids due to heart failure    [WL]      ED Course User Index  [WL] DO Stu Montenegro DO  04/09/19 8383

## 2019-04-09 NOTE — PROGRESS NOTES
RESPIRATORY THERAPY ASSESSMENT    Name:  Sushila [facility] Record Number:  [id number]  Age: 76 y.o. Gender: male  : 1945  Today's Date:  2019  Room:  [facility]0215-02    Assessment     Is the patient being admitted for a COPD or Asthma exacerbation? No   (If yes the patient will be seen every 4 hours for the first 24 hours and then reassessed)    Patient Admission Diagnosis      Allergies  No Known Allergies    Minimum Predicted Vital Capacity:     1129          Actual Vital Capacity:      1750              Pulmonary History:No history  Home Oxygen Therapy:  room air  Home Respiratory Therapy:Albuterol, Albuterol/Ipratropium Bromide HHN and Tiotropium bromide/Olodaterol   Current Respiratory Therapy:  duoneb QID          Respiratory Severity Index(RSI)   Patients with orders for inhalation medications, oxygen, or any therapeutic treatment modality will be placed on Respiratory Protocol. They will be assessed with the first treatment and at least every 72 hours thereafter. The following severity scale will be used to determine frequency of treatment intervention.     Smoking History: Pulmonary Disease or Smoking History, Greater than 15 pack year = 2    Social History  Social History     Tobacco Use    Smoking status: Former Smoker     Packs/day: 1.50     Years: 20.00     Pack years: 30.00     Types: Cigarettes     Last attempt to quit: 1/10/2000     Years since quittin.2    Smokeless tobacco: Never Used   Substance Use Topics    Alcohol use: No     Alcohol/week: 0.0 oz    Drug use: No       Recent Surgical History: None = 0  Past Surgical History  Past Surgical History:   Procedure Laterality Date    CATARACT REMOVAL WITH IMPLANT Right 2016    COLONOSCOPY  2015    colitis-mild gastritis    COLONOSCOPY N/A 10/25/2018    COLONOSCOPY POLYPECTOMY SNARE/COLD BIOPSY performed by Elsie Nogueira MD at [address] [place] [address]  2018    CYSTOSCOPY, DIRECT VISION reviewed, patient assessed. Plan of Care: continue patient on duoneb Q4WA    Electronically signed by Nydia Landa RCP on 4/9/2019 at 7:50 PM    Respiratory Protocol Guidelines     1. Assessment and treatment by Respiratory Therapy will be initiated for medication and therapeutic interventions upon initiation of aerosolized medication. 2. Physician will be contacted for respiratory rate (RR) greater than 35 breaths per minute. Therapy will be held for heart rate (HR) greater than 140 beats per minute, pending direction from physician. 3. Bronchodilators will be administered via Metered Dose Inhaler (MDI) with spacer when the following criteria are met:  a. Alert and cooperative     b. HR < 140 bpm  c. RR < 30 bpm                d. Can demonstrate a 2-3 second inspiratory hold  4. Bronchodilators will be administered via Hand Held Nebulizer [facility]) to patients when ANY of the following criteria are met  a. Incognizant or uncooperative          b. Patients treated with HHN at Home        c. Unable to demonstrate proper use of MDI with spacer     d. RR > 30 bpm   5. Bronchodilators will be delivered via Metered Dose Inhaler (MDI), HHN, Aerogen to intubated patients on mechanical ventilation. 6. Inhalation medication orders will be delivered and/or substituted as outlined below. Aerosolized Medications Ordering and Administration Guidelines:    1. All Medications will be ordered by a physician, and their frequency and/or modality will be adjusted as defined by the patients Respiratory Severity Index (RSI) score. 2. If the patient does not have documented COPD, consider discontinuing anticholinergics when RSI is less than 9.  3. If the bronchospasm worsens (increased RSI), then the bronchodilator frequency can be increased to a maximum of every 4 hours. If greater than every 4 hours is required, the physician will be contacted.   4. If the bronchospasm improves, the frequency of the bronchodilator can be

## 2019-04-09 NOTE — PROGRESS NOTES
[facility] Pulmonary, Critical Care, and Sleep    Outpatient Follow Up Note    Chief Complaint: Sick visit. COPD  Consulting provider: Eliz aMrrero DO    Interval History: 76 y.o. male Feels ill. SOB for 2-3 days. + wheezing. Cough with green sputum. Temp 100.3 at home. He has been using rescue treatments only BID    Initial HPI:The patient has a history of DM2, diastolic heart failure, dementia, COPD. The patient does have SOB and VILLARREAL. Exercise tolerance on level ground is limited by back pain more than SOB. Probably 1-2 blocks. Can go up a flight of stairs. No cough. +wheezes intermittently. The patient has smoked 1.5 PPD for 30 years. Quit in 2000.     Current Outpatient Medications:     furosemide (LASIX) 40 MG tablet, Take 1 tablet by mouth daily, Disp: 90 tablet, Rfl: 3    diltiazem (CARDIZEM CD) 120 MG extended release capsule, Take 1 capsule by mouth daily, Disp: 90 capsule, Rfl: 3    mesalamine (LIALDA) 1.2 g EC tablet, TAKE 1 TABLET THREE TIMES DAILY, Disp: 270 tablet, Rfl: 1    sertraline (ZOLOFT) 100 MG tablet, Take 1 tablet by mouth daily, Disp: 90 tablet, Rfl: 3    cyclobenzaprine (FLEXERIL) 10 MG tablet, Take 1 tablet by mouth 3 times daily as needed for Muscle spasms, Disp: 270 tablet, Rfl: 0    montelukast (SINGULAIR) 10 MG tablet, Take 1 tablet by mouth nightly, Disp: 90 tablet, Rfl: 3    pramipexole (MIRAPEX) 1 MG tablet, Take 1 tablet by mouth nightly, Disp: 90 tablet, Rfl: 3    donepezil (ARICEPT) 10 MG tablet, Take 1 tablet by mouth nightly, Disp: 90 tablet, Rfl: 1    ferrous sulfate 325 (65 Fe) MG tablet, Take 1 tablet by mouth daily (with breakfast), Disp: 90 tablet, Rfl: 2    colesevelam (WELCHOL) 625 MG tablet, TAKE 3 TABLETS TWICE DAILY, Disp: 540 tablet, Rfl: 1    apixaban (ELIQUIS) 5 MG TABS tablet, Take 1 tablet by mouth 2 times daily, Disp: 180 tablet, Rfl: 1    ipratropium-albuterol (DUONEB) 0.5-2.5 (3) MG/3ML SOLN nebulizer solution, Inhale 3 mLs into the lungs 4 times daily, Disp: 1620 mL, Rfl: 1    umeclidinium-vilanterol (ANORO ELLIPTA) 62.5-25 MCG/INH AEPB inhaler, Inhale 1 puff into the lungs daily, Disp: 3 each, Rfl: 1    tamsulosin (FLOMAX) 0.4 MG capsule, TAKE 1 CAPSULE EVERY DAY, Disp: 90 capsule, Rfl: 3    omeprazole (PRILOSEC) 40 MG delayed release capsule, TAKE 1 CAPSULE EVERY DAY, Disp: 90 capsule, Rfl: 3    metFORMIN (GLUCOPHAGE) 500 MG tablet, TAKE 1 TABLET TWICE DAILY, Disp: 180 tablet, Rfl: 1    oxyCODONE-acetaminophen (PERCOCET)  MG per tablet, Take 1 tablet by mouth every 6 hours as needed for Pain. ., Disp: , Rfl:     albuterol (PROVENTIL) (2.5 MG/3ML) 0.083% nebulizer solution, Take 3 mLs by nebulization every 6 hours as needed for Wheezing, Disp: 120 each, Rfl: 3    finasteride (PROSCAR) 5 MG tablet, Take 1 tablet by mouth daily Will shrink prostate; Affects PSA number, Disp: 30 tablet, Rfl: 5    glucose blood VI test strips (ASCENSIA AUTODISC VI;ONE TOUCH ULTRA TEST VI) strip, [employer] True Metrix Air Test Strips. FSBS daily. DX E11.9, Disp: 100 strip, Rfl: 3    TRUEPLUS LANCETS 28G MISC, 1 each by Does not apply route daily E11.9 Test FBS daily--NEED 33 GAUGE, Disp: 100 each, Rfl: 3    Objective:   PHYSICAL EXAM: BP (!) 104/56   Pulse 65   Resp 16   Ht 5' 10\" (1.778 m)   Wt 246 lb 3.2 oz (111.7 kg)   SpO2 (!) 89% Comment: on RA  BMI 35.33 kg/m²    Constitutional:  No acute distress. Eyes: PERRL. Conjunctivae anicteric. ENT: Normal nose. Normal tongue. Oropharynx clear. Neck:  Trachea is midline. No thyroid tenderness. Respiratory: No accessory muscle usage. decreased breath sounds. No wheezes. No rales. No Rhonchi. Cardiovascular: Normal S1S2. No digit clubbing. No digit cyanosis. No LE edema. Psychiatric: No anxiety or Agitation. Alert and Oriented to person, place and time. LABS:  Reviewed any pertinent new labs that are available.     PFTs   8/16/17  FVC  (%) FEV1 1.35 (42%) FEV1/FVC ratio 43   TLC  (%)  RV  (176%)   DLCO (70%) Bronchodilator response: +  3/8/18  FVC  (82%) FEV1 1.84 (59%) FEV1/FVC ratio 0.52  TLC  (99%)  RV  (128%)   DLCO (69%) Bronchodilator response: no    6MWT: 860 ft, 91%    IMAGING:  I personally reviewed and interpreted the following today in the office:   CXR:      Chest CT:    ASSESSMENT:  · Hypoxia  · Moderately Severe COPD with AE  · Fever  · Seasonal Allergic Rhinitis  · H/o aflutter and diastolic CHF  · CAD  · Afib  · DM2  · DVT     PLAN:   · Pt prefers an aerosol. Change from An[employer] to [employer].    · Change nebs to albuterol only  · Continue PRN albuterol INH and nebs  · The Pneumovax 23 and Flu Vaccine  Are UTD  · Pulm rehab declined  · on Eliquis   · F/u in 6 months  · Proceed to ED now for CXR, breathing treatment and possible admission for hypoxia/COPD AE

## 2019-04-09 NOTE — ED NOTES
Patient placed in gown and connected to cardiac monitor.   Wife at bedside and blood drawn to sent to lab     Landon Rashaad, [place]  04/09/19 [id number]

## 2019-04-09 NOTE — ED NOTES
Perfect serve message to Dr. Jay Tillman @ [address]  04/09/19 1734    Dr Jay Tillman returned the call to [facility] @ [address]  04/09/19 8439

## 2019-04-09 NOTE — ED NOTES
Patient placed on room air, dropped to 87% at rest, replaced 2 L NC, patient recovered to 93% at rest.     Luis Alfredo Dutton RN  04/09/19 6740

## 2019-04-10 NOTE — PROGRESS NOTES
Writer asked Dr. Chen Leonard about giving pt prn tramadol like he takes at home, Dr. Fabiano Beckett gave verbal order for med. Will inform pt.

## 2019-04-10 NOTE — H&P
Hospital Medicine History & Physical      PCP: Nii Rooney DO    Date of Admission: 4/9/2019    Date of Service: Pt seen/examined on 4/9/2019 and Admitted to Inpatient with expected LOS greater than two midnights due to medical therapy. Chief Complaint:  SOB      History Of Present Illness:      76 y.o. male presents with increased SOB. Patient states he has been noticing increased sob/difficulty breathing over the past few months, but notes worsening in the past several days. He has had a cough productive of clear sputum for a month, denies hemoptysis, chest pain, palpitations, leg edema, but notes increased orthopnea.  Fatigue and sob have progressed prompting current ED eval.    Past Medical History:          Diagnosis Date    Cancer ([facility] [address]     skin    Chronic pancreatitis ([facility] [address]     Colitis 5/08/2015    Diabetes mellitus ([facility] [address]     Esophagitis     Gastritis     Hyperlipidemia     Hypertension        Past Surgical History:          Procedure Laterality Date    CATARACT REMOVAL WITH IMPLANT Right 05/2016    COLONOSCOPY  5/08/2015    colitis-mild gastritis    COLONOSCOPY N/A 10/25/2018    COLONOSCOPY POLYPECTOMY SNARE/COLD BIOPSY performed by Ludy Bowers MD at [address] [place] [address]  09/21/2018    CYSTOSCOPY, DIRECT VISION INTERAL URETHROTOMY     HERNIA REPAIR Right 2012    inguinal    [place] OFFICE/OUTPT VISIT,PROCEDURE ONLY N/A 9/21/2018    CYSTOSCOPY, DIRECT VISION INTERAL URETHROTOMY performed by Claudeen Mcmurray, MD at [facility] Left 2001    Rotator cuff    SKIN CANCER EXCISION Left 5/2016    melanoma    TESTICLE REMOVAL Left 2012    UPPER GASTROINTESTINAL ENDOSCOPY  05/08/2015    Gastritis    UPPER GASTROINTESTINAL ENDOSCOPY N/A 09/08/2016    gastritis-Bx pending    UPPER GASTROINTESTINAL ENDOSCOPY  11/06/2018    UPPER GASTROINTESTINAL ENDOSCOPY N/A 11/6/2018    EGD BIOPSY performed by Ludy Bowers MD at [address] [place] [address] and rhythm with normal S1/S2 without murmurs, rubs or gallops. Abdomen: Soft, non-tender, non-distended with normal bowel sounds. Musculoskeletal:  No clubbing, cyanosis or edema bilaterally. Full range of motion without deformity. Skin: Skin color, texture, turgor normal.  No rashes or lesions. Neurologic:  Neurovascularly intact without any focal sensory/motor deficits. Cranial nerves: II-XII intact, grossly non-focal.  Psychiatric:  Alert and oriented, thought content appropriate, normal insight  Capillary Refill: Brisk,< 3 seconds   Peripheral Pulses: +2 palpable, equal bilaterally       Labs:     Recent Labs     04/09/19  1545   WBC 5.2   HGB 10.8*   HCT 33.8*        Recent Labs     04/09/19  1545      K 4.7   CL 98*   CO2 22   BUN 30*   CREATININE 1.8*   CALCIUM 9.2     Recent Labs     04/09/19  1545   AST 29   ALT 12   BILITOT 0.4   ALKPHOS 94     No results for input(s): INR in the last 72 hours. Recent Labs     04/09/19  1545   TROPONINI <0.01       Urinalysis:      Lab Results   Component Value Date    NITRU Negative 12/26/2018    WBCUA 6-10 12/26/2018    BACTERIA 1+ 12/26/2018    RBCUA >100 12/26/2018    BLOODU LARGE 12/26/2018    SPECGRAV 1.010 12/26/2018    GLUCOSEU Negative 12/26/2018       Radiology:          XR CHEST STANDARD (2 VW)   Final Result   New mild diffuse interstitial lung markings throughout both lungs favored to   represent interstitial pulmonary edema. This along with some pulmonary   vascular congestion suggest acute mild congestive heart failure and/or volume   overload.              ASSESSMENT:    Active Hospital Problems    Diagnosis Date Noted    COPD exacerbation ([facility] [address] [J44.1] 04/09/2019         PLAN:    1) COPD exac  - HHN's, solumedrol   - repeat lactic acid, BP improved  - abx for acute bronchitis    2) Atrial Fibrillation  - continue anticoagulation  - rate ok    3) FREDI  - hold lasix, suspect pre renal/ dehydration  - starting NS at 75 mL.hr  - will order urinalysis    4) DM  - corrective ISS ordered    DVT Prophylaxis: on systemic anticoagulation   Diet: DIET CARB CONTROL;  Code Status: Full Code         Omar Cisneros MD    Thank you Bayron Guzman DO for the opportunity to be involved in this patient's care. If you have any questions or concerns please feel free to contact me at [phone].

## 2019-04-10 NOTE — CARE COORDINATION
Case Management Assessment  Initial Evaluation    Date/Time of Evaluation: 4/10/2019 1:13 PM  Assessment Completed by: Samir Stevens    Patient Name: Lilia Downing  YOB: 1945  Diagnosis: COPD exacerbation ([address] [J44.1]  Date / Time: 4/9/2019  3:33 PM  Admission status/Date:4/9/2019 inpt  Chart Reviewed: Yes      Patient Interviewed: Yes   Family Interviewed:  No      Hospitalization in the last 30 days:  No    Contacts  :     Relationship to Patient:   Phone Number:    Alternate Contact:     Relationship to Patient:     Phone Number:    Met with:    Current PCP [address] required for SNF : Y, N        3 night stay required - Y, N    ADLS  Support Systems: Children, Spouse/Significant Other  Transportation: self    Meal Preparation: self    Housing  Home Environment: lives with spouse in 1 story house  Steps: 1  Plans to Return to Present Housing: Yes  Other Identified Issues:     Shweta Helm 78  Currently active with [address] : No  Type of Home Care Services: None  Passport/Waiver : No  :                      Phone Number:    Passport/Waiver Services:           [id number] Provider: none  Equipment: Walker__Cane__RTS__ BSC__Shower Chair__  02__ HHN_x CPAP__  BiPap__  Hospital Bed__ W/C___ Other__________      Has Home O2 in place on admit:  No  Informed of need to bring portable home O2 tank on day of discharge for nursing to connect prior to leaving:   Not Indicated  Verbalized agreement/Understanding:   Not Indicated    Community Service Affiliation  Dialysis:  No    · Name:  · Location  · Dialysis Schedule:  · Phone:   · Fax: Outpatient PT/OT: No    Cancer Center: No     CHF Clinic: No     Pulmonary Rehab: No  Pain Clinic: Yes - Dr Zoe Peck: No    Wound Clinic: No     Other:     DISCHARGE PLAN: Chart reviewed and role of dcp explained.  Pt is from house with spouse and is planning to return. Pt is IPTA +drives. Following for possible home O2. Explained Case Management role/services.

## 2019-04-10 NOTE — PROGRESS NOTES
Pt called out for pain 9/10 in back, prn meds given, see MAR. Will reassess and continue to monitor.

## 2019-04-10 NOTE — CONSULTS
Patient is being seen at the request of Herman Birch MD  for a consultation for COPD exacerbation    HISTORY OF PRESENT ILLNESS:   76years old with history of COPD presented with shortness of breath, progressive in the past several days. Cough with green sputum production- 2-3 times/day. Moderate to severe. No hemoptysis. Low-grade fever. Associated with wheezes. No hemoptysis. Dyspnea worse with exertion and better with resting. Seen in Dr. Gary Herrmann office yesterday and sent to the ER. To be in acute renal failure. Has been requiring up to 5 L O2 to keep sat above 80%- no home O2. No smmoking. No smoking.      PAST MEDICAL HISTORY:  Past Medical History:   Diagnosis Date    Cancer ([facility] [address]     skin    Chronic pancreatitis ([facility] [address]     Colitis 5/08/2015    Diabetes mellitus ([facility] [address]     Esophagitis     Gastritis     Hyperlipidemia     Hypertension      PAST SURGICAL HISTORY:  Past Surgical History:   Procedure Laterality Date    CATARACT REMOVAL WITH IMPLANT Right 05/2016    COLONOSCOPY  5/08/2015    colitis-mild gastritis    COLONOSCOPY N/A 10/25/2018    COLONOSCOPY POLYPECTOMY SNARE/COLD BIOPSY performed by Kai Henriquez MD at [address] [place] [address]  09/21/2018    CYSTOSCOPY, DIRECT VISION INTERAL URETHROTOMY     HERNIA REPAIR Right 2012    inguinal    [place] OFFICE/OUTPT VISIT,PROCEDURE ONLY N/A 9/21/2018    CYSTOSCOPY, DIRECT VISION INTERAL URETHROTOMY performed by Jessica Hartmann MD at [facility] Left 2001    Rotator cuff    SKIN CANCER EXCISION Left 5/2016    melanoma    TESTICLE REMOVAL Left 2012    UPPER GASTROINTESTINAL ENDOSCOPY  05/08/2015    Gastritis    UPPER GASTROINTESTINAL ENDOSCOPY N/A 09/08/2016    gastritis-Bx pending    UPPER GASTROINTESTINAL ENDOSCOPY  11/06/2018    UPPER GASTROINTESTINAL ENDOSCOPY N/A 11/6/2018    EGD BIOPSY performed by Kai Henriquez MD at [address] [place] [address]:  family history includes Cancer in his mother; Diabetes in his mother and son; Early Death in his mother; Other in his father. SOCIAL HISTORY:   reports that he quit smoking about 19 years ago. His smoking use included cigarettes. He has a 30.00 pack-year smoking history. He has never used smokeless tobacco.    Scheduled Meds:   finasteride  5 mg Oral Daily    tamsulosin  0.4 mg Oral Daily    pantoprazole  40 mg Oral QAM AC    apixaban  5 mg Oral BID    mesalamine  400 mg Oral TID    sertraline  50 mg Oral Daily    montelukast  10 mg Oral Nightly    pramipexole  1 mg Oral Nightly    donepezil  10 mg Oral Nightly    diltiazem  120 mg Oral Daily    ferrous sulfate  325 mg Oral Daily with breakfast    sodium chloride flush  10 mL Intravenous 2 times per day    methylPREDNISolone  40 mg Intravenous Q12H    Followed by   Les See ON 4/12/2019] predniSONE  40 mg Oral Daily    azithromycin  250 mg Oral Daily    insulin lispro  0-6 Units Subcutaneous TID WC    insulin lispro  0-3 Units Subcutaneous Nightly    ipratropium-albuterol  1 ampule Inhalation Q4H While awake     Continuous Infusions:   sodium chloride      dextrose       PRN Meds:  oxyCODONE-acetaminophen, sodium chloride flush, magnesium hydroxide, ondansetron, glucose, dextrose, glucagon (rDNA), dextrose, ipratropium-albuterol    ALLERGIES:  Patient has No Known Allergies. REVIEW OF SYSTEMS:  Constitutional: Negative for fever  HENT: Negative for sore throat  Eyes: Negative for redness   Respiratory:+  dyspnea, cough  Cardiovascular: Negative for chest pain  Gastrointestinal: Negative for vomiting, diarrhea   Genitourinary: Negative for hematuria   Musculoskeletal: Negative for arthralgias   Skin: Negative for rash  Neurological: Negative for syncope  Hematological: Negative for adenopathy  Psychiatric/Behavorial: Negative for anxiety    PHYSICAL EXAM:  Blood pressure 124/62, pulse 70, temperature 98.1 °F (36.7 °C), temperature source Oral, resp.  rate therapy  IV solumedrol 40 mg IV Q12 hrs.  Plan to switch to oral prednisone taper when improved  Zithromax and add ceftriaxone  Blood culture and Sputum GS&C  Acapella QID and Mucinex BID  Hold diuresis   Discussed with internal medicine

## 2019-04-10 NOTE — PROGRESS NOTES
IM Progress Note    Admit Date:  4/9/2019  1    Interval history:  Sent by pulmonology for increasing dyspnea  Admitted for copd exacerbation    Subjective:    Mr. Alicia Ribeiro seen up in bed , feels sob and now on 5 L oxygen  No cough or fevers    Objective:   /62   Pulse 70   Temp 98.1 °F (36.7 °C) (Oral)   Resp 16   Ht 5' 10\" (1.778 m)   Wt 246 lb 3.2 oz (111.7 kg)   SpO2 91%   BMI 35.33 kg/m²       Intake/Output Summary (Last 24 hours) at 4/10/2019 0759  Last data filed at 4/10/2019 0532  Gross per 24 hour   Intake --   Output 575 ml   Net -575 ml       Physical Exam:        General: elderly man   Awake, alert and oriented. Appears to be not in any distress  Mucous Membranes:  Pink , anicteric  Neck: No JVD, no carotid bruit, no thyromegaly  Chest:  Accessory muscle use, scattered wheeze , diminished in bases  Cardiovascular:  RRR S1S2 heard, no murmurs or gallops  Abdomen:  Soft,+distended, non tender, no organomegaly, BS present  Extremities: No edema or cyanosis.  Distal pulses well felt  Neurological : grossly normal      Medications:   Scheduled Medications:    finasteride  5 mg Oral Daily    tamsulosin  0.4 mg Oral Daily    pantoprazole  40 mg Oral QAM AC    apixaban  5 mg Oral BID    mesalamine  400 mg Oral TID    sertraline  50 mg Oral Daily    montelukast  10 mg Oral Nightly    pramipexole  1 mg Oral Nightly    donepezil  10 mg Oral Nightly    diltiazem  120 mg Oral Daily    ferrous sulfate  325 mg Oral Daily with breakfast    sodium chloride flush  10 mL Intravenous 2 times per day    methylPREDNISolone  40 mg Intravenous Q12H    Followed by   Daily Friday ON 4/12/2019] predniSONE  40 mg Oral Daily    azithromycin  250 mg Oral Daily    insulin lispro  0-6 Units Subcutaneous TID WC    insulin lispro  0-3 Units Subcutaneous Nightly    ipratropium-albuterol  1 ampule Inhalation Q4H While awake     I   sodium chloride      dextrose       oxyCODONE-acetaminophen, sodium chloride flush, magnesium hydroxide, ondansetron, glucose, dextrose, glucagon (rDNA), dextrose, ipratropium-albuterol    Lab Data:  Recent Labs     04/09/19  1545 04/10/19  0628   WBC 5.2 5.1   HGB 10.8* 10.7*   HCT 33.8* 33.3*   MCV 89.9 88.6    138     Recent Labs     04/09/19  1545 04/10/19  0628    134*   K 4.7 5.1   CL 98* 100   CO2 22 21   BUN 30* 28*   CREATININE 1.8* 1.2     Recent Labs     04/09/19  1545   TROPONINI <0.01       Coagulation:   Lab Results   Component Value Date    INR 0.99 01/19/2019    APTT 25.3 12/25/2018     Cardiac markers:   Lab Results   Component Value Date    CKTOTAL 205 04/18/2017    TROPONINI <0.01 04/09/2019         Lab Results   Component Value Date    ALT 12 04/09/2019    AST 29 04/09/2019    ALKPHOS 94 04/09/2019    BILITOT 0.4 04/09/2019       Lab Results   Component Value Date    INR 0.99 01/19/2019    INR 1.03 11/01/2018    INR 1.79 (H) 10/22/2018    PROTIME 11.3 01/19/2019    PROTIME 11.7 11/01/2018    PROTIME 20.4 (H) 10/22/2018       Radiology    Chest xray     New mild diffuse interstitial lung markings throughout both lungs favored to  represent interstitial pulmonary edema.  This along with some pulmonary  vascular congestion suggest acute mild congestive heart failure and/or volume  Overload. Cultures:   Pending     Assessment & Plan:       COPD exacerbation   CAP - evita increased interstitial markings   - likely gram positive organism  - HHN's, solumedrol , rocephin and azithromycin       Acute resp failure- hypoxic  -  Requiring 5 L today   -sec to above  - may need bipap if worsens     Elevated lactate - likely with bronchospams      Atrial Fibrillation paroxysmal  - rate controlled   - continue anticoagulation- ELIQUIS       FREDI  - hold lasix, suspect pre renal/ dehydration  - starting NS at 75 mL. hr     DM- 2   - corrective ISS ordered     DVT Prophylaxis: on systemic anticoagulation   Diet: DIET CARB CONTROL;  Code Status: Full Code        Isaac Gonzalez MD 4/10/2019 7:59 AM

## 2019-04-11 NOTE — PROGRESS NOTES
IM Progress Note    Admit Date:  4/9/2019  2    Interval history:  Sent by pulmonology for increasing dyspnea  Admitted for copd exacerbation  No acute events    Subjective:    Mr. Poncho Staton seen up in bed , feels slightly better today , now on 4 L oxygen  Minimal cough    Objective:   BP (!) 164/75   Pulse 82   Temp 97.1 °F (36.2 °C) (Oral)   Resp 18   Ht 5' 10\" (1.778 m)   Wt 246 lb 3.2 oz (111.7 kg)   SpO2 92%   BMI 35.33 kg/m²       Intake/Output Summary (Last 24 hours) at 4/11/2019 0829  Last data filed at 4/11/2019 0814  Gross per 24 hour   Intake 850 ml   Output 1700 ml   Net -850 ml       Physical Exam:        General: elderly man   Awake, alert and oriented. Appears to be not in any distress  Mucous Membranes:  Pink , anicteric  Neck: No JVD, no carotid bruit, no thyromegaly  Chest:  Improving evita air entry with , scattered wheeze , diminished in bases  Cardiovascular:  RRR S1S2 heard, no murmurs or gallops  Abdomen:  Soft,+distended, non tender, no organomegaly, BS present  Extremities: No edema or cyanosis.  Distal pulses well felt  Neurological : grossly normal      Medications:   Scheduled Medications:    cefTRIAXone (ROCEPHIN) IV  1 g Intravenous Q24H    guaiFENesin  600 mg Oral BID    finasteride  5 mg Oral Daily    tamsulosin  0.4 mg Oral Daily    pantoprazole  40 mg Oral QAM AC    apixaban  5 mg Oral BID    mesalamine  400 mg Oral TID    sertraline  50 mg Oral Daily    montelukast  10 mg Oral Nightly    pramipexole  1 mg Oral Nightly    donepezil  10 mg Oral Nightly    diltiazem  120 mg Oral Daily    ferrous sulfate  325 mg Oral Daily with breakfast    sodium chloride flush  10 mL Intravenous 2 times per day    methylPREDNISolone  40 mg Intravenous Q12H    Followed by   Les See ON 4/12/2019] predniSONE  40 mg Oral Daily    azithromycin  250 mg Oral Daily    insulin lispro  0-6 Units Subcutaneous TID WC    insulin lispro  0-3 Units Subcutaneous Nightly    ipratropium-albuterol 1 ampule Inhalation Q4H While awake     I   sodium chloride      dextrose       traMADol, oxyCODONE-acetaminophen, sodium chloride flush, magnesium hydroxide, ondansetron, glucose, dextrose, glucagon (rDNA), dextrose, ipratropium-albuterol    Lab Data:  Recent Labs     04/09/19  1545 04/10/19  0628   WBC 5.2 5.1   HGB 10.8* 10.7*   HCT 33.8* 33.3*   MCV 89.9 88.6    138     Recent Labs     04/09/19  1545 04/10/19  0628 04/11/19  0541    134* 134*   K 4.7 5.1 5.9*   CL 98* 100 99   CO2 22 21 24   BUN 30* 28* 25*   CREATININE 1.8* 1.2 0.8     Recent Labs     04/09/19  1545   TROPONINI <0.01       Coagulation:   Lab Results   Component Value Date    INR 0.99 01/19/2019    APTT 25.3 12/25/2018     Cardiac markers:   Lab Results   Component Value Date    CKTOTAL 205 04/18/2017    TROPONINI <0.01 04/09/2019         Lab Results   Component Value Date    ALT 12 04/09/2019    AST 29 04/09/2019    ALKPHOS 94 04/09/2019    BILITOT 0.4 04/09/2019       Lab Results   Component Value Date    INR 0.99 01/19/2019    INR 1.03 11/01/2018    INR 1.79 (H) 10/22/2018    PROTIME 11.3 01/19/2019    PROTIME 11.7 11/01/2018    PROTIME 20.4 (H) 10/22/2018       Radiology    Chest xray     New mild diffuse interstitial lung markings throughout both lungs favored to  represent interstitial pulmonary edema.  This along with some pulmonary  vascular congestion suggest acute mild congestive heart failure and/or volume  Overload.     Cultures:   Pending     Assessment & Plan:       COPD exacerbation   CAP - evita increased interstitial markings   - likely gram positive organism  - HHN's, solumedrol , rocephin and azithromycin       Acute resp failure- hypoxic  -  Requiring 4 L today - does not use any  -sec to above    Elevated lactate - likely with bronchospams- s.p IVF      Atrial Fibrillation paroxysmal  - rate controlled   - continue anticoagulation- ELIQUIS       FREDI  - hold lasix, suspect pre renal/ dehydration  - improved with NS   - hyperkalemia likely with steroids, add valtessa today     DM- 2   - corrective ISS ordered     DVT Prophylaxis: on systemic anticoagulation   Diet: DIET CARB CONTROL;  Code Status: Full Code        Vesna Bates MD 4/11/2019 8:29 AM

## 2019-04-11 NOTE — PROGRESS NOTES
Units Subcutaneous TID WC    insulin lispro  0-3 Units Subcutaneous Nightly    ipratropium-albuterol  1 ampule Inhalation Q4H While awake     Continuous Infusions:   sodium chloride      dextrose       PRN Meds:  traMADol, oxyCODONE-acetaminophen, sodium chloride flush, magnesium hydroxide, ondansetron, glucose, dextrose, glucagon (rDNA), dextrose, ipratropium-albuterol    Labs:  CBC:   Recent Labs     04/09/19  1545 04/10/19  0628   WBC 5.2 5.1   HGB 10.8* 10.7*   HCT 33.8* 33.3*   MCV 89.9 88.6    138     BMP:   Recent Labs     04/09/19  1545 04/10/19  0628 04/11/19  0541    134* 134*   K 4.7 5.1 5.9*   CL 98* 100 99   CO2 22 21 24   BUN 30* 28* 25*   CREATININE 1.8* 1.2 0.8     LIVER PROFILE:   Recent Labs     04/09/19  1545   AST 29   ALT 12   BILITOT 0.4   ALKPHOS 94     PT/INR: No results for input(s): PROTIME, INR in the last 72 hours. APTT: No results for input(s): APTT in the last 72 hours. UA:  Recent Labs     04/10/19  1405   COLORU Straw   PHUR 6.0   WBCUA 3-5   RBCUA 0-2   BACTERIA Rare*   CLARITYU Clear   SPECGRAV 1.015   LEUKOCYTESUR TRACE*   UROBILINOGEN 0.2   BILIRUBINUR Negative   BLOODU Negative   GLUCOSEU 250*     No results for input(s): PHART, TOO7HFN, PO2ART in the last 72 hours.   Cultures:   Bld: cns  Sputum: + GPC    Films:  No new    Assessment:  · Acute respiratory failure with hypoxemia  · Tracheobronchitis/possible pneumonia  · Moderate COPD with acute exacerbation  · Acute kidney injury  · CAD  · Afib  · Hx of DVT on eliquis      Plan:  · Supplemental oxygen to maintain SaO2 >92%; wean as tolerated   · Inhaled bronchodilators  · IV steroids  · Abx: Ceftriaxone and azithromycin  · Airway clearance

## 2019-04-11 NOTE — PROGRESS NOTES
Patient is sitting up in bed; states that he is very sob and that he has been doing this frequently lately at this time of night. Oxygen is on; pulse ox it 95% with O2 on at 4 L PNC. Contacted respiratory therapy and the therapist will come give breathing tx asap. Reinforced with patient to make sure he keeps the nasal cannula in his nares as it is often found to be out; understanding demonstrated. Pain medication given at this time for c/o back pain 8/10. Instructed patient on controlled breathing techniques; demonstrated understanding. Will continue to monitor.

## 2019-04-11 NOTE — CONSULTS
Pharmacy to dose Veltassa one time dose:    Pharmacy to dose Veltassa for hyperkalemia x one dose. Potassium = 5.9. Give Veltassa 8.4 grams po x 1 dose today. BMP ordered for tomorrow morning.

## 2019-04-12 NOTE — CARE COORDINATION
DISCHARGE ORDER  Date/Time 2019 5:02 PM  Completed by: Laurin Lombard, Case Management    Patient Name: Rock Chadwick    : 1945  Admitting Diagnosis: COPD exacerbation ([address] [J44.1]  Admit Date/Time: 2019  3:33 PM    Noted discharge order. Confirmed discharge plan with patient / family (pt): Yes   Discharge Plan: Reviewed chart. Role of discharge planner explained and patient verbalized understanding. Discharge order is noted. Pt is being d/c'd to home today. Pt's O2 sats are 92% on RA. Pt declines HHC. Discharge timeout done with Felicita Curry RN. All discharge needs and concerns addressed. No further discharge needs needed or noted.

## 2019-04-12 NOTE — PROGRESS NOTES
oxyCODONE-acetaminophen, sodium chloride flush, magnesium hydroxide, ondansetron, glucose, dextrose, glucagon (rDNA), dextrose, ipratropium-albuterol    Lab Data:  Recent Labs     04/09/19  1545 04/10/19  0628   WBC 5.2 5.1   HGB 10.8* 10.7*   HCT 33.8* 33.3*   MCV 89.9 88.6    138     Recent Labs     04/11/19  0541 04/11/19  1520 04/12/19  0447   * 134* 133*   K 5.9* 4.8 5.6*   CL 99 98* 96*   CO2 24 24 27   BUN 25* 24* 22*   CREATININE 0.8 0.8 0.8     Recent Labs     04/09/19  1545   TROPONINI <0.01       Coagulation:   Lab Results   Component Value Date    INR 0.99 01/19/2019    APTT 25.3 12/25/2018     Cardiac markers:   Lab Results   Component Value Date    CKTOTAL 205 04/18/2017    TROPONINI <0.01 04/09/2019         Lab Results   Component Value Date    ALT 12 04/09/2019    AST 29 04/09/2019    ALKPHOS 94 04/09/2019    BILITOT 0.4 04/09/2019       Lab Results   Component Value Date    INR 0.99 01/19/2019    INR 1.03 11/01/2018    INR 1.79 (H) 10/22/2018    PROTIME 11.3 01/19/2019    PROTIME 11.7 11/01/2018    PROTIME 20.4 (H) 10/22/2018       Radiology    Chest xray     New mild diffuse interstitial lung markings throughout both lungs favored to  represent interstitial pulmonary edema.  This along with some pulmonary  vascular congestion suggest acute mild congestive heart failure and/or volume  Overload.     Cultures:   NRF -sputum    Assessment & Plan:       COPD exacerbation   CAP - evita increased interstitial markings   - likely gram positive organism- sputum NRF  - HHN's, solumedrol , rocephin and azithromycin   - wean steroids  - improving symptoms     Acute resp failure- hypoxic  -  Requiring 3 L today - does not use any  -sec to above  - check ambulatory sats    Elevated lactate - likely with bronchospams- s.p IVF      Atrial Fibrillation paroxysmal  - rate controlled   - continue anticoagulation- ELIQUIS       FREDI  - hold lasix, suspect pre renal/ dehydration  - improved with  NS   - hyperkalemia likely with steroids, add valtessa again today     DM- 2   - corrective ISS ordered     DVT Prophylaxis: on systemic anticoagulation   Diet: DIET CARB CONTROL;  Code Status: Full Code    Dc planning    Bree Quiroga MD 4/12/2019 7:41 AM

## 2019-04-12 NOTE — PROGRESS NOTES
Pulmonary Progress Note    CC: shortness of breath     Subjective:   Patient feels less dyspnea. Minimal cough. Intake/Output Summary (Last 24 hours) at 4/12/2019 0833  Last data filed at 4/12/2019 0731  Gross per 24 hour   Intake 480 ml   Output 1650 ml   Net -1170 ml       Exam:   BP (!) 164/82   Pulse 82   Temp 98 °F (36.7 °C) (Oral)   Resp 18   Ht 5' 10\" (1.778 m)   Wt 246 lb 3.2 oz (111.7 kg)   SpO2 97%   BMI 35.33 kg/m²  on 4 L nasal cannula  Gen: No distress. Alert. Comfortable. Eyes: PERRL. No sclera icterus. No conjunctival injection. ENT: No discharge. Pharynx clear. Neck: Trachea midline. Normal thyroid. Resp: No accessory muscle use. No crackles. fewl wheezes. Few rhonchi. No dullness on percussion. CV: Regular rate. Regular rhythm. No murmur or rub. No edema. GI: Non-tender. Non-distended. Skin: Warm and dry. No nodule on exposed extremities. No rash on exposed extremities. Lymph: No cervical LAD. No supraclavicular LAD. M/S: No cyanosis. No joint deformity. No clubbing. Neuro: Awake. Moves all extremities. CN grossly intact. Psych: Oriented x 3. No anxiety or agitation.      Scheduled Meds:   cefTRIAXone (ROCEPHIN) IV  1 g Intravenous Q24H    guaiFENesin  600 mg Oral BID    finasteride  5 mg Oral Daily    tamsulosin  0.4 mg Oral Daily    pantoprazole  40 mg Oral QAM AC    apixaban  5 mg Oral BID    mesalamine  400 mg Oral TID    sertraline  50 mg Oral Daily    montelukast  10 mg Oral Nightly    pramipexole  1 mg Oral Nightly    donepezil  10 mg Oral Nightly    diltiazem  120 mg Oral Daily    ferrous sulfate  325 mg Oral Daily with breakfast    sodium chloride flush  10 mL Intravenous 2 times per day    predniSONE  40 mg Oral Daily    azithromycin  250 mg Oral Daily    insulin lispro  0-6 Units Subcutaneous TID WC    insulin lispro  0-3 Units Subcutaneous Nightly    ipratropium-albuterol  1 ampule Inhalation Q4H While awake     Continuous Infusions:   dextrose       PRN Meds:  traMADol, sodium chloride flush, magnesium hydroxide, ondansetron, glucose, dextrose, glucagon (rDNA), dextrose, ipratropium-albuterol    Labs:  CBC:   Recent Labs     04/09/19  1545 04/10/19  0628   WBC 5.2 5.1   HGB 10.8* 10.7*   HCT 33.8* 33.3*   MCV 89.9 88.6    138     BMP:   Recent Labs     04/11/19  0541 04/11/19  1520 04/12/19  0447   * 134* 133*   K 5.9* 4.8 5.6*   CL 99 98* 96*   CO2 24 24 27   BUN 25* 24* 22*   CREATININE 0.8 0.8 0.8     LIVER PROFILE:   Recent Labs     04/09/19  1545   AST 29   ALT 12   BILITOT 0.4   ALKPHOS 94     PT/INR: No results for input(s): PROTIME, INR in the last 72 hours. APTT: No results for input(s): APTT in the last 72 hours. UA:  Recent Labs     04/10/19  1405   COLORU Straw   PHUR 6.0   WBCUA 3-5   RBCUA 0-2   BACTERIA Rare*   CLARITYU Clear   SPECGRAV 1.015   LEUKOCYTESUR TRACE*   UROBILINOGEN 0.2   BILIRUBINUR Negative   BLOODU Negative   GLUCOSEU 250*     No results for input(s): PHART, WYP9AKR, PO2ART in the last 72 hours.   Cultures:   Bld: cns  Sputum: + GPC    Films:  No new    Assessment:  · Acute respiratory failure with hypoxemia  · Tracheobronchitis/possible pneumonia  · Moderate COPD with acute exacerbation  · Acute kidney injury  · CAD  · Afib  · Hx of DVT on eliquis      Plan:  · Supplemental oxygen to maintain SaO2 >92%; wean as tolerated   · Inhaled bronchodilators  · IV steroids-> pred taper  · Abx: Ceftriaxone and azithromycin- could switch to doxy for 4 more days  · Airway clearance

## 2019-04-12 NOTE — CARE COORDINATION
INTERDISCIPLINARY PLAN OF CARE CONFERENCE    Date/Time: 4/12/2019 1:59 PM  Completed by: Hilda Malone Case Management      Patient Name:  Zora Devries  YOB: 1945  Admitting Diagnosis: COPD exacerbation [facility]) [J44.1]     Admit Date/Time:  4/9/2019  3:33 PM    Chart reviewed. Interdisciplinary team met to discuss patient progress and discharge plans. Disciplines included Case Management, Nursing, and Dietitian. Current Status: Inpatient 4/9/2019    PT/OT recommendation: NA    Anticipated Discharge Date: TBD  Expected D/C Disposition:  Home  Confirmed plan with patient  Yes  Discharge Plan Comments: Chart reviewed. Met with pt at bedside and explained the role of the CM. Plan: Home. Denies HC or DME needs. Cm following for possible home O2 needs. Currently saturating 93% on RA.      Home O2 in place on admit: No  Pt informed of need to bring portable home O2 tank on day of discharge for nursing to connect prior to leaving:  Not Indicated  Verbalized agreement/Understanding:  Not Indicated

## 2019-04-12 NOTE — PROGRESS NOTES
Pt called out for pain 8/10 in back, prn meds given, see MAR. Will reassess and continue to monitor.

## 2019-04-12 NOTE — PROGRESS NOTES
Shift report received from Ricardo Newton [facility]. Remains on 4 L O2 PNC. States feeling better this evening. Denies needs/concerns at this time. Call light in reach.

## 2019-04-12 NOTE — FLOWSHEET NOTE
04/12/19 0854   Vital Signs   Temp 97.4 °F (36.3 °C)   Temp Source Oral   Pulse 82   Heart Rate Source Monitor   Resp 18   BP (!) 155/86   BP Location Right upper arm   BP Upper/Lower Upper   Patient Position Lying left side   Level of Consciousness 0   MEWS Score 1   Patient Currently in Pain Denies   Pain Assessment   Pain Assessment 0-10   Pain Level 0   Oxygen Therapy   SpO2 94 %   O2 Device Nasal cannula   O2 Flow Rate (L/min) 3 L/min   AM assessment complete and morning meds given per order, see MAR. Respirations e/e. Pt decreased to 3 L this am and tolerating well. Will plan to gradually taper. Pt denies any needs. Bed alarm is off but pt is aware of fall risks. Call light within reach, will continue to monitor.

## 2019-04-12 NOTE — PROGRESS NOTES
Shift assessment completed. Patient reports that he is feeling better but knows he still needs hospitalization. Oxygen remains at 4 L [facility]; respirs e/e at this time. Watching TV; call light in reach. Will continue to monitor.

## 2019-04-12 NOTE — PROGRESS NOTES
IV removed. Discharge paperwork reviewed with pt and his wife, both verbalize understanding and state no questions. Transport called for pt. Pt has gathered all belongings.

## 2019-04-15 NOTE — TELEPHONE ENCOUNTER
Pharmacy called in stating that patient should not be on Sotalol and Levaquin at the same time. Saw note from Dr. Prem Plascencia regarding patient stopping abx. Relayed message to pharmacy. They will also let patient know he cant take both.

## 2019-04-15 NOTE — PROGRESS NOTES
[place] [address]   CARDIAC EVALUATION NOTE  [phone]      PCP:  Samaria Hancock DO    Reason for Consultation/Chief Complaint: SOB    Subjective   History of Present Illness:  Lynn Gaston is a 76 y.o. patient with a history of chronic pancreatitis, CAD, DHF, PAF, HTN, HLD and DM who presents for hospital follow up. He was initially admitted on 12/21/2018 with fatigue and weakness. EKG showed sinus bradycardia with a HR of 33 and potassium was 7.2. Required CRRT for several days. He had a temporary pacemaker placed on 12/21/2018 and it was removed on 12/24. His echo from 12/21/18 showed his EF was 55% with mild concentric LVH. Converted to afib with RVR on 12/26 at 2:33am, was started on IV Cardizem, and spontaneously converted to sinus on 12/26/2018 at 10:57am. He has a history of anemia requiring blood transfusion. He is currently on Eliquis for a DVT. His stress test from 01/29/19 showed no evidence of myocardial ischemia or scar. He was admitted 04/09-04/12/19 with COPD exacerbation and hyperkalemia with a K+ of 5.9. He received one dose of Veltassa 8.4 gm. He also received steroids. Today he reports he has increased SOB. His EKG today shows AFib . He denies CP, dizziness, or syncope. Past Medical History:   has a past medical history of Cancer ([facility] [address] Chronic pancreatitis ([facility] [address] Colitis, Diabetes mellitus ([facility] [address] Esophagitis, Gastritis, Hyperlipidemia, and Hypertension. Surgical History:   has a past surgical history that includes Testicle removal (Left, 2012); shoulder surgery (Left, 2001); Upper gastrointestinal endoscopy (05/08/2015); Colonoscopy (5/08/2015); hernia repair (Right, 2012); Skin cancer excision (Left, 5/2016); Cataract removal with implant (Right, 05/2016); Upper gastrointestinal endoscopy (N/A, 09/08/2016); Cystoscopy (09/21/2018); pr office/outpt visit,procedure only (N/A, 9/21/2018); Colonoscopy (N/A, 10/25/2018);  Upper gastrointestinal endoscopy (11/06/2018); and Upper gastrointestinal endoscopy (N/A, 11/6/2018). Social History:   reports that he quit smoking about 19 years ago. His smoking use included cigarettes. He has a 30.00 pack-year smoking history. He has never used smokeless tobacco. He reports that he does not drink alcohol or use drugs. Family History:  family history includes Cancer in his mother; Diabetes in his mother and son; Early Death in his mother; Other in his father. Home Medications:  Were reviewed and are listed in nursing record and/or below  Prior to Admission medications    Medication Sig Start Date End Date Taking? Authorizing Provider   levofloxacin (LEVAQUIN) 500 MG tablet Take 1 tablet by mouth daily for 5 days 4/12/19 4/17/19 Yes Shakeel Saini MD   predniSONE (DELTASONE) 10 MG tablet 40 mg x 3 days, 30 mg x 3 days, 20 mg x 3 days, 10 mg x 3 days then stop 4/12/19  Yes Shakeel Saini MD   traMADol (ULTRAM) 50 MG tablet Take 50 mg by mouth every 12 hours as needed for Pain.    Yes Historical Provider, MD   Tiotropium Bromide-Olodaterol (STIOLTO RESPIMAT) 2.5-2.5 MCG/ACT AERS 2 inhalations daily 4/9/19  Yes Kevin Young MD   furosemide (LASIX) 40 MG tablet Take 1 tablet by mouth daily 3/29/19  Yes Alexander Rivas MD   diltiazem (CARDIZEM CD) 120 MG extended release capsule Take 1 capsule by mouth daily 3/29/19  Yes Alexander Rivas MD   mesalamine (LIALDA) 1.2 g EC tablet TAKE 1 TABLET THREE TIMES DAILY 3/26/19  Yes Jeffrey Love DO   sertraline (ZOLOFT) 100 MG tablet Take 1 tablet by mouth daily 3/26/19  Yes Jeffrey Love DO   cyclobenzaprine (FLEXERIL) 10 MG tablet Take 1 tablet by mouth 3 times daily as needed for Muscle spasms 3/26/19  Yes Ryan Love DO   montelukast (SINGULAIR) 10 MG tablet Take 1 tablet by mouth nightly 3/26/19  Yes Ryan Love DO   pramipexole (MIRAPEX) 1 MG tablet Take 1 tablet by mouth nightly 3/26/19  Yes Ryan Love,    donepezil (ARICEPT) 10 MG tablet Take 1 tablet by mouth nightly 3/26/19  Yes Ryan Love DO   ferrous sulfate 325 (65 Fe) MG tablet Take 1 tablet by mouth daily (with breakfast) 3/26/19  Yes SHAYY Green - CNP   [facility]) 625 MG tablet TAKE 3 TABLETS TWICE DAILY 2/19/19  Yes Ryan Love DO   apixaban (ELIQUIS) 5 MG TABS tablet Take 1 tablet by mouth 2 times daily 2/11/19  Yes Ben Love DO   ipratropium-albuterol (DUONEB) 0.5-2.5 (3) MG/3ML SOLN nebulizer solution Inhale 3 mLs into the lungs 4 times daily 2/7/19 11/4/19 Yes Michelle Hernández MD   tamsulosin (FLOMAX) 0.4 MG capsule TAKE 1 CAPSULE EVERY DAY 2/5/19  Yes Ryan Love DO   omeprazole (PRILOSEC) 40 MG delayed release capsule TAKE 1 CAPSULE EVERY DAY 2/5/19  Yes Ryan Love DO   metFORMIN (GLUCOPHAGE) 500 MG tablet TAKE 1 TABLET TWICE DAILY 2/5/19  Yes Ryan Love DO   oxyCODONE-acetaminophen (PERCOCET)  MG per tablet Take 1 tablet by mouth every 6 hours as needed for Pain. .   Yes Historical Provider, MD   albuterol (PROVENTIL) (2.5 MG/3ML) 0.083% nebulizer solution Take 3 mLs by nebulization every 6 hours as needed for Wheezing 9/11/18  Yes Oksana Rodriges MD   glucose blood VI test strips (ASCENSIA AUTODISC VI;ONE TOUCH ULTRA TEST VI) strip [employer] Air Test Strips. FSBS daily. DX E11.9 5/16/18  Yes Ryan Love DO   TRUEPLUS LANCETS 28G MISC 1 each by Does not apply route daily E11.9 Test FBS daily--NEED 33 GAUGE 5/1/17  Yes Ryan Love DO   finasteride (PROSCAR) 5 MG tablet Take 1 tablet by mouth daily Will shrink prostate; Affects PSA number 9/21/18 12/20/18  Mahad Chacon MD          Allergies:  Patient has no known allergies. Review of Systems:   A 14 point review of symptoms completed. Pertinent positives identified in the HPI, all other review of symptoms negative as below.       Objective   PHYSICAL EXAM:    Vitals:    04/15/19 1303   BP: 110/60   Pulse:    SpO2:     Weight: 235 lb 12.8 oz (107 kg) General Appearance:  Alert, cooperative, no distress, appears stated age   Head:  Normocephalic, without obvious abnormality, atraumatic   Eyes:  PERRL, conjunctiva/corneas clear   Nose: Nares normal, no drainage or sinus tenderness   Throat: Lips, mucosa, and tongue normal   Neck: Supple, symmetrical, trachea midline, no adenopathy, thyroid: not enlarged, symmetric, no tenderness/mass/nodules, no carotid bruit or JVD   Lungs:   Clear to auscultation bilaterally, respirations unlabored   Chest Wall:  No deformity or tenderness   Heart:  irRegular rate and rhythm, S1, S2 normal, no murmur, rub or gallop   Abdomen:   Soft, non-tender, bowel sounds active all four quadrants,  no masses, no organomegaly   Extremities: Extremities normal, atraumatic, no cyanosis , tc le edema   Pulses: 2+ and symmetric   Skin: Skin color, texture, turgor normal, no rashes or lesions   Pysch: Normal mood and affect   Neurologic: Normal gross motor and sensory exam.         Labs   CBC:   Lab Results   Component Value Date    WBC 5.1 04/10/2019    RBC 3.76 04/10/2019    HGB 10.7 04/10/2019    HCT 33.3 04/10/2019    MCV 88.6 04/10/2019    RDW 15.3 04/10/2019     04/10/2019     CMP:  Lab Results   Component Value Date     2019    K 4.5 2019    K 5.1 04/10/2019    CL 95 2019    CO2 26 2019    BUN 21 2019    CREATININE 0.8 2019    GFRAA >60 2019    AGRATIO 1.2 2019    LABGLOM >60 2019    GLUCOSE 134 2019    PROT 7.6 2019    CALCIUM 10.3 2019    BILITOT 0.4 2019    ALKPHOS 94 2019    AST 29 2019    ALT 12 2019     PT/INR:  No results found for: PTINR  HgBA1c:  Lab Results   Component Value Date    LABA1C 6.0 2018     Lab Results   Component Value Date    CKTOTAL 205 2017    TROPONINI <0.01 2019         Cardiac Data     Last EK19  SB PACs  HR 56.     Today afib w/ rvr    Echo: 18   Technically difficult examination. Normal systolic function with an estimated ejection fraction of 55%. Mild concentric left ventricular hypertrophy. No regional wall motion abnormalities are seen. Normal left ventricular diastolic filling pressure. The right atrium and ventricle are mildly dilated with normal function. No significant valvular heart disease. Stress Test: 01/29/19  Summary  There is normal isotope uptake at stress and rest. There is no evidence of  myocardial ischemia or scar. LV function is normal with uniform wall motion  and ejection fraction of 65%. Low risk study. Cath:     Studies:       I have reviewed labs and imaging/xray/diagnostic testing in this note.     Assessment        Patient Active Problem List   Diagnosis    DM II (diabetes mellitus, type II), controlled ([address]    HLD (hyperlipidemia)    Unstable angina (HCC)    Diabetic neuropathy (HCC)    RLS (restless legs syndrome)    Insomnia    Coronary artery disease involving native coronary artery of native heart without angina pectoris    Seasonal allergic rhinitis    Controlled type 2 diabetes mellitus without complication, without long-term current use of insulin (HCC)    HOLLAND (obstructive sleep apnea)    Essential hypertension    COPD, severe ([address]    Chronic bilateral low back pain without sciatica    Restless legs syndrome (RLS)    Mixed hyperlipidemia    Depression    Fall at home    T12 vertebral burst fracture    Colitis    Former smoker    Typical atrial flutter (HCC)    Gastroesophageal reflux disease without esophagitis    Short-term memory loss    Nocturia    BPH (benign prostatic hyperplasia)    Rectal bleed    Acute blood loss anemia    Paroxysmal atrial fibrillation (HCC)    Obesity    Anemia    Symptomatic anemia    Acute on chronic diastolic congestive heart failure (HCC)    Chronic diastolic heart failure (HCC)    Hyperkalemia    Bradycardia    FREDI (acute kidney injury) ([address]    Hypotension    Hypovolemic shock (HCC)    COPD exacerbation (HCC)    Acute hypoxemic respiratory failure ([facility] [address]    Community acquired pneumonia    COPD with acute exacerbation (HCC)    Tracheobronchitis    Acute renal failure (HCC)                Plan   1. 2 week cardiac monitor to evaluate atrial fib  2. Start sotalol 80 mg twice daily, addend, med list has levofloxacin, have called pt to dc this while on sotalol d/t concerns for qt prolongation. 3. Repeat EKG on Thursday   4. BMP on Thursday  5. Discussed cardioversion next week, r/b/a/o d/w pt and family  6. Follow up after procedure      Scribe's attestation: This note was scribed in the presence of Dr. Saurabh Romero by Balta Santiago RN      Thank you for allowing us to participate in the care of [employer]. Please call me with any questions [phone]. Saurabh Romero MD, [facility]   Interventional Cardiologist  [place] [address]  [phone] [facility]  [phone] [address] [place] [address]  4/15/2019 1:19 PM    I will address the patient's cardiac risk factors and adjusted pharmacologic treatment as needed. In addition, I have reinforced the need for patient directed risk factor modification. Tobacco use was discussed with the patient and educated on the negative effects and was asked not to use. All questions and concerns were addressed to the patient/family. Alternatives to my treatment were discussed. I, Dr Saurabh Romero, personally performed the services described in this documentation, as scribed by the above signed scribe in my presence. It is both accurate and complete to my knowledge. I agree with the details independently gathered by the clinical support staff and the scribed note accurately describes my personal service to the patient.

## 2019-04-15 NOTE — TELEPHONE ENCOUNTER
SONIAOVM to stop levaquin and start sotalol as discussed in office today. Will try again to ensure he received message.

## 2019-04-15 NOTE — LETTER
[address] [place] [address] - [address] [place] [address]  Phone: [phone]  Fax: [phone]    Robyn Chavria MD        April 15, 2019     Patient: Brian Perry   YOB: 1945   Date of Visit: 4/15/2019       To Whom It May Concern: It is my medical opinion that Tresa Sun requires a disability parking placard for the following reasons:  He cannot walk 200 feet without stopping to rest.  Duration of need: 5 years    If you have any questions or concerns, please don't hesitate to call.     Sincerely,         Robyn Chavira MD

## 2019-04-16 NOTE — TELEPHONE ENCOUNTER
Please call patient to make sure he is not taking levofloxacin with sotalol. He should stop levofloxacin. Thanks.

## 2019-04-19 NOTE — TELEPHONE ENCOUNTER
Also, let him know I would recommend nonurgent referral to EP for consideration of options for tx for afib. Let him know ekg shows NSR, does not need cardioversion.        I spoke with Krystian, relayed ekg and made [facility]PS appt 5/29. Pt verbalized understanding. I also relayed lab results per SRJ. Pt states he is taking lasix. I placed bmp order in epic for pt to have done early next week, he verbalized understanding. Does eliquis or sotalol need adjusted since he is on lasix?

## 2019-04-19 NOTE — TELEPHONE ENCOUNTER
Lets have him stop lasix as they may be affecting kidney function. No changes in sotalol/eliquis until we get f/u labs. Thanks.

## 2019-04-19 NOTE — TELEPHONE ENCOUNTER
Beth Encarnacion, patient's wife called in and requested the message from Dr. Fredi Saleem, as patient stated he did not understand. Let her know he should stop his lasix, have his blood drawn Monday or Tuesday next week and let her know that he will not need the cardioversion since he is in NSR on his EKG. She repeated back instructions, verbalized understanding and agreed with the plan.

## 2019-04-19 NOTE — TELEPHONE ENCOUNTER
----- Message from Lul Tabor MD sent at 4/18/2019  8:17 PM EDT -----  Let him know kidney function is worse, if taking lasix, need to stop that,  Need repeat bmp soon, not later than Monday/tuesdy next week. If not taking lasix, will also need to adjust eliquis and sotalol, let me know if that is the case. He should check with pcp about metformin too given abnormal kidney findings. Thanks.

## 2019-04-22 NOTE — TELEPHONE ENCOUNTER
Patient's wife states that heart doctor want patient to get in to be see by his PCP Bina DOMINIQUEr's wife is aware of doctors full schedule and would like to be squeezed soemtime this week.  Best call back number [phone]

## 2019-04-22 NOTE — TELEPHONE ENCOUNTER
Patient's wife called back and states she received a call from the hospital today stating that Debbie Nichols was scheduled for a cardioversion. Cardioversion cancelled due to patient being in NSR. Patient's wife just calling to make sure cardioversion is not needed. Patient is also wearing a monitor and wants to know if he should continue wearing this.

## 2019-04-23 NOTE — TELEPHONE ENCOUNTER
[employer] calling in to reportt patient had a Genita Buffy episode of HR of 30 lasting for 60 seconds. They are faxing the report. I will watch for this and rout to [address] [place]

## 2019-04-24 NOTE — PROGRESS NOTES
4/24/2019    This is a 76 y.o. male who presents for  Chief Complaint   Patient presents with    Follow-Up from Hospital    Chronic Kidney Disease       HPI:     Per Cardio, was supposed to see PCP ASAP  PCP's schedule was full so pt was scheduled today with me    Is worried about kidney function  Stopped lasix 3-4 days ago   Was taking daily 40 mg     + dysuria  Goes often at every 15 mins   Slow stream   + dribbling   Some incontinence, can't make it to the bathroom   No hematuria   Has enlarged prostate  No abd pain or flank pain   No fevers, chills, n/v   UOP baseline     Had prostate surgery scheduled and couln't perform due to HR of 190s  Started blood thinners and had hemorrhage while hospitalized    Was told to cut sotalol in half and take twice daily  Did not take this morning or yesterday      SOB the same, no CP, palpitations, Dizziness, visual changes, HA's. No new or worsening orthopnea, uses 1 pillow at night.      4 bottles a day of water      Past Medical History:   Diagnosis Date    Cancer ([facility] [address] [facility]     skin    Chronic pancreatitis ([facility]     Colitis 5/08/2015    Diabetes mellitus ([facility] [address] [facility]     Esophagitis     Gastritis     Hyperlipidemia     Hypertension        Past Surgical History:   Procedure Laterality Date    CATARACT REMOVAL WITH IMPLANT Right 05/2016    COLONOSCOPY  5/08/2015    colitis-mild gastritis    COLONOSCOPY N/A 10/25/2018    COLONOSCOPY POLYPECTOMY SNARE/COLD BIOPSY performed by Hayes Jarquin MD at [address] [place] [address]  09/21/2018    CYSTOSCOPY, DIRECT VISION INTERAL URETHROTOMY     HERNIA REPAIR Right 2012    inguinal    IA OFFICE/OUTPT VISIT,PROCEDURE ONLY N/A 9/21/2018    CYSTOSCOPY, DIRECT VISION INTERAL URETHROTOMY performed by Vipin Stewart MD at [facility] Left 2001    Rotator cuff    SKIN CANCER EXCISION Left 5/2016    melanoma    TESTICLE REMOVAL Left 2012    UPPER GASTROINTESTINAL ENDOSCOPY  05/08/2015 Gastritis    UPPER GASTROINTESTINAL ENDOSCOPY N/A 2016    gastritis-Bx pending    UPPER GASTROINTESTINAL ENDOSCOPY  2018    UPPER GASTROINTESTINAL ENDOSCOPY N/A 2018    EGD BIOPSY performed by Boris Conde MD at [address] [place] [address] History     Socioeconomic History    Marital status:      Spouse name: Not on file    Number of children: Not on file    Years of education: Not on file    Highest education level: Not on file   Occupational History    Not on file   Social Needs    Financial resource strain: Not on file    Food insecurity:     Worry: Not on file     Inability: Not on file    Transportation needs:     Medical: Not on file     Non-medical: Not on file   Tobacco Use    Smoking status: Former Smoker     Packs/day: 1.50     Years: 20.00     Pack years: 30.00     Types: Cigarettes     Last attempt to quit: 1/10/2000     Years since quittin.2    Smokeless tobacco: Never Used   Substance and Sexual Activity    Alcohol use: No     Alcohol/week: 0.0 oz    Drug use: No    Sexual activity: Yes     Partners: Female   Lifestyle    Physical activity:     Days per week: Not on file     Minutes per session: Not on file    Stress: Not on file   Relationships    Social connections:     Talks on phone: Not on file     Gets together: Not on file     Attends Congregation service: Not on file     Active member of club or organization: Not on file     Attends meetings of clubs or organizations: Not on file     Relationship status: Not on file    Intimate partner violence:     Fear of current or ex partner: Not on file     Emotionally abused: Not on file     Physically abused: Not on file     Forced sexual activity: Not on file   Other Topics Concern    Not on file   Social History Narrative    Not on file       Family History   Problem Relation Age of Onset    Diabetes Mother     Early Death Mother     Cancer Mother         stomach    Other Father     Diabetes Son        Current Outpatient Medications   Medication Sig Dispense Refill    sotalol (BETAPACE) 80 MG tablet Take 1 tablet by mouth 2 times daily 60 tablet 5    traMADol (ULTRAM) 50 MG tablet Take 50 mg by mouth every 12 hours as needed for Pain.  Tiotropium Bromide-Olodaterol (STIOLTO RESPIMAT) 2.5-2.5 MCG/ACT AERS 2 inhalations daily 3 Inhaler 1    diltiazem (CARDIZEM CD) 120 MG extended release capsule Take 1 capsule by mouth daily 90 capsule 3    mesalamine (LIALDA) 1.2 g EC tablet TAKE 1 TABLET THREE TIMES DAILY 270 tablet 1    sertraline (ZOLOFT) 100 MG tablet Take 1 tablet by mouth daily 90 tablet 3    cyclobenzaprine (FLEXERIL) 10 MG tablet Take 1 tablet by mouth 3 times daily as needed for Muscle spasms 270 tablet 0    montelukast (SINGULAIR) 10 MG tablet Take 1 tablet by mouth nightly 90 tablet 3    pramipexole (MIRAPEX) 1 MG tablet Take 1 tablet by mouth nightly 90 tablet 3    donepezil (ARICEPT) 10 MG tablet Take 1 tablet by mouth nightly 90 tablet 1    ferrous sulfate 325 (65 Fe) MG tablet Take 1 tablet by mouth daily (with breakfast) 90 tablet 2    colesevelam (WELCHOL) 625 MG tablet TAKE 3 TABLETS TWICE DAILY 540 tablet 1    apixaban (ELIQUIS) 5 MG TABS tablet Take 1 tablet by mouth 2 times daily 180 tablet 1    ipratropium-albuterol (DUONEB) 0.5-2.5 (3) MG/3ML SOLN nebulizer solution Inhale 3 mLs into the lungs 4 times daily 1620 mL 1    tamsulosin (FLOMAX) 0.4 MG capsule TAKE 1 CAPSULE EVERY DAY 90 capsule 3    omeprazole (PRILOSEC) 40 MG delayed release capsule TAKE 1 CAPSULE EVERY DAY 90 capsule 3    metFORMIN (GLUCOPHAGE) 500 MG tablet TAKE 1 TABLET TWICE DAILY 180 tablet 1    oxyCODONE-acetaminophen (PERCOCET)  MG per tablet Take 1 tablet by mouth every 6 hours as needed for Pain. Vinod Ribeiro finasteride (PROSCAR) 5 MG tablet Take 1 tablet by mouth daily Will shrink prostate;   Affects PSA number 30 tablet 5    albuterol (PROVENTIL) (2.5 MG/3ML) 0.083% nebulizer solution Take 3 mLs by nebulization every 6 hours as needed for Wheezing 120 each 3    glucose blood VI test strips (ASCENSIA AUTODISC VI;ONE TOUCH ULTRA TEST VI) strip [employer] True Metrix Air Test Strips. FSBS daily. DX E11.9 100 strip 3    TRUEPLUS LANCETS 28G MISC 1 each by Does not apply route daily E11.9 Test FBS daily--NEED 33 GAUGE 100 each 3     No current facility-administered medications for this visit. Immunization History   Administered Date(s) Administered    Influenza Vaccine, unspecified formulation 10/25/2018    Influenza, High Dose (Fluzone 65 yrs and older) 11/27/2017    Pneumococcal 13-valent Conjugate ([id number]) 05/14/2015    Pneumococcal Polysaccharide ([id number]) 05/10/2018    Tdap (Boostrix, Adacel) 05/21/2015       No Known Allergies    Review of Systems   Constitutional: Negative for activity change, appetite change, chills, diaphoresis, fatigue, fever and unexpected weight change. Eyes: Negative for visual disturbance. Respiratory: Positive for shortness of breath (baseline). Negative for cough. Cardiovascular: Positive for leg swelling (mild since stopping lasix). Negative for chest pain and palpitations. Gastrointestinal: Negative for abdominal pain, blood in stool, constipation, diarrhea, nausea and vomiting. Genitourinary: Negative for decreased urine volume, difficulty urinating, dysuria, flank pain, hematuria and urgency. Musculoskeletal: Negative for arthralgias and back pain. Skin: Negative for color change and rash. Neurological: Negative for dizziness, weakness, light-headedness, numbness and headaches. Psychiatric/Behavioral: Negative for dysphoric mood and sleep disturbance. The patient is not nervous/anxious. /60 (Site: Right Upper Arm, Position: Sitting, Cuff Size: Large Adult)   Pulse (!) 48   Wt 244 lb (110.7 kg)   SpO2 93%   BMI 35.01 kg/m²     Physical Exam   Constitutional: He appears well-developed and well-nourished. No distress. HENT:   Head: Normocephalic and atraumatic. Mouth/Throat: Oropharynx is clear and moist.   Eyes: Pupils are equal, round, and reactive to light. EOM are normal. Right eye exhibits no discharge. Left eye exhibits no discharge. Neck: Normal range of motion. Neck supple. No JVD present. No thyromegaly present. Cardiovascular: Regular rhythm, normal heart sounds and intact distal pulses. Exam reveals no gallop and no friction rub. No murmur heard. Gagan Curls, 40s   Pulmonary/Chest: Effort normal and breath sounds normal. No stridor. No respiratory distress. He has no wheezes. He has no rales. He exhibits no tenderness. Abdominal: Soft. Bowel sounds are normal. He exhibits no distension and no mass. There is no tenderness. There is no guarding. No hernia. No CVA tenderness   Musculoskeletal: Normal range of motion. He exhibits edema (2+ b/l LE, [place]'s neg). Lymphadenopathy:     He has no cervical adenopathy. Neurological: He is alert. No cranial nerve deficit. Skin: Skin is warm and dry. Capillary refill takes 2 to 3 seconds. He is not diaphoretic. No erythema. Psychiatric: He has a normal mood and affect. His behavior is normal. Judgment and thought content normal.   Nursing note and vitals reviewed. Plan  1. FREDI (acute kidney injury) ([facility] [address]  Normal renal fxn 4/12/19. Suspect postrenal given history and exam. No s/s on exam of fluid overload (no JVD or S3), dehydration, infection. See plans below. Stopped lasix 3-4 days ago. C/w all current medications until f/u lab work reviewed. GFR> 30, no indication to stop metformin at this time. 2. Dysuria  - URINALYSIS WITH MICROSCOPIC  - URINE CULTURE  - US RENAL COMPLETE; Future  - PSA, TOTAL AND FREE; Future    3. Benign prostatic hyperplasia with urinary frequency  Taking flomax consistently. No dec in UOP.   - URINALYSIS WITH MICROSCOPIC  - URINE CULTURE  - US RENAL COMPLETE; Future  - PSA, TOTAL AND FREE; Future    4.  Elevated PSA  6+ in 8/18, will recheck. Follows with Dr. [facility]. Highly rec'd following back up with Urology asap. - URINALYSIS WITH MICROSCOPIC  - URINE CULTURE  - US RENAL COMPLETE; Future  - PSA, TOTAL AND FREE; Future    Bradycardia: HR in 40s today. EKG from this am with HR at 50. No new acute concerning Cardiac SXs. I encouraged Krystian to stop sotalol altogether until Cardiology approves restarting. While assessing care for this patient, I have reviewed all pertinent lab work/imaging/ specialist notes and care in reference to those problems addressed above in detail. Appropriate medical decision making was based on this. Please note that portions of this note may have been completed with a voice recognition program. Efforts were made to edit the dictations but occasionally words are mis-transcribed. Return if symptoms worsen or fail to improve, for with PCP to discuss worsening memory.

## 2019-04-24 NOTE — TELEPHONE ENCOUNTER
Spouse calling in to let us know patients HR is 35. From the notes it looks like DrRenda Brittle Albesa Billing told him to restart sotalol, however they were at the PCP office today  after his EKG  Here in our office, and was told NOT to start sotalol.   Paged [address]

## 2019-04-24 NOTE — PATIENT INSTRUCTIONS
Patient Education        Acute Kidney Injury: Care Instructions  Your Care Instructions    Acute kidney injury (FREDI) is a sudden decrease in kidney function. This can happen over a period of hours, days or, in some cases, weeks. FREDI used to be called acute renal failure, but kidney failure doesn't always happen with FREDI. Common causes of FREDI are dehydration, blood loss, and medicines. When FREDI happens, the kidneys have trouble removing waste and excess fluids from the body. The waste and fluids build up and become harmful. Kidney function may return to normal if the cause of FREDI is treated quickly. Your chance of a full recovery depends on what caused the problem, how quickly the cause was treated, and what other medical problems you have. A machine may be used to help your kidneys remove waste and fluids for a short period of time. This is called dialysis. Follow-up care is a key part of your treatment and safety. Be sure to make and go to all appointments, and call your doctor if you are having problems. It's also a good idea to know your test results and keep a list of the medicines you take. How can you care for yourself at home? · Talk to your doctor about how much fluid you should drink. · Eat a balanced diet. Talk to your doctor or a dietitian about what type of diet may be best for you. You may need to limit sodium, potassium, and phosphorus. · If you need dialysis, follow the instructions and schedule for dialysis that your doctor gives you. · Do not smoke. Smoking can make your condition worse. If you need help quitting, talk to your doctor about stop-smoking programs and medicines. These can increase your chances of quitting for good. · Do not drink alcohol. · Review all of your medicines with your doctor.  Do not take any medicines, including nonsteroidal anti-inflammatory drugs (NSAIDs) such as ibuprofen (Advil, Motrin) or naproxen (Aleve), unless your doctor says it is safe for you to do so.  · Make sure that anyone treating you for any health problem knows that you have had FREDI. When should you call for help? Call 911 anytime you think you may need emergency care. For example, call if:    · You passed out (lost consciousness).    Call your doctor now or seek immediate medical care if:    · You have new or worse nausea and vomiting.     · You have much less urine than normal, or you have no urine.     · You are feeling confused or cannot think clearly.     · You have new or more blood in your urine.     · You have new swelling.     · You are dizzy or lightheaded, or feel like you may faint.    Watch closely for changes in your health, and be sure to contact your doctor if:    · You do not get better as expected. Where can you learn more? Go to https://X-IO.[employer]Switch. org and sign in to your [employer] account. Enter E582 in the [employer] box to learn more about \"Acute Kidney Injury: Care Instructions. \"     If you do not have an account, please click on the \"Sign Up Now\" link. Current as of: March 14, 2018  Content Version: 11.9  © 9217-0457 "[employer]". Care instructions adapted under license by [facility] ([facility]). If you have questions about a medical condition or this instruction, always ask your healthcare professional. [address] any warranty or liability for your use of this information. Patient Education        Bradycardia: Care Instructions  Your Care Instructions    Bradycardia is a slow heart rate. If your heart beats too slowly, it can't supply your body with enough blood. This can make you weak or dizzy. Or it may make you pass out. Sometimes medicine can cause this problem. If this happens, your doctor may have you adjust one of your medicines. If a medicine is not the problem, your doctor may recommend a pacemaker. It is important to treat bradycardia so that you don't get more serious health problems.  Your doctor will want to see you on a routine schedule to make sure that your heartbeat is normal.  Follow-up care is a key part of your treatment and safety. Be sure to make and go to all appointments, and call your doctor if you are having problems. It's also a good idea to know your test results and keep a list of the medicines you take. How can you care for yourself at home? · Take your medicines exactly as prescribed. Call your doctor if you think you are having a problem with your medicine. If your bradycardia is caused by another disease, your doctor will try to treat the disease. If it is caused by heart medicines, he or she will adjust your medicines. · Make lifestyle changes to improve your heart health. ? Get regular exercise. Try for 30 minutes on most days of the week. If you do not have other heart problems, you likely do not have limits on the type or level of activity that you can do. You may want to walk, swim, bike, or do other activities. Ask your doctor what level of exercise is safe for you. ? To control your cholesterol, avoid foods with a lot of fat, saturated fat, or sodium. Try to eat more fiber. And if your doctor says it's okay, get some exercise on most days. ? Do not smoke. Smoking can make your heart condition worse. If you need help quitting, talk to your doctor about stop-smoking programs and medicines. These can increase your chances of quitting for good. ? Limit alcohol to 2 drinks a day for men and 1 drink a day for women. Too much alcohol can cause health problems. Pacemaker  If you have a pacemaker, you will get more specific information about it. Be sure to:  · Check your pulse as your doctor tells you. · Have your pacemaker checked as often as your doctor recommends. You may be able to do this over the phone or computer. · Avoid strong magnetic or electrical fields. These include MRIs, welding equipment, and generators.   · You will be checked several times right after you get your pacemaker and when it is time to have the battery changed. Batteries last for 5 to 15 years. · You can talk on a cell phone. But keep it 6 inches away from your pacemaker. · Microwaves, TVs, radios, and kitchen and bathroom appliances won't harm you. When should you call for help? Call 911 anytime you think you may need emergency care. For example, call if:    · You have symptoms of sudden heart failure. These may include:  ? Severe trouble breathing. ? A fast or irregular heartbeat. ? Coughing up pink, foamy mucus. ? You passed out.     · You have symptoms of a stroke. These may include:  ? Sudden numbness, tingling, weakness, or loss of movement in your face, arm, or leg, especially on only one side of your body. ? Sudden vision changes. ? Sudden trouble speaking. ? Sudden confusion or trouble understanding simple statements. ? Sudden problems with walking or balance. ? A sudden, severe headache that is different from past headaches.    Call your doctor now or seek immediate medical care if:    · You have new or changed symptoms of heart failure, such as:  ? New or increased shortness of breath. ? New or worse swelling in your legs, ankles, or feet. ? Sudden weight gain, such as more than 2 to 3 pounds in a day or 5 pounds in a week. (Your doctor may suggest a different range of weight gain.)  ? Feeling dizzy or lightheaded or like you may faint. ? Feeling so tired or weak that you cannot do your usual activities. ? Not sleeping well. Shortness of breath wakes you at night. You need extra pillows to prop yourself up to breathe easier.    Watch closely for changes in your health, and be sure to contact your doctor if:    · You do not get better as expected. Where can you learn more? Go to https://chpepiceweb.[employer]. org and sign in to your [employer] account. Enter W505 in the [employer] box to learn more about \"Bradycardia: Care Instructions. \"     If you do not have an

## 2019-04-24 NOTE — PATIENT INSTRUCTIONS
Per SRJ Pt to take sotalol 40 mg BID. Pt to get labs done today, and come back in 1 week for EKG and BP check.

## 2019-04-25 ENCOUNTER — TELEPHONE (OUTPATIENT)
Dept: CARDIOLOGY CLINIC | Age: 74
End: 2019-04-25

## 2019-04-25 NOTE — TELEPHONE ENCOUNTER
Spoke to pt's wife with lab results. Pt will f/u with PCP. Pt will get BMP next week. I will put order in epic. Pt will stay away from high K foods, such as OJ, Bananas, and Nuts. Pt already has a lab appt for f/u EKG next week. I will put Nephrology referral in [facility] as well.  V/u.

## 2019-04-25 NOTE — TELEPHONE ENCOUNTER
LMOVM to return call  ----- Message from María Thomas MD sent at 4/24/2019  8:27 PM EDT -----  Let him know labs show improved kidney function but higher potassium. He needs to f/u w/ pcp and will need referral to nephrology for this. He should avoid foods with k such as bananas/oj/nuts. Needs repeat BMP next week along with f/u ekg next week.

## 2019-04-25 NOTE — TELEPHONE ENCOUNTER
Ultrasound of his kidneys were reassuring. There is no back up of fluid into the kidneys. Please let his wife know. They stated we could leave a detailed message on the machine during their visit.       Office notes from [address] reviewed. Nephrology consult placed by them. Cr/GFR improved.  Low K diet rec'd    Dr. Boateng Ahr: Rajendra Self

## 2019-04-25 NOTE — TELEPHONE ENCOUNTER
EVERARDO for pt to call us back. Received an episode report on pt's monitor from [facility]. Report showed sinus bradycardia. I called pt to check on him and got his vm. SRJ I scanned in and routed you the report. It is under the media tab in pt's chart for your review. Pt can be reached at [phone].

## 2019-04-25 NOTE — RESULT ENCOUNTER NOTE
Ultrasound of his kidneys were reassuring. There is no back up of fluid into the kidneys. Please let his wife know. They stated we could leave a detailed message on the machine during their visit. Office notes from [address] reviewed. Nephrology consult placed by them. Cr/GFR improved.  Low K diet rec'd

## 2019-04-26 ENCOUNTER — TELEPHONE (OUTPATIENT)
Dept: CARDIOLOGY CLINIC | Age: 74
End: 2019-04-26

## 2019-04-26 NOTE — TELEPHONE ENCOUNTER
We might have duplicate chart or other patient, this chart does not seem to have his prior meds and notes. Lets double check for that and demographics and phone number, thanks.

## 2019-04-26 NOTE — TELEPHONE ENCOUNTER
EVERARDO for pt to call us back. Pt had an episode report ( sinus bradycardia with 3.8 sec pause). SRJ would like him to see an EP doc asap. Pt can be reached at [phone].

## 2019-04-29 PROBLEM — R97.20 ELEVATED PSA: Status: ACTIVE | Noted: 2019-01-01

## 2019-04-29 PROBLEM — R79.9 ELEVATED BUN: Status: ACTIVE | Noted: 2019-01-01

## 2019-04-29 NOTE — PROGRESS NOTES
Subjective:      Patient ID: Geronimo Salas is a 76 y.o. male. HPI  In for check on HT(OK at home)--COPD(doing OK w meds-sees Pulm)--Needs labs--will see Urologist soon for prostate Bx-AF(converted spont at last check. Prior to Visit Medications :  Medication traMADol (ULTRAM) 50 MG tablet, Sig Take 50 mg by mouth every 12 hours as needed for Pain., Taking? Yes, Authorizing Provider Franky Valencia MD    Medication Tiotropium Bromide-Olodaterol (STIOLTO RESPIMAT) 2.5-2.5 MCG/ACT AERS, Sig 2 inhalations daily, Taking? Yes, Authorizing Provider Jacqui Myers MD    Medication diltiazem (CARDIZEM CD) 120 MG extended release capsule, Sig Take 1 capsule by mouth daily, Taking? Yes, Authorizing Provider Aida Kelly MD    Medication mesalamine (LIALDA) 1.2 g EC tablet, Sig TAKE 1 TABLET THREE TIMES DAILY, Taking? Yes, Authorizing Provider Ryan Love, DO    Medication sertraline (ZOLOFT) 100 MG tablet, Sig Take 1 tablet by mouth daily, Taking? Yes, Authorizing Provider Ryan Love, DO    Medication cyclobenzaprine (FLEXERIL) 10 MG tablet, Sig Take 1 tablet by mouth 3 times daily as needed for Muscle spasms, Taking? Yes, Authorizing Provider Ryan Love, DO    Medication montelukast (SINGULAIR) 10 MG tablet, Sig Take 1 tablet by mouth nightly, Taking? Yes, Authorizing Provider Ryan Love, DO    Medication pramipexole (MIRAPEX) 1 MG tablet, Sig Take 1 tablet by mouth nightly, Taking? Yes, Authorizing Provider Ryan Love, DO    Medication donepezil (ARICEPT) 10 MG tablet, Sig Take 1 tablet by mouth nightly, Taking? Yes, Authorizing Provider Ryan Love, DO    Medication ferrous sulfate 325 (65 Fe) MG tablet, Sig Take 1 tablet by mouth daily (with breakfast), Taking? Yes, Authorizing Provider Mikayla Steinberg APRN - CNP    Medication colesevelam (WELCHOL) 625 MG tablet, Sig TAKE 3 TABLETS TWICE DAILY, Taking?  Yes, Authorizing Provider Ryan Love, DO    Medication apixaban (ELIQUIS) 5 MG TABS tablet, Sig Take 1 tablet by mouth 2 times daily, Taking? Yes, Authorizing Provider Ryan Love, DO    Medication ipratropium-albuterol (DUONEB) 0.5-2.5 (3) MG/3ML SOLN nebulizer solution, Sig Inhale 3 mLs into the lungs 4 times daily, Taking? Yes, Authorizing Provider Hillary Frye MD    Medication tamsulosin (FLOMAX) 0.4 MG capsule, Sig TAKE 1 CAPSULE EVERY DAY, Taking? Yes, Authorizing Provider Ryan Love, DO    Medication omeprazole (PRILOSEC) 40 MG delayed release capsule, Sig TAKE 1 CAPSULE EVERY DAY, Taking? Yes, Authorizing Provider Ryan Love, DO    Medication metFORMIN (GLUCOPHAGE) 500 MG tablet, Sig TAKE 1 TABLET TWICE DAILY, Taking? Yes, Authorizing Provider Ryan Love, DO    Medication oxyCODONE-acetaminophen (PERCOCET)  MG per tablet, Sig Take 1 tablet by mouth every 6 hours as needed for Pain. ., Taking? Yes, Authorizing Provider Franky Valencia MD    Medication albuterol (PROVENTIL) (2.5 MG/3ML) 0.083% nebulizer solution, Sig Take 3 mLs by nebulization every 6 hours as needed for Wheezing, Taking? Yes, Authorizing Provider Hillary Frye MD    Medication glucose blood VI test strips (ASCENSIA AUTODISC VI;ONE TOUCH ULTRA TEST VI) strip, Sig [employer] True Metrix Air Test Strips. FSBS daily. DX E11.9, Taking? Yes, Authorizing Provider Ryan Love, DO    Medication TRUEPLUS LANCETS 28G MISC, Sig 1 each by Does not apply route daily E11.9 Test FBS daily--NEED 33 GAUGE, Taking? Yes, Authorizing Provider Ryan Love, DO    Medication sotalol (BETAPACE) 80 MG tablet, Sig Take 1 tablet by mouth 2 times daily, Taking? , Authorizing Provider Kentrell Hameed MD    Medication finasteride (PROSCAR) 5 MG tablet, Sig Take 1 tablet by mouth daily Will shrink prostate;   Affects PSA number, Taking? , Authorizing Provider Malou Rojas MD      Past Medical History:  No date: Cancer [facility])      Comment:  skin  No date: Chronic pancreatitis ([facility] [address]  5/08/2015: Colitis  No date: Diabetes mellitus ([address]  No date: Esophagitis  No date: Gastritis  No date: Hyperlipidemia  No date: Hypertension        Review of Systems    Review of Systems   Constitutional: Negative for unexpected weight change. HENT: Negative for congestion. Eyes: Negative for visual disturbance. Respiratory: Negative for cough and shortness of breath. Cardiovascular: Negative for chest pain and palpitations. Gastrointestinal: Negative for abdominal pain and blood in stool. Genitourinary: Positive for frequency. Negative for hematuria. Musculoskeletal: Positive for arthralgias. Neurological: Positive for tremors. Psychiatric/Behavioral: Positive for confusion. Objective:   Physical Exam      Physical Exam   Constitutional: He is oriented to person, place, and time. He appears well-developed and well-nourished. HENT:   Head: Normocephalic. Mouth/Throat: Oropharynx is clear and moist.   Eyes: Conjunctivae are normal.   Neck: Neck supple. Carotid bruit is not present. No thyromegaly present. Cardiovascular: Normal rate, regular rhythm and normal heart sounds. Pulmonary/Chest: Effort normal and breath sounds normal.   Abdominal: Soft. He exhibits no distension and no mass. There is no tenderness. Musculoskeletal: He exhibits no edema. Lymphadenopathy:     He has no cervical adenopathy. Neurological: He is alert and oriented to person, place, and time. Recent memory prob's per wife   Skin: Skin is warm and dry. Psychiatric: He has a normal mood and affect. His behavior is normal. Judgment and thought content normal.       Assessment:       Diagnosis Orders   1. Essential hypertension     2. Elevated PSA     3. COPD, severe ([address]     4. Elevated BUN           Plan:      Rip Mclain was seen today for abstract.     Diagnoses and all orders for this visit:    Essential hypertension  Continue meds-CHITO diet-work on wt loss  Elevated PSA  Seeing Urologist soon  COPD, severe ([address]  Continue meds,Pulm  Elevated BUN  As above     See me 6 mos     Brandon Rough, DO

## 2019-05-01 NOTE — TELEPHONE ENCOUNTER
Pt came in for f/u EKG. Pt lissette he removed monitor b/c the 2 wks were completed. However, pt's wife thinks  the monitor did not work. Millie Martínez looked at EKG and said it looked fine. Pt's wife said that sometimes pt's heart rate gets down in the low 40's. I told her to call us and let us know when that happens. She also aid that pt sleeps a lot. Pt v/u.

## 2019-05-01 NOTE — TELEPHONE ENCOUNTER
Pt called on answering service returning our call, also stated he took off his heart monitor on 4/29/19.  PLs call to advise Thank you

## 2019-05-02 NOTE — TELEPHONE ENCOUNTER
Spoke with patient and let him know that per Dr. Doris Zafar his EKG was stable and no new orders at this time.  He verbalized understanding

## 2019-05-23 NOTE — DISCHARGE SUMMARY
Name:  Kamaljit Kirkpatrick  Room:  0215/0215-02  MRN:    [id number]    IM Discharge Summary    Discharging Physician:  Mendy Grimaldo MD    Admit: 4/9/2019    Discharge:  4/12/2019    PCP      Peter Corea,     Diagnoses and hospital course  this Admission        COPD exacerbation   CAP - evita increased interstitial markings   - likely gram positive organism- sputum NRF  - HHN's, solumedrol , rocephin and azithromycin   - wean steroids  - improving symptoms - change to oral steroids and abx, dc home     Acute resp failure- hypoxic  -  Requiring 3 L today - does not use any  -sec to above  - off oxygen today      Elevated lactate - likely with bronchospams- s.p IVF      Atrial Fibrillation paroxysmal  - rate controlled   - continue anticoagulation- ELIQUIS        FREDI  - hold lasix, suspect pre renal/ dehydration  - improved with  NS   - hyperkalemia likely with steroids, add valtessa again today     DM- 2   - corrective ISS ordered          HPI   76 y.o. male presents with increased SOB. Patient states he has been noticing increased sob/difficulty breathing over the past few months, but notes worsening in the past several days. He has had a cough productive of clear sputum for a month, denies hemoptysis, chest pain, palpitations, leg edema, but notes increased orthopnea. Fatigue and sob have progressed prompting current ED eval.           Physical Exam at Discharge:      General: elderly man   Awake, alert and oriented. Appears to be not in any distress  Mucous Membranes:  Pink , anicteric  Neck: No JVD, no carotid bruit, no thyromegaly  Chest:  Improving evita air entry with ,resolving  wheeze , diminished in bases  Cardiovascular:  RRR S1S2 heard, no murmurs or gallops  Abdomen:  Soft,+distended, non tender, no organomegaly, BS present  Extremities: No edema or cyanosis.  Distal pulses well felt  Neurological : grossly normal                Radiology (Please Review Full Report for Details)         Chest xray      New mild tablet  Commonly known as:  ARICEPT  Take 1 tablet by mouth nightly     ferrous sulfate 325 (65 Fe) MG tablet  Take 1 tablet by mouth daily (with breakfast)     finasteride 5 MG tablet  Commonly known as:  PROSCAR  Take 1 tablet by mouth daily Will shrink prostate;   Affects PSA number     ipratropium-albuterol 0.5-2.5 (3) MG/3ML Soln nebulizer solution  Commonly known as:  DUONEB  Inhale 3 mLs into the lungs 4 times daily     mesalamine 1.2 g EC tablet  Commonly known as:  LIALDA  TAKE 1 TABLET THREE TIMES DAILY     metFORMIN 500 MG tablet  Commonly known as:  GLUCOPHAGE  TAKE 1 TABLET TWICE DAILY     montelukast 10 MG tablet  Commonly known as:  SINGULAIR  Take 1 tablet by mouth nightly     omeprazole 40 MG delayed release capsule  Commonly known as:  PRILOSEC  TAKE 1 CAPSULE EVERY DAY     oxyCODONE-acetaminophen  MG per tablet  Commonly known as:  PERCOCET     pramipexole 1 MG tablet  Commonly known as:  MIRAPEX  Take 1 tablet by mouth nightly     sertraline 100 MG tablet  Commonly known as:  ZOLOFT  Take 1 tablet by mouth daily     tamsulosin 0.4 MG capsule  Commonly known as:  FLOMAX  TAKE 1 CAPSULE EVERY DAY     traMADol 50 MG tablet  Commonly known as:  ULTRAM     TRUEPLUS LANCETS 28G Misc  1 each by Does not apply route daily E11.9 Test FBS daily--NEED 33 GAUGE        STOP taking these medications    furosemide 40 MG tablet  Commonly known as:  LASIX        ASK your doctor about these medications    umeclidinium-vilanterol 62.5-25 MCG/INH Aepb inhaler  Commonly known as:  ANORO ELLIPTA  Inhale 1 puff into the lungs daily           Where to Get Your Medications      These medications were sent to [address] [place] [address] [place] [phone] - F [phone]  [address] [address]    Phone:  [phone]   · Tiotropium Bromide-Olodaterol 2.5-2.5 MCG/ACT Aers           Discharge Condition/Location: stable/home     Follow Up:    PCP in 1 weeks      Time Spent on discharge is more than 28 minutes in the examination, evaluation, counseling and review of medications and discharge plan.       Bree Quiroga MD 5/23/2019 8:00 AM

## 2019-05-28 NOTE — PROGRESS NOTES
[address]   Electrophysiology follow up             Date:  May 29, 2019  Patient name: Tyronne Dakin  YOB: 1945    Reason for Visit: Atrial Fibrillation and Other (sinus node dysfunction)    Primary Care Physician:Ryan Love DO    HISTORY OF PRESENT ILLNESS: Tyronne Dakin is a 76 y.o. male who presents for follow up for paroxysmal atrial fibrillation. Tyronne Dakin was initially admitted on 12/21/2018 with fatigue and weakness. EKG showed sinus bradycardia with a HR of 33 and potassium was 7.2. Required CRRT for several days. He had a temporary pacemaker placed on 12/21/2018 and it was removed on 12/24. Converted to afib with RVR on 12/26 at 2:33am, was started on IV Cardizem, and spontaneously converted to sinus on 12/26/2018 at 10:57am. He has a history of anemia requiring blood transfusion. He is currently on Eliquis for a DVT. He was started on Sotalol on 4/15/19. Interval history since last office visit: Today he states he had has weakness and almost passed out. He has had no actual syncope. He also has SOB and has to pace himself when walking. Patient currently denies any weight gain, edema, palpitations, chest pain. He wore a cardiac event monitor from 4/15-4/28/19 which showed multiple pauses. Longest pause pause 4.1 sec and several episodes of bradycardia with HR down to 30. Monitor also had 2:1 Atrial flutter          Past Medical History:   has a past medical history of Cancer ([facility] [address] Chronic pancreatitis ([facility] [address] Colitis, Diabetes mellitus ([facility] [address] Esophagitis, Gastritis, Hyperlipidemia, and Hypertension. Past Surgical History:   has a past surgical history that includes Testicle removal (Left, 2012); shoulder surgery (Left, 2001); Upper gastrointestinal endoscopy (05/08/2015); Colonoscopy (5/08/2015); hernia repair (Right, 2012); Skin cancer excision (Left, 5/2016); Cataract removal with implant (Right, 05/2016);  Upper gastrointestinal endoscopy (N/A, (with breakfast) 3/26/19  Yes SHAYY Barba - CNP   [facility]) 625 MG tablet TAKE 3 TABLETS TWICE DAILY 2/19/19  Yes Ryan Love DO   apixaban (ELIQUIS) 5 MG TABS tablet Take 1 tablet by mouth 2 times daily 2/11/19  Yes Daniella Love DO   ipratropium-albuterol (DUONEB) 0.5-2.5 (3) MG/3ML SOLN nebulizer solution Inhale 3 mLs into the lungs 4 times daily 2/7/19 11/4/19 Yes Alejandro Hernández MD   tamsulosin (FLOMAX) 0.4 MG capsule TAKE 1 CAPSULE EVERY DAY 2/5/19  Yes Ryan Love DO   omeprazole (PRILOSEC) 40 MG delayed release capsule TAKE 1 CAPSULE EVERY DAY 2/5/19  Yes Ryan Love DO   metFORMIN (GLUCOPHAGE) 500 MG tablet TAKE 1 TABLET TWICE DAILY 2/5/19  Yes Ryan Love DO   oxyCODONE-acetaminophen (PERCOCET)  MG per tablet Take 1 tablet by mouth every 6 hours as needed for Pain. .   Yes Historical Provider, MD   albuterol (PROVENTIL) (2.5 MG/3ML) 0.083% nebulizer solution Take 3 mLs by nebulization every 6 hours as needed for Wheezing 9/11/18  Yes Lizbeth Perez MD   glucose blood VI test strips (ASCENSIA AUTODISC VI;ONE TOUCH ULTRA TEST VI) strip [employer] True [employer]rix Air Test Strips. FSBS daily. DX E11.9 5/16/18  Yes Ryan Love DO   TRUEPLUS LANCETS 28G MISC 1 each by Does not apply route daily E11.9 Test FBS daily--NEED 33 GAUGE 5/1/17  Yes Ryan Love DO   finasteride (PROSCAR) 5 MG tablet Take 1 tablet by mouth daily Will shrink prostate; Affects PSA number 9/21/18 4/24/19  Vin Segovia MD          REVIEW OF SYSTEMS:      All 14-point review of systems are completed and  pertinent positives are mentioned in the history of present illness. Other  systems are reviewed and are negative. Physical Examination:    /60   Pulse 94   Ht 5' 10\" (1.778 m)   Wt 236 lb (107 kg)   SpO2 95%   BMI 33.86 kg/m²      Constitutional and General Appearance:    alert, cooperative, no distress and appears stated age  [de-identified]:    PERRLA, no cervical lymphadenopathy.  No masses palpable. Normal oral  mucosa  Respiratory:  · Normal excursion and expansion without use of accessory muscles  · Resp Auscultation: Normal breath sounds without dullness or wheezing  Cardiovascular:  · The apical impulse is not displaced  · Heart tones are crisp and normal. regular S1 and S2.  · Jugular venous pulsation Normal  · The carotid upstroke is normal in amplitude and contour without delay or bruit  · Peripheral pulses are symmetrical and full  Abdomen:  · No masses or tenderness  · Bowel sounds present  Extremities:  ·  No Cyanosis or Clubbing  ·  Lower extremity edema: No  · Skin: Warm and dry  Neurological:  · Alert and oriented. · Moves all extremities well  · No abnormalities of mood, affect, memory, mentation, or behavior are noted      DATA:    ECG:    ECHO 36/43/53:  LV Diastolic Dimension: 8.56 cm LV Systolic Dimension: 0.38 cm   LV Septum Diastolic: 7.54 cm   LV PW Diastolic: 3.34 cm        AO Root Dimension: 3.2 cm                                   AV Cusp Separation: 1.8 cm                                   LA Dimension: 3.8 cm   LVOT: 2.2 cm                    LA Area: 25 cm2                                   LA volume/Index: 69.67 ml /32 ml/m2     Technically difficult examination. Normal systolic function with an estimated ejection fraction of 55%. Mild concentric left ventricular hypertrophy. No regional wall motion abnormalities are seen. Normal left ventricular diastolic filling pressure. The right atrium and ventricle are mildly dilated with normal function. No significant valvular heart disease. NM 1/29/19: There is normal isotope uptake at stress and rest. There is no evidence of  myocardial ischemia or scar. LV function is normal with uniform wall motion  and ejection fraction of 65%. Low risk study. IMPRESSION:    1. Paroxysmal atrial fibrillation   2. Atrial flutter    2. Symptomatic sinus node dysfunction   3. Sinus bradycardia   4. HX of DVT  5.  SOB and fatigue  6. Dizziness and near syncope    [id number] Score for Atrial Fibrillation Stroke Risk 5 (CHF, HTN, DM, thromboembolism, age)    RECOMMENDATIONS:  1. Plan for dual chamber pacemaker this Friday. Be at the hospital at 7:30 AM. Hold Eliquis starting tomorrow   2. Risk, benefit, alternative, rationale of the procedure were discussed with the patient which included but were not limited to allergic reaction to the medications, pain, bleeding, infection, nerve injury, injury to diaphragm(breathing muscle), pulmonary embolus(blood clot in lungs), deep vein blood clot, pneumothorax, hemothorax, acute renal failure, cardiac perforation,  tamponade, need for emergent surgery (open heart), need for any future surgery, pulmonary vein stenosis requiring stent or angioplasty, left atrial to esophageal fistula requiring emergent surgery, stroke, myocardial infarction, need for permanent pacemaker, lead dislodgement and death. Explained to patient that discontinuation of anticoagulation is not an indication for the procedure. 3. Follow up with me after the procedure. 4. Go to the ER if needed prior to procedure     QUALITY MEASURES  1. Tobacco Cessation Counseling: NA  2. Retake of BP if >140/90:   NA  3. Documentation to PCP/referring for new patient:  Sent to PCP at close of office visit  4. CAD patient on anti-platelet: NA  5. CAD patient on STATIN therapy:  NA  6. Patient with aFib on anticoagulation:  Yes     Scribe's attestation: This note was scribed in the presence of Dr. Pat Tabor. Zaida Kendrick M.D. By Ashanti Knott RN   myelodysplasia   All questions and concerns were addressed to the patient/family. Alternatives to my treatment were discussed. LUIS GarciaC. Electrophysiology  [facility].   [place], [address]  Phone: [phone]  Fax: [phone]

## 2019-05-29 PROBLEM — I49.5 SINUS NODE DYSFUNCTION (HCC): Status: ACTIVE | Noted: 2019-01-01

## 2019-05-29 NOTE — PATIENT INSTRUCTIONS
RECOMMENDATIONS:  1. Plan for dual chamber pacemaker this Friday. Be at the hospital at 7:30 AM. Hold Eliquis starting tomorrow   2. Risk, benefit, alternative, rationale of the procedure were discussed with the patient which included but were not limited to allergic reaction to the medications, pain, bleeding, infection, nerve injury, injury to diaphragm(breathing muscle), pulmonary embolus(blood clot in lungs), deep vein blood clot, pneumothorax, hemothorax, acute renal failure, cardiac perforation,  tamponade, need for emergent surgery (open heart), need for any future surgery, pulmonary vein stenosis requiring stent or angioplasty, left atrial to esophageal fistula requiring emergent surgery, stroke, myocardial infarction, need for permanent pacemaker, lead dislodgement and death. Given the complex nature of the disease no guarantee was given on the success of the procedure. Explained to patient that discontinuation of anticoagulation is not an indication for the procedure. 3. Follow up with me after the procedure.    4. Go to the ER if needed prior to procedure

## 2019-05-29 NOTE — LETTER
[address] [place] [address] Cardiology - [address] [facility] [address]  Phone: [phone]  Fax: [phone]    Snehal Betancourt MD        Armida 3, 2019     rachael [address] [place] [address]  [address] [place] [address]    Patient: Samantha Neely  MR Number: [id number]  YOB: 1945  Date of Visit: 5/29/2019       [facility]   Electrophysiology follow up             Date:  May 29, 2019  Patient name: Samantha Neely  YOB: 1945    Reason for Visit: Atrial Fibrillation and Other (sinus node dysfunction)    Primary Care Physician:Ryan Love DO    HISTORY OF PRESENT ILLNESS: Samantha Neely is a 76 y.o. male who presents for follow up for paroxysmal atrial fibrillation. Samantha Neely was initially admitted on 12/21/2018 with fatigue and weakness. EKG showed sinus bradycardia with a HR of 33 and potassium was 7.2. Required CRRT for several days. He had a temporary pacemaker placed on 12/21/2018 and it was removed on 12/24. Converted to afib with RVR on 12/26 at 2:33am, was started on IV Cardizem, and spontaneously converted to sinus on 12/26/2018 at 10:57am. He has a history of anemia requiring blood transfusion. He is currently on Eliquis for a DVT. He was started on Sotalol on 4/15/19. Interval history since last office visit: Today he states he had has weakness and almost passed out. He has had no actual syncope. He also has SOB and has to pace himself when walking. Patient currently denies any weight gain, edema, palpitations, chest pain. He wore a cardiac event monitor from 4/15-4/28/19 which showed multiple pauses. Longest pause pause 4.1 sec and several episodes of bradycardia with HR down to 30. Monitor also had 2:1 Atrial flutter          Past Medical History:   has a past medical history of Cancer ([address] Chronic pancreatitis ([address] Colitis, Diabetes mellitus ([address] Esophagitis, Gastritis, Hyperlipidemia, and Hypertension. Past Surgical History:   has a past surgical history that includes Testicle removal (Left, 2012); shoulder surgery (Left, 2001); Upper gastrointestinal endoscopy (05/08/2015); Colonoscopy (5/08/2015); hernia repair (Right, 2012); Skin cancer excision (Left, 5/2016); Cataract removal with implant (Right, 05/2016); Upper gastrointestinal endoscopy (N/A, 09/08/2016); Cystoscopy (09/21/2018); pr office/outpt visit,procedure only (N/A, 9/21/2018); Colonoscopy (N/A, 10/25/2018); Upper gastrointestinal endoscopy (11/06/2018); and Upper gastrointestinal endoscopy (N/A, 11/6/2018). Allergies:  Patient has no known allergies. Social History:   reports that he quit smoking about 19 years ago. His smoking use included cigarettes. He has a 30.00 pack-year smoking history. He has never used smokeless tobacco. He reports that he does not drink alcohol or use drugs. Family History: family history includes Cancer in his mother; Diabetes in his mother and son; Early Death in his mother; Other in his father. Home Medications:    Prior to Admission medications    Medication Sig Start Date End Date Taking? Authorizing Provider   cyclobenzaprine (FLEXERIL) 10 MG tablet TAKE 1 TABLET BY MOUTH 3 TIMES DAILY AS NEEDED FOR MUSCLE SPASMS 5/2/19  Yes Saul Love DO   sotalol (BETAPACE) 80 MG tablet Take 1 tablet by mouth 2 times daily 4/15/19  Yes Mary Falcon MD   traMADol (ULTRAM) 50 MG tablet Take 50 mg by mouth every 12 hours as needed for Pain.    Yes Historical Provider, MD   Tiotropium Bromide-Olodaterol (STIOLTO RESPIMAT) 2.5-2.5 MCG/ACT AERS 2 inhalations daily 4/9/19  Yes Hernán Smith MD   diltiazem (CARDIZEM CD) 120 MG extended release capsule Take 1 capsule by mouth daily 3/29/19  Yes Kevin Khan MD   mesalamine (LIALDA) 1.2 g EC tablet TAKE 1 TABLET THREE TIMES DAILY 3/26/19  Yes Ryan Love DO   sertraline (ZOLOFT) 100 MG tablet Take 1 tablet by mouth daily 3/26/19  Yes Saul Love, DO montelukast (SINGULAIR) 10 MG tablet Take 1 tablet by mouth nightly 3/26/19  Yes Ryan Love DO   pramipexole (MIRAPEX) 1 MG tablet Take 1 tablet by mouth nightly 3/26/19  Yes Antonietta Love DO   donepezil (ARICEPT) 10 MG tablet Take 1 tablet by mouth nightly 3/26/19  Yes Ryan Love DO   ferrous sulfate 325 (65 Fe) MG tablet Take 1 tablet by mouth daily (with breakfast) 3/26/19  Yes SHAYY Watson - CNP   [facility]) 625 MG tablet TAKE 3 TABLETS TWICE DAILY 2/19/19  Yes Ryan Love DO   apixaban (ELIQUIS) 5 MG TABS tablet Take 1 tablet by mouth 2 times daily 2/11/19  Yes Antonietta Love DO   ipratropium-albuterol (DUONEB) 0.5-2.5 (3) MG/3ML SOLN nebulizer solution Inhale 3 mLs into the lungs 4 times daily 2/7/19 11/4/19 Yes Sujatha Hernández MD   tamsulosin (FLOMAX) 0.4 MG capsule TAKE 1 CAPSULE EVERY DAY 2/5/19  Yes Ryan Love DO   omeprazole (PRILOSEC) 40 MG delayed release capsule TAKE 1 CAPSULE EVERY DAY 2/5/19  Yes Ryan Love DO   metFORMIN (GLUCOPHAGE) 500 MG tablet TAKE 1 TABLET TWICE DAILY 2/5/19  Yes Ryan Love DO   oxyCODONE-acetaminophen (PERCOCET)  MG per tablet Take 1 tablet by mouth every 6 hours as needed for Pain. .   Yes Historical Provider, MD   albuterol (PROVENTIL) (2.5 MG/3ML) 0.083% nebulizer solution Take 3 mLs by nebulization every 6 hours as needed for Wheezing 9/11/18  Yes Idris Sims MD   glucose blood VI test strips (ASCENSIA AUTODISC VI;ONE TOUCH ULTRA TEST VI) strip [employer] True [employer]rix Air Test Strips. FSBS daily. DX E11.9 5/16/18  Yes Ryan Love DO   TRUEPLUS LANCETS 28G MISC 1 each by Does not apply route daily E11.9 Test FBS daily--NEED 33 GAUGE 5/1/17  Yes Ryan Love, DO   finasteride (PROSCAR) 5 MG tablet Take 1 tablet by mouth daily Will shrink prostate;   Affects PSA number 9/21/18 4/24/19  Eusebio Pineda MD          REVIEW OF SYSTEMS:      All 14-point review of systems are completed and pertinent positives are mentioned in the history of present illness. Other  systems are reviewed and are negative. Physical Examination:    /60   Pulse 94   Ht 5' 10\" (1.778 m)   Wt 236 lb (107 kg)   SpO2 95%   BMI 33.86 kg/m²       Constitutional and General Appearance:    alert, cooperative, no distress and appears stated age  [de-identified]:    PERRLA, no cervical lymphadenopathy. No masses palpable. Normal oral  mucosa  Respiratory:  · Normal excursion and expansion without use of accessory muscles  · Resp Auscultation: Normal breath sounds without dullness or wheezing  Cardiovascular:  · The apical impulse is not displaced  · Heart tones are crisp and normal. regular S1 and S2.  · Jugular venous pulsation Normal  · The carotid upstroke is normal in amplitude and contour without delay or bruit  · Peripheral pulses are symmetrical and full  Abdomen:  · No masses or tenderness  · Bowel sounds present  Extremities:  ·  No Cyanosis or Clubbing  ·  Lower extremity edema: No  · Skin: Warm and dry  Neurological:  · Alert and oriented. · Moves all extremities well  · No abnormalities of mood, affect, memory, mentation, or behavior are noted      DATA:    ECG:    ECHO 77/41/44:  LV Diastolic Dimension: 6.08 cm LV Systolic Dimension: 6.07 cm   LV Septum Diastolic: 2.31 cm   LV PW Diastolic: 1.01 cm        AO Root Dimension: 3.2 cm                                   AV Cusp Separation: 1.8 cm                                   LA Dimension: 3.8 cm   LVOT: 2.2 cm                    LA Area: 25 cm2                                   LA volume/Index: 69.67 ml /32 ml/m2     Technically difficult examination. Normal systolic function with an estimated ejection fraction of 55%. Mild concentric left ventricular hypertrophy. No regional wall motion abnormalities are seen. Normal left ventricular diastolic filling pressure. The right atrium and ventricle are mildly dilated with normal function. No significant valvular heart disease. NM 1/29/19: There is normal isotope uptake at stress and rest. There is no evidence of  myocardial ischemia or scar. LV function is normal with uniform wall motion  and ejection fraction of 65%. Low risk study. IMPRESSION:    1. Paroxysmal atrial fibrillation   2. Atrial flutter    2. Symptomatic sinus node dysfunction   3. Sinus bradycardia   4. HX of DVT  5. SOB and fatigue  6. Dizziness and near syncope    [id number] Score for Atrial Fibrillation Stroke Risk 5 (CHF, HTN, DM, thromboembolism, age)    RECOMMENDATIONS:  1. Plan for dual chamber pacemaker this Friday. Be at the hospital at 7:30 AM. Hold Eliquis starting tomorrow   2. Risk, benefit, alternative, rationale of the procedure were discussed with the patient which included but were not limited to allergic reaction to the medications, pain, bleeding, infection, nerve injury, injury to diaphragm(breathing muscle), pulmonary embolus(blood clot in lungs), deep vein blood clot, pneumothorax, hemothorax, acute renal failure, cardiac perforation,  tamponade, need for emergent surgery (open heart), need for any future surgery, pulmonary vein stenosis requiring stent or angioplasty, left atrial to esophageal fistula requiring emergent surgery, stroke, myocardial infarction, need for permanent pacemaker, lead dislodgement and death. Explained to patient that discontinuation of anticoagulation is not an indication for the procedure. 3. Follow up with me after the procedure. 4. Go to the ER if needed prior to procedure     QUALITY MEASURES  1. Tobacco Cessation Counseling: NA  2. Retake of BP if >140/90:   NA  3. Documentation to PCP/referring for new patient:  Sent to PCP at close of office visit  4. CAD patient on anti-platelet: NA  5. CAD patient on STATIN therapy:  NA  6. Patient with aFib on anticoagulation:  Yes     Scribe's attestation:   This note was scribed in the presence of Dr. Rah Grissom. Sammi Barth M.D. By Namrata Guillen RN   myelodysplasia   All questions and concerns were addressed to the patient/family. Alternatives to my treatment were discussed. ELMER Pereira F.A.C.C. [facility] [facility]. [place] [address]  Phone: [phone]  Fax: [phone]            If you have questions, please do not hesitate to call me. I look forward to following Krystian along with you.     Sincerely,        Chris Westfall MD

## 2019-05-30 NOTE — TELEPHONE ENCOUNTER
I called and spoke with patient's wife. I reviewed medications and arrival time before procedure. She verbalized understanding.

## 2019-05-30 NOTE — TELEPHONE ENCOUNTER
Patient seen in office yesterday. Patient is scheduled with Dr. Albert Lora for Dual Pacemaker Implant on 5/31/19 at 9:30am [facility], arrival time of 8am to the Cath Lab. Please have patient arrive to the main entrance of [facility] at 7:45am and check in with the registration desk. Please call patient regarding medication instructions. Remind patient to be NPO after midnight (8 hours prior). Do not apply lotions/creams on skin the day of procedure. Per EP Sheet - hold Eliquis 1 day prior.

## 2019-05-31 PROBLEM — Z95.0 PACEMAKER: Status: ACTIVE | Noted: 2019-01-01

## 2019-05-31 PROBLEM — R79.9 ELEVATED BUN: Status: RESOLVED | Noted: 2019-01-01 | Resolved: 2019-01-01

## 2019-05-31 NOTE — PROCEDURES
Date:  5/31/2019       Procedures performed:  · Insertion of MRI compatible right ventricular pacing lead under fluoroscopy. · Insertion of MRI compatible right atrial lead under fluoroscopy  · Insertion of a MRI compatible dual chamber Pacemaker. · Electronic analysis of lead and device. · IV sedation. Sedation detail:  Sedation start time: 9:30  Sedation stop time: 10:20  ASA class: 2  Mallampati: 2  Pre and post Josselyn score: 10  Medication given:  Fentanyl: 100 mcg  Versed: 6 mg    Indication of the procedure:     Isaías Branch is a 76 y.o. old male with    · Sinus node dysfunction   · lightheadedness and near syncope. ·   Patient was referred for pacemaker implantation. Details of procedure: The patient was brought to the electrophysiology laboratory in stable condition. The patient was in a fasting and non-sedated state. The risks, benefits and alternatives of the procedure were discussed with the patient. The risks including, but not limited to, the risks of vascular injury, bleeding, infection, device malfunction, lead dislodgement, radiation exposure, injury to cardiac and surrounding structures (including pneumothorax), stroke, myocardial infarction and death were discussed in detail. The patient opted to proceed with the device implantation. Written informed consent was signed and placed in the chart. Prophylactic antibiotic was given. The patient was prepped and draped in a sterile fashion. A timeout protocol was completed to identify the patient and the procedure being performed. IV sedation was provided with IV Versed, Fentanyl. An incision was made in the left pectoral area after administration of lidocaine. Using electrocautery and blunt dissection, a pocket was created. Central venous access into the left subclavian vein was obtained using the modified Seldinger technique. After central venous access was obtained, a sheath was placed in the subclavian vein.      A right Knott. [address] [place] [address]. [address].   [address], [address]  Phone: [phone]  Fax: [phone]

## 2019-05-31 NOTE — PROGRESS NOTES
4 Eyes Skin Assessment     The patient is being assess for   Admission    I agree that 2 RN's have performed a thorough Head to Toe Skin Assessment on the patient. ALL assessment sites listed below have been assessed. Areas assessed by both nurses:   [x]   Head, Face, and Ears   [x]   Shoulders, Back, and Chest, Abdomen  [x]   Arms, Elbows, and Hands   [x]   Coccyx, Sacrum, and Ischium  [x]   Legs, Feet, and Heels            **SHARE this note so that the co-signing nurse is able to place an eSignature**    Co-signer eSignature: Electronically signed by Raisa Barnes RN on 5/31/19 at 6:47 PM    Does the Patient have Skin Breakdown?   No          Chaz Prevention initiated:  No   Wound Care Orders initiated:  No      C nurse consulted for Pressure Injury (Stage 3,4, Unstageable, DTI, NWPT, Complex wounds)and New or Established Ostomies:  No      Primary Nurse eSignature: Electronically signed by Edgar Oakes RN on 5/31/19 at 6:52 PM

## 2019-05-31 NOTE — H&P
transfusion. He is currently on Eliquis for a DVT. He was started on Sotalol on 4/15/19.      Interval history since last office visit: Today he states he had has weakness and almost passed out. He has had no actual syncope. He also has SOB and has to pace himself when walking. Patient currently denies any weight gain, edema, palpitations, chest pain.       He wore a cardiac event monitor from 4/15-4/28/19 which showed multiple pauses. Longest pause pause 4.1 sec and several episodes of bradycardia with HR down to 30. Monitor also had 2:1 Atrial flutter                        Past Medical History:   has a past medical history of Cancer ([facility] [address] Chronic pancreatitis ([facility] Colitis, Diabetes mellitus ([facility] Esophagitis, Gastritis, Hyperlipidemia, and Hypertension.     Past Surgical History:   has a past surgical history that includes Testicle removal (Left, 2012); shoulder surgery (Left, 2001); Upper gastrointestinal endoscopy (05/08/2015); Colonoscopy (5/08/2015); hernia repair (Right, 2012); Skin cancer excision (Left, 5/2016); Cataract removal with implant (Right, 05/2016); Upper gastrointestinal endoscopy (N/A, 09/08/2016); Cystoscopy (09/21/2018); pr office/outpt visit,procedure only (N/A, 9/21/2018); Colonoscopy (N/A, 10/25/2018); Upper gastrointestinal endoscopy (11/06/2018); and Upper gastrointestinal endoscopy (N/A, 11/6/2018).    Allergies:  Patient has no known allergies.     Social History:   reports that he quit smoking about 19 years ago. His smoking use included cigarettes. He has a 30.00 pack-year smoking history. He has never used smokeless tobacco. He reports that he does not drink alcohol or use drugs.      Family History: family history includes Cancer in his mother; Diabetes in his mother and son; Early Death in his mother; Other in his father.     Home Medications:    Home Medications           Prior to Admission medications    Medication Sig Start Date End Date Taking?  Authorizing Provider

## 2019-05-31 NOTE — PROGRESS NOTES
RESPIRATORY THERAPY ASSESSMENT    Name:  Sushila [facility] Record Number:  [id number]  Age: 76 y.o. Gender: male  : 1945  Today's Date:  2019  Room:  03670367-02    Assessment     Is the patient being admitted for a COPD or Asthma exacerbation? No  (If yes the patient will be seen every 4 hours for the first 24 hours and then reassessed)    Patient Admission Diagnosis      Allergies  No Known Allergies    Minimum Predicted Vital Capacity:     1000          Actual Vital Capacity:      Pt refused             Pulmonary History:COPD  Home Oxygen Therapy:  room air  Home Respiratory Therapy:Albuterol/Ipratropium Bromide HHN QID  Current Respiratory Therapy:  Duoneb HHN QID          Respiratory Severity Index(RSI)   Patients with orders for inhalation medications, oxygen, or any therapeutic treatment modality will be placed on Respiratory Protocol. They will be assessed with the first treatment and at least every 72 hours thereafter. The following severity scale will be used to determine frequency of treatment intervention.     Smoking History: Pulmonary Disease or Smoking History, Greater than 15 pack year = 2    Social History  Social History     Tobacco Use    Smoking status: Former Smoker     Packs/day: 1.50     Years: 20.00     Pack years: 30.00     Types: Cigarettes     Last attempt to quit: 1/10/2000     Years since quittin.4    Smokeless tobacco: Never Used   Substance Use Topics    Alcohol use: No     Alcohol/week: 0.0 oz    Drug use: No       Recent Surgical History: Surgery of Extremities = 1  Past Surgical History  Past Surgical History:   Procedure Laterality Date    CARDIAC PACEMAKER PLACEMENT  2019    Dr Wojciech Salcedo, [employer] Model: [place]    CATARACT REMOVAL WITH IMPLANT Right 2016    COLONOSCOPY  2015    colitis-mild gastritis    COLONOSCOPY N/A 10/25/2018    COLONOSCOPY POLYPECTOMY SNARE/COLD BIOPSY performed by Bong Posada MD at [address] [place] [address] further assessment needed unless patient condition changes. Electronically signed by Irena Lanier RCP on 5/31/2019 at 4:26 PM    Respiratory Protocol Guidelines     1. Assessment and treatment by Respiratory Therapy will be initiated for medication and therapeutic interventions upon initiation of aerosolized medication. 2. Physician will be contacted for respiratory rate (RR) greater than 35 breaths per minute. Therapy will be held for heart rate (HR) greater than 140 beats per minute, pending direction from physician. 3. Bronchodilators will be administered via Metered Dose Inhaler (MDI) with spacer when the following criteria are met:  a. Alert and cooperative     b. HR < 140 bpm  c. RR < 30 bpm                d. Can demonstrate a 2-3 second inspiratory hold  4. Bronchodilators will be administered via Hand Held Nebulizer [facility] to patients when ANY of the following criteria are met  a. Incognizant or uncooperative          b. Patients treated with HHN at Home        c. Unable to demonstrate proper use of MDI with spacer     d. RR > 30 bpm   5. Bronchodilators will be delivered via Metered Dose Inhaler (MDI), HHN, Aerogen to intubated patients on mechanical ventilation. 6. Inhalation medication orders will be delivered and/or substituted as outlined below. Aerosolized Medications Ordering and Administration Guidelines:    1. All Medications will be ordered by a physician, and their frequency and/or modality will be adjusted as defined by the patients Respiratory Severity Index (RSI) score. 2. If the patient does not have documented COPD, consider discontinuing anticholinergics when RSI is less than 9.  3. If the bronchospasm worsens (increased RSI), then the bronchodilator frequency can be increased to a maximum of every 4 hours. If greater than every 4 hours is required, the physician will be contacted.   4. If the bronchospasm improves, the frequency of the bronchodilator can be decreased,

## 2019-06-01 NOTE — DISCHARGE SUMMARY
[facility]  Discharge Summary  Patient ID:  Wilfredo Leal  [id number] y.o. 1945    Admit date: 5/31/2019    Discharge date:  6/1/2019    Admitting Physician: Hussein Barros MD     Discharge Provider: Noel Hadley [facility] Course: Wilfredo Leal presented 5/31/2019 for dual chamber pacemaker placement for symptomatic sinus node dysfunction. No post procedure complications, rhythm overnight has been sinus. This morning he denies chest pain, palpitations and dizziness. He has chronic shortness of breath that he feels is at baseline. Device interrogation within normal limits. Chest xray normal from a device standpoint. He has tolerated breakfast, ambulated, and voided without difficulty. Assessment:  Symptomatic sinus node dysfunction: stable, 4.1 sec pause noted on monitor 4/2019   - s/p [employer]onic dual chamber pacemaker placement 5/31/2019  Sinus bradycardia: stable, noted on monitor 4/2019  Paroxysmal atrial fibrillation: in sinus   - [id number] score 5   - started on sotalol 4/15/19  Atrial flutter: known history  Chronic diastolic CHF: compensated  HTN: sub optimal control, further medication titration as outpatient  COPD  History of diverticulosis/anemia  History of DVT    Plan:  1. Continue eliquis, diltiazem, sotalol  2. Activity restrictions reviewed  3. Call the office with any fevers, chills, drainage from site, or increased shortness of breath  4.  Follow up in device clinic 6/7/2019    Admission Diagnoses: Sick sinus syndrome [I49.5]  Sinus node dysfunction (HCC) [I49.5]  Sinus node dysfunction (HCC) [I49.5]    Discharge Diagnoses:   Patient Active Problem List   Diagnosis    DM II (diabetes mellitus, type II), controlled ([address]    HLD (hyperlipidemia)    Diabetic neuropathy [facility]    RLS (restless legs syndrome)    Insomnia    Coronary artery disease involving native coronary artery of native heart without angina pectoris    Seasonal allergic rhinitis    Controlled type 2 diabetes mellitus without complication, without long-term current use of insulin (HCC)    HOLLAND (obstructive sleep apnea)    Essential hypertension    COPD, severe (HCC)    Chronic bilateral low back pain without sciatica    Restless legs syndrome (RLS)    Mixed hyperlipidemia    Depression    Fall at home    T12 vertebral burst fracture    Colitis    Former smoker    Typical atrial flutter ([facility]    Gastroesophageal reflux disease without esophagitis    Short-term memory loss    Nocturia    BPH (benign prostatic hyperplasia)    Paroxysmal atrial fibrillation (HCC)    Obesity    Anemia    Symptomatic anemia    Chronic diastolic heart failure ([facility]    Community acquired pneumonia    Tracheobronchitis    Elevated PSA    [facility] dysfunction [facility]    Pacemaker      Discharged Condition: stable  The patient was seen and examined on day of discharge and this discharge summary is in conjunction with any daily progress note from day of discharge.     Consults:  None  Physical Exam:  BP (!) 153/96   Pulse 86   Temp 98 °F (36.7 °C) (Oral)   Resp 22   Ht 5' 10\" (1.778 m)   Wt 236 lb (107 kg)   SpO2 94%   BMI 33.86 kg/m²       Intake/Output Summary (Last 24 hours) at 6/1/2019 0936  Last data filed at 5/31/2019 1639  Gross per 24 hour   Intake 240 ml   Output --   Net 240 ml     General:  Awake, alert, NAD  Skin:  Warm and dry  Neck:  JVD<8, no bruit  Chest:  Diminished to auscultation, no wheezes/rhonchi/rales  Cardiovascular:  RRR S1S2  Abdomen:  Soft, nontender, +bowel sounds  Extremities:  No edema  Left chest site without redness, warmth, edema, steri strips intact, no drainage, mildly tender    Significant Diagnostic Studies:     Echo 5/22/2019:  Normal left ventricular systolic function with ejection fraction of 55-60%.   No regional wall motion abnormalites are seen.   Mild concentric left ventricular hypertrophy.   Grade I diastolic dysfunction with normal filling pressure.   Mild mitral regurgitation. Stress test 1/29/2019: There is normal isotope uptake at stress and rest. There is no evidence of    myocardial ischemia or scar. LV function is normal with uniform wall motion    and ejection fraction of 65%. Low risk study. Labs:   Lab Results   Component Value Date    CREATININE 0.8 05/31/2019    BUN 10 05/31/2019     (L) 05/31/2019    K 5.5 (H) 05/31/2019    CL 99 05/31/2019    CO2 27 05/31/2019      Lab Results   Component Value Date    WBC 5.4 05/31/2019    HGB 11.9 (L) 05/31/2019    HCT 36.0 (L) 05/31/2019    MCV 89.8 05/31/2019     05/31/2019      Lab Results   Component Value Date    INR 1.16 (H) 05/31/2019    PROTIME 13.2 (H) 05/31/2019    No results found for: BNP    Radiology: chest xray    Disposition: home    Patient Instructions:    Zenobia Ortega   Home Medication Instructions UER:[id number]    Printed on:06/01/19 1049   Medication Information                      albuterol (PROVENTIL) (2.5 MG/3ML) 0.083% nebulizer solution  Take 3 mLs by nebulization every 6 hours as needed for Wheezing             apixaban (ELIQUIS) 5 MG TABS tablet  Take 1 tablet by mouth 2 times daily             colesevelam (WELCHOL) 625 MG tablet  TAKE 3 TABLETS TWICE DAILY             cyclobenzaprine (FLEXERIL) 10 MG tablet  TAKE 1 TABLET BY MOUTH 3 TIMES DAILY AS NEEDED FOR MUSCLE SPASMS             diltiazem (CARDIZEM CD) 120 MG extended release capsule  Take 1 capsule by mouth daily             donepezil (ARICEPT) 10 MG tablet  Take 1 tablet by mouth nightly             ferrous sulfate 325 (65 Fe) MG tablet  Take 1 tablet by mouth daily (with breakfast)             glucose blood VI test strips (ASCENSIA AUTODISC VI;ONE TOUCH ULTRA TEST VI) strip  [employer] True Metrix Air Test Strips. FSBS daily.  DX E11.9             ipratropium-albuterol (DUONEB) 0.5-2.5 (3) MG/3ML SOLN nebulizer solution  Inhale 3 mLs into the lungs 4 times daily             mesalamine (LIALDA) 1.2 g EC tablet  TAKE 1 TABLET THREE TIMES DAILY             metFORMIN (GLUCOPHAGE) 500 MG tablet  TAKE 1 TABLET TWICE DAILY             montelukast (SINGULAIR) 10 MG tablet  Take 1 tablet by mouth nightly             omeprazole (PRILOSEC) 40 MG delayed release capsule  TAKE 1 CAPSULE EVERY DAY             oxyCODONE-acetaminophen (PERCOCET)  MG per tablet  Take 1 tablet by mouth every 6 hours as needed for Pain. .             pramipexole (MIRAPEX) 1 MG tablet  Take 1 tablet by mouth nightly             sertraline (ZOLOFT) 100 MG tablet  Take 1 tablet by mouth daily             sotalol (BETAPACE) 80 MG tablet  Take 1 tablet by mouth 2 times daily             tamsulosin (FLOMAX) 0.4 MG capsule  TAKE 1 CAPSULE EVERY DAY             Tiotropium Bromide-Olodaterol (STIOLTO RESPIMAT) 2.5-2.5 MCG/ACT AERS  2 inhalations daily             traMADol (ULTRAM) 50 MG tablet  Take 50 mg by mouth every 12 hours as needed for Pain.              TRUEPLUS LANCETS 28G MISC  1 each by Does not apply route daily E11.9 Test FBS daily--NEED 33 GAUGE               Activity: no heavy lifting, pushing, pulling with the implant side for 1 months  Diet: cardiac diet  Wound Care: keep wound clean and dry and ice to area for comfort    Follow-up on 6/7/2019 for device check, office appointment will be made    Signed:  Bhargav Cullen CNP 6/1/2019, 9:36 AM

## 2019-06-01 NOTE — LETTER
[facility] PH[facility]ICAL REH[facility] [place] [facility]  [address] [address]  Phone: [phone]               June 2, 2019    Patient:    Date of Birth:    Date of Visit: 6/1/2019       To Whom It May Concern:    Nael Chand was seen with a patient in our emergency department on 6/2/2019. She may return to work on 6/3/2019.       Sincerely,       Dave Donnelly RN         Signature:__________________________________

## 2019-06-01 NOTE — PROGRESS NOTES
[facility] interrogated. [facility] \"All post-implant testing WNL. No programming changes. \"

## 2019-06-01 NOTE — PROGRESS NOTES
Pt d/c'd home at this time. Removed PIV and stopped bleeding. Catheter intact. Pt tolerated well. No redness noted at site. Notified [facility] and removed tele box. Reviewed d/c instructions, home meds, and  f/u information utilizing teach-back method. Patient verbalized understanding. Pt transported off unit via wheelchair with all belongings.

## 2019-06-02 PROBLEM — T17.908A ASPIRATION INTO AIRWAY: Status: ACTIVE | Noted: 2019-01-01

## 2019-06-02 PROBLEM — R09.2 RESPIRATORY ARREST (HCC): Status: ACTIVE | Noted: 2019-01-01

## 2019-06-02 PROBLEM — R06.03 ACUTE RESPIRATORY DISTRESS: Status: ACTIVE | Noted: 2019-01-01

## 2019-06-02 PROBLEM — J96.02 ACUTE RESPIRATORY FAILURE WITH HYPERCAPNIA (HCC): Status: ACTIVE | Noted: 2019-01-01

## 2019-06-02 NOTE — PROGRESS NOTES
midline. Respiratory:  Normal respiratory effort. Lungs: decrease breath sounds bilaterally, with mild wheezes and crackles  Cardiovascular: Regular rate and rhythm with normal S1/S2 without murmurs, rubs or gallops. Abdomen: Soft, non-tender, non-distended with normal bowel sounds. Musculoskeletal: No clubbing, cyanosis or edema bilaterally. Full range of motion without deformity. Skin: Skin color, texture, turgor normal.  No rashes or lesions. Neurologic:  Neurovascularly intact without any focal sensory/motor deficits. Cranial nerves: II-XII intact, grossly non-focal.  Psychiatric: intubated, sedated  Capillary Refill: Brisk,< 3 seconds   Peripheral Pulses: +2 palpable, equal bilaterally       Labs:   Recent Labs     05/31/19  0810 06/01/19 2118 06/02/19  0623   WBC 5.4 12.7* 11.0   HGB 11.9* 13.1* 12.0*   HCT 36.0* 41.5 38.3*    220 178     Recent Labs     06/01/19  0947 06/01/19 2118 06/02/19  0624   * 138 137   K 4.9 5.9* 5.7*    100 100   CO2 26 30 29   BUN 10 14 20   CREATININE 0.6* 0.9 1.1   CALCIUM 9.2 9.3 9.0     Recent Labs     06/01/19 2118   AST 75*   ALT 26   BILITOT 0.6   ALKPHOS 131*     Recent Labs     05/31/19  0810 06/01/19 2118   INR 1.16* 1.14     Recent Labs     06/01/19  2118   Margaretann Boots <0.01       Urinalysis:      Lab Results   Component Value Date    NITRU Negative 04/24/2019    WBCUA 3-5 04/24/2019    BACTERIA Rare 04/10/2019    RBCUA None seen 04/24/2019    BLOODU Negative 04/24/2019    SPECGRAV 1.020 04/24/2019    GLUCOSEU Negative 04/24/2019       Radiology:  XR CHEST PORTABLE   Final Result   Interval improvement of interstitial opacities.                  Assessment/Plan:    Active Hospital Problems    Diagnosis    DM II (diabetes mellitus, type II), controlled ([facility] [E11.9]     Priority: Medium    Respiratory arrest ([address] [R09.2]    Acute respiratory distress [R06.03]    Paroxysmal atrial fibrillation (HCC) [I48.0]    COPD, severe ([facility] [J44.9]    Essential hypertension [I10]    HOLLAND (obstructive sleep apnea) [G47.33]     Acute hypoxic and hypercarbic respiratory failure 2/2 COPD exacerbation  - on vent, management per pulm  - ABG much improved, acidosis largely resolved  - continue steroids, nebs  - plan for bronch today  - no abx needed at this time  - CTPA without pneumonia    Respiratory acidosis  -severe, related to above  - improved on vent  - continue to monitor ABG    DMII  - SSI ordered.  Holding home regimen    Paroxysmal Afib  - Paced rhythm, recently placed  - continue sotalol, cardizem, eliquis    Chronic diastolic heart failure  - no evidence of volume overload  - Echo 5/19 with EF 23-22%, grade I diastolic dysfunction    HTN  - well controlled on cardizem    HOLLAND  - will order CPAP once extubated    DVT Prophylaxis: eliquis  Diet: Diet NPO Effective Now  Code Status: Full Code    PT/OT Eval Status: not ordered    Dispo - remains critically ill in the ICU    Kelli Meigs, MD

## 2019-06-02 NOTE — PLAN OF CARE
Problem: ACTIVITY INTOLERANCE/IMPAIRED MOBILITY  Goal: Mobility/activity is maintained at optimum level for patient  Outcome: Ongoing     Problem: FLUID AND ELECTROLYTE IMBALANCE  Goal: Fluid and electrolyte balance are achieved/maintained  Outcome: Ongoing     Problem: HEMODYNAMIC STATUS  Goal: Patient has stable vital signs and fluid balance  Outcome: Ongoing     Problem: OXYGENATION/RESPIRATORY FUNCTION  Goal: Patient will maintain patent airway  Outcome: Ongoing

## 2019-06-02 NOTE — CONSULTS
[address] [place] [address]  [phone]      Attending Physician: Thais Blair MD  Reason for Consultation/Chief Complaint: SOB    Subjective   History of Present Illness:  Wilfredo Leal is a 76 y.o. patient who presented to the hospital with complaints of SOB. Pt was d/c from hospital on 6/1 after getting dual chamber pacer for sinus node dysfunction in setting of PAF    Past Medical History:   has a past medical history of Cancer ([address] CHF (congestive heart failure) ([address] Chronic pancreatitis ([address] Colitis, COPD (chronic obstructive pulmonary disease) ([address] Diabetes mellitus ([address] Esophagitis, Gastritis, Hyperlipidemia, and Hypertension. Surgical History:   has a past surgical history that includes Testicle removal (Left, 2012); shoulder surgery (Left, 2001); Upper gastrointestinal endoscopy (05/08/2015); Colonoscopy (5/08/2015); hernia repair (Right, 2012); Skin cancer excision (Left, 5/2016); Cataract removal with implant (Right, 05/2016); Upper gastrointestinal endoscopy (N/A, 09/08/2016); Cystoscopy (09/21/2018); pr office/outpt visit,procedure only (N/A, 9/21/2018); Colonoscopy (N/A, 10/25/2018); Upper gastrointestinal endoscopy (11/06/2018); Upper gastrointestinal endoscopy (N/A, 11/6/2018); Cardiac pacemaker placement (05/31/2019); Pacemaker insertion (05/31/2019); and pacemaker placement (Left, 05/31/2019). Social History:   reports that he quit smoking about 19 years ago. His smoking use included cigarettes. He has a 30.00 pack-year smoking history. He has never used smokeless tobacco. He reports that he does not drink alcohol or use drugs. Family History:  family history includes Cancer in his mother; Diabetes in his mother and son; Early Death in his mother; Other in his father. Home Medications:  Were reviewed and are listed in nursing record and/or below  Prior to Admission medications    Medication Sig Start Date End Date Taking?  Authorizing Provider cyclobenzaprine (FLEXERIL) 10 MG tablet TAKE 1 TABLET BY MOUTH 3 TIMES DAILY AS NEEDED FOR MUSCLE SPASMS 5/2/19   Ryan Love DO   sotalol (BETAPACE) 80 MG tablet Take 1 tablet by mouth 2 times daily 4/15/19   Pedrito Boswell MD   traMADol (ULTRAM) 50 MG tablet Take 50 mg by mouth every 12 hours as needed for Pain. Historical MD Annie   Tiotropium Bromide-Olodaterol (STIOLTO RESPIMAT) 2.5-2.5 MCG/ACT AERS 2 inhalations daily 4/9/19   Gavino Muñoz MD   diltiazem (CARDIZEM CD) 120 MG extended release capsule Take 1 capsule by mouth daily 3/29/19   Galindo Tavarez MD   mesalamine (LIALDA) 1.2 g EC tablet TAKE 1 TABLET THREE TIMES DAILY 3/26/19   Ryan Love DO   sertraline (ZOLOFT) 100 MG tablet Take 1 tablet by mouth daily 3/26/19   Ryan Love DO   montelukast (SINGULAIR) 10 MG tablet Take 1 tablet by mouth nightly 3/26/19   Ryan Love DO   pramipexole (MIRAPEX) 1 MG tablet Take 1 tablet by mouth nightly 3/26/19   Ryan Love DO   donepezil (ARICEPT) 10 MG tablet Take 1 tablet by mouth nightly 3/26/19   Ryan Love DO   ferrous sulfate 325 (65 Fe) MG tablet Take 1 tablet by mouth daily (with breakfast) 3/26/19   SHAYY Valles - CNP   [facility]) 625 MG tablet TAKE 3 TABLETS TWICE DAILY 2/19/19   Ryan Love DO   apixaban (ELIQUIS) 5 MG TABS tablet Take 1 tablet by mouth 2 times daily 2/11/19   Ryan Love DO   ipratropium-albuterol (DUONEB) 0.5-2.5 (3) MG/3ML SOLN nebulizer solution Inhale 3 mLs into the lungs 4 times daily 2/7/19 11/4/19  Gavino Muñoz MD   tamsulosin (FLOMAX) 0.4 MG capsule TAKE 1 CAPSULE EVERY DAY 2/5/19   Ryan Love,    omeprazole (PRILOSEC) 40 MG delayed release capsule TAKE 1 CAPSULE EVERY DAY 2/5/19   Ryan Love DO   metFORMIN (GLUCOPHAGE) 500 MG tablet TAKE 1 TABLET TWICE DAILY 2/5/19   Ryan Love DO   oxyCODONE-acetaminophen (PERCOCET)  MG per tablet Take 1 tablet by mouth every 6 hours as needed for Pain. Michael Sis Historical Provider, MD   albuterol (PROVENTIL) (2.5 MG/3ML) 0.083% nebulizer solution Take 3 mLs by nebulization every 6 hours as needed for Wheezing 9/11/18   Kevin Young MD   glucose blood VI test strips (ASCENSIA AUTODISC VI;ONE TOUCH ULTRA TEST VI) strip [employer] True Metrix Air Test Strips. FSBS daily. DX E11.9 5/16/18   Ryan Love, DO   TRUEPLUS LANCETS 28G MISC 1 each by Does not apply route daily E11.9 Test FBS daily--NEED 33 GAUGE 5/1/17   Ryan Love, DO        CURRENT Medications:    sodium chloride flush 0.9 % injection 10 mL 2 times per day   sodium chloride flush 0.9 % injection 10 mL PRN   pantoprazole (PROTONIX) injection 40 mg Daily   propofol 1000 MG/100ML injection Titrated   apixaban (ELIQUIS) tablet 5 mg BID   sotalol (BETAPACE) tablet 80 mg BID   ipratropium-albuterol (DUONEB) nebulizer solution 1 ampule Q4H WA   glucose (GLUTOSE) 40 % oral gel 15 g PRN   dextrose 50 % solution 12.5 g PRN   glucagon (rDNA) injection 1 mg PRN   dextrose 5 % solution PRN   insulin lispro (HUMALOG) injection vial 0-6 Units Q4H   diltiazem (CARDIZEM CD) extended release capsule 120 mg Daily   mesalamine (DELZICOL) delayed release capsule 400 mg TID   sertraline (ZOLOFT) tablet 100 mg Daily   methylPREDNISolone sodium (SOLU-MEDROL) injection 40 mg Q8H   fentaNYL (SUBLIMAZE) 1,000 mcg in sodium chloride 0.9 % 100 mL infusion Continuous       Allergies:  Patient has no known allergies. Review of Systems:   A 14 point review of symptoms completed. Pertinent positives identified in the HPI, all other review of symptoms negative as below.       Objective   PHYSICAL EXAM:    Vitals:    06/02/19 0847   BP:    Pulse: 79   Resp: 20   Temp:    SpO2: 95%    Weight: 240 lb 8.4 oz (109.1 kg)         General Appearance:  Alert, sedated, no distress, appears stated age   Head:  Normocephalic, without obvious abnormality, atraumatic   Eyes:  PERRL, conjunctiva/corneas clear   Nose: Nares normal, no drainage or sinus tenderness   Throat: Lips, mucosa, and tongue normal   Neck: Supple, symmetrical, trachea midline, no adenopathy, thyroid: not enlarged, symmetric, no tenderness/mass/nodules, no carotid bruit or JVD   Lungs:   Clear to auscultation bilaterally but decreased, respirations unlabored   Chest Wall:  No deformity or tenderness   Heart:  Regular rate and rhythm, S1, S2 normal, no murmur, rub or gallop   Abdomen:   Soft, non-tender, bowel sounds active all four quadrants,  no masses, no organomegaly   Extremities: Extremities normal, atraumatic, no cyanosis or edema   Pulses: 2+ and symmetric   Skin: Skin color, texture, turgor normal, no rashes or lesions   Pysch: Normal mood and affect   Neurologic: Normal gross motor and sensory exam.         Labs   CBC: Lab Results   Component Value Date    WBC 11.0 2019    RBC 4.12 2019    HGB 12.0 2019    HCT 38.3 2019    MCV 93.0 2019    RDW 17.3 2019     2019     CMP:  Lab Results   Component Value Date     2019    K 5.7 2019     2019    CO2 29 2019    BUN 20 2019    CREATININE 1.1 2019    GFRAA >60 2019    AGRATIO 1.3 2019    LABGLOM >60 2019    GLUCOSE 125 2019    PROT 8.6 2019    CALCIUM 9.0 2019    BILITOT 0.6 2019    ALKPHOS 131 2019    AST 75 2019    ALT 26 2019     PT/INR:  No results found for: PTINR  HgBA1c:  Lab Results   Component Value Date    LABA1C 6.0 2018     Lab Results   Component Value Date    CKTOTAL 205 2017    TROPONINI <0.01 2019         Cardiac Data     Last EK2019 Sinus tach with no ischemia    Echo: 2019   Normal left ventricular systolic function with ejection fraction of 55-60%.   No regional wall motion abnormalites are seen.   Mild concentric left ventricular hypertrophy.   Grade I diastolic dysfunction with normal filling pressure.   Mild mitral regurgitation. Stress Test:    Cardiac cath: 04/27/15  IMPRESSION:  1.  Mild coronary artery disease of the mid left anterior descending and  luminal irregularities of circumflex and right coronary artery. 2.  Normal left ventricular systolic function with an ejection fraction of  55% to 60%. 3.  Normal left ventricular end-diastolic pressure, 6 mmHg. Studies:     CXR( post pacer)  Pacemaker placement without complicating feature.             HCT:  1. No acute intracranial abnormality. 2. Stable age-appropriate atrophy and chronic small vessel ischemic white   matter disease. 6/1/2019 admit   Appropriate life support device positioning.       Moderate to severe interstitial pulmonary edema. CHest CT  Negative for acute pulmonary embolism.       Advanced centrilobular and paraseptal emphysema with airway inflammation, the   latter of which may be smoking-related, reflect acute bronchitis or   aspiration sequela given tracheobronchial secretions.  No consolidative   airspace disease.       Gas surrounding the left chest pacemaker generator correlates with recent   history of pacemaker implantation.  Leads are intact and terminate in the   right heart chambers.       Enlarged main pulmonary artery may be seen with underlying pulmonary   hypertension. Assessment and Plan      1. Acute hypercapnic resp arrest.   2. Chronic diastolic CHF  3. SND:    - s/p recent dual chamber pacer  4. PAF: not on A/C due to GIb hx  5. HTN    PLAN  1. No s/s of cardiac cause for resp distress. No CHF, BNP negative. No edema  2. Pacer checked out. Will s/o, call with questions.        Patient Active Problem List   Diagnosis    DM II (diabetes mellitus, type II), controlled ([address]    HLD (hyperlipidemia)    Diabetic neuropathy ([address]    RLS (restless legs syndrome)    Insomnia    Coronary artery disease involving native coronary artery of native heart without angina pectoris    Seasonal allergic rhinitis    Controlled type 2 diabetes mellitus without complication, without long-term current use of insulin (HCC)    HOLLAND (obstructive sleep apnea)    Essential hypertension    COPD, severe (HCC)    Chronic bilateral low back pain without sciatica    Restless legs syndrome (RLS)    Mixed hyperlipidemia    Depression    Fall at home    T12 vertebral burst fracture    Colitis    Former smoker    Typical atrial flutter (HCC)    Gastroesophageal reflux disease without esophagitis    Short-term memory loss    Nocturia    BPH (benign prostatic hyperplasia)    Paroxysmal atrial fibrillation (HCC)    Obesity    Anemia    Symptomatic anemia    Chronic diastolic heart failure ([address]    Community acquired pneumonia    Tracheobronchitis    Elevated PSA    Sinus node dysfunction ([address]    Pacemaker    Respiratory arrest ([address]    Acute respiratory distress           Thank you for allowing us to participate in the care of [facility]. Please call me with any questions [phone]. Sonya Bateman MD, [address] [facility] Cardiologist  [facility]  [phone] [facility]  [phone] [address] [place] [address]  6/2/2019 10:23 AM    I will address the patient's cardiac risk factors and adjusted pharmacologic treatment as needed. In addition, I have reinforced the need for patient directed risk factor modification. All questions and concerns were addressed to the patient/family. Alternatives to my treatment were discussed. The note was completed using EMR. Every effort was made to ensure accuracy; however, inadvertent computerized transcription errors may be present.

## 2019-06-02 NOTE — PROGRESS NOTES
Patient intubated in er with 7.5 ett 23 at lip.  Placed on ventilator on settings of ac 20/460/60%/ peep of 5

## 2019-06-02 NOTE — H&P
Hospital Medicine History & Physical      PCP: Belkis Garcia DO    Date of Admission: 6/2/2019    Date of Service: Pt seen/examined on 6/2  and Admitted to Inpatient to ICU  with expected LOS greater than two midnights due to medical therapy. Chief Complaint:   Acute respiratory distress. S/p intubation      History Of Present Illness:     76 y.o. male who presented to [facility] as a transfer from [facility] ( Pt family called 911 after  Pt had  difficulty breathing , getting progressively worse during the day, He just being discharged from our  hospital after having a pacemaker placed, he was  overnight in the hospital the night prior to admission,     The patient was not complaining of any pain, however, the patient started to become more and more short of breath and finally 911 was called. IN ER ABG showed severe HyperCapnia, and pt was intubated due to being obtunded and nonresponsive. Per family request, he was transferred to our facility.          Past Medical History:          Diagnosis Date    Cancer ([address]     skin    Chronic pancreatitis ([address]     Colitis 5/08/2015    Diabetes mellitus ([address]     Esophagitis     Gastritis     Hyperlipidemia     Hypertension        Past Surgical History:          Procedure Laterality Date    CARDIAC PACEMAKER PLACEMENT  05/31/2019    Dr Maty Mohamud, [employer] Model: [place]    CATARACT REMOVAL WITH IMPLANT Right 05/2016    COLONOSCOPY  5/08/2015    colitis-mild gastritis    COLONOSCOPY N/A 10/25/2018    COLONOSCOPY POLYPECTOMY SNARE/COLD BIOPSY performed by Abbey Reid MD at [facility]  09/21/2018    CYSTOSCOPY, DIRECT VISION INTERAL URETHROTOMY     HERNIA REPAIR Right 2012    inguinal    PACEMAKER INSERTION  05/31/2019    MD OFFICE/OUTPT VISIT,PROCEDURE ONLY N/A 9/21/2018    CYSTOSCOPY, DIRECT VISION INTERAL URETHROTOMY performed by Jono Alicea MD at [address] [place] [address] 2001 ipratropium-albuterol (DUONEB) 0.5-2.5 (3) MG/3ML SOLN nebulizer solution Inhale 3 mLs into the lungs 4 times daily 2/7/19 11/4/19  Shiv Chavira MD   tamsulosin (FLOMAX) 0.4 MG capsule TAKE 1 CAPSULE EVERY DAY 2/5/19   Ryan Love DO   omeprazole (PRILOSEC) 40 MG delayed release capsule TAKE 1 CAPSULE EVERY DAY 2/5/19   Ryan Love DO   metFORMIN (GLUCOPHAGE) 500 MG tablet TAKE 1 TABLET TWICE DAILY 2/5/19   Ryan Love DO   oxyCODONE-acetaminophen (PERCOCET)  MG per tablet Take 1 tablet by mouth every 6 hours as needed for Pain. Christina Dupont Historical Provider, MD   albuterol (PROVENTIL) (2.5 MG/3ML) 0.083% nebulizer solution Take 3 mLs by nebulization every 6 hours as needed for Wheezing 9/11/18   Shiv Chavira MD   glucose blood VI test strips (ASCENSIA AUTODISC VI;ONE TOUCH ULTRA TEST VI) strip [employer] True Metrix Air Test Strips. FSBS daily. DX E11.9 5/16/18   Ryan Love DO   TRUEPLUS LANCETS 28G MISC 1 each by Does not apply route daily E11.9 Test FBS daily--NEED 33 GAUGE 5/1/17   Ryan Love DO       Allergies:  Patient has no known allergies. Social History:      The patient currently lives at home    TOBACCO:   reports that he quit smoking about 19 years ago. His smoking use included cigarettes. He has a 30.00 pack-year smoking history. He has never used smokeless tobacco.  ETOH:   reports that he does not drink alcohol. Family History:       Reviewed in detail and negative for DM, CAD, Cancer, CVA. Positive as follows:        Problem Relation Age of Onset    Diabetes Mother     Early Death Mother     Cancer Mother         stomach    Other Father     Diabetes Son        REVIEW OF SYSTEMS:   All twelve systems reviewed and negative except for noted in HPI.     PHYSICAL EXAM PERFORMED:    Pulse 75   Resp 23   SpO2 98%     General appearance:   S/P intubation, done in [facility] ER No apparent distress,  Pt on propofol drip, ssedayted  HEENT:  Normal cephalic, atraumatic without obvious deformity. Pupils equal, round, and reactive to light. Extra ocular muscles intact. Conjunctivae/corneas clear. Neck: Supple, with full range of motion. No jugular venous distention. Trachea midline. Respiratory:  Lungs: decrease breath sounds bilaterally, with mild wheezes and crackles  Cardiovascular:  Regular rate and rhythm with normal S1/S2 without murmurs, rubs or gallops. Abdomen: Soft, non-tender, non-distended with normal bowel sounds. Musculoskeletal: No clubbing, cyanosis or edema bilaterally. Full range of motion without deformity. Peripheral Pulses: +2 palpable, equal bilaterally   Skin: Skin color, texture, turgor normal.  No rashes or lesions. Neurologic:   Intubated. Unable to asess             Labs:     Recent Labs     05/31/19  0810 06/01/19 2118   WBC 5.4 12.7*   HGB 11.9* 13.1*   HCT 36.0* 41.5    220     Recent Labs     05/31/19  0810 06/01/19  0947 06/01/19 2118   * 135* 138   K 5.5* 4.9 5.9*   CL 99 100 100   CO2 27 26 30   BUN 10 10 14   CREATININE 0.8 0.6* 0.9   CALCIUM 9.3 9.2 9.3     Recent Labs     06/01/19 2118   AST 75*   ALT 26   BILITOT 0.6   ALKPHOS 131*     Recent Labs     05/31/19  0810 06/01/19 2118   INR 1.16* 1.14     Recent Labs     06/01/19 2118   TROPONINI <0.01       Urinalysis:      Lab Results   Component Value Date    NITRU Negative 04/24/2019    WBCUA 3-5 04/24/2019    BACTERIA Rare 04/10/2019    RBCUA None seen 04/24/2019    BLOODU Negative 04/24/2019    SPECGRAV 1.020 04/24/2019    GLUCOSEU Negative 04/24/2019       Radiology:     CXR: I have reviewed the CXR with the following interpretation:   Records from  [facility]   EKG:  I have reviewed the EKG with the following interpretation:  No ST elevation or depression     No orders to display       ASSESSMENT:    Active Hospital Problems    Diagnosis Date Noted    Respiratory arrest ([facility] [R09.2] 06/02/2019         PLAN:     S/P intubation due to acute respiratory distress. Intensiveness consult,     Cont on his home meds for management of A-Fib via NG tube ( Sotalol, he is also on Eliquis )         DVT Prophylaxis:  Eliquis  Via NG tube  Diet:  NPOCode   Status: Full      Electronically signed by Tab Sanders MD on 6/2/2019 at 5:25 AM    Thank you Kiley Leggett DO for the opportunity to be involved in this patient's care.  If you have any questions or concerns please feel free to contact me

## 2019-06-02 NOTE — PROGRESS NOTES
06/02/19 0635   Vent Information   Vent Type 840   Vent Mode AC/VC   Vt Ordered 500 mL   Rate Set 20 bmp   Peak Flow 70 L/min   Pressure Support 0 cmH20   FiO2  50 %   Sensitivity 3   PEEP/CPAP 5   Humidification Source Heated wire   Humidification Temp 37   Vent Patient Data   Peak Inspiratory Pressure 49 cmH2O   Mean Airway Pressure 13 cmH20   Rate Measured 21 br/min   Vt Exhaled 699 mL   Minute Volume 12.9 Liters   I:E Ratio 1:2.80   Cough/Sputum   Sputum How Obtained Endotracheal   Cough Non-productive   Spontaneous Breathing Trial (SBT) RT Doc   Pulse 80   SpO2 96 %   Breath Sounds   Right Upper Lobe Clear   Right Middle Lobe Diminished   Right Lower Lobe Diminished   Left Upper Lobe Diminished   Left Lower Lobe Diminished   Additional Respiratory  Assessments   Resp 23   End Tidal CO2 30 (%)   Alarm Settings   High Pressure Alarm 50 cmH2O   Low Minute Volume Alarm 2 L/min   High Respiratory Rate 45 br/min   Low Exhaled Vt  200 mL   Non-Surgical Airway Endo Tracheal Tube   Placement Date/Time: 06/01/19 2124   Timeout: Patient  Mask Ventilation: Ventilated by mask (1)  Placed By: In ED  Inserted by: MD Lazarus Mussel  Insertion attempts: 1  Airway Device: Endo Tracheal Tube  Size: 7.5  Placement Verified By[de-identified] Chest X-ray; [employer]. ..    Secured at 22 cm   Measured From Lips   Secured Location Left   Secured By Commercial tube ewing   Site Condition Dry

## 2019-06-02 NOTE — PROGRESS NOTES
06/01/19 2202   Vent Information   $Ventilation $Initial Day   Ventilator Started Yes   Vent Type 840   Vent Mode AC/VC   Vt Ordered 460 mL   Rate Set 20 bmp   Peak Flow 60 L/min   Pressure Support 0 cmH20   FiO2  60 %   Sensitivity 3   PEEP/CPAP 5   Humidification Source Heated wire   Humidification Temp Measured 37   Circuit Condensation Drained   Vent Patient Data   Peak Inspiratory Pressure 37 cmH2O   Mean Airway Pressure 13 cmH20   Rate Measured 20 br/min   Vt Exhaled 500 mL   Minute Volume 10 Liters   I:E Ratio 1:2.60   Cough/Sputum   Sputum How Obtained Endotracheal   Cough None   Sputum Amount Small   Sputum Color Creamy   Tenacity Thick   Spontaneous Breathing Trial (SBT) RT Doc   Pulse 103   SpO2 100 %   Breath Sounds   Right Upper Lobe Diminished; Expiratory Wheezes   Right Middle Lobe Diminished; Expiratory Wheezes   Right Lower Lobe Diminished; Expiratory Wheezes   Left Upper Lobe Diminished; Expiratory Wheezes   Left Lower Lobe Diminished; Expiratory Wheezes   Additional Respiratory  Assessments   Resp 20   Alarm Settings   High Pressure Alarm 45 cmH2O   Low Minute Volume Alarm 2.5 L/min   Apnea (secs) 20 secs   High Respiratory Rate 30 br/min   Low Exhaled Vt  300 mL   Patient Observation   Observations 7.5 ett 23 at lip   Non-Surgical Airway Endo Tracheal Tube   Placement Date/Time: 06/01/19 2124   Timeout: Patient  Mask Ventilation: Ventilated by mask (1)  Placed By: In ED  Inserted by: MD Mora Warren  Insertion attempts: 1  Airway Device: Endo Tracheal Tube  Size: 7.5  Placement Verified By[de-identified] Chest X-ray; [facility]. ..    Secured at 22 cm   Measured From [facility] By Commercial tube ewing

## 2019-06-02 NOTE — PROGRESS NOTES
ABG drawn from left radial x 1  attempt(s). Sight prepped per policy and procedure. Modified Zak's test positive. Pressure held to sight after procedure for five minutes. No hematoma present. Pulse present after procedure. 60% ventilator

## 2019-06-02 NOTE — PROGRESS NOTES
Patient's EF (Ejection Fraction) is greater than 40%    Patient's weights and intake/output reviewed:    Patient's Last Weight: 109 kg obtained by bed scale. Today's weight is noted to be more than last documented weight. No intake or output data in the 24 hours ending 06/02/19 0900    Patient's current functional capacity:  Marked limitation of physical activity. Comfortable at rest. Less than ordinary activity causes fatigue, palpitation, or dyspnea. Pt resting in bed at this time on Ventilator. Pt with complaints of shortness of breath prior to intubation. Pt without lower extremity edema. Patient and family's stated goal of care: reduce shortness of breath, increase activity tolerance, better understand heart failure and disease management, be more comfortable and reduce lower extremity edema prior to discharge        Patient has a past medical history of Cancer ([address] CHF (congestive heart failure) ([address] Chronic pancreatitis ([address] Colitis, COPD (chronic obstructive pulmonary disease) ([address] Diabetes mellitus ([address] Esophagitis, Gastritis, Hyperlipidemia, and Hypertension. Comorbidities reviewed and education provided.     >>For CHF and Comorbidity documentation on Education Time and Topics, please see Education Tab

## 2019-06-02 NOTE — PROGRESS NOTES
06/02/19 0507   Vent Information   Ventilator Started Yes   Vent Type 840   Vt Ordered 500 mL   Rate Set 20 bmp   Peak Flow 70 L/min   Pressure Support 0 cmH20   FiO2  50 %   Sensitivity 3   PEEP/CPAP 5   Cuff Pressure (cm H2O) 28 cm H2O   Humidification Source Heated wire   Humidification Temp 36   Vent Patient Data   Peak Inspiratory Pressure 41 cmH2O   Mean Airway Pressure 13 cmH20   Rate Measured 22 br/min   Vt Exhaled 629 mL   Minute Volume 12.4 Liters   I:E Ratio 1:2.40   Cough/Sputum   Sputum How Obtained Endotracheal   Cough Non-productive   Spontaneous Breathing Trial (SBT) RT Doc   Pulse 75   SpO2 98 %   Breath Sounds   Right Upper Lobe Clear   Right Middle Lobe Clear   Right Lower Lobe Diminished   Left Upper Lobe Diminished   Left Lower Lobe Diminished   Additional Respiratory  Assessments   Resp 23   End Tidal CO2 39 (%)   Alarm Settings   High Pressure Alarm 50 cmH2O   Low Minute Volume Alarm 2 L/min   High Respiratory Rate 45 br/min   Low Exhaled Vt  200 mL   Non-Surgical Airway Endo Tracheal Tube   Placement Date/Time: 06/01/19 2124   Timeout: Patient  Mask Ventilation: Ventilated by mask (1)  Placed By: In ED  Inserted by: MD Clark Button  Insertion attempts: 1  Airway Device: Endo Tracheal Tube  Size: 7.5  Placement Verified By[de-identified] Chest X-ray; [facility]ult. ..    Secured at 22 cm   Measured From Lips   Secured Location Left   Secured By Commercial tube ewing   Site Condition Dry   Cuff Pressure 28 cm H2O

## 2019-06-02 NOTE — PLAN OF CARE
Problem: Restraint Use - Nonviolent/Non-Self-Destructive Behavior:  Goal: Absence of restraint indications  Description  Absence of restraint indications  Outcome: Ongoing  Goal: Absence of restraint-related injury  Description  Absence of restraint-related injury  Outcome: Ongoing     Problem: Airway Clearance - Ineffective:  Goal: Ability to maintain a clear airway will improve  Description  Ability to maintain a clear airway will improve  Outcome: Ongoing     Problem: Anxiety/Stress:  Goal: Level of anxiety will decrease  Description  Level of anxiety will decrease  Outcome: Ongoing     Problem: Cardiac Output - Decreased:  Goal: Hemodynamic stability will improve  Description  Hemodynamic stability will improve  Outcome: Ongoing     Problem: Gas Exchange - Impaired:  Goal: Levels of oxygenation will improve  Description  Levels of oxygenation will improve  Outcome: Ongoing     Problem: Pain:  Goal: Pain level will decrease  Description  Pain level will decrease  Outcome: Ongoing     Problem: Serum Glucose Level - Abnormal:  Goal: Ability to maintain appropriate glucose levels will improve to within specified parameters  Description  Ability to maintain appropriate glucose levels will improve to within specified parameters  Outcome: Ongoing

## 2019-06-02 NOTE — PROGRESS NOTES
Respiratory Therapy Bronchoscopy Assist      Name:  Sushila [facility] Record Number:  [id number]  Age: 76 y.o. Gender: male  : 1945  Today's Date:  2019  Room:  [facility]0238-01      BAL cath samples obtained from right and left lung during bronchoscopy assist with patient on 100%. Sterile field maintained throughout procedure. Patient was pre-sedated. 110 mL of saline instilled. 55 mL of cloudy fluid obtained. Pt tolerated procedure well. Samples sent to lab for testing. Patient SpO2 within acceptable range throughout bronchscopy procedure. Physician assisted with bronch from [phone] AM to 9124 AM without complications.  Bronchoscope ID: [id number]    Patient/caregiver was educated on the proper method of use:  No: pt on ventilator      Level of patient/caregiver understanding able to:   [] Verbalize understanding   [] Demonstrate understanding       [] Teach back        [] Needs reinforcement       []  No available caregiver               []  Other:     Response to education:  pt on ventilator     Electronically signed by Nita Renae RCP on 2019 at 10:47 AM

## 2019-06-02 NOTE — CONSULTS
Henrry Rivas called consult on 06/02/19 at 0607. Will be held for the office in the AM, per answering service.

## 2019-06-02 NOTE — PROGRESS NOTES
Pt arrived to [facility] ICU room 238 from ground transport team. Pt connected to ICU monitors. VSS. Sinus arrhythmia on the monitor, rate in the 70s. Lung sounds very diminished throughout. RT at bedside setting up ventilator. Settings AC 20, , PEEP 5, FiO2 50%. Propofol drip running at 40 mcg/kg/min (rate that transport had running). Jameson draining clear, yellow urine. OG connected to CLWS with about 50 mL of immediate output. Pt had a pacemaker placed on Friday 5/31. Site c/d/i with steri strips still in place. Chlorhexidine bath given. Preventative sacral mepilex foam placed. No skin issues noted. MD paged for orders.

## 2019-06-02 NOTE — PROGRESS NOTES
RESPIRATORY THERAPY ASSESSMENT    Name:  Sushila [facility] Record Number:  [id number]  Age: 76 y.o. Gender: male  : 1945  Today's Date:  2019  Room:  [facility]8-01    Assessment     Is the patient being admitted for a COPD or Asthma exacerbation? No   (If yes the patient will be seen every 4 hours for the first 24 hours and then reassessed)    Patient Admission Diagnosis      Allergies  No Known Allergies    Minimum Predicted Vital Capacity:  1095         Actual Vital Capacity:      PT ON A VENTILATOR              Pulmonary History: CHF, COPD  Home Oxygen Therapy:  PT ON VENTILATOR UNABLE TO DETERMIN  Home Respiratory Therapy: albuterol Q6H PRN, Duoneb 4 x daily, Stiolto 2 puff daily  Current Respiratory Therapy:  dUONEB HHN Q4H  Treatment Type: HHN  Medications: Albuterol/Ipratropium    Respiratory Severity Index(RSI)   Patients with orders for inhalation medications, oxygen, or any therapeutic treatment modality will be placed on Respiratory Protocol. They will be assessed with the first treatment and at least every 72 hours thereafter. The following severity scale will be used to determine frequency of treatment intervention.     Smoking History: Pulmonary Disease or Smoking History, Greater than 15 pack year = 2    Social History  Social History     Tobacco Use    Smoking status: Former Smoker     Packs/day: 1.50     Years: 20.00     Pack years: 30.00     Types: Cigarettes     Last attempt to quit: 1/10/2000     Years since quittin.4    Smokeless tobacco: Never Used   Substance Use Topics    Alcohol use: No     Alcohol/week: 0.0 oz    Drug use: No       Recent Surgical History: Thoracic or Upper Airway = 3  Past Surgical History  Past Surgical History:   Procedure Laterality Date    CARDIAC PACEMAKER PLACEMENT  2019    Dr Anca Rosales, [employer] Model: [place]    CATARACT REMOVAL WITH IMPLANT Right 2016    COLONOSCOPY  2015    colitis-mild gastritis    COLONOSCOPY N/A 10/25/2018    COLONOSCOPY POLYPECTOMY SNARE/COLD BIOPSY performed by Aylsia Cabrera MD at [address]  09/21/2018    CYSTOSCOPY, DIRECT VISION INTERAL URETHROTOMY     HERNIA REPAIR Right 2012    inguinal    PACEMAKER INSERTION  05/31/2019    PACEMAKER PLACEMENT Left 05/31/2019    RI OFFICE/OUTPT VISIT,PROCEDURE ONLY N/A 9/21/2018    CYSTOSCOPY, DIRECT VISION INTERAL URETHROTOMY performed by Sara Delvalle MD at [facility] Left 2001    Rotator cuff    SKIN CANCER EXCISION Left 5/2016    melanoma    TESTICLE REMOVAL Left 2012    UPPER GASTROINTESTINAL ENDOSCOPY  05/08/2015    Gastritis    UPPER GASTROINTESTINAL ENDOSCOPY N/A 09/08/2016    gastritis-Bx pending    UPPER GASTROINTESTINAL ENDOSCOPY  11/06/2018    UPPER GASTROINTESTINAL ENDOSCOPY N/A 11/6/2018    EGD BIOPSY performed by Alysia Cabrera MD at [address]       Level of Consciousness: Comatose = 4    Level of Activity: Bedridden, unresponsive or quadriplegic = 4    Respiratory Pattern: Regular Pattern; RR 8-20 = 0    Breath Sounds: Diminshed bilaterally and/or crackles = 2    Sputum  Sputum Color: Cloudy, Tenacity: Thin, Sputum How Obtained: None  Cough: Strong, productive =1    Vital Signs   BP (!) 101/56   Pulse 60   Temp 99.2 °F (37.3 °C) (Axillary)   Resp 20   Ht 5' 10\" (1.778 m)   Wt 240 lb 8.4 oz (109.1 kg)   SpO2 95%   BMI 34.51 kg/m²   SPO2 (COPD values may differ): Less than 86% on room air or greater than 92% on FiO2 greater than 50% = 4    Peak Flow (asthma only): not applicable = 0    RSI: Greater than 17 = Contact physician        Plan       Goals: medication delivery, improve oxygenation  Patient/caregiver was educated on the proper method of use for Respiratory Care Devices:  No:       Level of patient/caregiver understanding able to:   ? Verbalize understanding   ? Demonstrate understanding       ? Teach back        ? Needs reinforcement       ?   No available caregiver               ? Other:     Response to education:  pt on ventilator     Is patient being placed on Home Treatment Regimen? No     Does the patient have everything they need prior to discharge? NA     Comments: pt assessed, chart and home medications reviewed    Plan of Care:  Change frequency to q4h of hhn    Electronically signed by Susy Mcadams RCP on 6/2/2019 at 1:02 PM    Respiratory Protocol Guidelines     1. Assessment and treatment by Respiratory Therapy will be initiated for medication and therapeutic interventions upon initiation of aerosolized medication. 2. Physician will be contacted for respiratory rate (RR) greater than 35 breaths per minute. Therapy will be held for heart rate (HR) greater than 140 beats per minute, pending direction from physician. 3. Bronchodilators will be administered via Metered Dose Inhaler (MDI) with spacer when the following criteria are met:  a. Alert and cooperative     b. HR < 140 bpm  c. RR < 30 bpm                d. Can demonstrate a 2-3 second inspiratory hold  4. Bronchodilators will be administered via Hand Held Nebulizer [facility]) to patients when ANY of the following criteria are met  a. Incognizant or uncooperative          b. Patients treated with HHN at Home        c. Unable to demonstrate proper use of MDI with spacer     d. RR > 30 bpm   5. Bronchodilators will be delivered via Metered Dose Inhaler (MDI), HHN, Aerogen to intubated patients on mechanical ventilation. 6. Inhalation medication orders will be delivered and/or substituted as outlined below. Aerosolized Medications Ordering and Administration Guidelines:    1. All Medications will be ordered by a physician, and their frequency and/or modality will be adjusted as defined by the patients Respiratory Severity Index (RSI) score.   2. If the patient does not have documented COPD, consider discontinuing anticholinergics when RSI is less than 9.  3. If the bronchospasm worsens (increased RSI), then the bronchodilator frequency can be increased to a maximum of every 4 hours. If greater than every 4 hours is required, the physician will be contacted. 4. If the bronchospasm improves, the frequency of the bronchodilator can be decreased, based on the patient's RSI, but not less than home treatment regimen frequency. 5. Bronchodilator(s) will be discontinued if patient has a RSI less than 9 and has received no scheduled or as needed treatment for 72  Hrs. Patients Ordered on a Mucolytic Agent:    1. Must always be administered with a bronchodilator. 2. Discontinue if patient experiences worsened bronchospasm, or secretions have lessened to the point that the patient is able to clear them with a cough. Anti-inflammatory and Combination Medications:    1. If the patient lacks prior history of lung disease, is not using inhaled anti-inflammatory medication at home, and lacks wheezing by examination or by history for at least 24 hours, contact physician for possible discontinuation.

## 2019-06-02 NOTE — PROGRESS NOTES
06/02/19 1014   Vent Information   Vt Ordered 500 mL   Rate Set 20 bmp   Peak Flow 70 L/min   Pressure Support 0 cmH20   FiO2  100 %   Sensitivity 3   PEEP/CPAP 5   Vent Patient Data   Peak Inspiratory Pressure 43 cmH2O   Mean Airway Pressure 12 cmH20   Rate Measured 26 br/min   Vt Exhaled 597 mL   Minute Volume 8.46 Liters   I:E Ratio 1:1.10   Cough/Sputum   Sputum How Obtained Endotracheal   $Obtained Sample $Induced Sputum   Cough Productive   Sputum Amount   (55 ml)   Sputum Color Cloudy   Tenacity Thin   Spontaneous Breathing Trial (SBT) RT Doc   Pulse 71   SpO2 97 %   Additional Respiratory  Assessments   Resp 20   End Tidal CO2 29 (%)   Alarm Settings   High Pressure Alarm 50 cmH2O   Low Minute Volume Alarm 2 L/min   High Respiratory Rate 45 br/min   Patient Observation   Observations see bronchoscopy progress notes for further information

## 2019-06-02 NOTE — ED PROVIDER NOTES
Triage Chief Complaint:    Respiratory Arrest (pt arrives to room 01 via ems c/o resp arrest, ems given bvm upon arrival, ems states pt had pacemaker placed yesterday )    Sycuan:  Wilfredo Leal is a 76 y.o. male that presents to emergency Department with complaints of difficulty breathing. The patient was having difficulty breathing throughout the day today, after just being discharged from the hospital after having a pacemaker placed. The patient had spent overnight in the hospital last night, and wife states that the patient left the hospital this morning. The patient, after arriving home, seemed be having some difficulty breathing, which seemingly be getting steadily worse. The patient was not complaining of any pain. Patient's wife states that she had been trying to get him to go to the hospital but he kept refusing to go. Eventually, the patient started to become more and more short of breath and finally 911 was called. 911 explains that when they did take her mother brought her into their rage he seemed to have altered mental status, and they then transported the patient here. ROS:  At least 10 systems reviewed and otherwise acutely negative except as in the [address]     Past Medical History:   Diagnosis Date    Cancer ([address]     skin    Chronic pancreatitis ([address]     Colitis 5/08/2015    Diabetes mellitus ([address]     Esophagitis     Gastritis     Hyperlipidemia     Hypertension      Past Surgical History:   Procedure Laterality Date    CARDIAC PACEMAKER PLACEMENT  05/31/2019    Dr Merdis Blizzard, Medtronic Model: Livia    CATARACT REMOVAL WITH IMPLANT Right 05/2016    COLONOSCOPY  5/08/2015    colitis-mild gastritis    COLONOSCOPY N/A 10/25/2018    COLONOSCOPY POLYPECTOMY SNARE/COLD BIOPSY performed by Reyna Aguilar MD at [address]  09/21/2018    CYSTOSCOPY, DIRECT VISION INTERAL URETHROTOMY     HERNIA REPAIR Right 2012    inguinal    PACEMAKER INSERTION 2019    ND OFFICE/OUTPT VISIT,PROCEDURE ONLY N/A 2018    CYSTOSCOPY, DIRECT VISION INTERAL URETHROTOMY performed by Vin Segovia MD at [facility] Left     Rotator cuff    SKIN CANCER EXCISION Left 2016    melanoma    TESTICLE REMOVAL Left     UPPER GASTROINTESTINAL ENDOSCOPY  2015    Gastritis    UPPER GASTROINTESTINAL ENDOSCOPY N/A 2016    gastritis-Bx pending    UPPER GASTROINTESTINAL ENDOSCOPY  2018    UPPER GASTROINTESTINAL ENDOSCOPY N/A 2018    EGD BIOPSY performed by Argentina Farrell MD at [address] [place] [address] Road History   Problem Relation Age of Onset    Diabetes Mother     Early Death Mother     Cancer Mother         stomach    Other Father     Diabetes Son      Social History     Socioeconomic History    Marital status:      Spouse name: Not on file    Number of children: Not on file    Years of education: Not on file    Highest education level: Not on file   Occupational History    Not on file   Social Needs    Financial resource strain: Not on file    Food insecurity:     Worry: Not on file     Inability: Not on file    Transportation needs:     Medical: Not on file     Non-medical: Not on file   Tobacco Use    Smoking status: Former Smoker     Packs/day: 1.50     Years: 20.00     Pack years: 30.00     Types: Cigarettes     Last attempt to quit: 1/10/2000     Years since quittin.4    Smokeless tobacco: Never Used   Substance and Sexual Activity    Alcohol use: No     Alcohol/week: 0.0 oz    Drug use: No    Sexual activity: Yes     Partners: Female   Lifestyle    Physical activity:     Days per week: Not on file     Minutes per session: Not on file    Stress: Not on file   Relationships    Social connections:     Talks on phone: Not on file     Gets together: Not on file     Attends [employer] service: Not on file     Active member of club or organization: Not on file     Attends meetings of clubs or organizations: Not on file     Relationship status: Not on file    Intimate partner violence:     Fear of current or ex partner: Not on file     Emotionally abused: Not on file     Physically abused: Not on file     Forced sexual activity: Not on file   Other Topics Concern    Not on file   Social History Narrative    Not on file     Current Facility-Administered Medications   Medication Dose Route Frequency Provider Last Rate Last Dose    midazolam (VERSED) 100 mg in dextrose 5 % 100 mL infusion  1 mg/hr Intravenous Continuous Estela Branham MD 4 mL/hr at 06/02/19 0230 4 mg/hr at 06/02/19 0230    albuterol sulfate  (90 Base) MCG/ACT inhaler             propofol 1000 MG/100ML injection  10 mcg/kg/min Intravenous Titrated Estela Branham MD 16.1 mL/hr at 06/02/19 0238 25 mcg/kg/min at 06/02/19 6762     Current Outpatient Medications   Medication Sig Dispense Refill    cyclobenzaprine (FLEXERIL) 10 MG tablet TAKE 1 TABLET BY MOUTH 3 TIMES DAILY AS NEEDED FOR MUSCLE SPASMS 270 tablet 0    sotalol (BETAPACE) 80 MG tablet Take 1 tablet by mouth 2 times daily 60 tablet 5    traMADol (ULTRAM) 50 MG tablet Take 50 mg by mouth every 12 hours as needed for Pain.       Tiotropium Bromide-Olodaterol (STIOLTO RESPIMAT) 2.5-2.5 MCG/ACT AERS 2 inhalations daily 3 Inhaler 1    diltiazem (CARDIZEM CD) 120 MG extended release capsule Take 1 capsule by mouth daily 90 capsule 3    mesalamine (LIALDA) 1.2 g EC tablet TAKE 1 TABLET THREE TIMES DAILY 270 tablet 1    sertraline (ZOLOFT) 100 MG tablet Take 1 tablet by mouth daily 90 tablet 3    montelukast (SINGULAIR) 10 MG tablet Take 1 tablet by mouth nightly 90 tablet 3    pramipexole (MIRAPEX) 1 MG tablet Take 1 tablet by mouth nightly 90 tablet 3    donepezil (ARICEPT) 10 MG tablet Take 1 tablet by mouth nightly 90 tablet 1    ferrous sulfate 325 (65 Fe) MG tablet Take 1 tablet by mouth daily (with breakfast) 90 tablet 2    colesevelam applicable):  Results for orders placed or performed during the hospital encounter of 06/01/19   CBC Auto Differential   Result Value Ref Range    WBC 12.7 (H) 4.0 - 11.0 K/uL    RBC 4.43 4.20 - 5.90 M/uL    Hemoglobin 13.1 (L) 13.5 - 17.5 g/dL    Hematocrit 41.5 40.5 - 52.5 %    MCV 93.5 80.0 - 100.0 fL    MCH 29.6 26.0 - 34.0 pg    MCHC 31.7 31.0 - 36.0 g/dL    RDW 17.1 (H) 12.4 - 15.4 %    Platelets 007 668 - 242 K/uL    MPV 8.1 5.0 - 10.5 fL    Neutrophils % 67.3 %    Lymphocytes % 21.0 %    Monocytes % 6.8 %    Eosinophils % 4.0 %    Basophils % 0.9 %    Neutrophils # 8.5 (H) 1.7 - 7.7 K/uL    Lymphocytes # 2.7 1.0 - 5.1 K/uL    Monocytes # 0.9 0.0 - 1.3 K/uL    Eosinophils # 0.5 0.0 - 0.6 K/uL    Basophils # 0.1 0.0 - 0.2 K/uL   Comprehensive Metabolic Panel   Result Value Ref Range    Sodium 138 136 - 145 mmol/L    Potassium 5.9 (H) 3.5 - 5.1 mmol/L    Chloride 100 99 - 110 mmol/L    CO2 30 21 - 32 mmol/L    Anion Gap 8 3 - 16    Glucose 180 (H) 70 - 99 mg/dL    BUN 14 7 - 20 mg/dL    CREATININE 0.9 0.8 - 1.3 mg/dL    GFR Non-African American >60 >60    GFR African American >60 >60    Calcium 9.3 8.3 - 10.6 mg/dL    Total Protein 8.6 (H) 6.4 - 8.2 g/dL    Alb 4.8 3.4 - 5.0 g/dL    Albumin/Globulin Ratio 1.3 1.1 - 2.2    Total Bilirubin 0.6 0.0 - 1.0 mg/dL    Alkaline Phosphatase 131 (H) 40 - 129 U/L    ALT 26 10 - 40 U/L    AST 75 (H) 15 - 37 U/L    Globulin 3.8 g/dL   Lactic Acid, Plasma   Result Value Ref Range    Lactic Acid 1.1 0.4 - 2.0 mmol/L   Blood Gas, Arterial   Result Value Ref Range    pH, Arterial 6.922 (LL) 7.350 - 7.450    pCO2, Arterial 179.6 (HH) 35.0 - 45.0 mmHg    pO2, Arterial 215.2 (H) 75.0 - 108.0 mmHg    HCO3, Arterial 36.2 (H) 21.0 - 29.0 mmol/L    Base Excess, Arterial -1.3 -3.0 - 3.0 mmol/L    Hemoglobin, Art, Extended 13.6 13.5 - 17.5 g/dL    O2 Sat, Arterial 98.5 >92 %    Carboxyhgb, Arterial 1.3 0.0 - 1.5 %    Methemoglobin, Arterial 0.5 <1.5 %    TCO2, Arterial 41.7 Not by myself in theabsence of a cardiologist)  Sinus tachycardia, normal QRS, no STEMI. Intubation  Date/Time: 6/1/2019 9:30 PM  Performed by: Evangelina Diamond MD  Authorized by: Evangelina Diamond MD     Consent:     Consent obtained:  Verbal    Consent given by:  Patient and parent    Risks discussed:  Brain injury    Alternatives discussed:  Alternative treatment  Pre-procedure details:     Patient status:  Unresponsive    Mallampati score: I    Pretreatment meds: Etomidate. Paralytics:  Rocuronium  Procedure details:     Preoxygenation:  Bag valve mask    CPR in progress: no      Intubation method:  Oral    Oral intubation technique:  Fiber optic    Tube size (mm):  7.5    Tube type:  Cuffed    Number of attempts:  1    Cricoid pressure: yes      Tube visualized through cords: yes    Placement assessment:     Tube secured with:  ETT ewing    Breath sounds:  Equal and absent over the epigastrium    Placement verification: chest rise, condensation, CXR verification and ETCO2 detector      CXR findings:  ETT in proper place  Post-procedure details:     Patient tolerance of procedure: Tolerated well, no immediate complications        MDM:  After my initial evaluation, the patient had poor respiratory effort, but seemed to be moving air without difficulty. The patient was obtunded, and did not seem to have a gag reflex. I was concerned with possibility of a brain bleed, related to the patient directly over to CAT scan. Patient had a CAT scan done which did not show any intercranial bleeding at the present time. Upon return to the patient's room he then proceeded to intubate the patient, and put him on a ventilator. The patient's initial ABG shows significant respiratory acidosis with CO2 retention. The patient also had other blood work done which shows slight elevation of the white blood cell count but no signs of any kidney dysfunction. Patient had a normal BMP, and normal troponin.   The patient's EKG was normal.  At this point it appears that the patient is mainly having problems secondary to respiratory acidosis, and restrictive failure. Patient went for a CT scan of the chest.  Patient's CT scan of the chest does not show any signs of acute abnormalities, and there is no signs of any pericardial effusion. I had spoken with the cardiologist on-call, Dr. Harleen Rojas, and his only concern with the patient recently having the pacemaker placed was whether outpatient and effusion. I did speak with the patient's family about the findings, and they are requesting the patient go to [facility]. They stated that they would prefer the patient would [facility] because they have had family members at bedside here and would prefer treatment there instead. I did speak with the hospitals that [facility], and she was happy to accept the patient in transfer. Patient did have some difficulty maintaining sedation with propofol so that a Versed drip in order to achieve good sedation. Critical care time provided of 93 minutes, excluding any previously dictated procedures. This time is being requested for physical examination, laboratory interpretation, medical intervention, frequent reassessments, physician consultation, and charting. Clinical Impression:  1.  Acute respiratory failure with hypercapnia ([address]      (Please note that portions of this note Anahi Colene been completed with a voice recognition program. Efforts were made to edit the dictations but occasionally words are mis-transcribed.)    MD Adri Soares MD  06/02/19 7691

## 2019-06-02 NOTE — CONSULTS
Hospital Medicine  Consult History & Physical        Chief Complaint:  resp arrest    Date of Service: Pt seen/examined in consultation on 6/1/19    History Of Present Illness: The patient is a 76 y.o. male who we are asked to see/evaluate by Dr. Concetta Siemens for ER evaluation for admission. Pt is intubated and sedated and is thus unable to provide hx. Multiple members of family report that pt became increasingly SOB throughout the day. Increasing intolerance to exertion. EMS was summoned this evening 2/2 his distress. He just had a AV pacemaker placed yesterday and was DC from [facility] this am.  He underwent twilight sedation for this procedure and was not placed on general anesthesia. He was emergently intubated in the ED. He has hx of CHF and COPD. During my interview family requested transfer to [facility] admission rather than having him admitted here.       Past Medical History:        Diagnosis Date    Cancer ([address]     skin    Chronic pancreatitis ([address]     Colitis 5/08/2015    Diabetes mellitus ([address]     Esophagitis     Gastritis     Hyperlipidemia     Hypertension        Past Surgical History:        Procedure Laterality Date    CARDIAC PACEMAKER PLACEMENT  05/31/2019    Dr Claude Closs, [employer]tr[employer] Model: Livia    CATARACT REMOVAL WITH IMPLANT Right 05/2016    COLONOSCOPY  5/08/2015    colitis-mild gastritis    COLONOSCOPY N/A 10/25/2018    COLONOSCOPY POLYPECTOMY SNARE/COLD BIOPSY performed by Kai Henriquez MD at [facility]  09/21/2018    CYSTOSCOPY, DIRECT VISION INTERAL URETHROTOMY     HERNIA REPAIR Right 2012    inguinal    PACEMAKER INSERTION  05/31/2019    UT OFFICE/OUTPT VISIT,PROCEDURE ONLY N/A 9/21/2018    CYSTOSCOPY, DIRECT VISION INTERAL URETHROTOMY performed by [facility]    Rotator cuff    SKIN CANCER EXCISION Left 5/2016    melanoma    TESTICLE REMOVAL Left 2012    UPPER GASTROINTESTINAL ENDOSCOPY 05/08/2015    Gastritis    UPPER GASTROINTESTINAL ENDOSCOPY N/A 09/08/2016    gastritis-Bx pending    UPPER GASTROINTESTINAL ENDOSCOPY  11/06/2018    UPPER GASTROINTESTINAL ENDOSCOPY N/A 11/6/2018    EGD BIOPSY performed by Hayes Jarquin MD at [address]       Medications Prior to Admission:    Prior to Admission medications    Medication Sig Start Date End Date Taking? Authorizing Provider   cyclobenzaprine (FLEXERIL) 10 MG tablet TAKE 1 TABLET BY MOUTH 3 TIMES DAILY AS NEEDED FOR MUSCLE SPASMS 5/2/19   Ryan Love DO   sotalol (BETAPACE) 80 MG tablet Take 1 tablet by mouth 2 times daily 4/15/19   Kate Fuchs MD   traMADol (ULTRAM) 50 MG tablet Take 50 mg by mouth every 12 hours as needed for Pain.     Historical Provider, MD   Tiotropium Bromide-Olodaterol (STIOLTO RESPIMAT) 2.5-2.5 MCG/ACT AERS 2 inhalations daily 4/9/19   Ml Roy MD   diltiazem (CARDIZEM CD) 120 MG extended release capsule Take 1 capsule by mouth daily 3/29/19   Idris Reynoso MD   mesalamine (LIALDA) 1.2 g EC tablet TAKE 1 TABLET THREE TIMES DAILY 3/26/19   Ryan Love DO   sertraline (ZOLOFT) 100 MG tablet Take 1 tablet by mouth daily 3/26/19   Ryan Love DO   montelukast (SINGULAIR) 10 MG tablet Take 1 tablet by mouth nightly 3/26/19   Ryan Love DO   pramipexole (MIRAPEX) 1 MG tablet Take 1 tablet by mouth nightly 3/26/19   Ryan Love DO   donepezil (ARICEPT) 10 MG tablet Take 1 tablet by mouth nightly 3/26/19   Ryan Love DO   ferrous sulfate 325 (65 Fe) MG tablet Take 1 tablet by mouth daily (with breakfast) 3/26/19   SHAYY Carroll - CNP   [facility]) 625 MG tablet TAKE 3 TABLETS TWICE DAILY 2/19/19   Ryan Love DO   apixaban (ELIQUIS) 5 MG TABS tablet Take 1 tablet by mouth 2 times daily 2/11/19   Ryan Love DO   ipratropium-albuterol (DUONEB) 0.5-2.5 (3) MG/3ML SOLN nebulizer solution Inhale 3 mLs into the lungs 4 times daily 2/7/19 11/4/19  Loan Patel MD Edgar   tamsulosin (FLOMAX) 0.4 MG capsule TAKE 1 CAPSULE EVERY DAY 2/5/19   Ryan Love DO   omeprazole (PRILOSEC) 40 MG delayed release capsule TAKE 1 CAPSULE EVERY DAY 2/5/19   Ryan Love DO   metFORMIN (GLUCOPHAGE) 500 MG tablet TAKE 1 TABLET TWICE DAILY 2/5/19   Ryan Love DO   oxyCODONE-acetaminophen (PERCOCET)  MG per tablet Take 1 tablet by mouth every 6 hours as needed for Pain. Jesus Valencia MD   albuterol (PROVENTIL) (2.5 MG/3ML) 0.083% nebulizer solution Take 3 mLs by nebulization every 6 hours as needed for Wheezing 9/11/18   Nikita Hubbard MD   glucose blood VI test strips (ASCENSIA AUTODISC VI;ONE TOUCH ULTRA TEST VI) strip [employer] True Metrix Air Test Strips. FSBS daily. DX E11.9 5/16/18   Ryan Love DO   TRUEPLUS LANCETS 28G MISC 1 each by Does not apply route daily E11.9 Test FBS daily--NEED 33 GAUGE 5/1/17   Ryan Love DO       Allergies:  Patient has no known allergies. Social History:      TOBACCO:   reports that he quit smoking about 19 years ago. His smoking use included cigarettes. He has a 30.00 pack-year smoking history. He has never used smokeless tobacco.  ETOH:   reports that he does not drink alcohol. Family History:  Reviewed in detail and negative for DM, Early CAD, Cancer, CVA. Positive as follows:        Problem Relation Age of Onset    Diabetes Mother     Early Death Mother     Cancer Mother         stomach    Other Father     Diabetes Son        REVIEW OF SYSTEMS:   Positive for SOB, VILLARREAL and as noted in the HPI. All other systems reviewed and negative. PHYSICAL EXAM:  /80   Pulse 104   Temp 99.4 °F (37.4 °C) (Oral)   Resp 22   Ht 5' 10\" (1.778 m)   Wt 236 lb (107 kg)   SpO2 97%   BMI 33.86 kg/m²     General appearance: Intubated and sedated. HEENT Normal cephalic, atraumatic without obvious deformity. Pupils equal, round, and reactive to light. Conjunctivae/corneas clear.   Neck: Supple, No jugular venous distention/bruits. Trachea midline without thyromegaly or adenopathy with full range of motion. Lungs: No Rales/Wheezes/Rhonchi. Diminished throughout. Heart: Regular rate and rhythm with Normal S1/S2 without  murmurs, rubs or gallops, point of maximum impulse non-displaced  Abdomen: Soft, non-tender or non-distended without rigidity or guarding and positive bowel sounds all four quadrants. Extremities: No clubbing, cyanosis. 1+ edema bilaterally. Skin: Skin color, texture, turgor normal.  No rashes or lesions. Neurologic: intubated and sedated, does not follow commands. Moves all. Mental status: sedated. CXR:  I have reviewed the CXR with the following interpretation:  XR CHEST PORTABLE [[id number]] Collected: 06/01/19 2203   Updated: 06/01/19 2209    Narrative:     EXAMINATION:  ONE XRAY VIEW OF THE CHEST    6/1/2019 9:51 pm    COMPARISON:  Chest radiograph 06/01/2019, 04/09/2019    HISTORY:  ORDERING SYSTEM PROVIDED HISTORY: chest pain  TECHNOLOGIST PROVIDED HISTORY:  Reason for exam:->chest pain  Ordering Physician Provided Reason for Exam: chest pain, OG and ETT placement  Acuity: Acute  Type of Exam: Initial    FINDINGS:  Endotracheal tube terminates 4.7 cm superior to the alcides.  Esophagogastric  tube terminates over left upper quadrant. No pneumothorax or sizable effusion.  Perihilar and bronchovascular  indistinctness with peribronchial cuffing and peripheral septal lines. Normal heart size and mediastinal contours.  Intact dual lead pacemaker with  left chest generator.  No acute osseous abnormality. Impression:     Appropriate life support device positioning. Moderate to severe interstitial pulmonary edema.      CT Head WO Contrast [[id number]] Collected: 06/01/19 2128   Updated: 06/01/19 2147    Narrative:     EXAMINATION:  CT OF THE HEAD WITHOUT CONTRAST  6/1/2019 9:03 pm    TECHNIQUE:  CT of the head was performed without the administration of intravenous  contrast. Dose modulation, iterative reconstruction, and/or weight based  adjustment of the mA/kV was utilized to reduce the radiation dose to as low  as reasonably achievable. COMPARISON:  01/19/2018, 04/17/2017    HISTORY:  ORDERING SYSTEM PROVIDED HISTORY: HEAD TRAUMA, CLOSED, MILD, GCS >= 13, NO  RISK FACTORS, NEURO EXAM NORMAL  TECHNOLOGIST PROVIDED HISTORY:  Has a \"code stroke\" or \"stroke alert\" been called? ->No  Ordering Physician Provided Reason for Exam: Respiratory Arrest (pt arrives  to room 01 via ems c/o resp arrest, ems given bvm upon arrival, ems states pt  had pacemaker placed yesterday )  Acuity: Acute  Type of Exam: Initial    FINDINGS:  BRAIN/VENTRICLES: There is no acute intracranial hemorrhage, mass effect or  midline shift.  No abnormal extra-axial fluid collection.  The gray-white  differentiation is maintained without evidence of an acute infarct.  There is  no evidence of hydrocephalus. There is stable age-appropriate atrophy. Cherene Shaun  are multiple stable dilated perivascular spaces adjacent to the bilateral  basal ganglia, unchanged.  There is stable chronic small vessel ischemic  white matter disease.  No focus of acute abnormal brain attenuation is  identified. ORBITS: The visualized portion of the orbits demonstrate no acute abnormality. SINUSES: There are mucous retention cysts within the right maxillary sinus. There is mild mucosal thickening within the bilateral ethmoid sinus cellules. The remaining paranasal sinuses are clear.  The mastoids are clear  bilaterally. SOFT TISSUES/SKULL:  No acute abnormality of the visualized skull or soft  tissues. Impression:     1. No acute intracranial abnormality. 2. Stable age-appropriate atrophy and chronic small vessel ischemic white  matter disease.          EKG:  I have reviewed the EKG with the following interpretation:  EKG 12 Lead [[id number]] Collected: 06/01/19 2054   Updated: 06/01/19 2131     Ventricular Rate 104 BPM    Atrial Rate 104 BPM    P-R Interval 166 ms    QRS Duration 96 ms    Q-T Interval 316 ms    QTc Calculation (Bazett) 415 ms    P Axis 71 degrees    R Axis 79 degrees    T Axis 74 degrees    Diagnosis Sinus tachycardiaOtherwise normal ECGWhen compared with ECG of 31-MAY-2019 07:53,Questionable change in QRS axis         CBC   Recent Labs     05/31/19  0810 06/01/19 2118   WBC 5.4 12.7*   HGB 11.9* 13.1*   HCT 36.0* 41.5    220      RENAL  Recent Labs     05/31/19  0810 06/01/19  0947 06/01/19 2118   * 135* 138   K 5.5* 4.9 5.9*   CL 99 100 100   CO2 27 26 30   BUN 10 10 14   CREATININE 0.8 0.6* 0.9     LFT'S  Recent Labs     06/01/19 2118   AST 75*   ALT 26   BILITOT 0.6   ALKPHOS 131*     COAG  Recent Labs     05/31/19  0810 06/01/19 2118   INR 1.16* 1.14     CARDIAC ENZYMES  Recent Labs     06/01/19 2118   TROPONINI <0.01       U/A:    Lab Results   Component Value Date    NITRITE neg 08/27/2018    COLORU YELLOW 04/24/2019    WBCUA 3-5 04/24/2019    RBCUA None seen 04/24/2019    MUCUS 3+ 04/18/2017    BACTERIA Rare 04/10/2019    CLARITYU Clear 04/24/2019    SPECGRAV 1.020 04/24/2019    LEUKOCYTESUR Negative 04/24/2019    BLOODU Negative 04/24/2019    GLUCOSEU Negative 04/24/2019       ABG    Lab Results   Component Value Date    EGX2UCC 36.2 06/01/2019    BEART -1.3 06/01/2019    X7OOMCME 98.5 06/01/2019    PHART 6.922 06/01/2019    TYH7HQR 179.6 06/01/2019    PO2ART 215.2 06/01/2019    BDP1SYU 41.7 06/01/2019     ASSESSMENT/PLAN:  1. Acute respiratory failure with hypoxia and marked hypercapnia w/ respiratory arrest.  2. Intubated and sedated  3. Meets SIRS/qSOFA if clear source identified. 4. CXR suggestive of pulm edema but BNP normal. ? Bronchitis vs COPD exacerbation. 5. [facility] 6. Hyperkalemia, 5.9  7. Leukocytosis, 12.7    Rec coverage w/ HCAP abx for now. Chk PCT. Pt given an extra 30 mg bolus Propofol while I was in room 2/2 inadequate sedation.     Code Status: full code    [address] pt for admission to [facility]. Offered admit to family and they declined. They requested transfer to [facility]. Dr. Dayanna Tom notified.        Maximiliano Benavides MD

## 2019-06-02 NOTE — PROGRESS NOTES
06/02/19 1227   Vent Information   Vent Type 840   Vent Mode AC/VC   Vt Ordered 500 mL   Rate Set 20 bmp   Peak Flow 70 L/min   Pressure Support 0 cmH20   FiO2  50 %   Sensitivity 3   PEEP/CPAP 5   Humidification Temp 37   Humidification Temp Measured 36.7   Circuit Condensation Not drained   Vent Patient Data   Peak Inspiratory Pressure 35 cmH2O   Mean Airway Pressure 12 cmH20   Rate Measured 20 br/min   Vt Exhaled 591 mL   Minute Volume 11.8 Liters   I:E Ratio 1:2.80   Cough/Sputum   Sputum How Obtained None   Spontaneous Breathing Trial (SBT) RT Doc   Pulse 60   SpO2 95 %   Breath Sounds   Right Upper Lobe Clear   Right Middle Lobe Clear;Diminished   Right Lower Lobe Diminished   Left Upper Lobe Clear   Left Lower Lobe Diminished   Additional Respiratory  Assessments   Resp 20   End Tidal CO2 30 (%)   Alarm Settings   High Pressure Alarm 50 cmH2O   Low Minute Volume Alarm 2 L/min   High Respiratory Rate 45 br/min   Non-Surgical Airway Endo Tracheal Tube   Placement Date/Time: 06/01/19 2124   Timeout: Patient  Mask Ventilation: Ventilated by mask (1)  Placed By: In ED  Inserted by: MD Mora Warren  Insertion attempts: 1  Airway Device: Endo Tracheal Tube  Size: 7.5  Placement Verified By[de-identified] Chest X-ray; Auscult. ..    Secured at 22 cm   Measured From [address] By Commercial tube ewing   Site Condition Dry

## 2019-06-03 NOTE — PROGRESS NOTES
06/02/19 2025   Vent Information   Vent Type 840   Vent Mode AC/VC   Vt Ordered 500 mL   Rate Set 20 bmp   Peak Flow 70 L/min   Pressure Support 0 cmH20   FiO2  50 %   Sensitivity 3   PEEP/CPAP 5   Cuff Pressure (cm H2O) 30 cm H2O   Humidification Source Heated wire   Humidification Temp 35.2   Circuit Condensation Drained   Vent Patient Data   Peak Inspiratory Pressure 50 cmH2O   Mean Airway Pressure 13 cmH20   Rate Measured 20 br/min   Vt Exhaled 569 mL   Minute Volume 11.5 Liters   I:E Ratio 1:2.80   Plateau Pressure 23 [id number]   Static Compliance 31.61 mL/cmH2O   Dynamic Compliance 12.64 mL/cmH2O   Cough/Sputum   Sputum How Obtained Endotracheal   Cough Non-productive   Sputum Amount None   Spontaneous Breathing Trial (SBT) RT Doc   Pulse 68   SpO2 94 %   Breath Sounds   Right Upper Lobe Clear   Right Middle Lobe Clear   Right Lower Lobe Diminished   Left Upper Lobe Clear   Left Lower Lobe Diminished   Additional Respiratory  Assessments   Resp 18   End Tidal CO2 30 (%)   Alarm Settings   High Pressure Alarm 50 cmH2O   Low Minute Volume Alarm 2 L/min   High Respiratory Rate 45 br/min   Non-Surgical Airway Endo Tracheal Tube   Placement Date/Time: 06/01/19 2124   Timeout: Patient  Mask Ventilation: Ventilated by mask (1)  Placed By: In ED  Inserted by: MD Angelito Gilliland  Insertion attempts: 1  Airway Device: Endo Tracheal Tube  Size: 7.5  Placement Verified By[de-identified] Chest X-ray; Auscult. ..    Secured at 22 cm   Measured From Lips   Secured Location Left   Secured By Commercial tube ewing   Site Condition Dry   Cuff Pressure 30 cm H2O     ambu bag/mask @ bedside

## 2019-06-03 NOTE — PROGRESS NOTES
06/03/19 0840   Vent Information   $Ventilation $Subsequent Day   Vent Type 840   Vent Mode AC/VC  (changed to PS at this time.)   Vt Ordered 500 mL   Peak Flow 65 L/min   Pressure Support 5 cmH20   FiO2  40 %   Sensitivity 3   PEEP/CPAP 5   Cuff Pressure (cm H2O) 30 cm H2O   Humidification Source Heated wire   Humidification Temp 36.6   Humidification Temp Measured 39.9   Vent Patient Data   Peak Inspiratory Pressure 41 cmH2O   Mean Airway Pressure 6.6 cmH20   Rate Measured 10 br/min   Vt Exhaled 578 mL   Minute Volume 9.03 Liters   I:E Ratio 1:5.80   Plateau Pressure 32 HEQ69   Static Compliance 21.4 mL/cmH2O   Dynamic Compliance 16.05 mL/cmH2O   Cough/Sputum   Sputum How Obtained Endotracheal;Suctioned   Cough Non-productive   Spontaneous Breathing Trial (SBT) RT Doc   Pulse 70   SpO2 97 %   Breath Sounds   Right Upper Lobe Diminished   Right Middle Lobe Diminished   Right Lower Lobe Diminished   Left Upper Lobe Diminished   Left Lower Lobe Diminished   Additional Respiratory  Assessments   End Tidal CO2 48 (%)   Alarm Settings   High Pressure Alarm 45 cmH2O   Low Minute Volume Alarm 3 L/min   Apnea (secs) 20 secs   High Respiratory Rate 40 br/min   Low Exhaled Vt  300 mL   Patient Observation   Observations ambu @ Bedside   Non-Surgical Airway Endo Tracheal Tube   Placement Date/Time: 06/01/19 2124   Timeout: Patient  Mask Ventilation: Ventilated by mask (1)  Placed By: In ED  Inserted by: MD Anahi Garcia  Insertion attempts: 1  Airway Device: Endo Tracheal Tube  Size: 7.5  Placement Verified By[de-identified] Chest X-ray; Auscult. ..    Secured at 22 cm   Measured From FARHAT Mckay  (moved to the left at this time.)   Secured By Commercial tube ewing   Site Condition Dry

## 2019-06-03 NOTE — PROGRESS NOTES
INPATIENT PULMONARY CRITICAL CARE PROGRESS NOTE      Reason for visit    s/p Intubation         SUBJECTIVE:  Patient can just be critically ill on mechanical vent support when seen, patient is on IV sedation to maintain patient ventilator synchrony, patient underwent a bronchoscopy yesterday and mucous plugs were lavaged out from right lower lobe, patient was afebrile and hemodynamically maintained, patient has normal sinus rhythm on the monitor, patient's glycemic control was acceptable, patient was on 50% oxygen when seen, patient was having adequate urine output with cumulative fluid balance of -644 mL, no other pertinent review of system could be obtained      Physical Exam:  Blood pressure 138/72, pulse 73, temperature 98.7 °F (37.1 °C), temperature source Axillary, resp. rate 16, height 5' 10\" (1.778 m), weight 240 lb 8.4 oz (109.1 kg), SpO2 92 %.'     Constitutional:  No acute distress. on ventilatory support   HENT:  ETT(+) . No thyromegaly. Eyes:  Conjunctivae are normal. Pupils equal, round, and reactive to light. No scleral icterus. Neck: . No tracheal deviation present. No obvious thyroid mass. Cardiovascular: Normal rate, regular rhythm, normal heart sounds. No right ventricular heave. (+)  lower extremity edema. Pulmonary/Chest: No wheezes. Bibasilar rales. Chest wall is not dull to percussion. No accessory muscle usage or stridor. Abdominal: Soft. Bowel sounds present. No distension or hernia. No tenderness. Musculoskeletal: No cyanosis. No clubbing. No obvious joint deformity. Lymphadenopathy: No cervical or supraclavicular adenopathy. Skin: Skin is warm and dry. No rash or nodules on the exposed extremities.   Neurologic: Intubated and sedated                 Results:  CBC:   Recent Labs     06/01/19 2118 06/02/19  0623 06/03/19  0418   WBC 12.7* 11.0 8.5   HGB 13.1* 12.0* 11.7*   HCT 41.5 38.3* 35.9*   MCV 93.5 93.0 91.2    178 145     BMP:   Recent Labs     06/01/19 2118 Exam: Respiratory Arrest (pt arrives to room 01 via ems c/o resp arrest, ems given bvm upon arrival, ems states pt had pacemaker placed yesterday ) Acuity: Acute Type of Exam: Initial FINDINGS: BRAIN/VENTRICLES: There is no acute intracranial hemorrhage, mass effect or midline shift. No abnormal extra-axial fluid collection. The gray-white differentiation is maintained without evidence of an acute infarct. There is no evidence of hydrocephalus. There is stable age-appropriate atrophy. There are multiple stable dilated perivascular spaces adjacent to the bilateral basal ganglia, unchanged. There is stable chronic small vessel ischemic white matter disease. No focus of acute abnormal brain attenuation is identified. ORBITS: The visualized portion of the orbits demonstrate no acute abnormality. SINUSES: There are mucous retention cysts within the right maxillary sinus. There is mild mucosal thickening within the bilateral ethmoid sinus cellules. The remaining paranasal sinuses are clear. The mastoids are clear bilaterally. SOFT TISSUES/SKULL:  No acute abnormality of the visualized skull or soft tissues. 1. No acute intracranial abnormality. 2. Stable age-appropriate atrophy and chronic small vessel ischemic white matter disease. Xr Chest Portable    Result Date: 6/3/2019  EXAMINATION: ONE XRAY VIEW OF THE CHEST 6/3/2019 8:42 am COMPARISON: 06/02/2019 HISTORY: ORDERING SYSTEM PROVIDED HISTORY: vent TECHNOLOGIST PROVIDED HISTORY: Reason for exam:->vent Ordering Physician Provided Reason for Exam: vent Acuity: Acute Type of Exam: Initial FINDINGS: The endotracheal tube distal tip is 7 cm above the alcides. A nasogastric tube extends below the diaphragm. A left subclavian transvenous pacemaker is in place. There are low lung volumes with left basilar atelectasis. No focal infiltrate, pleural effusion or pneumothorax is seen. The heart is stable in size. No acute osseous abnormality is demonstrated. 1. The ET tube distal tip is 7 cm above the alcides. 2. Low lung volumes. No acute cardiopulmonary disease. Xr Chest Portable    Result Date: 6/2/2019  EXAMINATION: ONE XRAY VIEW OF THE CHEST 6/2/2019 5:48 am COMPARISON: 06/01/2019 HISTORY: ORDERING SYSTEM PROVIDED HISTORY: s/p admission TECHNOLOGIST PROVIDED HISTORY: Reason for exam:->s/p admission Ordering Physician Provided Reason for Exam: f/u COPD Acuity: Acute Type of Exam: Initial FINDINGS: Endotracheal tube tip is 6.1 cm above the alcides. Cardiac pacer stable in positioning. The left costophrenic angle is excluded on this exam.  Interval improvement of interstitial opacities. No focal consolidation, pleural effusion or pneumothorax. The cardiomediastinal silhouette is stable. No overt pulmonary edema. The osseous structures are stable. Interval improvement of interstitial opacities. Xr Chest Portable    Result Date: 6/1/2019  EXAMINATION: ONE XRAY VIEW OF THE CHEST 6/1/2019 9:51 pm COMPARISON: Chest radiograph 06/01/2019, 04/09/2019 HISTORY: ORDERING SYSTEM PROVIDED HISTORY: chest pain TECHNOLOGIST PROVIDED HISTORY: Reason for exam:->chest pain Ordering Physician Provided Reason for Exam: chest pain, OG and ETT placement Acuity: Acute Type of Exam: Initial FINDINGS: Endotracheal tube terminates 4.7 cm superior to the alcides. Esophagogastric tube terminates over left upper quadrant. No pneumothorax or sizable effusion. Perihilar and bronchovascular indistinctness with peribronchial cuffing and peripheral septal lines. Normal heart size and mediastinal contours. Intact dual lead pacemaker with left chest generator. No acute osseous abnormality. Appropriate life support device positioning. Moderate to severe interstitial pulmonary edema.      Ct Chest Pulmonary Embolism W Contrast    Result Date: 6/2/2019  EXAMINATION: CTA OF THE CHEST 6/1/2019 11:47 pm TECHNIQUE: CTA of the chest was performed after the administration of compression deformity. No new compression deformity or acute osseous abnormality. Negative for acute pulmonary embolism. Advanced centrilobular and paraseptal emphysema with airway inflammation, the latter of which may be smoking-related, reflect acute bronchitis or aspiration sequela given tracheobronchial secretions. No consolidative airspace disease. Gas surrounding the left chest pacemaker generator correlates with recent history of pacemaker implantation. Leads are intact and terminate in the right heart chambers. Enlarged main pulmonary artery may be seen with underlying pulmonary hypertension. Results for Dilcia Luke (MRN [id number]) as of 6/3/2019 11:43   Ref. Range 6/2/2019 06:24 6/2/2019 11:23 6/2/2019 16:43 6/2/2019 20:15 6/2/2019 23:07 6/3/2019 04:18   Sodium Latest Ref Range: 136 - 145 mmol/L 137     136   Potassium Latest Ref Range: 3.5 - 5.1 mmol/L 5.7 (H)     5.2 (H)   Chloride Latest Ref Range: 99 - 110 mmol/L 100     99   CO2 Latest Ref Range: 21 - 32 mmol/L 29     27   BUN Latest Ref Range: 7 - 20 mg/dL 20     32 (H)   Creatinine Latest Ref Range: 0.8 - 1.3 mg/dL 1.1     1.0   Anion Gap Latest Ref Range: 3 - 16  8     10   GFR Non- Latest Ref Range: >60  >60     >60   GFR  Latest Ref Range: >60  >60     >60   Lactic Acid Latest Ref Range: 0.4 - 2.0 mmol/L 1.6     1.1   Glucose Latest Ref Range: 70 - 99 mg/dL 125 (H)     135 (H)   POC Glucose Latest Ref Range: 70 - 99 mg/dl  134 (H) 143 (H) 132 (H) 147 (H)    Calcium Latest Ref Range: 8.3 - 10.6 mg/dL 9.0     9.1     Results for Dilcia Luke (MRN [id number]) as of 6/3/2019 11:43   Ref.  Range 4/10/2019 06:28 4/29/2019 10:42 5/31/2019 08:10 6/1/2019 21:18 6/2/2019 06:23 6/3/2019 04:18   WBC Latest Ref Range: 4.0 - 11.0 K/uL 5.1 5.2 5.4 12.7 (H) 11.0 8.5   RBC Latest Ref Range: 4.20 - 5.90 M/uL 3.76 (L) 4.24 4.01 (L) 4.43 4.12 (L) 3.94 (L)   Hemoglobin Quant Latest Ref Range: 13.5 - 17.5 g/dL 10.7 (L) 12.0 (L) 11.9 (L) 13.1 (L) 12.0 (L) 11.7 (L)   Hematocrit Latest Ref Range: 40.5 - 52.5 % 33.3 (L) 38.1 (L) 36.0 (L) 41.5 38.3 (L) 35.9 (L)   MCV Latest Ref Range: 80.0 - 100.0 fL 88.6 89.9 89.8 93.5 93.0 91.2   MCH Latest Ref Range: 26.0 - 34.0 pg 28.5 28.3 29.6 29.6 29.1 29.7   MCHC Latest Ref Range: 31.0 - 36.0 g/dL 32.2 31.5 32.9 31.7 31.3 32.6   MPV Latest Ref Range: 5.0 - 10.5 fL 9.0 8.5 7.6 8.1 7.7 7.8   RDW Latest Ref Range: 12.4 - 15.4 % 15.3 16.1 (H) 16.4 (H) 17.1 (H) 17.3 (H) 16.9 (H)   Platelet Count Latest Ref Range: 135 - 450 K/uL 138 135 158 220 178 145   Neutrophils % Latest Units: % 83.6 62.7 59.8 67.3 81.3 88.1   Lymphocyte % Latest Units: % 13.8 24.4 23.7 21.0 9.8 8.8   Monocytes % Latest Units: % 2.1 7.7 8.5 6.8 7.1 2.7     Results for Erlin Melendez (MRN [id number]) as of 6/3/2019 11:43   Ref. Range 6/1/2019 21:35 6/2/2019 06:20 6/3/2019 06:03   O2 Therapy Unknown See comment Unknown Unknown   Hemoglobin, Art, Extended Latest Ref Range: 13.5 - 17.5 g/dL 13.6 13.0 (L) 12.0 (L)   pH, Arterial Latest Ref Range: 7.350 - 7.450  6.922 (LL) 7.276 (L) 7.343 (L)   pCO2, Arterial Latest Ref Range: 35.0 - 45.0 mmHg 179.6 (HH) 59.7 (H) 52.9 (H)   pO2, Arterial Latest Ref Range: 75.0 - 108.0 mmHg 215.2 (H) 115.2 (H) 71.5 (L)   HCO3, Arterial Latest Ref Range: 21.0 - 29.0 mmol/L 36.2 (H) 27.2 28.1   TCO2 (calc), Art Latest Ref Range: Not Established mmol/L 41.7 29.0 29.7   Base Excess, Arterial Latest Ref Range: -3.0 - 3.0 mmol/L -1.3 -0.7 1.6   O2 Sat, Arterial Latest Ref Range: >92 % 98.5 98.1 93.8   O2 Content, Arterial Latest Ref Range: Not Established mL/dL 19 18 16   Methemoglobin, Arterial Latest Ref Range: <1.5 % 0.5 0.4 0.6   Carboxyhgb, Arterial Latest Ref Range: 0.0 - 1.5 % 1.3 0.6 0.4     Results for Erlin Melendez (MRN [id number]) as of 6/3/2019 11:43   Ref. Range 6/2/2019 06:24 6/2/2019 10:20   Culture, Blood 2 Unknown No Growth to date. ..     Gram Stain Result Unknown  1+ Gram positive ...   CULTURE, RESPIRATORY Unknown  Further report to. .. ONE XRAY VIEW OF THE CHEST       6/3/2019 8:42 am       COMPARISON:   06/02/2019       HISTORY:   ORDERING SYSTEM PROVIDED HISTORY: vent   TECHNOLOGIST PROVIDED HISTORY:   Reason for exam:->vent   Ordering Physician Provided Reason for Exam: vent   Acuity: Acute   Type of Exam: Initial       FINDINGS:   The endotracheal tube distal tip is 7 cm above the alcides.  A nasogastric   tube extends below the diaphragm.  A left subclavian transvenous pacemaker is   in place.       There are low lung volumes with left basilar atelectasis.  No focal   infiltrate, pleural effusion or pneumothorax is seen.  The heart is stable in   size.  No acute osseous abnormality is demonstrated.           Impression   1. The ET tube distal tip is 7 cm above the alcides. 2. Low lung volumes.  No acute cardiopulmonary disease. Assessment:  Active Problems:    DM II (diabetes mellitus, type II), controlled ([facility])    HOLLAND (obstructive sleep apnea)    Essential hypertension    COPD, severe ([facility])    Former smoker    PAF (paroxysmal atrial fibrillation) ([facility])    Chronic diastolic congestive heart failure ([address]    Respiratory arrest ([address]    Acute respiratory distress    S/P cardiac pacemaker procedure    Aspiration into airway    Acute respiratory failure with hypercapnia ([facility])  Resolved Problems:    * No resolved hospital problems.  *          Plan:   · Ventilatory support to keep saturation between 90-94%  · Ventilatory waveforms and settings reviewed  · Ventilator changes made-we will decrease IV sedation and try patient on SBT  · Pulmonary toilet  · Will benefit from bronchoscopy for diagnostic and therapeutic purposes-patient's family was told about the procedure along with the pros and cons and arranged today  · IV sedation to maintain patient ventilator synchrony  · Will give empiric Unasyn-to be reassessed as per clinical status and cultures  · Bronchodilators  · IV steroids to continue  · Cardiac medications with parameters  · Eliquis  to continue  · Keep negative fluid balance  · Monitor I/O and BMP  · PUD prophylaxis     Case discussed with family and ICU team     Critical care time spent on the patient was 35 minutes exclusive of any procedures        Electronically signed by:  Janneth Garcia MD    6/3/2019    11:42 AM.

## 2019-06-03 NOTE — PROGRESS NOTES
4 Eyes Skin Assessment     The patient is being assess for   Shift Handoff    I agree that 2 RN's have performed a thorough Head to Toe Skin Assessment on the patient. ALL assessment sites listed below have been assessed. Areas assessed by both nurses:   [x]   Head, Face, and Ears   [x]   Shoulders, Back, and Chest, Abdomen  [x]   Arms, Elbows, and Hands   [x]   Coccyx, Sacrum, and Ischium  [x]   Legs, Feet, and Heels          **SHARE this note so that the co-signing nurse is able to place an eSignature**    Co-signer eSignature: {Esignature:[id number]}    Does the Patient have Skin Breakdown?   No          Chaz Prevention initiated:  Yes   Wound Care Orders initiated:  NA      WO nurse consulted for Pressure Injury (Stage 3,4, Unstageable, DTI, NWPT, Complex wounds)and New or Established Ostomies:  NA      Primary Nurse eSignature: Electronically signed by Supriya Daily RN on 6/3/19 at 6:14 PM

## 2019-06-03 NOTE — PROGRESS NOTES
Topical Daily    diltiazem  30 mg Oral 4 times per day    ampicillin-sulbactam  1.5 g Intravenous Q6H       Infusion Medications:   dextrose         Labs:  Lab Results   Component Value Date    WBC 8.5 06/03/2019    HGB 11.7 (L) 06/03/2019    HCT 35.9 (L) 06/03/2019    MCV 91.2 06/03/2019     06/03/2019     Lab Results   Component Value Date    CREATININE 1.0 06/03/2019    BUN 32 (H) 06/03/2019     06/03/2019    K 5.2 (H) 06/03/2019    CL 99 06/03/2019    CO2 27 06/03/2019     Lab Results   Component Value Date    INR 1.14 06/01/2019    PROTIME 13.0 06/01/2019        Physical Examination:    BP (!) 148/73   Pulse 74   Temp 98.7 °F (37.1 °C) (Axillary)   Resp 16   Ht 5' 10\" (1.778 m)   Wt 240 lb 8.4 oz (109.1 kg)   SpO2 93%   BMI 34.51 kg/m²    Wt Readings from Last 3 Encounters:   06/02/19 240 lb 8.4 oz (109.1 kg)   06/01/19 236 lb (107 kg)   05/31/19 236 lb (107 kg)       Intake/Output Summary (Last 24 hours) at 6/3/2019 1304  Last data filed at 6/3/2019 1140  Gross per 24 hour   Intake 1080.83 ml   Output 1325 ml   Net -244.17 ml       Respiratory:  · Resp Assessment: Increased respiratory effort  · Resp Auscultation: Reduced to auscultation bilaterally   Cardiovascular:  · Auscultation: regular rhythm and normal rate, normal S1S2, no murmur, rub or gallop  · Palpation:  Nl PMI  · JVP:  normal  · Extremities: trace Edema  Abdomen:  · Soft, non-tender  · Normal bowel sounds  Extremities:  ·  No Cyanosis or Clubbing  Neurological/Psychiatric:  · Oriented to time, place, and person  · Anxious  Skin Warm and dry    Assessment:    Active Problems:    DM II (diabetes mellitus, type II), controlled ([facility])    HOLLAND (obstructive sleep apnea)    Essential hypertension    COPD, severe ([facility])    Former smoker    PAF (paroxysmal atrial fibrillation) ([facility])    Chronic diastolic congestive heart failure ([address]    Respiratory arrest [address]    Acute respiratory distress    S/P cardiac pacemaker procedure

## 2019-06-03 NOTE — PROGRESS NOTES
Monitoring    Nutrition Evaluation:   · Evaluation: Goals set   · Goals: Tolerate most appropriate form of nutrition therapy to consume greater than 50% of estimated  nutrition needs. · Monitoring: Nutrition Progression, Monitor Hemodynamic Status, Pertinent Labs      Electronically signed by Soraya Gibson.  Gaston Headley RD, LD on 6/3/19 at 9:48 AM    Contact Number: [phone]

## 2019-06-03 NOTE — PLAN OF CARE
Problem: Nutrition  Goal: Optimal nutrition therapy  Outcome: Ongoing   Nutrition Problem: Inadequate oral intake  Intervention: Food and/or Nutrient Delivery: Continue current diet  Nutritional Goals: Tolerate most appropriate form of nutrition therapy to consume greater than 50% of estimated  nutrition needs.

## 2019-06-03 NOTE — PROGRESS NOTES
RESPIRATORY THERAPY ASSESSMENT    Name:  [facility] Record Number:  [id number]  Age: 76 y.o. Gender: male  : 1945  Today's Date:  6/3/2019  Room:  02380238-01    Assessment     Is the patient being admitted for a COPD or Asthma exacerbation? NA  (If yes the patient will be seen every 4 hours for the first 24 hours and then reassessed)    Patient Admission Diagnosis      Allergies  No Known Allergies    Minimum Predicted Vital Capacity:     N/A          Actual Vital Capacity:      N/A              Pulmonary History:COPD and CHF/Pulmonary Edema  Home Oxygen Therapy:  room air  Home Respiratory Therapy:Stiolto QD, DuoNeb HHN QID and Albuterol HHN Q6H PRN   Current Respiratory Therapy:  DuoNeb HHN Q4H  Treatment Type: HHN  Medications: Albuterol/Ipratropium    Respiratory Severity Index(RSI)   Patients with orders for inhalation medications, oxygen, or any therapeutic treatment modality will be placed on Respiratory Protocol. They will be assessed with the first treatment and at least every 72 hours thereafter. The following severity scale will be used to determine frequency of treatment intervention.     Smoking History: Mild Exacerbation = 3    Social History  Social History     Tobacco Use    Smoking status: Former Smoker     Packs/day: 1.50     Years: 20.00     Pack years: 30.00     Types: Cigarettes     Last attempt to quit: 1/10/2000     Years since quittin.4    Smokeless tobacco: Never Used   Substance Use Topics    Alcohol use: No     Alcohol/week: 0.0 oz    Drug use: No       Recent Surgical History: None = 0  Past Surgical History  Past Surgical History:   Procedure Laterality Date    CARDIAC PACEMAKER PLACEMENT  2019    Dr Dorothy Iverson, [employer] Model: [place]    CATARACT REMOVAL WITH IMPLANT Right 2016    COLONOSCOPY  2015    colitis-mild gastritis    COLONOSCOPY N/A 10/25/2018    COLONOSCOPY POLYPECTOMY SNARE/COLD BIOPSY performed by Brooke Kim MD at [address]  09/21/2018    CYSTOSCOPY, DIRECT VISION INTERAL URETHROTOMY     HERNIA REPAIR Right 2012    inguinal    PACEMAKER INSERTION  05/31/2019    PACEMAKER PLACEMENT Left 05/31/2019    NM OFFICE/OUTPT VISIT,PROCEDURE ONLY N/A 9/21/2018    CYSTOSCOPY, DIRECT VISION INTERAL URETHROTOMY performed by Claudeen Mcmurray, MD at [facility] Left 2001    Rotator cuff    SKIN CANCER EXCISION Left 5/2016    melanoma    TESTICLE REMOVAL Left 2012    UPPER GASTROINTESTINAL ENDOSCOPY  05/08/2015    Gastritis    UPPER GASTROINTESTINAL ENDOSCOPY N/A 09/08/2016    gastritis-Bx pending    UPPER GASTROINTESTINAL ENDOSCOPY  11/06/2018    UPPER GASTROINTESTINAL ENDOSCOPY N/A 11/6/2018    EGD BIOPSY performed by Ludy Bowers MD at [address]       Level of Consciousness: Alert, Oriented, and Cooperative = 0    Level of Activity: Mostly sedentary, minimal walking = 2    Respiratory Pattern: Increased; RR 21-30 = 1    Breath Sounds: Diminshed bilaterally and/or crackles = 2    Sputum  Sputum Color: Yellow, Tenacity: Thick, Sputum How Obtained: Endotracheal, Suctioned  Cough: Strong, spontaneous, non-productive = 0    Vital Signs   BP (!) 144/83   Pulse 71   Temp 97.8 °F (36.6 °C) (Oral)   Resp 11   Ht 5' 10\" (1.778 m)   Wt 240 lb 8.4 oz (109.1 kg)   SpO2 96%   BMI 34.51 kg/m²   SPO2 (COPD values may differ): 86-87% on room air or greater than 92% on FiO2 35- 50% = 3    Peak Flow (asthma only): not applicable = 0    RSI: 73-80 = Q6H or QID and Q4HPRN for dyspnea        Plan       Goals: medication delivery and improve oxygenation    Patient/caregiver was educated on the proper method of use for Respiratory Care Devices:  Yes      Level of patient/caregiver understanding able to:   ? Verbalize understanding   ? Demonstrate understanding       ? Teach back        ? Needs reinforcement       ? No available caregiver               ?   Other:     Response to education:  Excellent     Is patient being placed on Home Treatment Regimen? Yes     Does the patient have everything they need prior to discharge? NA     Comments: Patient assessed and chart reviewed. Patient recently extubated and doing well on 5 LPM nasal cannula. Patient awake, alert, and oriented and able to follow commands. Plan of Care: [address] [place]. Re-evaluate as needed. Electronically signed by Brie Rasmussen RCP on 6/3/2019 at 10:36 AM    Respiratory Protocol Guidelines     1. Assessment and treatment by Respiratory Therapy will be initiated for medication and therapeutic interventions upon initiation of aerosolized medication. 2. Physician will be contacted for respiratory rate (RR) greater than 35 breaths per minute. Therapy will be held for heart rate (HR) greater than 140 beats per minute, pending direction from physician. 3. Bronchodilators will be administered via Metered Dose Inhaler (MDI) with spacer when the following criteria are met:  a. Alert and cooperative     b. HR < 140 bpm  c. RR < 30 bpm                d. Can demonstrate a 2-3 second inspiratory hold  4. Bronchodilators will be administered via Hand Held Nebulizer BRYANNA [facility] [place]) to patients when ANY of the following criteria are met  a. Incognizant or uncooperative          b. Patients treated with HHN at Home        c. Unable to demonstrate proper use of MDI with spacer     d. RR > 30 bpm   5. Bronchodilators will be delivered via Metered Dose Inhaler (MDI), HHN, Aerogen to intubated patients on mechanical ventilation. 6. Inhalation medication orders will be delivered and/or substituted as outlined below. Aerosolized Medications Ordering and Administration Guidelines:    1. All Medications will be ordered by a physician, and their frequency and/or modality will be adjusted as defined by the patients Respiratory Severity Index (RSI) score.   2. If the patient does not have documented COPD, consider discontinuing

## 2019-06-03 NOTE — PROGRESS NOTES
Shift assessment complete and documented on flowsheets. VSS. Four eyes skin assessment and MAR handoff completed with previous RN. Sedation adjusted for SAT and probable SBT. Repositioned for comfort. Will continue to monitor.

## 2019-06-03 NOTE — PROGRESS NOTES
SBT initiated at this time with a Pressure Support of 5 cmH2O. RN at bedside and is aware of this change.

## 2019-06-03 NOTE — FLOWSHEET NOTE
2000  Pt assessed, see note for details. Pt remains intubated and sedated. Pt's family at bedside, all questions answered. Bed in low locked position, SR up x3.    0000 Assessment complete, see note for details. 0400 Assessment complete, see note for details. Full bed bath complete to include oral and nogueira care.

## 2019-06-03 NOTE — PROGRESS NOTES
Rales/Wheezes/Rhonchi. Cardiovascular: Regular rate and rhythm with normal S1/S2 without murmurs, rubs or gallops. Abdomen: Soft, non-tender, non-distended with normal bowel sounds. Musculoskelatal: No clubbing, cyanosis or edema bilaterally. Full range of motion without deformity. Neurologic:  Neurovascularly intact without any focal sensory/motor deficits. Cranial nerves: II-XII intact, grossly non-focal.  Psychiatric: Alert and oriented, thought content appropriate, normal insight  Skin: Skin color, texture, turgor normal.  No rashes or lesions. Capillary Refill: Brisk,< 3 seconds   Peripheral Pulses: +2 palpable, equal bilaterally       Labs:   Recent Labs     06/01/19 2118 06/02/19 0623 06/03/19 0418   WBC 12.7* 11.0 8.5   HGB 13.1* 12.0* 11.7*   HCT 41.5 38.3* 35.9*    178 145     Recent Labs     06/01/19 2118 06/02/19 0624 06/03/19 0418    137 136   K 5.9* 5.7* 5.2*    100 99   CO2 30 29 27   BUN 14 20 32*   CREATININE 0.9 1.1 1.0   CALCIUM 9.3 9.0 9.1     Recent Labs     06/01/19 2118   AST 75*   ALT 26   BILITOT 0.6   ALKPHOS 131*     Recent Labs     06/01/19 2118   INR 1.14     Recent Labs     06/01/19 2118   TROPONINI <0.01       Urinalysis:      Lab Results   Component Value Date    NITRU Negative 04/24/2019    WBCUA 3-5 04/24/2019    BACTERIA Rare 04/10/2019    RBCUA None seen 04/24/2019    BLOODU Negative 04/24/2019    SPECGRAV 1.020 04/24/2019    GLUCOSEU Negative 04/24/2019       Radiology:  XR CHEST PORTABLE   Final Result   1. The ET tube distal tip is 7 cm above the alcides. 2. Low lung volumes. No acute cardiopulmonary disease. XR CHEST PORTABLE   Final Result   Interval improvement of interstitial opacities.              Assessment/Plan:    Active Hospital Problems    Diagnosis    DM II (diabetes mellitus, type II), controlled ([address] [E11.9]     Priority: Medium    Respiratory arrest ([address] [R09.2]    Acute respiratory distress [R06.03]    Aspiration into airway [T17.908A]    Acute respiratory failure with hypercapnia (HCC) [J96.02]    S/P cardiac pacemaker procedure [Z95.0]    Chronic diastolic congestive heart failure (HCC) [I50.32]    PAF (paroxysmal atrial fibrillation) (HCC) [I48.0]    Former smoker [Z87.891]    COPD, severe ([address] [J44.9]    Essential hypertension [I10]    HOLLAND (obstructive sleep apnea) [G47.33]     Acute hypoxic and hypercarbic respiratory failure 2/2 COPD exacerbation  - Bronchoscopy completed 6/2/2019  - Weaned and extubated from the vent   - Continue high flow nasal cannula, wean as tolerated. - Continue steroids, nebulizers. - no abx needed at this time  - CTPA without pneumonia    DMII  - SSI ordered. Holding home regimen    Paroxysmal Afib  - Paced rhythm, recently placed  - continue sotalol, cardizem, eliquis    Chronic diastolic heart failure  - no evidence of volume overload  - Echo 5/19 with EF 18-14%, grade I diastolic dysfunction    HTN  - well controlled on cardizem    HOLLAND  - CPAP      DVT Prophylaxis: eliquis  Diet: DIET CLEAR LIQUID;  Code Status: Full Code    PT/OT Eval Status: not ordered    Dispo - Monitor in ICU today.      Vivica Barthel, MD

## 2019-06-03 NOTE — PLAN OF CARE
RN  Outcome: Ongoing  6/2/2019 2302 by Dione Benson RN  Outcome: Ongoing     Problem: Fluid Volume - Imbalance:  Goal: Absence of imbalanced fluid volume signs and symptoms  Description  Absence of imbalanced fluid volume signs and symptoms  Outcome: Ongoing     Problem: Gas Exchange - Impaired:  Goal: Levels of oxygenation will improve  Description  Levels of oxygenation will improve  6/3/2019 0925 by Rosita Hill RN  Outcome: Ongoing  6/2/2019 2302 by Dione Benson RN  Outcome: Ongoing     Problem: Mental Status - Impaired:  Goal: Mental status will be restored to baseline  Description  Mental status will be restored to baseline  Outcome: Ongoing     Problem: Nutrition Deficit:  Goal: Ability to achieve adequate nutritional intake will improve  Description  Ability to achieve adequate nutritional intake will improve  Outcome: Ongoing     Problem: Pain:  Goal: Pain level will decrease  Description  Pain level will decrease  Outcome: Ongoing  Goal: Recognizes and communicates pain  Description  Recognizes and communicates pain  Outcome: Ongoing  Goal: Control of acute pain  Description  Control of acute pain  6/3/2019 0925 by Rosita Hill RN  Outcome: Ongoing  6/2/2019 2302 by Dione Benson RN  Outcome: Ongoing  Goal: Control of chronic pain  Description  Control of chronic pain  Outcome: Ongoing     Problem: Serum Glucose Level - Abnormal:  Goal: Ability to maintain appropriate glucose levels will improve to within specified parameters  Description  Ability to maintain appropriate glucose levels will improve to within specified parameters  Outcome: Ongoing     Problem: Skin Integrity - Impaired:  Goal: Will show no infection signs and symptoms  Description  Will show no infection signs and symptoms  Outcome: Ongoing  Note:   Skin assessment complete. Pt at risk for skin breakdown. See Chaz score. Pt remains on bedrest. Unable to reposition self in bed. Heels elevated off bed. Will continue to turn and reposition patient every two hours and as needed. Will continue to keep patient clean and dry, applying skin care cream as needed. Pillows used for positioning. Will continue to monitor and assess for skin breakdown. Goal: Absence of new skin breakdown  Description  Absence of new skin breakdown  6/3/2019 0925 by Ema Granados RN  Outcome: Ongoing  6/2/2019 2302 by Dylan Freitas RN  Outcome: Ongoing     Problem: Sleep Pattern Disturbance:  Goal: Appears well-rested  Description  Appears well-rested  Outcome: Ongoing     Problem: Tissue Perfusion, Altered:  Goal: Circulatory function within specified parameters  Description  Circulatory function within specified parameters  Outcome: Ongoing     Problem: Tissue Perfusion - Cardiopulmonary, Altered:  Goal: Absence of angina  Description  Absence of angina  Outcome: Ongoing  Goal: Hemodynamic stability will improve  Description  Hemodynamic stability will improve  Outcome: Ongoing     Problem: OXYGENATION/RESPIRATORY FUNCTION  Goal: Patient will maintain patent airway  Outcome: Ongoing  Goal: Patient will achieve/maintain normal respiratory rate/effort  Description  Respiratory rate and effort will be within normal limits for the patient  Outcome: Ongoing  Note:   Patient's EF (Ejection Fraction) is greater than 40%    Patient has a past medical history of Cancer ([address] CHF (congestive heart failure) ([address] Chronic pancreatitis ([address] Colitis, COPD (chronic obstructive pulmonary disease) ([address] Diabetes mellitus ([address] Esophagitis, Gastritis, Hyperlipidemia, and Hypertension. Comorbidities reviewed and education provided as appropriate. Patient and/or family's stated goal of care: reduce shortness of breath prior to discharge    Pt is currently intubated on a ventilator. Pt with nonpitting lower extremity edema.  Patient's weights and intake/output reviewed:    Patient Vitals for the past 96 hrs (Last 3 readings):   Weight 06/02/19 0500 240 lb 8.4 oz (109.1 kg)       Intake/Output Summary (Last 24 hours) at 6/3/2019 1792  Last data filed at 6/3/2019 0432  Gross per 24 hour   Intake 1080.83 ml   Output 1200 ml   Net -119.17 ml         Patient's current functional capacity: Unable to assess due to patient condition      >> For CHF and Comorbidity Education Time and Topics, please see Education Tab. Problem: HEMODYNAMIC STATUS  Goal: Patient has stable vital signs and fluid balance  Outcome: Ongoing     Problem: FLUID AND ELECTROLYTE IMBALANCE  Goal: Fluid and electrolyte balance are achieved/maintained  Outcome: Ongoing     Problem: ACTIVITY INTOLERANCE/IMPAIRED MOBILITY  Goal: Mobility/activity is maintained at optimum level for patient  Outcome: Ongoing     Problem: Risk for Impaired Skin Integrity  Goal: Tissue integrity - skin and mucous membranes  Description  Structural intactness and normal physiological function of skin and  mucous membranes.   Outcome: Ongoing

## 2019-06-03 NOTE — PROCEDURES
Bronchoscopy note    Patient with history arrest, acute respiratory failure with hypercarbia; pulmonary infiltrates, aspiration the airways, COPD, status post recent pacemaker placement 2 days back-patient's CT findings were discussed with patient's family and given the patient's clinical status and radiology it was that the bronchoscopy and for that reason after informed consent, the respiratory therapist was told to set up the A-scope after timeout, the bronchoscope was introduced through the endotracheal tube.   T piece adapter and patient was found to have thick mucous plugs in the right lower lobe and right middle lobe bronchi and to some extent in the left lower lobe, the bronchoscope was wedged into the right lower lobe bronchus and BMP was done from that area which was sent for routine culture,; patient already the procedure well and did not have any apparent complications  Further treatment depending on patient's clinical status and the bronchoscopy results    Jefferson Angelo MD

## 2019-06-03 NOTE — PROGRESS NOTES
Per order from Katty Heller CNP, patient has been successfully weaned from Mechanical Ventilation. RSBI before extubation was 10 with EtCO2 of 34 and SpO2 of 95 on 40% FiO2. Patient extubated and placed on 5 liters/min via nasal cannula. Post extubation SpO2 is 90% with HR 73 bpm and RR 20 breaths/min. Patient had strong cough that was non-productive. Extubation Well tolerated by patient. RN at bedside for extubation and is aware of the change in patient status.

## 2019-06-03 NOTE — PROGRESS NOTES
Wasted amount 95mL of fentanyl infusion verified by:   Nurse eSignature: Electronically signed by Tay Villegas RN on 6/3/19 at 10:32 AM    Wasted amount verified by:  Nurse eSignature: Electronically signed by Rosita Hill RN on 6/3/19 at 11:44 AM

## 2019-06-04 NOTE — CARE COORDINATION
CASE MANAGEMENT INITIAL ASSESSMENT      Reviewed chart and met with patient today, re: 76year old male. Explained Case Management role/services. Family present: cousin  Primary contact information: Zandra Medrano [phone]    Admit date/status: 6/2/19  Diagnosis: respiratory arrest    Insurance: [place] required for SNF -yes       3 night stay required - no    Living arrangements, Adls, care needs, prior to admission: patient lives in a single story house with spouse. Independent in all ADL's     Transportation: private    [employer] at home: none    Services in the home and/or outpatient, prior to admission: none    PT/OT recs: [facility] [place] Notification (HEN): not initiated    Barriers to discharge: none    Plan/comments:   Met with patient to discuss potential discharge needs. 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 Denies any current needs at this time.      [facility]OC on chart for MD leta Escobar RN

## 2019-06-04 NOTE — PROGRESS NOTES
Pt c/o \"chest hurting\". Pt points to epigastric area. Non radiating. Denies other associated s/s. Simethicone given at this time for indigestion. HOB elevated 30 degrees. VSS.  Will monitor

## 2019-06-04 NOTE — PROGRESS NOTES
Spoke with Dr Serg Sher, pt asking for his PRN Percocet 10 mg q6h PRN. Received order for Percocet 5mg.  Order placed Zena Garza RN

## 2019-06-04 NOTE — PROGRESS NOTES
Bedside rounds complete with Dr. Jessica Orozco. POC reviewed and updated. Pt aware. Understanding verbalized.

## 2019-06-04 NOTE — DISCHARGE INSTR - COC
Continuity of Care Form    Patient Name: Lilia Downing   :  1945  MRN:  [id number]    Admit date:  2019  Discharge date:  19    Code Status Order: Full Code   Advance Directives:   [facility] Documentation     Date/Time Healthcare Directive Type of Healthcare Directive Copy in [address] [place] [address] [address] Agent's Name Healthcare Agent's Phone Number    [phone]  No, patient does not have an advance directive for healthcare treatment -- -- -- -- --          Admitting Physician:  Howard Canchola MD  PCP: Rosie Crenshaw DO    Discharging Nurse:  [facility] Unit/Room#: 5940/1886-91  Discharging Unit Phone Number: [phone]    Emergency Contact:   Extended Emergency Contact Information  Primary Emergency Contact: KenXin  Address: [address] [place] [address], [address], [address] Phone: [phone]  Mobile Phone: [phone]  Relation: Spouse  Secondary Emergency Contact: Javier Kyle [address] Phone: [phone]  Relation: Child    Past Surgical History:  Past Surgical History:   Procedure Laterality Date    CARDIAC PACEMAKER PLACEMENT  2019    Dr Waqas Harris, [employer] Model: Livia    CATARACT REMOVAL WITH IMPLANT Right 2016    COLONOSCOPY  2015    colitis-mild gastritis    COLONOSCOPY N/A 10/25/2018    COLONOSCOPY POLYPECTOMY SNARE/COLD BIOPSY performed by Milagros Schroeder MD at [address] [place] [address]  2018    CYSTOSCOPY, DIRECT VISION INTERAL URETHROTOMY     HERNIA REPAIR Right     inguinal    PACEMAKER INSERTION  2019    PACEMAKER PLACEMENT Left 2019    [place] OFFICE/OUTPT VISIT,PROCEDURE ONLY N/A 2018    CYSTOSCOPY, DIRECT VISION INTERAL URETHROTOMY performed by Porfirio Dodge MD at [facility] Left     Rotator cuff    SKIN CANCER EXCISION Left 2016    melanoma    TESTICLE REMOVAL Left     UPPER GASTROINTESTINAL ENDOSCOPY  05/08/2015    Gastritis    UPPER GASTROINTESTINAL ENDOSCOPY N/A 09/08/2016    gastritis-Bx pending    UPPER GASTROINTESTINAL ENDOSCOPY  11/06/2018    UPPER GASTROINTESTINAL ENDOSCOPY N/A 11/6/2018    EGD BIOPSY performed by Jared Locke MD at [address]       Immunization History:   Immunization History   Administered Date(s) Administered    Influenza Vaccine, unspecified formulation 10/25/2018    Influenza, High Dose (Fluzone 65 yrs and older) 11/27/2017    Pneumococcal 13-valent Conjugate (Pbpegyr03) 05/14/2015    Pneumococcal Polysaccharide (Ifhayidvo21) 05/10/2018    Tdap (Boostrix, Adacel) 05/21/2015       Active Problems:  Patient Active Problem List   Diagnosis Code    DM II (diabetes mellitus, type II), controlled ([address] E11.9    HLD (hyperlipidemia) E78.5    Diabetic neuropathy ([facility] E11.40    RLS (restless legs syndrome) G25.81    Insomnia G47.00    Coronary artery disease involving native coronary artery of native heart without angina pectoris I25.10    Seasonal allergic rhinitis J30.2    Controlled type 2 diabetes mellitus without complication, without long-term current use of insulin ([facility]) E11.9    HOLLAND (obstructive sleep apnea) G47.33    Essential hypertension I10    COPD, severe ([facility]) J44.9    Chronic bilateral low back pain without sciatica M54.5, G89.29    Restless legs syndrome (RLS) G25.81    Mixed hyperlipidemia E78.2    Depression F32.9    Fall at home W19. Ashley Peels, Y92.009    T12 vertebral burst fracture S22.089A    Colitis K52.9    Former smoker Z87.891    Typical atrial flutter (HCC) I48.3    Gastroesophageal reflux disease without esophagitis K21.9    Short-term memory loss R41.3    Nocturia R35.1    BPH (benign prostatic hyperplasia) N40.0    PAF (paroxysmal atrial fibrillation) ([facility]) I48.0    Obesity E66.9    Anemia D64.9    Symptomatic anemia D64.9    Chronic diastolic congestive heart failure ([address] [facility] I50.32    Community acquired pneumonia J18.9    Tracheobronchitis J40    Elevated PSA R97.20    Sinus node dysfunction (HCC) I49.5    Pacemaker Z95.0    Respiratory arrest (HCC) R09.2    Acute respiratory distress R06.03    S/P cardiac pacemaker procedure Z95.0    Aspiration into airway T17.908A    Acute respiratory failure with hypercapnia (HCC) J96.02       Isolation/Infection:   Isolation          No Isolation            Nurse Assessment:  Last Vital Signs: BP (!) 162/72   Pulse 62   Temp 98.4 °F (36.9 °C) (Oral)   Resp 20   Ht 5' 10\" (1.778 m)   Wt 240 lb 8.4 oz (109.1 kg)   SpO2 93%   BMI 34.51 kg/m²     Last documented pain score (0-10 scale): Pain Level: 7  Last Weight:   Wt Readings from Last 1 Encounters:   06/02/19 240 lb 8.4 oz (109.1 kg)     Mental Status:  oriented and alert    IV Access:  - None    Nursing Mobility/ADLs:  Walking   Independent  Transfer  Independent  Bathing  Assisted  Dressing  Assisted  Toileting  Independent  Feeding  Independent  Med Admin  Independent  Med Delivery   whole    Wound Care Documentation and Therapy:        Elimination:  Continence:   · Bowel: Yes  · Bladder: Yes  Urinary Catheter: None   Colostomy/Ileostomy/Ileal Conduit: No       Date of Last BM: ***    Intake/Output Summary (Last 24 hours) at 6/4/2019 1010  Last data filed at 6/4/2019 0847  Gross per 24 hour   Intake 1491 ml   Output 2350 ml   Net -859 ml     I/O last 3 completed shifts: In: 1481 [P.O.:660; I.V.:821]  Out: 2650 [Urine:2650]    Safety Concerns:     None    Impairments/Disabilities:      None    Nutrition Therapy:  Current Nutrition Therapy:   - Oral Diet:  General    Routes of Feeding: Oral  Liquids: Thin Liquids  Daily Fluid Restriction: no  Last Modified Barium Swallow with Video (Video Swallowing Test): not done    Treatments at the Time of Hospital Discharge:   Respiratory Treatments:   Oxygen Therapy:  is not on home oxygen therapy.   Ventilator:    - No ventilator support    Rehab Therapies: Physical Therapy and Occupational Therapy  Weight Bearing Status/Restrictions: No weight bearing restirctions  Other Medical Equipment (for information only, NOT a DME order): Other Treatments:     Patient's personal belongings (please select all that are sent with patient):  None    RN SIGNATURE:  Electronically signed by Chad Bailey RN on 6/7/19 at 10:53 AM    CASE MANAGEMENT/SOCIAL WORK SECTION    Inpatient Status Date: 6/2/19    Readmission Risk Assessment Score:  Readmission Risk              Risk of Unplanned Readmission:        41           Discharging to Facility/ Agency   · Name: [facility]  · Address:  · Phone: [phone]  · Fax: [phone]    Dialysis Facility (if applicable)   · Name:  · Address:  · Dialysis Schedule:  · Phone:  · Fax:    / signature: Electronically signed by Maday Araiza RN on 6/7/19 at 12:05 PM    PHYSICIAN SECTION    Prognosis: Good    Condition at Discharge: Stable    Rehab Potential (if transferring to Rehab): Good    Recommended Labs or Other Treatments After Discharge: none  Recommended Follow-up, Labs or Other Treatments After Discharge:    Home pt, ot             Physician Certification: I certify the above information and transfer of Shreya Ortega  is necessary for the continuing treatment of the diagnosis listed and that he requires Home Care for less 30 days.      Update Admission H&P: No change in H&P    PHYSICIAN SIGNATURE:  Electronically signed by Rene Ramirez MD on 6/7/19 at 11:12 AM

## 2019-06-04 NOTE — PROGRESS NOTES
Hospitalist Progress Note      PCP: Marianna Swanson DO    Date of Admission: 6/2/2019    Chief Complaint: acute respiratory distress    Hospital Course:   76 y.o. male who presented to [facility] as a transfer from [facility] with respiratory distress/failure. On presentation, required intubation for severe hypercapnia, obtundation. Subjective:   Improving O2 requirement; 6L HFNC  However, still reports increased work of breathing and severe SOB. Alert and oriented    Medications:  Reviewed    Infusion Medications    dextrose       Scheduled Medications    predniSONE  40 mg Oral Daily    cholestyramine  4 g Oral Daily    ipratropium-albuterol  1 ampule Inhalation Q4H WA    pramipexole  1 mg Oral Nightly    sodium chloride flush  10 mL Intravenous 2 times per day    pantoprazole  40 mg Intravenous Daily    apixaban  5 mg Oral BID    sotalol  80 mg Oral BID    insulin lispro  0-6 Units Subcutaneous Q4H    mesalamine  400 mg Oral TID    sertraline  100 mg Oral Daily    mupirocin   Topical Daily    diltiazem  30 mg Oral 4 times per day     PRN Meds: melatonin, benzonatate, simethicone, prochlorperazine, oxyCODONE-acetaminophen **OR** oxyCODONE-acetaminophen, cyclobenzaprine, sodium chloride flush, glucose, dextrose, glucagon (rDNA), dextrose      Intake/Output Summary (Last 24 hours) at 6/4/2019 1034  Last data filed at 6/4/2019 0847  Gross per 24 hour   Intake 1491 ml   Output 2350 ml   Net -859 ml       Physical Exam Performed:    BP (!) 162/72   Pulse 62   Temp 98.4 °F (36.9 °C) (Oral)   Resp 20   Ht 5' 10\" (1.778 m)   Wt 240 lb 8.4 oz (109.1 kg)   SpO2 93%   BMI 34.51 kg/m²   General appearance: No apparent distress, appears stated age and cooperative. HEENT: Pupils equal, round, and reactive to light. Conjunctivae/corneas clear. Neck: Supple, no jugular venous distention. Trachea midline with full range of motion. Respiratory:  Normal respiratory effort.  Diminished with poor air movement, but clear to auscultation, bilaterally without Rales/Wheezes/Rhonchi. Cardiovascular: Regular rate and rhythm with normal S1/S2 without murmurs, rubs or gallops. Abdomen: Soft, non-tender, non-distended with normal bowel sounds. Musculoskelatal: No clubbing, cyanosis or edema bilaterally. Full range of motion without deformity. Neurologic:  Neurovascularly intact without any focal sensory/motor deficits. Cranial nerves: II-XII intact, grossly non-focal.  Psychiatric: Alert and oriented, thought content appropriate, normal insight  Skin: Skin color, texture, turgor normal.  No rashes or lesions. Capillary Refill: Brisk,< 3 seconds   Peripheral Pulses: +2 palpable, equal bilaterally       Labs:   Recent Labs     06/02/19 0623 06/03/19 0418 06/04/19  0410   WBC 11.0 8.5 9.6   HGB 12.0* 11.7* 11.8*   HCT 38.3* 35.9* 36.2*    145 163     Recent Labs     06/02/19 0624 06/03/19 0418 06/04/19  0410    136 136   K 5.7* 5.2* 4.6    99 99   CO2 29 27 29   BUN 20 32* 27*   CREATININE 1.1 1.0 0.7*   CALCIUM 9.0 9.1 9.7     Recent Labs     06/01/19 2118   AST 75*   ALT 26   BILITOT 0.6   ALKPHOS 131*     Recent Labs     06/01/19 2118   INR 1.14     Recent Labs     06/01/19 2118   TROPONINI <0.01       Urinalysis:      Lab Results   Component Value Date    NITRU Negative 04/24/2019    WBCUA 3-5 04/24/2019    BACTERIA Rare 04/10/2019    RBCUA None seen 04/24/2019    BLOODU Negative 04/24/2019    SPECGRAV 1.020 04/24/2019    GLUCOSEU Negative 04/24/2019       Radiology:  XR ABDOMEN (KUB) (SINGLE AP VIEW)   Preliminary Result   1. No acute cardiopulmonary disease. 2. Non-specific bowel gas pattern, without evidence of free air. XR CHEST PORTABLE   Preliminary Result   1. No acute cardiopulmonary disease. 2. Non-specific bowel gas pattern, without evidence of free air. XR CHEST PORTABLE   Final Result   1. The ET tube distal tip is 7 cm above the alcides.    2. Low lung volumes. No acute cardiopulmonary disease. XR CHEST PORTABLE   Final Result   Interval improvement of interstitial opacities. Assessment/Plan:    Active Hospital Problems    Diagnosis    DM II (diabetes mellitus, type II), controlled ([address] [E11.9]     Priority: Medium    Respiratory arrest ([address] [R09.2]    Acute respiratory distress [R06.03]    Aspiration into airway [T17.908A]    Acute respiratory failure with hypercapnia (HCC) [J96.02]    S/P cardiac pacemaker procedure [Z95.0]    Chronic diastolic congestive heart failure (HCC) [I50.32]    PAF (paroxysmal atrial fibrillation) (HCC) [I48.0]    Former smoker [Z87.891]    COPD, severe ([address] [J44.9]    Essential hypertension [I10]    HOLLAND (obstructive sleep apnea) [G47.33]     Acute hypoxic and hypercarbic respiratory failure 2/2 COPD exacerbation  - Bronchoscopy completed 6/2/2019  - Weaned and extubated from the vent; improving  - Continue high flow nasal cannula, wean as tolerated. - Continue steroids, nebulizers. - no abx needed at this time  - CTPA without pneumonia    DMII  - SSI ordered. Holding home regimen    Paroxysmal Afib  - Paced rhythm, recently placed  - continue sotalol, cardizem, eliquis    Chronic diastolic heart failure  - no evidence of volume overload  - Echo 5/19 with EF 84-89%, grade I diastolic dysfunction    HTN  - well controlled on cardizem    HOLLAND  - CPAP      DVT Prophylaxis: eliquis  Diet: DIET CLEAR LIQUID;  Code Status: Full Code    PT/OT Eval Status: not ordered    Dispo - Monitor in ICU again today.      Mainor Hollins MD

## 2019-06-04 NOTE — PROGRESS NOTES
INPATIENT PULMONARY CRITICAL CARE PROGRESS NOTE      Reason for visit    s/p Intubation         SUBJECTIVE:  Patient's IV sedation was tapered to d/c and was tried on CPAP with PSV which he tolerate dwell;patient was generating adequate tidal volume and had maintained acid-base balance and was commuinicative on the vent and hence was extubated and was put on nasal cannula oxygenation which had to be changed to high flow oxygen as patient was somewhat hypoxemic, patient can just to be on high flow oxygen when seen this morning, patient was having some mild tachypnea, patient was also somewhat restless because of back pain, patient was initially on 10 L of oxygen on the high flow oxygen which has been decreased to 8 L/m, patient was afebrile and hemodynamically maintained, patient has normal sinus rhythm on the monitor, patient's glycemic control was acceptable, patient has adequate urine output with cumulative fluid balance of - 1.2 L, no other pertinent review of system of concern, patient continues to have some back pain and did not sleep well last night      Physical Exam:  Blood pressure 135/78, pulse 69, temperature 98.4 °F (36.9 °C), temperature source Oral, resp. rate 21, height 5' 10\" (1.778 m), weight 240 lb 8.4 oz (109.1 kg), SpO2 92 %.'     Constitutional:  No acute distress but has mild tachypnea on high flow oxygen  HENT:   facial flushing present  . No thyromegaly. Eyes:  Conjunctivae are normal. Pupils equal, round, and reactive to light. No scleral icterus. Neck: . No tracheal deviation present. No obvious thyroid mass. Cardiovascular: Normal rate, regular rhythm, normal heart sounds. No right ventricular heave. (+)  lower extremity edema. Pulmonary/Chest: No wheezes. minimal Bibasilar rales. Chest wall is not dull to percussion. No accessory muscle usage or stridor. Abdominal: Soft. Bowel sounds present. No distension or hernia. No tenderness. Musculoskeletal: No cyanosis.  No Latest Ref Range: 0.8 - 1.3 mg/dL 1.0      0.7 (L)    Anion Gap Latest Ref Range: 3 - 16  10      8    GFR Non- Latest Ref Range: >60  >60      >60    GFR  Latest Ref Range: >60  >60      >60    Lactic Acid Latest Ref Range: 0.4 - 2.0 mmol/L 1.1      1.1    Glucose Latest Ref Range: 70 - 99 mg/dL 135 (H)      170 (H)    POC Glucose Latest Ref Range: 70 - 99 mg/dl  158 (H) 122 (H) 151 (H) 129 (H) 140 (H)  149 (H)   Calcium Latest Ref Range: 8.3 - 10.6 mg/dL 9.1      9.7      Results for Radha Stanford (MRN [id number]) as of 6/4/2019 14:00   Ref. Range 5/31/2019 08:10 6/1/2019 21:18 6/2/2019 06:23 6/3/2019 04:18 6/4/2019 04:10   WBC Latest Ref Range: 4.0 - 11.0 K/uL 5.4 12.7 (H) 11.0 8.5 9.6   RBC Latest Ref Range: 4.20 - 5.90 M/uL 4.01 (L) 4.43 4.12 (L) 3.94 (L) 3.98 (L)   Hemoglobin Quant Latest Ref Range: 13.5 - 17.5 g/dL 11.9 (L) 13.1 (L) 12.0 (L) 11.7 (L) 11.8 (L)   Hematocrit Latest Ref Range: 40.5 - 52.5 % 36.0 (L) 41.5 38.3 (L) 35.9 (L) 36.2 (L)   MCV Latest Ref Range: 80.0 - 100.0 fL 89.8 93.5 93.0 91.2 90.9   MCH Latest Ref Range: 26.0 - 34.0 pg 29.6 29.6 29.1 29.7 29.7   MCHC Latest Ref Range: 31.0 - 36.0 g/dL 32.9 31.7 31.3 32.6 32.7   MPV Latest Ref Range: 5.0 - 10.5 fL 7.6 8.1 7.7 7.8 7.9   RDW Latest Ref Range: 12.4 - 15.4 % 16.4 (H) 17.1 (H) 17.3 (H) 16.9 (H) 16.7 (H)   Platelet Count Latest Ref Range: 135 - 450 K/uL 158 220 178 145 163   Neutrophils % Latest Units: % 59.8 67.3 81.3 88.1 89.9   Lymphocyte % Latest Units: % 23.7 21.0 9.8 8.8 6.7   Monocytes % Latest Units: % 8.5 6.8 7.1 2.7 3.1   Eosinophils % Latest Units: % 7.4 4.0 1.4 0.0 0.0   Basophils % Latest Units: % 0.6 0.9 0.4 0.4 0.3   Neutrophils # Latest Ref Range: 1.7 - 7.7 K/uL 3.2 8.5 (H) 9.0 (H) 7.5 8.7 (H)     Results for Radha Host (MRN [id number]) as of 6/4/2019 14:00   Ref. Range 6/1/2019 21:18 6/2/2019 06:24 6/2/2019 10:20   Culture, Blood 2 Unknown  No Growth to date. ..     Gram Stain Result Unknown   1+ Gram positive . .. Organism Unknown Staphylococcus co... (A)     CULTURE, RESPIRATORY Unknown   Normal respirator. ..      Resulting lab: [facility]   Value: Staphylococcus coagulase negative DNA DetectedAbnormal    *Additional information available - narrative   6/3/2019  8:20 AM - Felicity Benavides Incoming Lab Results From Soft (Epic Adt)     Specimen Information: Blood        Component Collected Lab   Blood Culture, Routine Abnormal  06/01/2019  9:18 PM [facility]   Gram stain Aerobic bottle:   Gram positive cocci in clusters   resembling Staphylococcus   Information to follow   Gram stain Anaerobic bottle:   Gram positive cocci in clusters   resembling Staphylococcus   Information to follow      ONE XRAY VIEW OF THE CHEST; ONE SUPINE XRAY VIEW(S) OF THE ABDOMEN       6/4/2019 9:33 am       COMPARISON:   06/03/2019       HISTORY:   ORDERING SYSTEM PROVIDED HISTORY: SOB   TECHNOLOGIST PROVIDED HISTORY:   Reason for exam:->SOB   Ordering Physician Provided Reason for Exam: SOB   Acuity: Unknown   Type of Exam: Unknown; ORDERING SYSTEM PROVIDED HISTORY: Distention, pain   TECHNOLOGIST PROVIDED HISTORY:   Reason for exam:->Distention, pain   Ordering Physician Provided Reason for Exam: Distention   Acuity: Unknown   Type of Exam: Unknown       FINDINGS:   A single frontal view of the chest demonstrates no acute skeletal   abnormality.  There has been interval extubation of the previously identified   endotracheal tube. Mitchel Osier is a left subclavian pacemaker in place with leads   in appropriate positions.  The heart size and mediastinal contours are   stable, and within normal limits.  The pulmonary vascularity is at the upper   limits of normal.  Stable scattered calcified granulomata are noted.  The   lungs are otherwise clear, without evidence of acute airspace consolidation,   pneumothorax, or pleural effusion.       A single supine view of the abdomen demonstrates no evidence of airway    Acute respiratory failure with hypercapnia (HCC)  Resolved Problems:    * No resolved hospital problems.  *          Plan:   · O2 supplementation to keep saturation between 90-94%  · Pulmonary toilet  · S/p bronchoscopy -NRF  · Unasyn d/froy   · Bronchodilators  · IV steroids changed to PO prednisone   · Cardiac medications with parameters  · Eliquis  to continue  · Keep negative fluid balance  · Monitor I/O and BMP  · Oral det as per metabolic support   · If hypoxemia persists-will consider CTPA  · Analgesics as at home being started -monitor for any hypoventilation   · PUD prophylaxis     Case discussed with ICU team        Electronically signed by:  Reina Falcon MD    6/4/2019    1:53 PM.

## 2019-06-04 NOTE — PROGRESS NOTES
Pt had one episode of emesis following PRN melatonin and tessalon, emesis was dark green bile colored, small amount. Abdomen appears distended but pt says he feels bloated.  Bowel sounds are present, and pt has had two BM in last 24 hrs, PRN compazine and mylicon ordered Shyam Butler RN

## 2019-06-04 NOTE — PROGRESS NOTES
Pt OOB to chair with SBA . Pt tolerated well. Denies c/o dizziness/lightheadedness. Chair alarm in place. Call light in reach. Will monitor.

## 2019-06-05 NOTE — PROGRESS NOTES
Patient arrived to room 367-2 from ICU, alert/oriented, vss except blood pressure slightly elevated, complains of low back 8/10 on the pain scale, received pain meds before transferring, oriented to room, call light, calling for assistance before getting up, has call light within reach

## 2019-06-05 NOTE — CARE COORDINATION
Received call from [place] with [facility] and she states patient is active with them for skilled nursing only. Confirmed this with the patient and they state they are happy with this company and the plan is to return home with resumption of care through [facility].

## 2019-06-05 NOTE — PROGRESS NOTES
INPATIENT PULMONARY CRITICAL CARE PROGRESS NOTE      Reason for visit    s/p Intubation         SUBJECTIVE:  Patient continues to be extubated and does not have any increasing shortness of breath;no increased cough/expectoration/wheezing;patient is less restless after the pain medications were started;patient also takes medications for restless leg syndrome at a higher dose as per family ; , patient was afebrile and hemodynamically maintained, patient has normal sinus rhythm on the monitor, patient's glycemic control was acceptable, patient has adequate urine output with cumulative fluid balance of - 2.1 L,patient's oxygenation has improved and was on 3.5 lts of nasal cannula oxygenation with Sao2 of 96% when seen  no other pertinent review of system of concern,      Physical Exam:  Blood pressure (!) 153/93, pulse 66, temperature 98.4 °F (36.9 °C), temperature source Oral, resp. rate 16, height 5' 10\" (1.778 m), weight 222 lb 0.1 oz (100.7 kg), SpO2 94 %.'     Constitutional:  No acute distress on nasal cannula oxygenation   HENT:   facial flushing present but less than yesterday  . No thyromegaly. Eyes:  Conjunctivae are normal. Pupils equal, round, and reactive to light. No scleral icterus. Neck: . No tracheal deviation present. No obvious thyroid mass. Cardiovascular: Normal rate, regular rhythm, normal heart sounds. No right ventricular heave. (+)  lower extremity edema. Pulmonary/Chest: No wheezes. minimal Bibasilar rales. Chest wall is not dull to percussion. No accessory muscle usage or stridor. Abdominal: Soft. Bowel sounds present. No distension or hernia. No tenderness. Musculoskeletal: No cyanosis. No clubbing. No obvious joint deformity. Lymphadenopathy: No cervical or supraclavicular adenopathy. Skin: Skin is warm and dry. No rash or nodules on the exposed extremities.   Neurologic:  alert and communicative with no focal cranial N deficts          Results:  CBC:   Recent Labs 06/03/19 0418 06/04/19 0410 06/05/19 0418   WBC 8.5 9.6 9.7   HGB 11.7* 11.8* 12.4*   HCT 35.9* 36.2* 38.1*   MCV 91.2 90.9 91.2    163 173     BMP:   Recent Labs     06/03/19 0418 06/04/19 0410 06/05/19 0418    136 137   K 5.2* 4.6 4.1   CL 99 99 100   CO2 27 29 25   BUN 32* 27* 28*   CREATININE 1.0 0.7* 0.7*       Imaging:  I have reviewed radiology images personally. XR ABDOMEN (KUB) (SINGLE AP VIEW)   Final Result   1. No acute cardiopulmonary disease. 2. Nonspecific bowel gas pattern, without evidence of free air. XR CHEST PORTABLE   Final Result   1. No acute cardiopulmonary disease. 2. Nonspecific bowel gas pattern, without evidence of free air. XR CHEST PORTABLE   Final Result   1. The ET tube distal tip is 7 cm above the alcides. 2. Low lung volumes. No acute cardiopulmonary disease. XR CHEST PORTABLE   Final Result   Interval improvement of interstitial opacities. Resulting lab: [address] [place] [address] LAB   Value: Staphylococcus coagulase negative DNA DetectedAbnormal    *Additional information available - narrative   6/3/2019  8:20 AM - Ardell Lesser Incoming Lab Results From Soft (Epic Adt)     Specimen Information: Blood        Component Collected Lab   Blood Culture, Routine Abnormal  06/01/2019  9:18 PM [facility] Lab   Gram stain Aerobic bottle:   Gram positive cocci in clusters   resembling Staphylococcus   Information to follow   Gram stain Anaerobic bottle:   Gram positive cocci in clusters   resembling Staphylococcus   Information to follow        Results for Grayce Counts (MRN [id number]) as of 6/5/2019 11:09   Ref.  Range 6/4/2019 04:10 6/4/2019 08:46 6/4/2019 12:56 6/4/2019 20:36 6/5/2019 04:18   Sodium Latest Ref Range: 136 - 145 mmol/L 136    137   Potassium Latest Ref Range: 3.5 - 5.1 mmol/L 4.6    4.1   Chloride Latest Ref Range: 99 - 110 mmol/L 99    100   CO2 Latest Ref Range: 21 - 32 mmol/L 29    25   BUN Latest Ref Range: 7 - 20 mg/dL 27 (H)    28 (H)   Creatinine Latest Ref Range: 0.8 - 1.3 mg/dL 0.7 (L)    0.7 (L)   Anion Gap Latest Ref Range: 3 - 16  8    12   GFR Non- Latest Ref Range: >60  >60    >60   GFR  Latest Ref Range: >60  >60    >60   Lactic Acid Latest Ref Range: 0.4 - 2.0 mmol/L 1.1       Glucose Latest Ref Range: 70 - 99 mg/dL 170 (H)    102 (H)   POC Glucose Latest Ref Range: 70 - 99 mg/dl  149 (H) 159 (H) 132 (H)    Calcium Latest Ref Range: 8.3 - 10.6 mg/dL 9.7    9.3     Results for Merlin Campuzano (MRN [id number]) as of 6/5/2019 11:09   Ref. Range 4/29/2019 10:42 5/31/2019 08:10 6/1/2019 21:18 6/2/2019 06:23 6/3/2019 04:18 6/4/2019 04:10 6/5/2019 04:18   WBC Latest Ref Range: 4.0 - 11.0 K/uL 5.2 5.4 12.7 (H) 11.0 8.5 9.6 9.7   RBC Latest Ref Range: 4.20 - 5.90 M/uL 4.24 4.01 (L) 4.43 4.12 (L) 3.94 (L) 3.98 (L) 4.18 (L)   Hemoglobin Quant Latest Ref Range: 13.5 - 17.5 g/dL 12.0 (L) 11.9 (L) 13.1 (L) 12.0 (L) 11.7 (L) 11.8 (L) 12.4 (L)   Hematocrit Latest Ref Range: 40.5 - 52.5 % 38.1 (L) 36.0 (L) 41.5 38.3 (L) 35.9 (L) 36.2 (L) 38.1 (L)   MCV Latest Ref Range: 80.0 - 100.0 fL 89.9 89.8 93.5 93.0 91.2 90.9 91.2   MCH Latest Ref Range: 26.0 - 34.0 pg 28.3 29.6 29.6 29.1 29.7 29.7 29.7   MCHC Latest Ref Range: 31.0 - 36.0 g/dL 31.5 32.9 31.7 31.3 32.6 32.7 32.5   MPV Latest Ref Range: 5.0 - 10.5 fL 8.5 7.6 8.1 7.7 7.8 7.9 7.4   RDW Latest Ref Range: 12.4 - 15.4 % 16.1 (H) 16.4 (H) 17.1 (H) 17.3 (H) 16.9 (H) 16.7 (H) 16.1 (H)   Platelet Count Latest Ref Range: 135 - 450 K/uL 135 158 220 178 145 163 173   Neutrophils % Latest Units: % 62.7 59.8 67.3 81.3 88.1 89.9 77.0   Lymphocyte % Latest Units: % 24.4 23.7 21.0 9.8 8.8 6.7 13.9     Results for Merlin Campuzano (MRN [id number]) as of 6/5/2019 11:09   Ref.  Range 6/1/2019 21:35 6/2/2019 06:20 6/3/2019 06:03 6/3/2019 10:00 6/3/2019 13:30 6/4/2019 05:51   Hemoglobin, Art, Extended Latest Ref Range: 13.5 - 17.5 g/dL 13.6 13.0

## 2019-06-05 NOTE — PLAN OF CARE
Problem: Restraint Use - Nonviolent/Non-Self-Destructive Behavior:  Goal: Absence of restraint indications  Description  Absence of restraint indications  Outcome: Ongoing  Goal: Absence of restraint-related injury  Description  Absence of restraint-related injury  Outcome: Ongoing     Problem: Discharge Planning:  Goal: Participates in care planning  Description  Participates in care planning  Outcome: Ongoing  Goal: Discharged to appropriate level of care  Description  Discharged to appropriate level of care  Outcome: Ongoing     Problem: Airway Clearance - Ineffective:  Goal: Ability to maintain a clear airway will improve  Description  Ability to maintain a clear airway will improve  Outcome: Ongoing     Problem: Anxiety/Stress:  Goal: Level of anxiety will decrease  Description  Level of anxiety will decrease  Outcome: Ongoing     Problem: Aspiration:  Goal: Absence of aspiration  Description  Absence of aspiration  Outcome: Ongoing     Problem:  Bowel Function - Altered:  Goal: Bowel elimination is within specified parameters  Description  Bowel elimination is within specified parameters  Outcome: Ongoing     Problem: Cardiac Output - Decreased:  Goal: Hemodynamic stability will improve  Description  Hemodynamic stability will improve  Outcome: Ongoing     Problem: Fluid Volume - Imbalance:  Goal: Absence of imbalanced fluid volume signs and symptoms  Description  Absence of imbalanced fluid volume signs and symptoms  Outcome: Ongoing     Problem: Gas Exchange - Impaired:  Goal: Levels of oxygenation will improve  Description  Levels of oxygenation will improve  Outcome: Ongoing     Problem: Mental Status - Impaired:  Goal: Mental status will be restored to baseline  Description  Mental status will be restored to baseline  Outcome: Ongoing     Problem: Nutrition Deficit:  Goal: Ability to achieve adequate nutritional intake will improve  Description  Ability to achieve adequate nutritional intake will improve  Outcome: Ongoing     Problem: Pain:  Goal: Pain level will decrease  Description  Pain level will decrease  Outcome: Ongoing  Goal: Recognizes and communicates pain  Description  Recognizes and communicates pain  Outcome: Ongoing  Goal: Control of acute pain  Description  Control of acute pain  Outcome: Ongoing  Goal: Control of chronic pain  Description  Control of chronic pain  Outcome: Ongoing     Problem: Serum Glucose Level - Abnormal:  Goal: Ability to maintain appropriate glucose levels will improve to within specified parameters  Description  Ability to maintain appropriate glucose levels will improve to within specified parameters  Outcome: Ongoing  Goal: Ability to maintain appropriate glucose levels will improve  Description  Ability to maintain appropriate glucose levels will improve  Outcome: Ongoing     Problem: Skin Integrity - Impaired:  Goal: Will show no infection signs and symptoms  Description  Will show no infection signs and symptoms  Outcome: Ongoing  Goal: Absence of new skin breakdown  Description  Absence of new skin breakdown  Outcome: Ongoing     Problem: Sleep Pattern Disturbance:  Goal: Appears well-rested  Description  Appears well-rested  Outcome: Ongoing     Problem: Tissue Perfusion, Altered:  Goal: Circulatory function within specified parameters  Description  Circulatory function within specified parameters  Outcome: Ongoing     Problem: Tissue Perfusion - Cardiopulmonary, Altered:  Goal: Absence of angina  Description  Absence of angina  Outcome: Ongoing  Goal: Hemodynamic stability will improve  Description  Hemodynamic stability will improve  Outcome: Ongoing     Problem: OXYGENATION/RESPIRATORY FUNCTION  Goal: Patient will maintain patent airway  Outcome: Ongoing  Goal: Patient will achieve/maintain normal respiratory rate/effort  Description  Respiratory rate and effort will be within normal limits for the patient  Outcome: Ongoing     Problem: HEMODYNAMIC STATUS  Goal: Patient has stable vital signs and fluid balance  Outcome: Ongoing     Problem: FLUID AND ELECTROLYTE IMBALANCE  Goal: Fluid and electrolyte balance are achieved/maintained  Outcome: Ongoing     Problem: ACTIVITY INTOLERANCE/IMPAIRED MOBILITY  Goal: Mobility/activity is maintained at optimum level for patient  Outcome: Ongoing     Problem: Risk for Impaired Skin Integrity  Goal: Tissue integrity - skin and mucous membranes  Description  Structural intactness and normal physiological function of skin and  mucous membranes.   Outcome: Ongoing     Problem: Nutrition  Goal: Optimal nutrition therapy  Outcome: Ongoing     Problem: Sensory Perception - Impaired:  Goal: Ability to maintain a stable neurologic state will improve  Description  Ability to maintain a stable neurologic state will improve  Outcome: Ongoing     Problem: Pain:  Goal: Pain level will decrease  Description  Pain level will decrease  Outcome: Ongoing  Goal: Control of acute pain  Description  Control of acute pain  Outcome: Ongoing  Goal: Control of chronic pain  Description  Control of chronic pain  Outcome: Ongoing

## 2019-06-06 NOTE — PROGRESS NOTES
nasal cannula, wean as tolerated. - Continue steroids, nebulizers. - no abx needed at this time  - CTPA without pneumonia    DMII  - SSI ordered.  Holding home regimen    Paroxysmal Afib  - Paced rhythm, recently placed PPM  - continue sotalol, cardizem, eliquis    Chronic diastolic heart failure  - no evidence of volume overload  - Echo 5/19 with EF 19-54%, grade I diastolic dysfunction    HTN  - well controlled on cardizem    HOLLAND  - CPAP    Consult PT, OT      DVT Prophylaxis: eliquis  Diet: DIET CARB CONTROL;  Code Status: Full Code    PT/OT Eval Status: not ordered    [address] [place] [address] likely with [address] in am    Discussed with pt, RN, family at bedside    Kalin Donis MD

## 2019-06-06 NOTE — PROGRESS NOTES
Assessment complete. VSS. Denies pain, lungs diminished. On 3 L O2. Will try to ween today. Pt may need home O2?  PT/OT eval?

## 2019-06-06 NOTE — PROGRESS NOTES
Hospitalist Progress Note      PCP: Lucretia Garsia DO    Date of Admission: 6/2/2019    Chief Complaint: acute respiratory distress    Hospital Course:   76 y.o. male who presented to [facility] as a transfer from [facility] with respiratory distress/failure. On presentation, required intubation for severe hypercapnia, obtundation. Subjective:   Improving O2 requirement; 4L NC  Minimal SOB  Limited appetite    Medications:  Reviewed    Infusion Medications    dextrose       Scheduled Medications    [START ON 6/6/2019] pantoprazole  40 mg Oral QAM AC    pramipexole  2 mg Oral BID    predniSONE  40 mg Oral Daily    insulin lispro  0-6 Units Subcutaneous 4x Daily AC & HS    ipratropium-albuterol  1 ampule Inhalation Q4H WA    sodium chloride flush  10 mL Intravenous 2 times per day    apixaban  5 mg Oral BID    sotalol  80 mg Oral BID    mesalamine  400 mg Oral TID    sertraline  100 mg Oral Daily    mupirocin   Topical Daily    diltiazem  30 mg Oral 4 times per day     PRN Meds: melatonin, benzonatate, simethicone, oxyCODONE-acetaminophen **OR** oxyCODONE-acetaminophen, prochlorperazine, cyclobenzaprine, sodium chloride flush, glucose, dextrose, glucagon (rDNA), dextrose      Intake/Output Summary (Last 24 hours) at 6/5/2019 2241  Last data filed at 6/5/2019 2000  Gross per 24 hour   Intake 330 ml   Output 950 ml   Net -620 ml       Physical Exam Performed:    BP (!) 167/91   Pulse 62   Temp 98.1 °F (36.7 °C) (Oral)   Resp 17   Ht 5' 10\" (1.778 m)   Wt 222 lb 0.1 oz (100.7 kg)   SpO2 95%   BMI 31.85 kg/m²   General appearance: No apparent distress, appears stated age and cooperative. HEENT: Pupils equal, round, and reactive to light. Conjunctivae/corneas clear. Neck: Supple, no jugular venous distention. Trachea midline with full range of motion. Respiratory:  Normal respiratory effort.  Diminished with poor air movement, but clear to auscultation, bilaterally without Rales/Wheezes/Rhonchi. Cardiovascular: Regular rate and rhythm with normal S1/S2 without murmurs, rubs or gallops. Abdomen: Soft, non-tender, non-distended with normal bowel sounds. Musculoskelatal: No clubbing, cyanosis or edema bilaterally. Full range of motion without deformity. Neurologic:  Neurovascularly intact without any focal sensory/motor deficits. Cranial nerves: II-XII intact, grossly non-focal.  Psychiatric: Alert and oriented, thought content appropriate, normal insight  Skin: Skin color, texture, turgor normal.  No rashes or lesions. Capillary Refill: Brisk,< 3 seconds   Peripheral Pulses: +2 palpable, equal bilaterally       Labs:   Recent Labs     06/03/19 0418 06/04/19 0410 06/05/19 0418   WBC 8.5 9.6 9.7   HGB 11.7* 11.8* 12.4*   HCT 35.9* 36.2* 38.1*    163 173     Recent Labs     06/03/19 0418 06/04/19 0410 06/05/19 0418    136 137   K 5.2* 4.6 4.1   CL 99 99 100   CO2 27 29 25   BUN 32* 27* 28*   CREATININE 1.0 0.7* 0.7*   CALCIUM 9.1 9.7 9.3     No results for input(s): AST, ALT, BILIDIR, BILITOT, ALKPHOS in the last 72 hours. No results for input(s): INR in the last 72 hours. No results for input(s): [place] in the last 72 hours. Urinalysis:      Lab Results   Component Value Date    NITRU Negative 04/24/2019    WBCUA 3-5 04/24/2019    BACTERIA Rare 04/10/2019    RBCUA None seen 04/24/2019    BLOODU Negative 04/24/2019    SPECGRAV 1.020 04/24/2019    GLUCOSEU Negative 04/24/2019       Radiology:  XR ABDOMEN (KUB) (SINGLE AP VIEW)   Final Result   1. No acute cardiopulmonary disease. 2. Nonspecific bowel gas pattern, without evidence of free air. XR CHEST PORTABLE   Final Result   1. No acute cardiopulmonary disease. 2. Nonspecific bowel gas pattern, without evidence of free air. XR CHEST PORTABLE   Final Result   1. The ET tube distal tip is 7 cm above the alcides. 2. Low lung volumes. No acute cardiopulmonary disease.          XR CHEST PORTABLE   Final Result   Interval improvement of interstitial opacities. Assessment/Plan:    Active Hospital Problems    Diagnosis    DM II (diabetes mellitus, type II), controlled ([address] [E11.9]     Priority: Medium    Respiratory arrest ([address] [R09.2]    Acute respiratory distress [R06.03]    Aspiration into airway [T17.908A]    Acute respiratory failure with hypercapnia ([facility]) [J96.02]    S/P cardiac pacemaker procedure [Z95.0]    Chronic diastolic congestive heart failure (HCC) [I50.32]    PAF (paroxysmal atrial fibrillation) ([facility]) [I48.0]    Former smoker [Z87.891]    COPD, severe ([address] [J44.9]    Essential hypertension [I10]    OHLLAND (obstructive sleep apnea) [G47.33]     Acute hypoxic and hypercarbic respiratory failure 2/2 COPD exacerbation  - Bronchoscopy completed 6/2/2019  - Weaned and extubated from the vent; improving  - Continue nasal cannula, wean as tolerated. - Continue steroids, nebulizers. - no abx needed at this time  - CTPA without pneumonia    DMII  - SSI ordered.  Holding home regimen    Paroxysmal Afib  - Paced rhythm, recently placed  - continue sotalol, cardizem, eliquis    Chronic diastolic heart failure  - no evidence of volume overload  - Echo 5/19 with EF 42-10%, grade I diastolic dysfunction    HTN  - well controlled on cardizem    HOLLAND  - CPAP      DVT Prophylaxis: eliquis  Diet: DIET CARB CONTROL;  Code Status: Full Code    PT/OT Eval Status: not ordered    Dispo - 1-2 days    Med Zapata MD

## 2019-06-06 NOTE — PROGRESS NOTES
Occupational Therapy   Occupational Therapy Initial Assessment and Treatment Note 1x  Date: 2019   Patient Name: Lynn Gaston  MRN: [id number]     : 1945    Date of Service: 2019    Discharge Recommendations:  Home with assist PRN     Assessment   Assessment: OT eval completed. Pt demo's independent level of function for ADLs and mobility. BUE tested [facility]. No further OT. Patient Education: role of OT, transfers, ADLs  REQUIRES OT FOLLOW UP: No  Activity Tolerance  Activity Tolerance: Patient Tolerated treatment well  Activity Tolerance: 1LO2, O2 sats 93%, HR 67  Safety Devices  Safety Devices in place: Yes  Type of devices: Nurse notified;Gait belt;Left in chair;Call light within reach         Patient Diagnosis(es): There were no encounter diagnoses. has a past medical history of Cancer ([facility] CHF (congestive heart failure) ([facility] Chronic pancreatitis ([facility] Colitis, COPD (chronic obstructive pulmonary disease) ([facility] Diabetes mellitus ([facility] Esophagitis, Gastritis, Hyperlipidemia, and Hypertension. has a past surgical history that includes Testicle removal (Left, ); shoulder surgery (Left, ); Upper gastrointestinal endoscopy (2015); Colonoscopy (2015); hernia repair (Right, ); Skin cancer excision (Left, 2016); Cataract removal with implant (Right, 2016); Upper gastrointestinal endoscopy (N/A, 2016); Cystoscopy (2018); pr office/outpt visit,procedure only (N/A, 2018); Colonoscopy (N/A, 10/25/2018); Upper gastrointestinal endoscopy (2018); Upper gastrointestinal endoscopy (N/A, 2018); Cardiac pacemaker placement (2019); Pacemaker insertion (2019); and pacemaker placement (Left, 2019).        Restrictions  Restrictions/Precautions  Restrictions/Precautions: General Precautions  Position Activity Restriction  Other position/activity restrictions: up with assist, high fall risk per nsg assessment (bed/chair alarms not needed per RN), 1L O2 PRN    Subjective   General  Chart Reviewed: Yes  Patient assessed for rehabilitation services?: Yes  Family / Caregiver Present: Yes  Referring Practitioner: ANNAMARIE Perez  Diagnosis: respiratory arrest  General Comment  Comments: RN approved therapy   Oxygen Therapy  SpO2: 93 %  O2 Device: None (Room air)  Social/Functional History  Social/Functional History  Lives With: Spouse(available 24/7)  Type of Home: House  Home Layout: One level  Home Access: Stairs to enter without rails  Entrance Stairs - Number of Steps: 1  Bathroom Shower/Tub: Walk-in shower  Bathroom Toilet: Standard  Bathroom Equipment: Grab bars in shower, Shower chair  Home Equipment: Standard walker, Rolling walker, Cane  ADL Assistance: Independent  Homemaking Assistance: Independent  Ambulation Assistance: Independent(no device)  Transfer Assistance: Independent  Active : Yes  Occupation: Retired  Type of occupation: [employer] in [address] [place] [address] [employer]: Impaired  Vision Exceptions: Wears glasses for reading  Hearing: Within functional limits    Orientation  Overall Orientation Status: Within Functional Limits  Observation/Palpation  Posture: Good  Balance  Sitting Balance: Independent  Standing Balance: Independent  Toilet Transfers  Toilet - Technique: Ambulating  Equipment Used: Standard toilet  Toilet Transfer: Independent  Toilet Transfers Comments: Pt reports that he has been ambulating to toilet  ADL  LE Dressing: Independent(socks )  Toileting: Independent  Tone RUE  RUE Tone: Normotonic  Tone LUE  LUE Tone: Normotonic  Coordination  Movements Are Fluid And Coordinated: Yes     Bed mobility  Supine to Sit: Modified independent  Transfers  Stand Pivot Transfers: Independent  Sit to stand: Independent  Stand to sit:  Independent     Cognition  Overall Cognitive Status: WFL  Perception  Overall Perceptual Status: WFL     Sensation  Overall Sensation Status: WFL      LUE AROM (degrees)  LUE AROM : WFL  RUE AROM (degrees)  RUE AROM : WFL  LUE Strength  Gross LUE Strength: WFL  RUE Strength  Gross RUE Strength: WFL     AM-PAC Score   AM-PAC Inpatient Daily Activity Raw Score: 24 (06/06/19 1634)  AM-PAC Inpatient ADL T-Scale Score : 57.54 (06/06/19 1634)  ADL Inpatient CMS 0-100% Score: 0 (06/06/19 1634)  ADL Inpatient CMS G-Code Modifier : [address] (06/06/19 1634)  Goals  Short term goals  Time Frame for Short term goals: 1x  Short term goal 1: Perform functional transfers independently. Goal met  Short term goal 2: Perform LE ADLs with SBA. Goal met  Patient Goals   Patient goals :  \"Go home\"     Therapy Time   Individual Concurrent Group Co-treatment   Time In 1330         Time Out 1348         Minutes 18         Timed Code Treatment Minutes: 8 Minutes(10 min eval )     Griselda Mantle OTR/L

## 2019-06-06 NOTE — PROGRESS NOTES
Oxygen documentation:    1. O2 saturation at REST on ROOM AIR = 93%    If saturation is 89% or above please proceed with steps 2 and 3. 2. O2 saturation with AMBULATION of 100 feet on ROOM AIR = 88%  3.  O2 saturation with AMBULATION on 2L O2 = 94%    DCP notified: Ace Luke

## 2019-06-06 NOTE — PROGRESS NOTES
Recent Labs     06/04/19  0410 06/05/19  0418 06/06/19  0615    137 136   K 4.6 4.1 3.5   CL 99 100 100   CO2 29 25 26   BUN 27* 28* 23*   CREATININE 0.7* 0.7* 0.8       Imaging:  I have reviewed radiology images personally. XR ABDOMEN (KUB) (SINGLE AP VIEW)   Final Result   1. No acute cardiopulmonary disease. 2. Nonspecific bowel gas pattern, without evidence of free air. XR CHEST PORTABLE   Final Result   1. No acute cardiopulmonary disease. 2. Nonspecific bowel gas pattern, without evidence of free air. XR CHEST PORTABLE   Final Result   1. The ET tube distal tip is 7 cm above the alcides. 2. Low lung volumes. No acute cardiopulmonary disease. XR CHEST PORTABLE   Final Result   Interval improvement of interstitial opacities. Results for Millie Gleason (MRN [id number]) as of 6/6/2019 12:34   Ref. Range 6/4/2019 20:36 6/5/2019 04:18 6/5/2019 06:32 6/5/2019 12:38 6/5/2019 16:04 6/6/2019 06:15 6/6/2019 08:44   Sodium Latest Ref Range: 136 - 145 mmol/L  137    136    Potassium Latest Ref Range: 3.5 - 5.1 mmol/L  4.1    3.5    Chloride Latest Ref Range: 99 - 110 mmol/L  100    100    CO2 Latest Ref Range: 21 - 32 mmol/L  25    26    BUN Latest Ref Range: 7 - 20 mg/dL  28 (H)    23 (H)    Creatinine Latest Ref Range: 0.8 - 1.3 mg/dL  0.7 (L)    0.8    Anion Gap Latest Ref Range: 3 - 16   12    10    GFR Non- Latest Ref Range: >60   >60    >60    GFR  Latest Ref Range: >60   >60    >60    Magnesium Latest Ref Range: 1.80 - 2.40 mg/dL      2.00    Glucose Latest Ref Range: 70 - 99 mg/dL  102 (H)    149 (H)    POC Glucose Latest Ref Range: 70 - 99 mg/dl 132 (H)  102 (H) 126 (H) 131 (H)  137 (H)   Calcium Latest Ref Range: 8.3 - 10.6 mg/dL  9.3    9.3      Results for Millie Gleason (MRN [id number]) as of 6/6/2019 12:34   Ref.  Range 6/3/2019 04:18 6/4/2019 04:10 6/5/2019 04:18 6/6/2019 06:15   WBC Latest Ref Range: 4.0 - 11.0 K/uL 8.5 9.6 9.7 6.6   RBC Latest Ref Range: 4.20 - 5.90 M/uL 3.94 (L) 3.98 (L) 4.18 (L) 4.36   Hemoglobin Quant Latest Ref Range: 13.5 - 17.5 g/dL 11.7 (L) 11.8 (L) 12.4 (L) 12.9 (L)   Hematocrit Latest Ref Range: 40.5 - 52.5 % 35.9 (L) 36.2 (L) 38.1 (L) 39.2 (L)   MCV Latest Ref Range: 80.0 - 100.0 fL 91.2 90.9 91.2 89.9   MCH Latest Ref Range: 26.0 - 34.0 pg 29.7 29.7 29.7 29.5   MCHC Latest Ref Range: 31.0 - 36.0 g/dL 32.6 32.7 32.5 32.8   MPV Latest Ref Range: 5.0 - 10.5 fL 7.8 7.9 7.4 7.6   RDW Latest Ref Range: 12.4 - 15.4 % 16.9 (H) 16.7 (H) 16.1 (H) 16.3 (H)   Platelet Count Latest Ref Range: 135 - 450 K/uL 145 163 173 177   Neutrophils % Latest Units: % 88.1 89.9 77.0 60.5   Lymphocyte % Latest Units: % 8.8 6.7 13.9 28.9   Monocytes % Latest Units: % 2.7 3.1 8.8 9.9     Results for Karolina Blum (MRN [id number]) as of 6/6/2019 12:34   Ref. Range 4/9/2019 17:40 4/9/2019 17:40 4/10/2019 15:05 4/24/2019 11:09 6/1/2019 21:18 6/1/2019 21:18 6/2/2019 06:24 6/2/2019 10:20   CULTURE BLOOD #2 Unknown       Rpt    CULTURE BLOOD, PCR REPORT Unknown Rpt          RESPIRATORY CULTURE Unknown        Rpt   URINE CULTURE Unknown    Rpt       Culture, Blood 2 Unknown       No Growth to date. .. Gram Stain Result Unknown        1+ Gram positive . .. Organism Unknown Staphylococcus co... (A) Staphylococcus co... (A)   Staphylococcus ep. .. (A) Staphylococcus co... (A)     CULTURE, RESPIRATORY Unknown        Normal respirator. ..   L. pneumophila Serogp 1 Ur Ag Unknown   Presumptive Negat. ..         LEGIONELLA ANTIGEN, URINE Unknown   Rpt          Active Problems:    DM II (diabetes mellitus, type II), controlled ([facility])    HOLLAND (obstructive sleep apnea)    Essential hypertension    COPD, severe ([facility])    Former smoker    PAF (paroxysmal atrial fibrillation) ([facility])    Chronic diastolic congestive heart failure ([address]    Respiratory arrest ([address]    Acute respiratory distress    S/P cardiac pacemaker procedure    Aspiration into airway Acute respiratory failure with hypercapnia (HCC)  Resolved Problems:    * No resolved hospital problems. *          Plan:   · O2 supplementation to keep saturation between 90-94%-oxygen requirements are decreasing and was on 2 lts/min of oxygen with SaO2 of 96% when seen   · Pulmonary toilet  · S/p bronchoscopy -NRF  · Unasyn d/froy   · Patient has  4 blood c/s in recent past (+) for coag negative staph/staph epidermidis - ?? Need for ANGELICA -cardiology to decide upon that  · Bronchodilators  · PO prednisone in tapering doses   · Cardiac medications with parameters  · Eliquis  to continue  · Keep negative fluid balance  · Monitor I/O and BMP  · Oral det as per metabolic support   · Mirapex to continue  · Analgesics as at home being started -monitor for any hypoventilation   · Needs PSG as an outpatient   · PUD prophylaxis     Case discussed with  Nursing    ? Discharge planning    No other recommendations from Pulm/CCM stand point -will sign off -please call on PRN basis if patient is not discharged    Patient needs to follow up with Dr Anil Youssef at [facility] in 3-4 weeks           Electronically signed by:  Kwabena Ham MD    6/6/2019    12:32 PM.

## 2019-06-06 NOTE — PROGRESS NOTES
09/08/2016); Cystoscopy (09/21/2018); pr office/outpt visit,procedure only (N/A, 9/21/2018); Colonoscopy (N/A, 10/25/2018); Upper gastrointestinal endoscopy (11/06/2018); Upper gastrointestinal endoscopy (N/A, 11/6/2018); Cardiac pacemaker placement (05/31/2019); Pacemaker insertion (05/31/2019); and pacemaker placement (Left, 05/31/2019). Restrictions  Restrictions/Precautions  Restrictions/Precautions: General Precautions  Position Activity Restriction  Other position/activity restrictions: up with assist, high fall risk per nsg assessment (bed/chair alarms not needed per RN), 1L O2 PRN  Vision/Hearing  Vision: Impaired  Vision Exceptions: Wears glasses for reading  Hearing: Within functional limits     Subjective  General  Chart Reviewed: Yes  Patient assessed for rehabilitation services?: Yes  Family / Caregiver Present: Yes(family member present during first part of evaluation)  Referring Practitioner: Dr. Halle Oconnell  Referral Date : 06/06/19  Diagnosis: Acute hypoxic and hypercarbic respiratory failure 2* COPD exacerbation  Follows Commands: Within Functional Limits  General Comment  Comments: Pt resting in bed upon entry of therapy staff  Subjective  Subjective: Pt agreeable to work with PT this afternoon. States he feels back to baseline with regard to work of breathing and (I) with mobility.   Pain Screening  Patient Currently in Pain: Denies  Intervention List: Patient able to continue with treatment    Orientation  Orientation  Overall Orientation Status: Within Normal Limits     Social/Functional History  Social/Functional History  Lives With: Spouse(available 24/7)  Type of Home: House  Home Layout: One level  Home Access: Stairs to enter without rails  Entrance Stairs - Number of Steps: 1  Bathroom Shower/Tub: Walk-in shower  Bathroom Toilet: Standard  Bathroom Equipment: Grab bars in shower, Shower chair  Home Equipment: Standard walker, Rolling walker, Cane  ADL Assistance: Independent  Homemaking Timed Code Treatment Minutes: [address], [address], [place] #[id number]

## 2019-06-07 NOTE — PROGRESS NOTES
6/7/2019         RE: Roxanna Tao  [address] [facility] [address]        Dear Colleague,    Thank you for referring your patient, Roxanna Tao, to the [facility]. Please see a copy of my visit note below.    Roxanna Tao is a 40 year old  male with previous medical history significant for allergic rhinitis who returns for a follow up visit.     Patient presents today for allergy skin testing. The patient is currently in a good state of health. No recent fevers, chills, cough, wheezing, shortness of breath, skin rash, angioedema, nausea, vomiting or diarrhea. The risks and benefits were discussed and the patient/patient's family wishes to proceed. The consent was signed.    History reviewed. No pertinent past medical history.  History reviewed. No pertinent family history.  History reviewed. No pertinent surgical history.    REVIEW OF SYSTEMS:  General: negative for weight gain. negative for weight loss. negative for changes in sleep.   Ears: negative for fullness. negative for hearing loss. negative for dizziness.   Nose: negative for snoring.negative for changes in smell. negative for drainage.   Eyes: negative for eye watering. negative for eye itching. negative for vision changes. negative for eye redness.  Throat: negative for hoarseness. negative for sore throat. negative for trouble swallowing.   Lungs: negative for shortness of breath.negative for wheezing. negative for sputum production.   Cardiovascular: negative for chest pain. negative for swelling of ankles. negative for fast or irregular heartbeat.   Gastrointestinal: negative for nausea. negative for heartburn. negative for acid reflux.   Musculoskeletal: negative for joint pain. negative for joint stiffness. negative for joint swelling.   Neurologic: negative for seizures. negative for fainting. negative for weakness.   Psychiatric: negative for changes in mood. negative for anxiety.   Endocrine:  Pt walked a full Pl[employer] around the nursing station and writer gradually tapered O2. Pt remained at 91-90% with RA. Dr. Courtney Becker aware, per Dr. Courtney Becker, if potassium level is ok later today, pt can go home. Will let Dr. Courtney Becker know the results of the potassium level. negative for cold intolerance. negative for heat intolerance. negative for tremors.   Lymphatic: negative for lower extremity swelling. negative for lymph node swelling.   Hematologic: negative for easy bruising. negative for easy bleeding.  Integumentary: negative for rash. negative for scaling. negative for nail changes.       Current Outpatient Medications:      azelastine (ASTELIN) 0.1 % nasal spray, Spray 2 sprays into both nostrils 2 times daily, Disp: 1 Bottle, Rfl: 11     cetirizine (ZYRTEC) 10 MG tablet, Take 1 tablet (10 mg) by mouth daily, Disp: 30 tablet, Rfl: 11     EPINEPHrine (EPIPEN/ADRENACLICK/OR ANY BX GENERIC EQUIV) 0.3 MG/0.3ML injection 2-pack, Inject 0.3 mLs (0.3 mg) into the muscle as needed for anaphylaxis, Disp: 0.6 mL, Rfl: 1     fluticasone (FLONASE) 50 MCG/ACT nasal spray, Spray 2 sprays into both nostrils daily Office visit for further refills, Disp: 16 g, Rfl: 11     fluticasone (FLONASE) 50 MCG/ACT spray, Spray 2 sprays into both nostrils daily, Disp: , Rfl:      montelukast (SINGULAIR) 10 MG tablet, Take 1 tablet (10 mg) by mouth At Bedtime Office visit for further refills., Disp: 30 tablet, Rfl: 11     Montelukast Sodium (SINGULAIR PO), , Disp: , Rfl:      ORDER FOR ALLERGEN IMMUNOTHERAPY, Dust Mites DF 30,000AU/mL, HS  0.3 ml Dust Mites DP. 30,000 AU/mL, HS  0.3 ml  Jose, White 1:20 w/v, HS  1.0 ml Lamb's Quarters 1:20 w/v, HS 1.0 ml Diluent: HSA qs to 5ml, Disp: 5 mL, Rfl: prn    There is no immunization history on file for this patient.  No Known Allergies        WORKUP:  ENVIRONMENTAL PERCUTANEOUS SKIN TESTING: ADULT  [place] [employer] 6/7/2019   Consent Y   Ordering Physician Harsha   Interpreting Physician Harsha   Testing Technician Aparna DIALLO   Location Back   Time start:  7:45 AM   Time End:  8:00 AM   Positive Control: Histatrol*ALK 1 mg/ml 5/32   Negative Control: 50% Glycerin 0   Cat Hair*ALK (10,000 BAU/ml) 0   AP Dog Hair/Dander (1:100 w/v) 0   Dust Mite p. 30,000 AU/ml  4/15   Dust Mite f. (30,000 AU/ml) 3/20   Santy (W/F in millimeters) 0   Juan Grass (100,000 BAU/mL) 0   Red Cedar (W/F in millimeters) 0   Maple/[place] (W/F in millimeters) 0   Hackberry (W/F in millimeters) 0   [place] (W/F in millimeters) 0   Faulk *ALK (W/F in millimeters) 0   American Elm (W/F in millimeters) 0   Singer (W/F in millimeters) 0   Black Waves (W/F in millimeters) 0   Birch Mix (W/F in millimeters) 4/10   Jewett (W/F in millimeters) 0   Oak (W/F in millimeters) 0   Cocklebur (W/F in millimeters) 0   Turin (W/F in millimeters) 0   White Lulu (W/F in millimeters) 0   Careless (W/F in millimeters) 0   Nettle (W/F in millimeters) 0   English Plantain (W/F in millimeters) 0   Kochia (W/F in millimeters) 0   Lamb's Quarter (W/F in millimeters) 0   Marshelder (W/F in millimeters) 0   Ragweed Mix* ALK (W/F in millimeters) 0   Russian Thistle (W/F in millimeters) 4/4   Sagebrush/Mugwort (W/F in millimeters) 0   Sheep Sorrel (W/F in millimeters) 0   Feather Mix* ALK (W/F in millimeters) 0   Penicillium Mix (1:10 w/v) 0   Curvularia spicifera (1:10 w/v) 0   Epicoccum (1:10 w/v) 0   Aspergillus fumigatus (1:10 w/v): 0   Alternaria tenius (1:10 w/v) 0   H. Cladosporium (1:10 w/v) 0   Phoma herbarum (1:10 w/v) 0        ASSESSMENT/PLAN:  Problem List Items Addressed This Visit        Respiratory    Allergic rhinitis due to dust mite - Primary     Perennial nasal and ocular symptoms for the last 11 years. Flonase has been used and non-beneficial. Zyrtec, montelukast and other oral antihistamines have been non-beneficial.     On allergen immunotherapy for 1 year.  Shots of been helpful. However, still with intermittent but severe nasal and ocular symptoms.       Skin testing positive for dust mites, birch and russian thistle. Blood testing positive for lulu, dust mites and lamb's quarter.     Discussed with patient that I think dust mites is main cause of symptoms and that I would continue  allergy shots. If he wants to include birch and russian thistle into allergy shots he would need to build from green in order to do such as testing initially based on blood testing results. He opted to continue allergy shots as is.       - Flonase 50mcg 2 sprays/nostril q day.   - Azelastine 2 sprays/nostril twice daily as needed.   - Cetirizine (Zyrtec) 10mg by mouth daily to twice daily.  - Singulair 10mg by mouth daily at night.   - Continue allergy shots.          Relevant Orders    ALLERGY SKIN TESTS,ALLERGENS [[id number]] (Completed)    Chronic seasonal allergic rhinitis due to pollen    Relevant Orders    ALLERGY SKIN TESTS,ALLERGENS [[id number]] (Completed)          Chart documentation with Dragon Voice recognition Software. Although reviewed after completion, some words and grammatical errors may remain.    Ray Mcleod DO FAAAAI  Allergy/Immunology  [place], [place]      Again, thank you for allowing me to participate in the care of your patient.        Sincerely,        Ray Mcleod DO

## 2019-06-07 NOTE — CARE COORDINATION
CASE MANAGEMENT DISCHARGE SUMMARY      Discharge to: home with  [facility]      Transportation: private      Notified: patient aware of discharge plan   Family: family aware of discharge plan    Facility/Agency: [facility] faxed to [phone]   RN: Kim Blum aware of discharge plan    Phone number for report to facility: Bruno Hua with  [facility] aware that patient discharging today. Note: Discharging nurse to complete [facility], reconcile [facility], and place final copy with patient's discharge packet.

## 2019-06-07 NOTE — TELEPHONE ENCOUNTER
Spoke with pt's wife, the pt is still currently at [facility]. She stated he was in a coma in for three days. She stated he should be coming home today. I informed Mrs. Rogers that we will keep this message on our messageboard and keep an eye on his followups bc the hospital might arrange these prior to his d/c. If pt's device clinic s/p implant appt is not scheduled and his HSFU with [facility] is not scheduled by Tuesday 6-11-19 I informed Mrs. Alicia Ribeiro that our office will contact her to get these arranged. *Do not close message until followups are arranged and the pt's wife has been notified.

## 2019-06-07 NOTE — PROGRESS NOTES
Assessment complete. VSS. Pt received PRN Percocet given for chronic back pain. Call light within reach. Will continue to monitor.

## 2019-06-07 NOTE — DISCHARGE SUMMARY
Hospital Discharge Summary    Patient's PCP: Joni Bedolla DO  Admit Date: 6/2/2019   Discharge Date: 6/7/2019    Admitting Physician: Dr. Federico Lantigua MD  Discharge Physician: Dr. Jordyn Perez   Consults: pulmonary/intensive care    HPI: 77 yo M admitted with resp failure req vent. Brief hospital course:   Admitted with acute respiratory with hypoxia, hypercapnia sec to COPD exac, req urgent intubation. Underlying DM 2, HOLLAND, HTN, PAF. Required IV abx, IV steroids, nebs and was weaned off vent. Did well thereafter, weaned to RA. Tested for home 02 req day of dc though did not meet criteria. Plan to dc home with Prednisone 40 mg x 5 more days, cont home inhalers, nebs, HHC with PT, OT    Discharge Diagnoses:   Patient Active Problem List   Diagnosis Code    DM II (diabetes mellitus, type II), controlled ([facility] E11.9    HLD (hyperlipidemia) E78.5    Diabetic neuropathy ([facility] E11.40    RLS (restless legs syndrome) G25.81    Insomnia G47.00    Coronary artery disease involving native coronary artery of native heart without angina pectoris I25.10    Seasonal allergic rhinitis J30.2    Controlled type 2 diabetes mellitus without complication, without long-term current use of insulin ([facility]) E11.9    HOLLAND (obstructive sleep apnea) G47.33    Essential hypertension I10    COPD, severe (HCC) J44.9    Chronic bilateral low back pain without sciatica M54.5, G89.29    Restless legs syndrome (RLS) G25.81    Mixed hyperlipidemia E78.2    Depression F32.9    Fall at home [address]. [place], Y92.009    T12 vertebral burst fracture S22.089A    Colitis K52.9    Former smoker Z87.891    Typical atrial flutter (HCC) I48.3    Gastroesophageal reflux disease without esophagitis K21.9    Short-term memory loss R41.3    Nocturia R35.1    BPH (benign prostatic hyperplasia) N40.0    PAF (paroxysmal atrial fibrillation) ([facility]) I48.0    Obesity E66.9    Anemia D64.9    Symptomatic anemia D64.9    Chronic diastolic congestive heart failure (HCC) I50.32    Community acquired pneumonia J18.9    Tracheobronchitis J40    Elevated PSA R97.20    Sinus node dysfunction (HCC) I49.5    Pacemaker Z95.0    Respiratory arrest ([address] R09.2    Acute respiratory distress R06.03    S/P cardiac pacemaker procedure Z95.0    Aspiration into airway T17.908A    Acute respiratory failure with hypercapnia ([facility]) J96.02       Physical Exam: /82   Pulse 61   Temp 97.3 °F (36.3 °C) (Oral)   Resp 18   Ht 5' 10\" (1.778 m)   Wt 221 lb 9.6 oz (100.5 kg)   SpO2 92%   BMI 31.80 kg/m²     Recent Labs     06/06/19  1127 06/06/19  1611 06/06/19  2051 06/07/19  0755 06/07/19  1114   POCGLU 136* 127* 124* 104* 116*       General appearance: alert, appears stated age and cooperative  Head: Normocephalic, without obvious abnormality, atraumatic  Neck: no adenopathy, no carotid bruit, no JVD, supple, symmetrical, trachea midline and thyroid not enlarged, symmetric, no tenderness/mass/nodules  Lungs: clear to auscultation bilaterally  Heart: regular rate and rhythm, S1, S2 normal, no murmur, click, rub or gallop  Abdomen: soft, non-tender; bowel sounds normal; no masses,  no organomegaly  Extremities: extremities normal, atraumatic, no cyanosis or edema  Pulses: 2+ and symmetric  Skin: Skin color, texture, turgor normal. No rashes or lesions    LABS:  Recent Labs     06/05/19 0418 06/06/19 0615 06/07/19 0618   WBC 9.7 6.6 7.0   HGB 12.4* 12.9* 12.2*   HCT 38.1* 39.2* 38.1*    177 165                                                                  Recent Labs     06/05/19 0418 06/06/19 0615 06/07/19 0618    136 138   K 4.1 3.5 3.4*    100 101   CO2 25 26 26   BUN 28* 23* 23*   CREATININE 0.7* 0.8 0.8   GLUCOSE 102* 149* 104*     No results for input(s): INR in the last 72 hours.     Significant Diagnostic Studies:  As above    Treatments: as above    Discharge Medications:   Nixon Jean Paul   Home Medication Instructions FOV:[id number]    Printed on:06/07/19 1225   Medication Information                      apixaban (ELIQUIS) 5 MG TABS tablet  Take 1 tablet by mouth 2 times daily             colesevelam (WELCHOL) 625 MG tablet  TAKE 3 TABLETS TWICE DAILY             cyclobenzaprine (FLEXERIL) 10 MG tablet  TAKE 1 TABLET BY MOUTH 3 TIMES DAILY AS NEEDED FOR MUSCLE SPASMS             diltiazem (CARDIZEM CD) 120 MG extended release capsule  Take 1 capsule by mouth daily             donepezil (ARICEPT) 10 MG tablet  Take 1 tablet by mouth nightly             ferrous sulfate 325 (65 Fe) MG tablet  Take 1 tablet by mouth daily (with breakfast)             glucose blood VI test strips (ASCENSIA AUTODISC VI;ONE TOUCH ULTRA TEST VI) strip  [employer] True Metrix Air Test Strips. FSBS daily. DX E11.9             ipratropium-albuterol (DUONEB) 0.5-2.5 (3) MG/3ML SOLN nebulizer solution  Inhale 3 mLs into the lungs 4 times daily             mesalamine (LIALDA) 1.2 g EC tablet  TAKE 1 TABLET THREE TIMES DAILY             metFORMIN (GLUCOPHAGE) 500 MG tablet  TAKE 1 TABLET TWICE DAILY             montelukast (SINGULAIR) 10 MG tablet  Take 1 tablet by mouth nightly             omeprazole (PRILOSEC) 40 MG delayed release capsule  TAKE 1 CAPSULE EVERY DAY             oxyCODONE-acetaminophen (PERCOCET)  MG per tablet  Take 1 tablet by mouth every 6 hours as needed for Pain. .             pramipexole (MIRAPEX) 1 MG tablet  Take 1 tablet by mouth nightly             predniSONE (DELTASONE) 20 MG tablet  Take 2 tablets by mouth daily for 5 days             sertraline (ZOLOFT) 100 MG tablet  Take 1 tablet by mouth daily             sotalol (BETAPACE) 80 MG tablet  Take 1 tablet by mouth 2 times daily             tamsulosin (FLOMAX) 0.4 MG capsule  TAKE 1 CAPSULE EVERY DAY             Tiotropium Bromide-Olodaterol (STIOLTO RESPIMAT) 2.5-2.5 MCG/ACT AERS  2 inhalations daily             traMADol (ULTRAM) 50 MG tablet  Take 50 mg by mouth every 12 hours as needed for Pain. TRUEPLUS LANCETS 28G MISC  1 each by Does not apply route daily E11.9 Test FBS daily--NEED 33 GAUGE                Activity: activity as tolerated  Diet: diabetic diet  Wound Care: none needed    Disposition: home  Discharged Condition: Stable  Follow Up: Primary Care Physician in one week    Total time spent on discharge, finalizing medications, referrals and arranging follow up was greater than 30 minutes. Thank you Dr. Bayron Guzman DO for the opportunity to be involved in this patients care. If you have any questions or concerns please feel free to contact me at [phone].      Dr Steve St

## 2019-06-09 NOTE — PROGRESS NOTES
Carelink transmission shows normal sensing and pacing function. See interrogation for more details. Initial set up of carelink completed. No new arrhythmias. No AF recorded (hx PAF-OAC)  AP 46.7%   0.1%  Follow up in 3 months via carelink. See PACEART report under Cardiology tab.

## 2019-06-11 PROBLEM — I48.92 ATRIAL FLUTTER (HCC): Status: ACTIVE | Noted: 2019-01-01

## 2019-06-11 NOTE — TELEPHONE ENCOUNTER
[facility] Trin[facility] stating that pt reports O2 sats dropping into the 80's when pt is ambulating. Pt does not have home O2 and is asking about being tested for portable O2. Please advise. OV 4/9/19:    ASSESSMENT:  · Hypoxia  · Moderately Severe COPD with AE  · Fever  · Seasonal Allergic Rhinitis  · H/o aflutter and diastolic CHF  · CAD  · Afib  · DM2  · DVT     PLAN:   · Pt prefers an aerosol. Change from An[employer] to [employer].    · Change nebs to albuterol only  · Continue PRN albuterol INH and nebs  · The Pneumovax 23 and Flu Vaccine  Are UTD  · Pulm rehab declined  · on Eliquis   · F/u in 6 months  · Proceed to ED now for CXR, breathing treatment and possible admission for hypoxia/COPD AE

## 2019-06-11 NOTE — PROGRESS NOTES
2019  Nereyda Zaman (: 1945)  76 y.o.      HPI  Has a walk stress test tomorrow with Dr. Megha Hua. Review of Systems    Allergies, past medical history, family history, and social history reviewed and unchanged from previous encounter. Current Outpatient Medications   Medication Sig Dispense Refill    predniSONE (DELTASONE) 20 MG tablet Take 2 tablets by mouth daily for 5 days 10 tablet 0    cyclobenzaprine (FLEXERIL) 10 MG tablet TAKE 1 TABLET BY MOUTH 3 TIMES DAILY AS NEEDED FOR MUSCLE SPASMS 270 tablet 0    sotalol (BETAPACE) 80 MG tablet Take 1 tablet by mouth 2 times daily 60 tablet 5    traMADol (ULTRAM) 50 MG tablet Take 50 mg by mouth every 12 hours as needed for Pain.       Tiotropium Bromide-Olodaterol (STIOLTO RESPIMAT) 2.5-2.5 MCG/ACT AERS 2 inhalations daily 3 Inhaler 1    diltiazem (CARDIZEM CD) 120 MG extended release capsule Take 1 capsule by mouth daily 90 capsule 3    mesalamine (LIALDA) 1.2 g EC tablet TAKE 1 TABLET THREE TIMES DAILY 270 tablet 1    sertraline (ZOLOFT) 100 MG tablet Take 1 tablet by mouth daily 90 tablet 3    montelukast (SINGULAIR) 10 MG tablet Take 1 tablet by mouth nightly 90 tablet 3    pramipexole (MIRAPEX) 1 MG tablet Take 1 tablet by mouth nightly 90 tablet 3    donepezil (ARICEPT) 10 MG tablet Take 1 tablet by mouth nightly 90 tablet 1    ferrous sulfate 325 (65 Fe) MG tablet Take 1 tablet by mouth daily (with breakfast) 90 tablet 2    colesevelam (WELCHOL) 625 MG tablet TAKE 3 TABLETS TWICE DAILY 540 tablet 1    apixaban (ELIQUIS) 5 MG TABS tablet Take 1 tablet by mouth 2 times daily 180 tablet 1    tamsulosin (FLOMAX) 0.4 MG capsule TAKE 1 CAPSULE EVERY DAY 90 capsule 3    omeprazole (PRILOSEC) 40 MG delayed release capsule TAKE 1 CAPSULE EVERY DAY 90 capsule 3    metFORMIN (GLUCOPHAGE) 500 MG tablet TAKE 1 TABLET TWICE DAILY 180 tablet 1    oxyCODONE-acetaminophen (PERCOCET)  MG per tablet Take 1 tablet by mouth every 6 hours as needed for Pain. .      glucose blood VI test strips (ASCENSIA AUTODISC VI;ONE TOUCH ULTRA TEST VI) strip [employer] True Metrix Air Test Strips. FSBS daily. DX E11.9 100 strip 3    TRUEPLUS LANCETS 28G MISC 1 each by Does not apply route daily E11.9 Test FBS daily--NEED 33 GAUGE 100 each 3    ipratropium-albuterol (DUONEB) 0.5-2.5 (3) MG/3ML SOLN nebulizer solution Inhale 3 mLs into the lungs 4 times daily 1620 mL 1     No current facility-administered medications for this visit. Vitals:    06/11/19 1926   BP: 100/68   Site: Left Upper Arm   Position: Sitting   Cuff Size: Large Adult   Pulse: 153   SpO2: 98%   Weight: 226 lb (102.5 kg)   Height: 5' 10\" (1.778 m)     Estimated body mass index is 32.43 kg/m² as calculated from the following:    Height as of this encounter: 5' 10\" (1.778 m). Weight as of this encounter: 226 lb (102.5 kg). Physical Exam    ASSESSMENT and PLAN:  There are no diagnoses linked to this encounter. No follow-ups on file.

## 2019-06-11 NOTE — PROGRESS NOTES
Post-Discharge Transitional Care Management Services or Hospital Follow Up      Geronimo Salas   YOB: 1945    Date of Office Visit:  6/11/2019  Date of Hospital Admission: 6/2/19  Date of Hospital Discharge: 6/7/19  Risk of hospital readmission (high >=14%.  Medium >=10%) :Readmission Risk Score: 42      Care management risk score Rising risk (score 2-5) and Complex Care (Scores >=6): 7     Non face to face  following discharge, date last encounter closed (first attempt may have been earlier): *No documented post hospital discharge outreach found in the last 14 days    Call initiated 2 business days of discharge: *No response recorded in the last 14 days    Patient Active Problem List   Diagnosis    DM II (diabetes mellitus, type II), controlled ([address]    HLD (hyperlipidemia)    Diabetic neuropathy ([address]    RLS (restless legs syndrome)    Insomnia    Coronary artery disease involving native coronary artery of native heart without angina pectoris    Seasonal allergic rhinitis    Controlled type 2 diabetes mellitus without complication, without long-term current use of insulin ([address]    HOLLAND (obstructive sleep apnea)    Essential hypertension    COPD, severe (HCC)    Chronic bilateral low back pain without sciatica    Restless legs syndrome (RLS)    Mixed hyperlipidemia    Depression    Fall at home    T12 vertebral burst fracture    Colitis    Former smoker    Typical atrial flutter ([address]    Gastroesophageal reflux disease without esophagitis    Short-term memory loss    Nocturia    BPH (benign prostatic hyperplasia)    PAF (paroxysmal atrial fibrillation) (HCC)    Obesity    Anemia    Symptomatic anemia    Chronic diastolic congestive heart failure ([address]    Community acquired pneumonia    Tracheobronchitis    Elevated PSA    Sinus node dysfunction ([address]    Pacemaker    Respiratory arrest ([address]    Acute respiratory distress    S/P cardiac pacemaker procedure    Aspiration into airway    Acute respiratory failure with hypercapnia (HCC)       No Known Allergies    Medications listed as ordered at the time of discharge from hospital   Fahrangladys Freitasmaryjane DE LUNA   Home Medication Instructions ESTEFANIA:    Printed on:06/11/19 1957   Medication Information                      apixaban (ELIQUIS) 5 MG TABS tablet  Take 1 tablet by mouth 2 times daily             colesevelam (WELCHOL) 625 MG tablet  TAKE 3 TABLETS TWICE DAILY             cyclobenzaprine (FLEXERIL) 10 MG tablet  TAKE 1 TABLET BY MOUTH 3 TIMES DAILY AS NEEDED FOR MUSCLE SPASMS             diltiazem (CARDIZEM CD) 120 MG extended release capsule  Take 1 capsule by mouth daily             donepezil (ARICEPT) 10 MG tablet  Take 1 tablet by mouth nightly             ferrous sulfate 325 (65 Fe) MG tablet  Take 1 tablet by mouth daily (with breakfast)             glucose blood VI test strips (ASCENSIA AUTODISC VI;ONE TOUCH ULTRA TEST VI) strip  [employer] True Met[employer] Air Test Strips. FSBS daily. DX E11.9             ipratropium-albuterol (DUONEB) 0.5-2.5 (3) MG/3ML SOLN nebulizer solution  Inhale 3 mLs into the lungs 4 times daily             mesalamine (LIALDA) 1.2 g EC tablet  TAKE 1 TABLET THREE TIMES DAILY             metFORMIN (GLUCOPHAGE) 500 MG tablet  TAKE 1 TABLET TWICE DAILY             montelukast (SINGULAIR) 10 MG tablet  Take 1 tablet by mouth nightly             omeprazole (PRILOSEC) 40 MG delayed release capsule  TAKE 1 CAPSULE EVERY DAY             oxyCODONE-acetaminophen (PERCOCET)  MG per tablet  Take 1 tablet by mouth every 6 hours as needed for Pain. .             pramipexole (MIRAPEX) 1 MG tablet  Take 1 tablet by mouth nightly             predniSONE (DELTASONE) 20 MG tablet  Take 2 tablets by mouth daily for 5 days             sertraline (ZOLOFT) 100 MG tablet  Take 1 tablet by mouth daily             sotalol (BETAPACE) 80 MG tablet  Take 1 tablet by mouth 2 times daily             tamsulosin (FLOMAX) 0.4 MG capsule  TAKE 1 CAPSULE EVERY DAY             Tiotropium Bromide-Olodaterol (STIOLTO RESPIMAT) 2.5-2.5 MCG/ACT AERS  2 inhalations daily             traMADol (ULTRAM) 50 MG tablet  Take 50 mg by mouth every 12 hours as needed for Pain. TRUEPLUS LANCETS 28G MISC  1 each by Does not apply route daily E11.9 Test FBS daily--NEED 33 GAUGE                   Medications marked \"taking\" at this time  Outpatient Medications Marked as Taking for the 6/11/19 encounter (Office Visit) with Harley Curling, PA   Medication Sig Dispense Refill    predniSONE (DELTASONE) 20 MG tablet Take 2 tablets by mouth daily for 5 days 10 tablet 0    cyclobenzaprine (FLEXERIL) 10 MG tablet TAKE 1 TABLET BY MOUTH 3 TIMES DAILY AS NEEDED FOR MUSCLE SPASMS 270 tablet 0    sotalol (BETAPACE) 80 MG tablet Take 1 tablet by mouth 2 times daily 60 tablet 5    traMADol (ULTRAM) 50 MG tablet Take 50 mg by mouth every 12 hours as needed for Pain.       Tiotropium Bromide-Olodaterol (STIOLTO RESPIMAT) 2.5-2.5 MCG/ACT AERS 2 inhalations daily 3 Inhaler 1    diltiazem (CARDIZEM CD) 120 MG extended release capsule Take 1 capsule by mouth daily 90 capsule 3    mesalamine (LIALDA) 1.2 g EC tablet TAKE 1 TABLET THREE TIMES DAILY 270 tablet 1    sertraline (ZOLOFT) 100 MG tablet Take 1 tablet by mouth daily 90 tablet 3    montelukast (SINGULAIR) 10 MG tablet Take 1 tablet by mouth nightly 90 tablet 3    pramipexole (MIRAPEX) 1 MG tablet Take 1 tablet by mouth nightly 90 tablet 3    donepezil (ARICEPT) 10 MG tablet Take 1 tablet by mouth nightly 90 tablet 1    ferrous sulfate 325 (65 Fe) MG tablet Take 1 tablet by mouth daily (with breakfast) 90 tablet 2    colesevelam (WELCHOL) 625 MG tablet TAKE 3 TABLETS TWICE DAILY 540 tablet 1    apixaban (ELIQUIS) 5 MG TABS tablet Take 1 tablet by mouth 2 times daily 180 tablet 1    tamsulosin (FLOMAX) 0.4 MG capsule TAKE 1 CAPSULE EVERY DAY 90 capsule 3    omeprazole (PRILOSEC) 40 MG delayed release capsule TAKE 1 CAPSULE EVERY DAY 90 capsule 3    metFORMIN (GLUCOPHAGE) 500 MG tablet TAKE 1 TABLET TWICE DAILY 180 tablet 1    oxyCODONE-acetaminophen (PERCOCET)  MG per tablet Take 1 tablet by mouth every 6 hours as needed for Pain. .      glucose blood VI test strips (ASCENSIA AUTODISC VI;ONE TOUCH ULTRA TEST VI) strip [employer] Air Test Strips. FSBS daily. DX E11.9 100 strip 3    TRUEPLUS LANCETS 28G MISC 1 each by Does not apply route daily E11.9 Test FBS daily--NEED 33 GAUGE 100 each 3        Medications patient taking as of now reconciled against medications ordered at time of hospital discharge: Yes    Chief Complaint   Patient presents with   [address] from Hospital       History of Present illness - Follow up of Hospital diagnosis(es): Acute respiratory Failure    Inpatient course: Discharge summary reviewed- see chart. Interval history/Current status: Pt has been doing well up until today. Today he has noticed an increased shortness of breath. His weight is slightly elevated today compared to yesterday but not by a full 3 pounds. He denies any chest pain, lethargy, chest tightness, wheezing or fevers. Pt is sweating profusely in the room and reports that he feels very warm. His blood pressure is much lower than documented during recent hospital stay. GA is 152 and not improved even after 15 minutes of rest. Pt's wife is very concerned and states that this is how his previous episode of respiratory failure started. O2 saturation moving from 98-95%. Pt reports that he has had some BRBPR that he associated with hemorrhoids. A comprehensive review of systems was negative except for what was noted in the HPI. Vitals:    06/11/19 1926 06/11/19 1948   BP: 100/68 (!) 84/54   Site: Left Upper Arm    Position: Sitting    Cuff Size: Large Adult    Pulse: 153 152   SpO2: 98% 95%   Weight: 226 lb (102.5 kg)    Height: 5' 10\" (1.778 m)      Body mass index is 32.43 kg/m².    Wt

## 2019-06-11 NOTE — TELEPHONE ENCOUNTER
According to pt's chart he is scheduled for 6-12 with device clinic and an ov with nprb on 6-26-19 at [facility] AT [place] office.

## 2019-06-12 NOTE — PROGRESS NOTES
Dr. Jovanna Villarreal returned phone call stating to continue amio gtt on top of 5mg IV metoprolol, PRN flexeril and percocet. Will continue to monitor.

## 2019-06-12 NOTE — DISCHARGE INSTR - COC
GASTROINTESTINAL ENDOSCOPY  05/08/2015    Gastritis    UPPER GASTROINTESTINAL ENDOSCOPY N/A 09/08/2016    gastritis-Bx pending    UPPER GASTROINTESTINAL ENDOSCOPY  11/06/2018    UPPER GASTROINTESTINAL ENDOSCOPY N/A 11/6/2018    EGD BIOPSY performed by Shelia Woodall MD at [address]       Immunization History:   Immunization History   Administered Date(s) Administered    Influenza Vaccine, unspecified formulation 10/25/2018    Influenza, High Dose (Fluzone 65 yrs and older) 11/27/2017    Pneumococcal 13-valent Conjugate ([id number]) 05/14/2015    Pneumococcal Polysaccharide ([id number]) 05/10/2018    Tdap (Boostrix, Adacel) 05/21/2015       Active Problems:  Patient Active Problem List   Diagnosis Code    DM II (diabetes mellitus, type II), controlled ([address] [facility] [address] E11.9    HLD (hyperlipidemia) E78.5    Diabetic neuropathy ([facility] E11.40    RLS (restless legs syndrome) G25.81    Insomnia G47.00    Coronary artery disease involving native coronary artery of native heart without angina pectoris I25.10    Seasonal allergic rhinitis J30.2    Controlled type 2 diabetes mellitus without complication, without long-term current use of insulin ([facility]) E11.9    HOLLAND (obstructive sleep apnea) G47.33    Essential hypertension I10    COPD, severe ([facility]) J44.9    Chronic bilateral low back pain without sciatica M54.5, G89.29    Restless legs syndrome (RLS) G25.81    Mixed hyperlipidemia E78.2    Depression F32.9    Fall at home W19. Everlena Lo, Y92.009    T12 vertebral burst fracture S22.089A    Colitis K52.9    Former smoker Z87.891    Typical atrial flutter ([facility]) I48.3    Gastroesophageal reflux disease without esophagitis K21.9    Short-term memory loss R41.3    Nocturia R35.1    BPH (benign prostatic hyperplasia) N40.0    PAF (paroxysmal atrial fibrillation) ([facility]) I48.0    Obesity E66.9    Anemia D64.9    Symptomatic anemia D64.9    Chronic diastolic congestive heart failure ([facility]) I50.32 TXVX:[id number]}    Routes of Feeding: {CHP DME Other Feedings:097673735}  Liquids: {Slp liquid thickness:65885}  Daily Fluid Restriction: {CHP DME Yes amt example:115226983}  Last Modified Barium Swallow with Video (Video Swallowing Test): {Done Not Done XMFA:097590225}    Treatments at the Time of Hospital Discharge:   Respiratory Treatments: ***  Oxygen Therapy:  {Therapy; copd oxygen:50455}  Ventilator:    { CC Vent PTJT:686598670}    Rehab Therapies: {THERAPEUTIC INTERVENTION:1767066554}  Weight Bearing Status/Restrictions: { CC Weight Bearin}  Other Medical Equipment (for information only, NOT a DME order):  {EQUIPMENT:745808066}  Other Treatments: ***    Patient's personal belongings (please select all that are sent with patient):  {CHP DME Belongings:691753081}    RN SIGNATURE:  {Esignature:[id number]}    CASE MANAGEMENT/SOCIAL WORK SECTION    Inpatient Status Date: ***    Readmission Risk Assessment Score:  Readmission Risk              Risk of Unplanned Readmission:        40           Discharging to Facility/ Agency   · Name:   · Address:  · Phone:  · Fax:    Dialysis Facility (if applicable)   · Name:  · Address:  · Dialysis Schedule:  · Phone:  · Fax:    / signature: {Esignature:[id number]}    PHYSICIAN SECTION    Prognosis: Good    Condition at Discharge: Stable    Rehab Potential (if transferring to Rehab): Good    Recommended Labs or Other Treatments After Discharge: repeat CBC and BMP in 1 week     Physician Certification: I certify the above information and transfer of Tyronne Dakin  is necessary for the continuing treatment of the diagnosis listed and that he requires Home Care for greater 30 days.      Update Admission H&P: No change in H&P    PHYSICIAN SIGNATURE:  Electronically signed by Luis Rivas MD on 19 at 3:10 PM

## 2019-06-12 NOTE — FLOWSHEET NOTE
06/12/19 1456   Encounter Summary   Services provided to: Patient and family together   Referral/Consult From: [facility]   (6/12/19/Patient wants to complete AD later)   Complexity of Encounter Low   Length of Encounter 15 minutes   Routine   Intervention Sustaining presence/ Ministry of presence

## 2019-06-12 NOTE — H&P
Hospital Medicine History & Physical      PCP: Kiley Leggett DO    Date of Admission: 6/11/2019    Date of Service: Pt seen/examined on 6/11  and Admitted to Inpatient with expected LOS greater than two midnights due to medical therapy. Chief Complaint:   A-Flatter with RVR       History Of Present Illness:  76 y.o. male who presented to [facility]  For the evaluation of palpitations, dizziness,  and nausea,  starting this afternoon while he was in the at pulmonologist outpatient  office. Pt had AICD placed  Just  2 weeks ago, but he did not feel any discharge   Pt denies any other complains- No chest pain, no SOB currently. EP specialist consulted regarding pacemaker evaluation. It was advised to admit pt under close observation. Pt was transferred to Step down Unit for further management. He was received Cardizem boluses in ER< was started on Cardizem drip, later given Amiodarone bolus, Started on drip, given IV dig, IV Lopressor.         Past Medical History:          Diagnosis Date    Cancer [facility])     skin    CHF (congestive heart failure) (HCC)     Chronic pancreatitis ([facility]     Colitis 5/08/2015    COPD (chronic obstructive pulmonary disease) ([facility]     Diabetes mellitus ([facility]     Esophagitis     Gastritis     Hyperlipidemia     Hypertension        Past Surgical History:          Procedure Laterality Date    CARDIAC PACEMAKER PLACEMENT  05/31/2019    Dr Parth Garcia, [employer] Model: Livia    CATARACT REMOVAL WITH IMPLANT Right 05/2016    COLONOSCOPY  5/08/2015    colitis-mild gastritis    COLONOSCOPY N/A 10/25/2018    COLONOSCOPY POLYPECTOMY SNARE/COLD BIOPSY performed by Kervin Lee MD at [address] [place] [address]  09/21/2018    CYSTOSCOPY, DIRECT VISION INTERAL URETHROTOMY     HERNIA REPAIR Right 2012    inguinal    PACEMAKER INSERTION  05/31/2019    PACEMAKER PLACEMENT Left 05/31/2019    MA OFFICE/OUTPT VISIT,PROCEDURE ONLY N/A 9/21/2018 CYSTOSCOPY, DIRECT VISION INTERAL URETHROTOMY performed by Bo Stokes MD at [facility] Left 2001    Rotator cuff    SKIN CANCER EXCISION Left 5/2016    melanoma    TESTICLE REMOVAL Left 2012    UPPER GASTROINTESTINAL ENDOSCOPY  05/08/2015    Gastritis    UPPER GASTROINTESTINAL ENDOSCOPY N/A 09/08/2016    gastritis-Bx pending    UPPER GASTROINTESTINAL ENDOSCOPY  11/06/2018    UPPER GASTROINTESTINAL ENDOSCOPY N/A 11/6/2018    EGD BIOPSY performed by Asmita Villalobos MD at [address]       Medications Prior to Admission:      Prior to Admission medications    Medication Sig Start Date End Date Taking? Authorizing Provider   predniSONE (DELTASONE) 20 MG tablet Take 2 tablets by mouth daily for 5 days 6/8/19 6/13/19  Anais Abreu MD   cyclobenzaprine (FLEXERIL) 10 MG tablet TAKE 1 TABLET BY MOUTH 3 TIMES DAILY AS NEEDED FOR MUSCLE SPASMS 5/2/19   Ryan Love DO   sotalol (BETAPACE) 80 MG tablet Take 1 tablet by mouth 2 times daily 4/15/19   Africa Romero MD   traMADol (ULTRAM) 50 MG tablet Take 50 mg by mouth every 12 hours as needed for Pain.     Historical Provider, MD   Tiotropium Bromide-Olodaterol (STIOLTO RESPIMAT) 2.5-2.5 MCG/ACT AERS 2 inhalations daily 4/9/19   Christine Gallego MD   diltiazem (CARDIZEM CD) 120 MG extended release capsule Take 1 capsule by mouth daily 3/29/19   Dinora Pozo MD   mesalamine (LIALDA) 1.2 g EC tablet TAKE 1 TABLET THREE TIMES DAILY 3/26/19   Ryan Love DO   sertraline (ZOLOFT) 100 MG tablet Take 1 tablet by mouth daily 3/26/19   Ryan Love DO   montelukast (SINGULAIR) 10 MG tablet Take 1 tablet by mouth nightly 3/26/19   Ryan Love DO   pramipexole (MIRAPEX) 1 MG tablet Take 1 tablet by mouth nightly 3/26/19   Ryan Love DO   donepezil (ARICEPT) 10 MG tablet Take 1 tablet by mouth nightly 3/26/19   Ryan Love DO   ferrous sulfate 325 (65 Fe) MG tablet Take 1 tablet by mouth daily (with breakfast) 3/26/19   SHAYY Hutchinson - CNP   [facility]) 625 MG tablet TAKE 3 TABLETS TWICE DAILY 2/19/19   Ryan Love DO   apixaban (ELIQUIS) 5 MG TABS tablet Take 1 tablet by mouth 2 times daily 2/11/19   Ryan Love DO   ipratropium-albuterol (DUONEB) 0.5-2.5 (3) MG/3ML SOLN nebulizer solution Inhale 3 mLs into the lungs 4 times daily 2/7/19 11/4/19  Brian Alcazar MD   tamsulosin (FLOMAX) 0.4 MG capsule TAKE 1 CAPSULE EVERY DAY 2/5/19   Ryan Love DO   omeprazole (PRILOSEC) 40 MG delayed release capsule TAKE 1 CAPSULE EVERY DAY 2/5/19   Ryan Love DO   metFORMIN (GLUCOPHAGE) 500 MG tablet TAKE 1 TABLET TWICE DAILY 2/5/19   Ryan Love DO   oxyCODONE-acetaminophen (PERCOCET)  MG per tablet Take 1 tablet by mouth every 6 hours as needed for Pain. [facility] Historical Provider, MD   glucose blood VI test strips (ASCENSIA AUTODISC VI;ONE TOUCH ULTRA TEST VI) strip [employer] True Metrix Air Test Strips. FSBS daily. DX E11.9 5/16/18   Ryan Love DO   TRUEPLUS LANCETS 28G MISC 1 each by Does not apply route daily E11.9 Test FBS daily--NEED 33 GAUGE 5/1/17   Ryan Love DO       Allergies:  Patient has no known allergies. Social History:      The patient currently lives  At home    TOBACCO:   reports that he quit smoking about 19 years ago. His smoking use included cigarettes. He has a 30.00 pack-year smoking history. He has never used smokeless tobacco.  ETOH:   reports that he does not drink alcohol. Family History:       Reviewed in detail and negative for DM, CAD, Cancer, CVA. Positive as follows:        Problem Relation Age of Onset    Diabetes Mother     Early Death Mother     Cancer Mother         stomach    Other Father     Diabetes Son        REVIEW OF SYSTEMS:   All twelve systems reviewed and negative except for noted in HPI.     PHYSICAL EXAM PERFORMED:    /72   Pulse 152   Temp 98.2 °F (36.8 °C) (Oral)   Resp (!) 36   Ht 5' 10\" (1.778 m)   Wt 224 lb (101.6 kg)   SpO2 94%   BMI 32.14 kg/m²     General appearance:  No apparent distress, appears stated age and cooperative. HEENT:  Normal cephalic, atraumatic without obvious deformity. Pupils equal, round, and reactive to light. Extra ocular muscles intact. Conjunctivae/corneas clear. Neck: Supple, with full range of motion. No jugular venous distention. Trachea midline. Respiratory:  Normal respiratory effort. Clear to auscultation, bilaterally without Rales/Wheezes/Rhonchi. Cardiovascular:  Heart S1S2 present, irregular rhythm,no murmurs, rubs, gallops    Per EKG and Monitor - A-Flutter/ A-Fib  with RVR in ER   Abdomen: Soft, non-tender, non-distended with normal bowel sounds. Musculoskeletal: No clubbing, cyanosis or edema bilaterally. Full range of motion without deformity. Peripheral Pulses: +2 palpable, equal bilaterally   Skin: Skin color, texture, turgor normal.  No rashes or lesions. Neurologic:  Neurovascularly intact without any focal sensory/motor deficits. Cranial nerves: II-XII intact, grossly non-focal.  Psychiatric:  Alert and oriented, thought content appropriate, normal insight  Capillary Refill: Brisk,< 3 seconds         Labs:     Recent Labs     06/11/19 2038   WBC 7.5   HGB 13.3*   HCT 40.7        Recent Labs     06/11/19 2038      K 4.0   CL 99   CO2 23   BUN 13   CREATININE 0.9   CALCIUM 9.5     Recent Labs     06/11/19 2038   AST 35   ALT 23   BILITOT 0.7   ALKPHOS 83     No results for input(s): INR in the last 72 hours.   Recent Labs     06/11/19 2038 06/11/19  2241   TROPONINI <0.01 <0.01       Urinalysis:      Lab Results   Component Value Date    NITRU Negative 04/24/2019    WBCUA 3-5 04/24/2019    BACTERIA Rare 04/10/2019    RBCUA None seen 04/24/2019    BLOODU Negative 04/24/2019    SPECGRAV 1.020 04/24/2019    GLUCOSEU Negative 04/24/2019       Radiology:     CXR: I have reviewed the CXR with the following interpretation: as below  EKG:  I have reviewed the EKG with the following interpretation: A-Flutter/ A-Fib  with RVR in ER     XR CHEST STANDARD (2 VW)   Final Result   Findings favored to reflect atelectasis in the lingula. No evidence of   pulmonary edema. ASSESSMENT:    Active Hospital Problems    Diagnosis Date Noted    Atrial flutter [facility]) [I48.92] 06/11/2019         PLAN:     A-Fib/A-Flutter with RVR     Pt received Cardizem boluses in ER   was started on Cardizem drip,   later was started on  Amiodarone bolus with  drip,   given IV digpxin, IV Lopressor. Cardiology, EP consults in AM         DVT Prophylaxis: Eliquis  Diet: DIET CARB CONTROL;  Code Status: Full Code      Electronically signed by Sunday Broussard MD on 6/11/2019 at 11:56 PM    Thank you Jorge Galvez DO for the opportunity to be involved in this patient's care.  If you have any questions or concerns please feel free to contact me

## 2019-06-12 NOTE — PROGRESS NOTES
Pt in sinus rhythm. Per Dr. Antonio valenzuela to stop amiodarone drip for now. Will cont. To monitor.

## 2019-06-12 NOTE — PROGRESS NOTES
Bedside report received from [facility]. Patient resting comfortably in bed. No signs of discomfort or distress. Bed is in lowest position, wheels locked, 2/2 side rails up. Bedside table and call light within reach. White board updated. Will continue to monitor patient. Travis Stern RN    11:35 PM  Patient resting in bed. BP elevated, maybe caused by pain. Other VSS. PRN flexeril and percocet given for pain. Travis Stern RN    5:48 AM  Message sent to cross cover \"BP has been trending up, thought it was because patient was in pain. Pain meds given, but blood pressure not going done. Last check 180/86. Can you add something PRN for me to give now? Thanks.  \" Travis Stern RN never used

## 2019-06-12 NOTE — PROGRESS NOTES
Dr. Nancy Willingham paged. \"home med list reviewed please order home meds.  pt rating pain in lower back 8/10\"

## 2019-06-12 NOTE — ED PROVIDER NOTES
GASTROINTESTINAL ENDOSCOPY N/A 2018    EGD BIOPSY performed by Hood Montesinos MD at [address]     Family History   Problem Relation Age of Onset    Diabetes Mother     Early Death Mother     Cancer Mother         stomach    Other Father     Diabetes Son      Social History     Socioeconomic History    Marital status:      Spouse name: Not on file    Number of children: Not on file    Years of education: Not on file    Highest education level: Not on file   Occupational History    Not on file   Social Needs    Financial resource strain: Not on file    Food insecurity:     Worry: Not on file     Inability: Not on file    Transportation needs:     Medical: Not on file     Non-medical: Not on file   Tobacco Use    Smoking status: Former Smoker     Packs/day: 1.50     Years: 20.00     Pack years: 30.00     Types: Cigarettes     Last attempt to quit: 1/10/2000     Years since quittin.4    Smokeless tobacco: Never Used   Substance and Sexual Activity    Alcohol use: No     Alcohol/week: 0.0 oz    Drug use: No    Sexual activity: Yes     Partners: Female   Lifestyle    Physical activity:     Days per week: Not on file     Minutes per session: Not on file    Stress: Not on file   Relationships    Social connections:     Talks on phone: Not on file     Gets together: Not on file     Attends Taoist service: Not on file     Active member of club or organization: Not on file     Attends meetings of clubs or organizations: Not on file     Relationship status: Not on file    Intimate partner violence:     Fear of current or ex partner: Not on file     Emotionally abused: Not on file     Physically abused: Not on file     Forced sexual activity: Not on file   Other Topics Concern    Not on file   Social History Narrative    Not on file     Current Facility-Administered Medications   Medication Dose Route Frequency Provider Last Rate Last Dose    diltiazem 125 mg in dextrose 5 % 125 mL infusion  10 mg/hr Intravenous Continuous Jenny Vickers MD 15 mL/hr at 06/11/19 2152 15 mg/hr at 06/11/19 2152    digoxin (LANOXIN) injection 500 mcg  500 mcg Intravenous Once Laverne Pringle DO         Current Outpatient Medications   Medication Sig Dispense Refill    predniSONE (DELTASONE) 20 MG tablet Take 2 tablets by mouth daily for 5 days 10 tablet 0    cyclobenzaprine (FLEXERIL) 10 MG tablet TAKE 1 TABLET BY MOUTH 3 TIMES DAILY AS NEEDED FOR MUSCLE SPASMS 270 tablet 0    sotalol (BETAPACE) 80 MG tablet Take 1 tablet by mouth 2 times daily 60 tablet 5    traMADol (ULTRAM) 50 MG tablet Take 50 mg by mouth every 12 hours as needed for Pain.       Tiotropium Bromide-Olodaterol (STIOLTO RESPIMAT) 2.5-2.5 MCG/ACT AERS 2 inhalations daily 3 Inhaler 1    diltiazem (CARDIZEM CD) 120 MG extended release capsule Take 1 capsule by mouth daily 90 capsule 3    mesalamine (LIALDA) 1.2 g EC tablet TAKE 1 TABLET THREE TIMES DAILY 270 tablet 1    sertraline (ZOLOFT) 100 MG tablet Take 1 tablet by mouth daily 90 tablet 3    montelukast (SINGULAIR) 10 MG tablet Take 1 tablet by mouth nightly 90 tablet 3    pramipexole (MIRAPEX) 1 MG tablet Take 1 tablet by mouth nightly 90 tablet 3    donepezil (ARICEPT) 10 MG tablet Take 1 tablet by mouth nightly 90 tablet 1    ferrous sulfate 325 (65 Fe) MG tablet Take 1 tablet by mouth daily (with breakfast) 90 tablet 2    colesevelam (WELCHOL) 625 MG tablet TAKE 3 TABLETS TWICE DAILY 540 tablet 1    apixaban (ELIQUIS) 5 MG TABS tablet Take 1 tablet by mouth 2 times daily 180 tablet 1    ipratropium-albuterol (DUONEB) 0.5-2.5 (3) MG/3ML SOLN nebulizer solution Inhale 3 mLs into the lungs 4 times daily 1620 mL 1    tamsulosin (FLOMAX) 0.4 MG capsule TAKE 1 CAPSULE EVERY DAY 90 capsule 3    omeprazole (PRILOSEC) 40 MG delayed release capsule TAKE 1 CAPSULE EVERY DAY 90 capsule 3    metFORMIN (GLUCOPHAGE) 500 MG tablet TAKE 1 TABLET TWICE DAILY 180 tablet 1    oxyCODONE-acetaminophen (PERCOCET)  MG per tablet Take 1 tablet by mouth every 6 hours as needed for Pain. .      glucose blood VI test strips (ASCENSIA AUTODISC VI;ONE TOUCH ULTRA TEST VI) strip [employer] True [employer]rix Air Test Strips. FSBS daily. DX E11.9 100 strip 3    TRUEPLUS LANCETS 28G MISC 1 each by Does not apply route daily E11.9 Test FBS daily--NEED 33 GAUGE 100 each 3     No Known Allergies    REVIEW OF SYSTEMS  10 systems reviewed, pertinent positives per HPI otherwise noted to be negative. PHYSICAL EXAM  /84   Pulse 161   Temp 98.2 °F (36.8 °C) (Oral)   Resp 16   Ht 5' 10\" (1.778 m)   Wt 224 lb (101.6 kg)   SpO2 95%   BMI 32.14 kg/m²   GENERAL APPEARANCE: Awake and alert. Cooperative. No acute distress. HEAD: Normocephalic. Atraumatic. EYES: PERRL. EOM's grossly intact. ENT: Mucous membranes are moist.   NECK: Supple. HEART: tachycardia. LUNGS: Respirations unlabored. CTAB. Good air exchange. Speaking comfortably in full sentences. BACK: No midline spinal tenderness or step-off. ABDOMEN: Soft. Non-distended. Non-tender. No guarding or rebound. Normal bowel sounds. EXTREMITIES: No peripheral edema. Moves all extremities equally. All extremities neurovascularly intact. SKIN: Warm and dry. No acute rashes. NEUROLOGICAL: Alert and oriented. No gross facial drooping. Strength 5/5, sensation intact. Normal coordination. PSYCHIATRIC: Normal mood and affect. LABS  I have reviewed all labs for this visit.    Results for orders placed or performed during the hospital encounter of 06/11/19   CBC Auto Differential   Result Value Ref Range    WBC 7.5 4.0 - 11.0 K/uL    RBC 4.52 4.20 - 5.90 M/uL    Hemoglobin 13.3 (L) 13.5 - 17.5 g/dL    Hematocrit 40.7 40.5 - 52.5 %    MCV 90.1 80.0 - 100.0 fL    MCH 29.5 26.0 - 34.0 pg    MCHC 32.8 31.0 - 36.0 g/dL    RDW 17.2 (H) 12.4 - 15.4 %    Platelets 908 269 - 161 K/uL    MPV 7.6 5.0 - 10.5 fL    Neutrophils % 86.1 % Lymphocytes % 10.0 %    Monocytes % 3.7 %    Eosinophils % 0.0 %    Basophils % 0.2 %    Neutrophils # 6.4 1.7 - 7.7 K/uL    Lymphocytes # 0.7 (L) 1.0 - 5.1 K/uL    Monocytes # 0.3 0.0 - 1.3 K/uL    Eosinophils # 0.0 0.0 - 0.6 K/uL    Basophils # 0.0 0.0 - 0.2 K/uL   Comprehensive Metabolic Panel w/ Reflex to MG   Result Value Ref Range    Sodium 137 136 - 145 mmol/L    Potassium reflex Magnesium 4.0 3.5 - 5.1 mmol/L    Chloride 99 99 - 110 mmol/L    CO2 23 21 - 32 mmol/L    Anion Gap 15 3 - 16    Glucose 124 (H) 70 - 99 mg/dL    BUN 13 7 - 20 mg/dL    CREATININE 0.9 0.8 - 1.3 mg/dL    GFR Non-African American >60 >60    GFR African American >60 >60    Calcium 9.5 8.3 - 10.6 mg/dL    Total Protein 7.9 6.4 - 8.2 g/dL    Alb 4.5 3.4 - 5.0 g/dL    Albumin/Globulin Ratio 1.3 1.1 - 2.2    Total Bilirubin 0.7 0.0 - 1.0 mg/dL    Alkaline Phosphatase 83 40 - 129 U/L    ALT 23 10 - 40 U/L    AST 35 15 - 37 U/L    Globulin 3.4 g/dL   Troponin   Result Value Ref Range    Troponin <0.01 <0.01 ng/mL   Brain Natriuretic Peptide   Result Value Ref Range    Pro- (H) 0 - 449 pg/mL       EKG  EKG interpreted by me. Rapid A Flutter, No significant ST elevation or depression. RADIOLOGY  X-RAYS:  I have reviewed radiologic plain film image(s). ALL OTHER NON-PLAIN FILM IMAGES SUCH AS CT, ULTRASOUND AND MRI HAVE BEEN READ BY THE RADIOLOGIST. XR CHEST STANDARD (2 VW)   Final Result   Findings favored to reflect atelectasis in the lingula. No evidence of   pulmonary edema. CRITICAL CARE TIME ATTESTATION ( 45 minutes):    Due to the high probability of imminent or life-threatening deterioration secondary to dyspnea, possible infection, and new arrhythmia this patient required my direct attention, interventions and personal management. I personally provided 45 minutes of critical care time exclusive of time spent on separate billable procedures.   Time includes review and interpretation of laboratory data, review

## 2019-06-12 NOTE — PROGRESS NOTES
Dr. Mai Fabian called Fidel Pringle. Writer told her about pt flipping to ST. She stated to turn off amio gtt and give 5mg IV metoprolol with PRN flexeril and percocet. Writer will give medications per MD and continue to monitor.

## 2019-06-12 NOTE — FLOWSHEET NOTE
06/12/19 0046   Assessment   Charting Type Shift assessment   Neurological   Neuro (WDL) WDL   Level of Consciousness 0   Orientation Level Oriented X4   Cognition Appropriate judgement; Appropriate safety awareness; Appropriate attention/concentration; Appropriate for developmental age; Follows commands   [place] Coma Scale   Eye Opening 4   Best Verbal Response 5   Best Motor Response 6   [place] Coma Scale Score 15   HEENT   HEENT (WDL) X   Voice Normal   Teeth Edentulous   Respiratory   Respiratory (WDL) X   Respiratory Quality/Effort Unlabored   Chest Assessment Chest expansion symmetrical   Respiratory Pattern Regular   Respiratory Depth Normal   Breath Sounds   Right Upper Lobe Diminished   Right Middle Lobe Diminished   Right Lower Lobe Diminished   Left Upper Lobe Diminished   Left Lower Lobe Diminished   Cardiac   Cardiac (WDL) X   Cardiac Rhythm Atrial flutter;Atrial fibrillation;ST   Cardiac Regularity Irregular   Heart Sounds S1, S2   Ectopy PVC   Rhythm Interpretation   Pulse 152   Cardiac Monitor   Telemetry Monitor On Yes   Telemetry Audible Yes   Telemetry Alarms Set Yes   Telemetry Box Number 43   Pacemaker   Pacemaker Yes   Pacemaker Type Permanent   Pacemaker Location Left chest   Gastrointestinal   Abdominal (WDL) X   RUQ Bowel Sounds Active   RLQ Bowel Sounds Active   LUQ Bowel Sounds Active   LLQ Bowel Sounds Active   Tenderness Soft; No guarding;Nontender;Rebound   Abdomen Inspection Rounded; Soft   Peripheral Vascular   Peripheral Vascular (WDL) X   Edema Right lower extremity; Left lower extremity   RLE Edema Trace   LLE Edema Trace   Skin Color/Condition   Skin Color/Condition (WDL) X   Skin Color Red   Skin Condition/Temp Warm;Dry;Swollen   Skin Integrity   Skin Integrity (WDL) X   Location L chest   Skin Integrity Other (Comment)  (incision)   Preventative Dressing No   Assessed this shift?  Yes   Musculoskeletal   Musculoskeletal (WDL) WDL   RUE Full movement   RL Extremity Full movement LUE Full movement   LL Extremity Full movement   Genitourinary   Genitourinary (WDL) WDL   Flank Tenderness No   Suprapubic Tenderness No   Dysuria No   Urine Assessment   Urine Color Mackenzie   Urine Appearance Clear   Urine Odor Malodorous   Incontinence No   Anus/Rectum   Anus/Rectum (WDL) WDL   Handoff   Communication Given Other  (4 eyes)   Oncoming Nurse/Offgoing Nurse Rosario/Page

## 2019-06-12 NOTE — PROGRESS NOTES
Dr. Pasquale Nicole paged. \"pt now flipping back and forth between Afib, Aflutter and ST with PVC's with HR ranging between 80 - 150. Amio has not been stopped yet. Do you still want to proceed with IV metoprolol and turning off the amio? \" writer holding medications until call back from MD.

## 2019-06-12 NOTE — PROGRESS NOTES
Notified Jesús Garcia, nurse with [facility], @ 0800 6/12/19 re: cardiac consult. [phone] [facility]

## 2019-06-12 NOTE — PROGRESS NOTES
Hospitalist Progress Note      PCP: Hilda Coffman DO    Date of Admission: 6/11/2019    Chief Complaint: dizziness, palpitations     Hospital Course:     Subjective:     Currently back in NSR. No chest pain, sob. No n/v/c/d. Denies any new complaints. Medications:  Reviewed    Infusion Medications   Scheduled Medications    apixaban  5 mg Oral BID    cholestyramine  4 g Oral Daily    donepezil  10 mg Oral Nightly    ferrous sulfate  325 mg Oral Daily with breakfast    mesalamine  400 mg Oral TID    metFORMIN  500 mg Oral BID WC    montelukast  10 mg Oral Nightly    pantoprazole  40 mg Oral QAM AC    pramipexole  1 mg Oral Nightly    predniSONE  40 mg Oral Daily    sertraline  100 mg Oral Daily    tamsulosin  0.4 mg Oral Daily    Tiotropium Bromide-Olodaterol  1 spray Inhalation Daily    sotalol  80 mg Oral Q8H    sodium chloride flush  10 mL Intravenous 2 times per day    insulin lispro  0-12 Units Subcutaneous TID WC    insulin lispro  0-6 Units Subcutaneous Nightly     PRN Meds: cyclobenzaprine, oxyCODONE-acetaminophen, traMADol, sodium chloride flush, magnesium hydroxide      Intake/Output Summary (Last 24 hours) at 6/12/2019 1325  Last data filed at 6/12/2019 1313  Gross per 24 hour   Intake 1207.65 ml   Output 375 ml   Net 832.65 ml       Physical Exam Performed:    /76   Pulse 66   Temp 98 °F (36.7 °C) (Oral)   Resp 18   Ht 5' 10\" (1.778 m)   Wt 225 lb 8 oz (102.3 kg)   SpO2 98%   BMI 32.36 kg/m²     General appearance: No apparent distress, appears stated age and cooperative. HEENT: Pupils equal, round, and reactive to light. Conjunctivae/corneas clear. Neck: Supple, with full range of motion. No jugular venous distention  Respiratory:  Normal respiratory effort. Clear to auscultation, bilaterally without Rales/Wheezes/Rhonchi. Cardiovascular: Regular rate and rhythm with normal S1/S2 without murmurs, rubs or gallops.   Abdomen: Soft, non-tender, non-distended with normal bowel sounds. Musculoskeletal: No clubbing, cyanosis or edema bilaterally. Skin: Skin color, texture, turgor normal.  No rashes or lesions. Neurologic:  Neurovascularly intact without any focal sensory/motor deficits. Cranial nerves: II-XII intact, grossly non-focal.  Psychiatric: Alert and oriented, thought content appropriate, normal insight  Capillary Refill: Brisk,< 3 seconds   Peripheral Pulses: +2 palpable, equal bilaterally       Labs:   Recent Labs     06/11/19 2038 06/12/19  0513   WBC 7.5 8.7   HGB 13.3* 12.0*   HCT 40.7 36.8*    178     Recent Labs     06/11/19 2038      K 4.0   CL 99   CO2 23   BUN 13   CREATININE 0.9   CALCIUM 9.5     Recent Labs     06/11/19 2038   AST 35   ALT 23   BILITOT 0.7   ALKPHOS 83     No results for input(s): INR in the last 72 hours. Recent Labs     06/11/19 2038 06/11/19  2241   TROPONINI <0.01 <0.01       Urinalysis:      Lab Results   Component Value Date    NITRU Negative 04/24/2019    WBCUA 3-5 04/24/2019    BACTERIA Rare 04/10/2019    RBCUA None seen 04/24/2019    BLOODU Negative 04/24/2019    SPECGRAV 1.020 04/24/2019    GLUCOSEU Negative 04/24/2019       Radiology:  XR CHEST STANDARD (2 VW)   Final Result   Findings favored to reflect atelectasis in the lingula. No evidence of   pulmonary edema. Assessment/Plan:    Active Hospital Problems    Diagnosis    Atrial flutter ([address] [I48.92]       Atrial flutter/fibrillation with RVR - now converted back to NSR with amio gtt. Recent placement of pacer   - continue sotalol per cardiology, appreciate recs. - pacer eval   - continue eliquis for anticoagulation   - optimize lytes     COPD - controlled with recent exacerbation requiring intubation. Stable. Continue home inhalers. S/p ourse of prednisone, will wean starting tomorrow      Chronic diastolic CHF - without exacerbation, appears euvolemic. Continue with monitoring I/O, weights.      CAD - continue ASA, statin Dementia - continue home aricept     DM2 - d/c metformin, SSI while inpatient     Depression - controlled, continue home medication       DVT Prophylaxis: eliquis   Diet: DIET CARB CONTROL;  Code Status: Full Code    PT/OT Eval Status: not consulted     Dispo - 1-2 d inpatient pending cardiology Osito Petersen MD

## 2019-06-12 NOTE — FLOWSHEET NOTE
06/12/19 0804   Assessment   Charting Type Shift assessment   Neurological   Neuro (WDL) WDL   Level of Consciousness 0   Orientation Level Oriented X4   Cognition Appropriate judgement; Appropriate safety awareness; Appropriate attention/concentration; Appropriate for developmental age; Follows commands   Language Appropriate for developmental age;Clear   Bartlett Coma Scale   Eye Opening 4   Best Verbal Response 5   Best Motor Response 6   Eyad Coma Scale Score 15   HEENT   HEENT (WDL) X   Voice Normal   Teeth Edentulous   Respiratory   Respiratory (WDL) X   Respiratory Quality/Effort Unlabored   Chest Assessment Chest expansion symmetrical   Respiratory Pattern Regular   Respiratory Depth Normal   Breath Sounds   Right Upper Lobe Diminished   Right Middle Lobe Diminished   Right Lower Lobe Diminished   Left Upper Lobe Diminished   Left Lower Lobe Diminished   Cardiac   Cardiac (WDL) X   Cardiac Rhythm Atrial flutter;Atrial fibrillation;ST   Cardiac Regularity Irregular   Heart Sounds S1, S2   Ectopy PVC   Cardiac Monitor   Telemetry Monitor On Yes   Telemetry Audible Yes   Telemetry Alarms Set Yes   Telemetry Box Number 43   Pacemaker   Pacemaker Yes   Pacemaker Type Permanent   Gastrointestinal   Abdominal (WDL) X   RUQ Bowel Sounds Active   RLQ Bowel Sounds Active   LUQ Bowel Sounds Active   LLQ Bowel Sounds Active   Tenderness Soft; No guarding;Nontender;Rebound   Abdomen Inspection Rounded; Soft   Peripheral Vascular   Peripheral Vascular (WDL) X   Edema Right lower extremity; Left lower extremity   RLE Edema Trace   LLE Edema Trace   Skin Color/Condition   Skin Color/Condition (WDL) WDL   Skin Integrity   Skin Integrity (WDL) WDL   Musculoskeletal   Musculoskeletal (WDL) WDL   RUE Full movement   RL Extremity Full movement   LUE Full movement   LL Extremity Full movement   Genitourinary   Genitourinary (WDL) WDL   Flank Tenderness No   Suprapubic Tenderness No   Dysuria No   Urine Assessment   Urine Color

## 2019-06-12 NOTE — CONSULTS
History and Physical  [facility]   [facility]    Chief Complaint: atrial flutter    HPI:     Patient is a 76 y.o. male presented with palps sweaty from Docs office. He has a hx of atrial arrhythmias for years and sotalol tried recently to prevent. He has been in atrial flutter and fib. He underwent DDD pacer 5/31/19. He was discharged after respiratory arrest 6/7/19. He had been on low dose sotalol with ok QT. Cards consult.       Past Medical History:   Diagnosis Date    Cancer [facility])     skin    CHF (congestive heart failure) (HCC)     [facility] [address]     Colitis 5/08/2015    COPD (chronic obstructive pulmonary disease) ([facility] [address] [facility]     Diabetes mellitus ([facility] [address] [facility]     Esophagitis     Gastritis     Hyperlipidemia     Hypertension       Past Surgical History:   Procedure Laterality Date    CARDIAC PACEMAKER PLACEMENT  05/31/2019    Dr Troy Blanton, Medtronic Model: [place]    CATARACT REMOVAL WITH IMPLANT Right 05/2016    COLONOSCOPY  5/08/2015    colitis-mild gastritis    COLONOSCOPY N/A 10/25/2018    COLONOSCOPY POLYPECTOMY SNARE/COLD BIOPSY performed by Elsie Nogueira MD at [address] [place] [address]  09/21/2018    CYSTOSCOPY, DIRECT VISION INTERAL URETHROTOMY     HERNIA REPAIR Right 2012    inguinal    PACEMAKER INSERTION  05/31/2019    PACEMAKER PLACEMENT Left 05/31/2019    ID OFFICE/OUTPT VISIT,PROCEDURE ONLY N/A 9/21/2018    CYSTOSCOPY, DIRECT VISION INTERAL URETHROTOMY performed by Tian Horan MD at [facility] Left 2001    Rotator cuff    SKIN CANCER EXCISION Left 5/2016    melanoma    TESTICLE REMOVAL Left 2012    UPPER GASTROINTESTINAL ENDOSCOPY  05/08/2015    Gastritis    UPPER GASTROINTESTINAL ENDOSCOPY N/A 09/08/2016    gastritis-Bx pending    UPPER GASTROINTESTINAL ENDOSCOPY  11/06/2018    UPPER GASTROINTESTINAL ENDOSCOPY N/A 11/6/2018    EGD BIOPSY performed by Elsie Nogueira MD at [address] [place] [address] Prior to Admission: predniSONE (DELTASONE) 20 MG tablet, Take 2 tablets by mouth daily for 5 days  cyclobenzaprine (FLEXERIL) 10 MG tablet, TAKE 1 TABLET BY MOUTH 3 TIMES DAILY AS NEEDED FOR MUSCLE SPASMS  sotalol (BETAPACE) 80 MG tablet, Take 1 tablet by mouth 2 times daily  traMADol (ULTRAM) 50 MG tablet, Take 50 mg by mouth every 12 hours as needed for Pain. Tiotropium Bromide-Olodaterol (STIOLTO RESPIMAT) 2.5-2.5 MCG/ACT AERS, 2 inhalations daily  diltiazem (CARDIZEM CD) 120 MG extended release capsule, Take 1 capsule by mouth daily  mesalamine (LIALDA) 1.2 g EC tablet, TAKE 1 TABLET THREE TIMES DAILY  sertraline (ZOLOFT) 100 MG tablet, Take 1 tablet by mouth daily  montelukast (SINGULAIR) 10 MG tablet, Take 1 tablet by mouth nightly  pramipexole (MIRAPEX) 1 MG tablet, Take 1 tablet by mouth nightly  donepezil (ARICEPT) 10 MG tablet, Take 1 tablet by mouth nightly  ferrous sulfate 325 (65 Fe) MG tablet, Take 1 tablet by mouth daily (with breakfast)  colesevelam (WELCHOL) 625 MG tablet, TAKE 3 TABLETS TWICE DAILY  apixaban (ELIQUIS) 5 MG TABS tablet, Take 1 tablet by mouth 2 times daily  tamsulosin (FLOMAX) 0.4 MG capsule, TAKE 1 CAPSULE EVERY DAY  omeprazole (PRILOSEC) 40 MG delayed release capsule, TAKE 1 CAPSULE EVERY DAY  metFORMIN (GLUCOPHAGE) 500 MG tablet, TAKE 1 TABLET TWICE DAILY  oxyCODONE-acetaminophen (PERCOCET)  MG per tablet, Take 1 tablet by mouth every 6 hours as needed for Pain. Renee Berman glucose blood VI test strips (ASCENSIA AUTODISC VI;ONE TOUCH ULTRA TEST VI) strip, [employer] True Metrix Air Test Strips. FSBS daily.  DX E11.9  TRUEPLUS LANCETS 28G MISC, 1 each by Does not apply route daily E11.9 Test FBS daily--NEED 33 GAUGE    No Known Allergies   Social History     Tobacco Use    Smoking status: Former Smoker     Packs/day: 1.50     Years: 20.00     Pack years: 30.00     Types: Cigarettes     Last attempt to quit: 1/10/2000     Years since quittin.4    Smokeless tobacco: Never Used Substance Use Topics    Alcohol use: No     Alcohol/week: 0.0 oz      Family History   Problem Relation Age of Onset    Diabetes Mother     Early Death Mother     Cancer Mother         stomach    Other Father     Diabetes Son         Review of Systems:  · Constitutional: No Fever or Weight Loss  · Eyes: No decreased vision  · ENT: No Headaches, Hearing Loss or Vertigo  · Cardiovascular:  palpitations resolved  · Respiratory: No cough or wheezing    · Gastrointestinal: No abdominal pain, appetite loss, blood in stools, constipation, diarrhea or heartburn  · Genitourinary: No dysuria, trouble voiding, or hematuria  · Musculoskeletal:  No gait disturbance, weakness or joint complaints  · Integumentary: No rash or pruritis  · Neurological: No TIA or stroke symptoms  · Psychiatric: No anxiety or depression  · Endocrine: No malaise, fatigue or temperature intolerance  · Hematologic/Lymphatic: No bleeding problems, blood clots or swollen lymph nodes  · Allergic/Immunologic: No nasal congestion or hives      Objective Data:     /70   Pulse 62   Temp 98.1 °F (36.7 °C) (Oral)   Resp 18   Ht 5' 10\" (1.778 m)   Wt 225 lb 8 oz (102.3 kg)   SpO2 96%   BMI 32.36 kg/m²     General appearance: alert, appears stated age, cooperative and moderately obese  Alert, awake, oriented x 3  Eyes:  No erythema  Head: atraumatic  Neck: no carotid bruit and no JVD  Lungs: clear to auscultation bilaterally  Heart: regular rate and rhythm, S1, S2 normal, no murmur, click, rub or gallop  Abdomen: soft, non-tender; bowel sounds normal; no masses,  no organomegaly  Extremities: extremities normal, atraumatic, no cyanosis or edema  Skin: Skin color, texture, turgor normal. No rashes or lesions  Hematologic: no remarkable bruising   Pacer site intact      ECG: atrial flutter with 2:1 block     Data Review    Echo:  Summary   Normal left ventricular systolic function with ejection fraction of 55-60%.   No regional wall motion abnormalites are seen.   Mild concentric left ventricular hypertrophy.   Grade I diastolic dysfunction with normal filling pressure.   Mild mitral regurgitation.      Signature      ------------------------------------------------------------------   Electronically signed by Giovanny Kirby MD (Interpreting   physician) on 05/22/2019 at 04:13 PM    Recent Labs     06/11/19 2038      K 4.0   CL 99   CO2 23   BUN 13   CREATININE 0.9     Recent Labs     06/11/19 2038 06/12/19  0513   WBC 7.5 8.7   HGB 13.3* 12.0*   HCT 40.7 36.8*   MCV 90.1 91.1    178     Lab Results   Component Value Date    CKTOTAL 205 04/18/2017    TROPONINI <0.01 06/11/2019         Assessment:     Active Problems:    Atrial flutter ([address]  Resolved Problems:    * No resolved hospital problems. *      Plan:     1. Discussed options for treatment with patient and wife. He has both afib and atrial flutter. He has tolerated sotalol thus will restart and increase dose. Dr. Alex Rothman returns tomorrow and might consider another plan. The recent pacer might be in part a trigger till atrial lead in for awhile.

## 2019-06-13 NOTE — PROGRESS NOTES
RESPIRATORY THERAPY ASSESSMENT    Name:  Sushila [facility] Record Number:  [id number]  Age: 76 y.o. Gender: male  : 1945  Today's Date:  2019  Room:  [facility]/8797-76    Assessment     Is the patient being admitted for a COPD or Asthma exacerbation? No   (If yes the patient will be seen every 4 hours for the first 24 hours and then reassessed)    Patient Admission Diagnosis      Allergies  No Known Allergies    Minimum Predicted Vital Capacity:     1095          Actual Vital Capacity:      2200              Pulmonary History:COPD  Home Oxygen Therapy:  room air  Home Respiratory Therapy:Duoneb HHN Q4PRN Albuterol MDI PRN  Current Respiratory Therapy:  Duoneb Q4WA  Treatment Type: IS  Medications: Albuterol/Ipratropium    Respiratory Severity Index(RSI)   Patients with orders for inhalation medications, oxygen, or any therapeutic treatment modality will be placed on Respiratory Protocol. They will be assessed with the first treatment and at least every 72 hours thereafter. The following severity scale will be used to determine frequency of treatment intervention.     Smoking History: Smoking History Less than 1ppd or less than 15 pack year = 1    Social History  Social History     Tobacco Use    Smoking status: Former Smoker     Packs/day: 1.50     Years: 20.00     Pack years: 30.00     Types: Cigarettes     Last attempt to quit: 1/10/2000     Years since quittin.4    Smokeless tobacco: Never Used   Substance Use Topics    Alcohol use: No     Alcohol/week: 0.0 oz    Drug use: No       Recent Surgical History: None = 0  Past Surgical History  Past Surgical History:   Procedure Laterality Date    CARDIAC PACEMAKER PLACEMENT  2019    Dr Ronel Knowles, [employer] Model: [place]    CATARACT REMOVAL WITH IMPLANT Right 2016    COLONOSCOPY  2015    colitis-mild gastritis    COLONOSCOPY N/A 10/25/2018    COLONOSCOPY POLYPECTOMY SNARE/COLD BIOPSY performed by Alysia aCbrera MD at [facility]     Comments: patient assessed. Chart/meds reviewed. Plan of Care: Patient SOB. Increased RR. Keep Duo Q4WA. Re-eval 6/16 at 1630. Electronically signed by José Manuel Fonseca RCP on 6/13/2019 at 5:00 PM    Respiratory Protocol Guidelines     1. Assessment and treatment by Respiratory Therapy will be initiated for medication and therapeutic interventions upon initiation of aerosolized medication. 2. Physician will be contacted for respiratory rate (RR) greater than 35 breaths per minute. Therapy will be held for heart rate (HR) greater than 140 beats per minute, pending direction from physician. 3. Bronchodilators will be administered via Metered Dose Inhaler (MDI) with spacer when the following criteria are met:  a. Alert and cooperative     b. HR < 140 bpm  c. RR < 30 bpm                d. Can demonstrate a 2-3 second inspiratory hold  4. Bronchodilators will be administered via Hand Held Nebulizer BRYANNA [facility]) to patients when ANY of the following criteria are met  a. Incognizant or uncooperative          b. Patients treated with HHN at Home        c. Unable to demonstrate proper use of MDI with spacer     d. RR > 30 bpm   5. Bronchodilators will be delivered via Metered Dose Inhaler (MDI), HHN, Aerogen to intubated patients on mechanical ventilation. 6. Inhalation medication orders will be delivered and/or substituted as outlined below. Aerosolized Medications Ordering and Administration Guidelines:    1. All Medications will be ordered by a physician, and their frequency and/or modality will be adjusted as defined by the patients Respiratory Severity Index (RSI) score. 2. If the patient does not have documented COPD, consider discontinuing anticholinergics when RSI is less than 9.  3. If the bronchospasm worsens (increased RSI), then the bronchodilator frequency can be increased to a maximum of every 4 hours. If greater than every 4 hours is required, the physician will be contacted.   4. If the bronchospasm improves, the frequency of the bronchodilator can be decreased, based on the patient's RSI, but not less than home treatment regimen frequency. 5. Bronchodilator(s) will be discontinued if patient has a RSI less than 9 and has received no scheduled or as needed treatment for 72  Hrs. Patients Ordered on a Mucolytic Agent:    1. Must always be administered with a bronchodilator. 2. Discontinue if patient experiences worsened bronchospasm, or secretions have lessened to the point that the patient is able to clear them with a cough. Anti-inflammatory and Combination Medications:    1. If the patient lacks prior history of lung disease, is not using inhaled anti-inflammatory medication at home, and lacks wheezing by examination or by history for at least 24 hours, contact physician for possible discontinuation.

## 2019-06-13 NOTE — PROGRESS NOTES
pt takes tiotropium bromide olodaterol 2.25-2.25 mcg inhaler at home. has not been taking it here due to it being unavailable in the pharmacy. pt feeling sob on exertion, no chest pain, o2 98%, bp and hr wnl. pt is requesting a breathing tx. please advise, thank you.    Dr Gill Gates

## 2019-06-13 NOTE — PROGRESS NOTES
Hospitalist Progress Note      PCP: Joni Bedolla DO    Date of Admission: 6/11/2019    Chief Complaint: dizziness, palpitations     Hospital Course:     Subjective:     Feels well today without new complaints. No cp, sob. Still in NSR. Medications:  Reviewed    Infusion Medications    dextrose       Scheduled Medications    diltiazem  120 mg Oral Daily    apixaban  5 mg Oral BID    cholestyramine  4 g Oral Daily    donepezil  10 mg Oral Nightly    ferrous sulfate  325 mg Oral Daily with breakfast    mesalamine  400 mg Oral TID    montelukast  10 mg Oral Nightly    pantoprazole  40 mg Oral QAM AC    pramipexole  1 mg Oral Nightly    sertraline  100 mg Oral Daily    tamsulosin  0.4 mg Oral Daily    Tiotropium Bromide-Olodaterol  1 spray Inhalation Daily    sotalol  80 mg Oral Q8H    insulin lispro  0-3 Units Subcutaneous Nightly    predniSONE  20 mg Oral Daily    sodium chloride flush  10 mL Intravenous 2 times per day    insulin lispro  0-12 Units Subcutaneous TID WC    insulin lispro  0-6 Units Subcutaneous Nightly     PRN Meds: hydrALAZINE, cyclobenzaprine, oxyCODONE-acetaminophen, traMADol, glucose, dextrose, glucagon (rDNA), dextrose, sodium chloride flush, magnesium hydroxide      Intake/Output Summary (Last 24 hours) at 6/13/2019 1401  Last data filed at 6/13/2019 0902  Gross per 24 hour   Intake 720 ml   Output 500 ml   Net 220 ml       Physical Exam Performed:    BP (!) 169/80   Pulse 70   Temp 98.2 °F (36.8 °C) (Oral)   Resp 18   Ht 5' 10\" (1.778 m)   Wt 225 lb 9 oz (102.3 kg)   SpO2 98%   BMI 32.36 kg/m²     General appearance: No apparent distress, appears stated age and cooperative. HEENT: Pupils equal, round, and reactive to light. Conjunctivae/corneas clear. Neck: Supple, with full range of motion. No jugular venous distention  Respiratory:  Normal respiratory effort. Clear to auscultation, bilaterally without Rales/Wheezes/Rhonchi.   Cardiovascular: Regular rate and rhythm with normal S1/S2 without murmurs, rubs or gallops. Abdomen: Soft, non-tender, non-distended with normal bowel sounds. Musculoskeletal: No clubbing, cyanosis or edema bilaterally. Skin: Skin color, texture, turgor normal.  No rashes or lesions. Neurologic:  Neurovascularly intact without any focal sensory/motor deficits. Cranial nerves: II-XII intact, grossly non-focal.  Psychiatric: Alert and oriented, thought content appropriate, normal insight  Capillary Refill: Brisk,< 3 seconds   Peripheral Pulses: +2 palpable, equal bilaterally       Labs:   Recent Labs     06/11/19 2038 06/12/19  0513   WBC 7.5 8.7   HGB 13.3* 12.0*   HCT 40.7 36.8*    178     Recent Labs     06/11/19 2038 06/13/19  0438    139   K 4.0 4.1   CL 99 102   CO2 23 28   BUN 13 16   CREATININE 0.9 0.7*   CALCIUM 9.5 9.0     Recent Labs     06/11/19 2038   AST 35   ALT 23   BILITOT 0.7   ALKPHOS 83     No results for input(s): INR in the last 72 hours. Recent Labs     06/11/19 2038 06/11/19  2241   TROPONINI <0.01 <0.01       Urinalysis:      Lab Results   Component Value Date    NITRU Negative 04/24/2019    WBCUA 3-5 04/24/2019    BACTERIA Rare 04/10/2019    RBCUA None seen 04/24/2019    BLOODU Negative 04/24/2019    SPECGRAV 1.020 04/24/2019    GLUCOSEU Negative 04/24/2019       Radiology:  XR CHEST STANDARD (2 VW)   Final Result   Findings favored to reflect atelectasis in the lingula. No evidence of   pulmonary edema. Assessment/Plan:    Active Hospital Problems    Diagnosis    Atrial flutter ([address] [I48.92]       Atrial flutter/fibrillation with RVR - now converted back to NSR with amio gtt. Recent placement of pacer   - continue sotalol per cardiology, appreciate recs. - pacer eval   - continue eliquis for anticoagulation   - optimize lytes     COPD - controlled with recent exacerbation requiring intubation. Stable. Continue home inhalers.  S/p course of prednisone, wean down     Chronic

## 2019-06-13 NOTE — CARE COORDINATION
Case Management  Discharge Summary      DISCHARGE TO: Home with Home Health Care: [facility]     TRANSPORTATION: Family     ORDERS FAXED TO: D/C faxed and called to Isela Obando: N-A    PRE-CERTIFICATION OBTAINED: N-A    NURSE RESPONSIBLE FOR COMPLETION OF PAGE ONE OF CONTINUITY OF CARE (ARTHUR) FORM, RECONCILIATION OF MEDICATIONS, AND TO PLACE A COPY OF FORMS IN PATIENT'S HARD CHART. NURSE TO ENSURE THAT ANY WRITTEN PRESCRIPTIONS ARE GIVEN TO PT UPON DISCHARGE. Patient aware of and agreeable to all arrangements and states no further discharge planning needs.

## 2019-06-13 NOTE — FLOWSHEET NOTE
Tenderness No   Suprapubic Tenderness No   Dysuria No   Urine Assessment   Urine Color Mackenzie   Urine Appearance Clear   Urine Odor Malodorous   Incontinence No   Anus/Rectum   Anus/Rectum (WDL) WDL   Psychosocial   Psychosocial (WDL) WDL

## 2019-06-13 NOTE — PROGRESS NOTES
Progress Note    Date:  2019   Patient: Nereyda Zaman  Age:  76 y.o. Admission:  2019  8:19 PM  Admit DX: Atrial flutter, unspecified type ([address] [I48.92]  Atrial flutter, unspecified type ([address] [I48.92]   LOS: 2 days     Reason for follow up:Atrial tachycardia and Paroxysmal atrial fibrillation     SUBJECTIVE:    The patient was seen and examined. Notes reviewed. There were not complications over night. The patient is being seen for atrial fibrillation. Patient's cardiac review of systems: positive for fatigue. The patient is generally feeling unchanged. Review of Systems    OBJECTIVE:    Telemetry: Sinus  /68   Pulse 60   Temp 98.2 °F (36.8 °C) (Oral)   Resp 18   Ht 5' 10\" (1.778 m)   Wt 225 lb 9 oz (102.3 kg)   SpO2 96%   BMI 32.36 kg/m²   Vital Signs:           Blood pressure 134/68, pulse 60, temperature 98.2 °F (36.8 °C), temperature source Oral, resp. rate 18, height 5' 10\" (1.778 m), weight 225 lb 9 oz (102.3 kg), SpO2 96 %. Temp  Av.4 °F (36.9 °C)  Min: 98.2 °F (36.8 °C)  Max: 98.7 °F (37.1 °C)  Pulse  Av.7  Min: 60  Max: 72  BP  Min: 133/73  Max: 180/86  SpO2  Av.9 %  Min: 94 %  Max: 98 %      Admission Weight:  Weight: 224 lb (101.6 kg)      I/O:     Intake/Output Summary (Last 24 hours) at 2019 1757  Last data filed at 2019 1404  Gross per 24 hour   Intake 960 ml   Output 300 ml   Net 660 ml           EXAM:   CONSTITUTIONAL:  awake, alert, cooperative, no apparent distress, and appears stated age. HEENT:Normal jugular venous pulsations, no carotid bruits. Head is atraumatic, normocephalic. Eyes and oral mucosa are normal.  LUNGS:  No increased work of breathing, good air exchange, clear to auscultation bilaterally, nocrackles or wheezing. CARDIOVASCULAR:  Normal apical impulse, regular rate and rhythm, normal S1 and S2, no S3 or S4, and no murmur noted. ABDOMEN: Soft, nontender, nondistended. Bowel soundspresent.  No masses or tenderness. SKIN: Warm and dry. EXTREMITIES: No lower extremity edema. Motor movement grossly intact. No cyanosis or clubbing. Current Inpatient Medications:   ipratropium-albuterol  1 ampule Inhalation Q4H WA    [START ON 6/14/2019] sotalol  120 mg Oral BID    apixaban  5 mg Oral BID    cholestyramine  4 g Oral Daily    donepezil  10 mg Oral Nightly    ferrous sulfate  325 mg Oral Daily with breakfast    mesalamine  400 mg Oral TID    montelukast  10 mg Oral Nightly    pantoprazole  40 mg Oral QAM AC    pramipexole  1 mg Oral Nightly    sertraline  100 mg Oral Daily    tamsulosin  0.4 mg Oral Daily    Tiotropium Bromide-Olodaterol  1 spray Inhalation Daily    insulin lispro  0-3 Units Subcutaneous Nightly    predniSONE  20 mg Oral Daily    sodium chloride flush  10 mL Intravenous 2 times per day    insulin lispro  0-12 Units Subcutaneous TID WC    insulin lispro  0-6 Units Subcutaneous Nightly      dextrose         Diagnostics:    EKG: Atrial paced rhythm  ECHO: reviewed   Labs:   CBC:   Recent Labs     06/11/19 2038 06/12/19  0513   WBC 7.5 8.7   RBC 4.52 4.05*   HGB 13.3* 12.0*   HCT 40.7 36.8*   MCV 90.1 91.1   RDW 17.2* 17.1*    178     BMP:  Recent Labs     06/11/19 2038 06/13/19  0438    139   K 4.0 4.1   CL 99 102   CO2 23 28   BUN 13 16   CREATININE 0.9 0.7*     BNP: No results for input(s): BNP in the last 72 hours. PT/INR:   Recent Labs     06/11/19 2038   PROT 7.9     APTT:No results for input(s): APTT in the last 72 hours.   CARDIAC ENZYMES:  Recent Labs     06/11/19 2038 06/11/19  2241   TROPONINI <0.01 <0.01     FASTING LIPID PANEL:  Lab Results   Component Value Date    CHOL 135 04/29/2019    HDL 47 04/29/2019    TRIG 210 04/29/2019     LIVER PROFILE:  Recent Labs     06/11/19 2038   AST 35   ALT 23   BILITOT 0.7   ALKPHOS 83       ASSESSMENT AND PLAN:    Patient Active Problem List   Diagnosis    DM II (diabetes mellitus, type II), controlled ([facility]    HLD (hyperlipidemia)    Diabetic neuropathy (HCC)    RLS (restless legs syndrome)    Insomnia    Coronary artery disease involving native coronary artery of native heart without angina pectoris    Seasonal allergic rhinitis    Controlled type 2 diabetes mellitus without complication, without long-term current use of insulin ([facility])    HOLLAND (obstructive sleep apnea)    Essential hypertension    COPD, severe ([facility])    Chronic bilateral low back pain without sciatica    Restless legs syndrome (RLS)    Mixed hyperlipidemia    Depression    Fall at home    T12 vertebral burst fracture    Colitis    Former smoker    Typical atrial flutter ([facility])    Gastroesophageal reflux disease without esophagitis    Short-term memory loss    Nocturia    BPH (benign prostatic hyperplasia)    PAF (paroxysmal atrial fibrillation) ([facility])    Obesity    Anemia    Symptomatic anemia    Chronic diastolic congestive heart failure ([facility])    Community acquired pneumonia    Tracheobronchitis    Elevated PSA    Sinus node dysfunction ([facility])    Pacemaker    Respiratory arrest ([facility])    Acute respiratory distress    S/P cardiac pacemaker procedure    Aspiration into airway    Acute respiratory failure with hypercapnia ([facility])    Atrial flutter ([facility])       1. Paroxysmal atrial fibrillation and atrial tachycardia   -change Sotalol to 120 mg bid   -daily EKG   -daily Magnesium and potassium   -keep Mg>2 and potassium>4  2. Sinus node dysfunction   -pacemaker function normal   -atrial arrhythmias noted  3. Chronic lung disease   -breathing better  4. Diabetes   The patient is asked to make an attempt to improve diet and exercise patterns to aid in medical management of this problem. Please see orders. Discussed with patient and nursing. Thank you for asking me to assist in the care of this patient. Please contact me if you have any questions regarding her evaluation.     All questions and concerns were addressed to the patient/family. Alternatives to my treatment were discussed. The note was completed using EMR. Every effortwas made to ensure accuracy; however, inadvertent computerized transcription errors may be present. CHAMP [address]  ([address] office

## 2019-06-14 PROBLEM — I47.1 ATRIAL TACHYCARDIA (HCC): Status: ACTIVE | Noted: 2019-01-01

## 2019-06-14 PROBLEM — I47.19 ATRIAL TACHYCARDIA: Status: ACTIVE | Noted: 2019-01-01

## 2019-06-14 NOTE — PROGRESS NOTES
Hospitalist Progress Note      PCP: Courtney Duarte DO    Date of Admission: 6/11/2019    Chief Complaint: dizziness, palpitations     Hospital Course:     77 yo M with history of afib p/w afib with RVR and dizziness. Sotalol dose being adjusted. Subjective:     Remains in NSR. Sotalol dosing adjusted. No cp, sob. No other new complaints. Medications:  Reviewed    Infusion Medications    dextrose       Scheduled Medications    potassium chloride  20 mEq Oral Once    magnesium sulfate  2 g Intravenous Once    ipratropium-albuterol  1 ampule Inhalation Q4H WA    sotalol  120 mg Oral BID    apixaban  5 mg Oral BID    cholestyramine  4 g Oral Daily    donepezil  10 mg Oral Nightly    ferrous sulfate  325 mg Oral Daily with breakfast    mesalamine  400 mg Oral TID    montelukast  10 mg Oral Nightly    pantoprazole  40 mg Oral QAM AC    pramipexole  1 mg Oral Nightly    sertraline  100 mg Oral Daily    tamsulosin  0.4 mg Oral Daily    Tiotropium Bromide-Olodaterol  1 spray Inhalation Daily    insulin lispro  0-3 Units Subcutaneous Nightly    predniSONE  20 mg Oral Daily    sodium chloride flush  10 mL Intravenous 2 times per day    insulin lispro  0-12 Units Subcutaneous TID WC    insulin lispro  0-6 Units Subcutaneous Nightly     PRN Meds: hydrALAZINE, cyclobenzaprine, oxyCODONE-acetaminophen, traMADol, glucose, dextrose, glucagon (rDNA), dextrose, sodium chloride flush, magnesium hydroxide      Intake/Output Summary (Last 24 hours) at 6/14/2019 1226  Last data filed at 6/14/2019 1103  Gross per 24 hour   Intake 1200 ml   Output 800 ml   Net 400 ml       Physical Exam Performed:    BP (!) 156/88   Pulse 60   Temp 97.6 °F (36.4 °C) (Oral)   Resp 14   Ht 5' 10\" (1.778 m)   Wt 225 lb 9 oz (102.3 kg)   SpO2 99%   BMI 32.36 kg/m²     General appearance: No apparent distress, appears stated age and cooperative. HEENT: Pupils equal, round, and reactive to light.  Conjunctivae/corneas clear.  Neck: Supple, with full range of motion. No jugular venous distention  Respiratory:  Normal respiratory effort. Clear to auscultation, bilaterally without Rales/Wheezes/Rhonchi. Cardiovascular: Regular rate and rhythm with normal S1/S2 without murmurs, rubs or gallops. Abdomen: Soft, non-tender, non-distended with normal bowel sounds. Musculoskeletal: No clubbing, cyanosis or edema bilaterally. Skin: Skin color, texture, turgor normal.  No rashes or lesions. Neurologic:  Neurovascularly intact without any focal sensory/motor deficits. Cranial nerves: II-XII intact, grossly non-focal.  Psychiatric: Alert and oriented, thought content appropriate, normal insight  Capillary Refill: Brisk,< 3 seconds   Peripheral Pulses: +2 palpable, equal bilaterally       Labs:   Recent Labs     06/11/19 2038 06/12/19  0513   WBC 7.5 8.7   HGB 13.3* 12.0*   HCT 40.7 36.8*    178     Recent Labs     06/11/19 2038 06/13/19  0438 06/14/19  0945    139  --    K 4.0 4.1 3.7   CL 99 102  --    CO2 23 28  --    BUN 13 16  --    CREATININE 0.9 0.7*  --    CALCIUM 9.5 9.0  --      Recent Labs     06/11/19 2038   AST 35   ALT 23   BILITOT 0.7   ALKPHOS 83     No results for input(s): INR in the last 72 hours. Recent Labs     06/11/19 2038 06/11/19  2241   TROPONINI <0.01 <0.01       Urinalysis:      Lab Results   Component Value Date    NITRU Negative 04/24/2019    WBCUA 3-5 04/24/2019    BACTERIA Rare 04/10/2019    RBCUA None seen 04/24/2019    BLOODU Negative 04/24/2019    SPECGRAV 1.020 04/24/2019    GLUCOSEU Negative 04/24/2019       Radiology:  XR CHEST STANDARD (2 VW)   Final Result   Findings favored to reflect atelectasis in the lingula. No evidence of   pulmonary edema. Assessment/Plan:    Active Hospital Problems    Diagnosis    Atrial flutter ([address] [I48.92]       Atrial flutter/fibrillation with RVR - now converted back to NSR with amio gtt on admission. Recent placement of pacer.  EP

## 2019-06-14 NOTE — PROGRESS NOTES
Patient alert and oriented x4 resting in bed, in stable condition. Denies CP or shortness of breath. VSS and assessment completed. Patient denies any needs. Call light and bedside table within reach, bed in lowest position and locked with 2/4 rails raised. RN will continue to monitor. 2hr EKG post Betapace ordered.

## 2019-06-14 NOTE — PROGRESS NOTES
[facility]     Electrophysiology                                     Progress Note    Admission date:  2019    Reason for follow up visit: PAF    HPI/CC: Polo Laguna was admitted on 2019 with palpitations and has been treated for PAF and AT. Converted on IV amiodarone then home sotalol was increased. Rhythm has been paced. Subjective: He denies chest pain, palpitations, shortness of breath, and dizziness. Vitals:  Blood pressure (!) 156/88, pulse 60, temperature 97.6 °F (36.4 °C), temperature source Oral, resp. rate 14, height 5' 10\" (1.778 m), weight 225 lb 9 oz (102.3 kg), SpO2 99 %.   Temp  Av °F (36.7 °C)  Min: 96.8 °F (36 °C)  Max: 98.8 °F (37.1 °C)  Pulse  Av  Min: 60  Max: 74  BP  Min: 134/68  Max: 169/80  SpO2  Av.8 %  Min: 94 %  Max: 99 %    24 hour I/O    Intake/Output Summary (Last 24 hours) at 2019 0934  Last data filed at 2019 8123  Gross per 24 hour   Intake 840 ml   Output 800 ml   Net 40 ml     Current Facility-Administered Medications   Medication Dose Route Frequency Provider Last Rate Last Dose    hydrALAZINE (APRESOLINE) injection 10 mg  10 mg Intravenous Q2H PRN Carmelita Krishnan MD        ipratropium-albuterol (DUONEB) nebulizer solution 1 ampule  1 ampule Inhalation Q4H WA Thais Sewell MD   1 ampule at 19 0838    sotalol (BETAPACE) tablet 120 mg  120 mg Oral BID Sary Arceo MD   120 mg at 19 0852    apixaban (ELIQUIS) tablet 5 mg  5 mg Oral BID Carmelita Krishnan MD   5 mg at 19 0851    cholestyramine (QUESTRAN) packet 4 g  4 g Oral Daily Carmelita Krishnan MD   4 g at 19 0851    cyclobenzaprine (FLEXERIL) tablet 10 mg  10 mg Oral TID PRN Carmelita Krishnan MD   10 mg at 19 2333    donepezil (ARICEPT) tablet 10 mg  10 mg Oral Nightly Carmelita Krishnan MD   10 mg at 19    ferrous sulfate tablet 325 mg  325 mg Oral Daily with breakfast Carmelita Krishnan MD   325 mg at 06/14/19 0852    mesalamine (DELZICOL) delayed release capsule 400 mg  400 mg Oral TID Benny Ovalle MD   400 mg at 06/13/19 2043    montelukast (SINGULAIR) tablet 10 mg  10 mg Oral Nightly Benny Ovalle MD   10 mg at 06/13/19 2042    pantoprazole (PROTONIX) tablet 40 mg  40 mg Oral QAM AC Benny Ovalle MD   40 mg at 06/14/19 0646    oxyCODONE-acetaminophen (PERCOCET)  MG per tablet 1 tablet  1 tablet Oral Q6H PRN Benny Ovalle MD   1 tablet at 06/14/19 0851    pramipexole (MIRAPEX) tablet 1 mg  1 mg Oral Nightly Benny Ovalle MD   1 mg at 06/13/19 2042    sertraline (ZOLOFT) tablet 100 mg  100 mg Oral Daily Benny Ovalle MD   100 mg at 06/14/19 0852    tamsulosin (FLOMAX) capsule 0.4 mg  0.4 mg Oral Daily Benny Ovalle MD   0.4 mg at 06/14/19 0851    Tiotropium Bromide-Olodaterol 2.5-2.5 MCG/ACT AERS 1 spray  1 spray Inhalation Daily Benny Ovalle MD        traMADol (ULTRAM) tablet 50 mg  50 mg Oral Q12H PRN Benny Ovalle MD   50 mg at 06/12/19 0440    glucose (GLUTOSE) 40 % oral gel 15 g  15 g Oral PRN Dione Sahni MD        dextrose 50 % IV solution  12.5 g Intravenous PRN Dione Sahni MD        glucagon (rDNA) injection 1 mg  1 mg Intramuscular PRN Dione Sahni MD        dextrose 5 % solution  100 mL/hr Intravenous PRN Dione Sahni MD        insulin lispro (HUMALOG) injection vial 0-3 Units  0-3 Units Subcutaneous Nightly Dione Sahni MD        predniSONE (DELTASONE) tablet 20 mg  20 mg Oral Daily Dione Sahni MD   20 mg at 06/14/19 1401    sodium chloride flush 0.9 % injection 10 mL  10 mL Intravenous 2 times per day Benny Ovalle MD   10 mL at 06/14/19 0853    sodium chloride flush 0.9 % injection 10 mL  10 mL Intravenous PRN Benny Ovalle MD        magnesium hydroxide (MILK OF MAGNESIA) 400 MG/5ML suspension 30 mL  30 mL Oral Daily PRN Benny Ovalle MD        insulin lispro (HUMALOG) injection vial 0-12 Units  0-12 Units Subcutaneous TID WC Kyle Andujar MD   2 Units at 06/13/19 1703    insulin lispro (HUMALOG) injection vial 0-6 Units  0-6 Units Subcutaneous Nightly Kyle Andujar MD   1 Units at 06/12/19 0149       Objective:     Telemetry monitor: paced     Physical Exam:  Constitutional and general appearance: alert, cooperative, no distress and appears stated age  HEENT: PERRL, no cervical lymphadenopathy. No masses palpable. Normal oral mucosa  Respiratory:  · Normal excursion and expansion without use of accessory muscles  · Resp auscultation: Normal breath sounds without wheezing, rhonchi, and rales  Cardiovascular:  · The apical impulse is not displaced  · Heart tones are crisp and normal. regular S1 and S2.  · Jugular venous pulsation Normal  · The carotid upstroke is normal in amplitude and contour without delay or bruit  · Peripheral pulses are symmetrical and full   Abdomen:  · No masses or tenderness  · Bowel sounds present  Extremities:  ·  No cyanosis or clubbing  ·  No lower extremity edema  ·  Skin: warm and dry; left upper chest incision is closed and healed  Neurological:  · Alert and oriented  · Moves all extremities well  · No abnormalities of mood, affect, memory, mentation, or behavior are noted    Data  Echo 5/22/2019:  Normal left ventricular systolic function with ejection fraction of 55-60%.   No regional wall motion abnormalites are seen.   Mild concentric left ventricular hypertrophy.   Grade I diastolic dysfunction with normal filling pressure.   Mild mitral regurgitation.     Echo 12/22/2018:  Technically difficult examination.   Normal systolic function with an estimated ejection fraction of 55%.   Mild concentric left ventricular hypertrophy.   No regional wall motion abnormalities are seen.   Normal left ventricular diastolic filling pressure.   The right atrium and ventricle are mildly dilated with normal function.   No significant valvular heart disease.     Stress test 1/29/2019: There is normal isotope uptake at stress and rest. There is no evidence of    myocardial ischemia or scar. LV function is normal with uniform wall motion    and ejection fraction of 65%. Low risk study. All labs and testing reviewed. Lab Review     Renal Profile:   Lab Results   Component Value Date    CREATININE 0.7 06/13/2019    BUN 16 06/13/2019     06/13/2019    K 4.1 06/13/2019    K 4.0 06/11/2019     06/13/2019    CO2 28 06/13/2019     CBC:    Lab Results   Component Value Date    WBC 8.7 06/12/2019    RBC 4.05 06/12/2019    HGB 12.0 06/12/2019    HCT 36.8 06/12/2019    MCV 91.1 06/12/2019    RDW 17.1 06/12/2019     06/12/2019     BNP:  No results found for: BNP  Fasting Lipid Panel:    Lab Results   Component Value Date    CHOL 135 04/29/2019    HDL 47 04/29/2019    TRIG 210 04/29/2019     Cardiac Enzymes:  CK/MbTroponin  Lab Results   Component Value Date    CKTOTAL 205 04/18/2017    TROPONINI <0.01 06/11/2019     PT/ INR   Lab Results   Component Value Date    INR 1.14 06/01/2019    INR 1.16 05/31/2019    INR 0.99 01/19/2019    PROTIME 13.0 06/01/2019    PROTIME 13.2 05/31/2019    PROTIME 11.3 01/19/2019     PTT No results found for: PTT   Lab Results   Component Value Date    MG 2.00 06/07/2019      Lab Results   Component Value Date    TSH 0.98 08/27/2018       Assessment:  Paroxysmal atrial arrhythmias (PAF and AT): stable, improved    -sotalol increased 6/14/2019   -[id number] score 6 (age, HTN, CHF, DM, DVT)  Sinus node dysfunction: stable   -s/p dual chamber pacemaker implant 5/31/2019  HTN: control is varied  Chronic diastolic CHF: compensated   History of hyperkalemia: resolved off spironolactone  Anemia: chronic, stable  History of diverticulosis and GI bleeding: Xarelto was stopped in 11/2018 but tolerating Eliquis  DM  Chronic hepatitis  COPD    Plan:   1. Continue Eliquis  2. Continue sotalol 120mg po BID  3.  EKG 2 hours after each dose   4. Daily mag and potassium (keep K 4 or higher and mag 2 or higher)  5. Will add amlodipine if BP is persistently elevated    Dispo: likely 6/15/2019  Discussed EKG and medications with Dr. Joseph Alexander. Patient was seen outside of global visit for pacemaker due to symptomatic atrial arrhythmias.      Dougie Pandey, APRN-CNP  [address] [place] [facility]\[address]  [phone]

## 2019-06-15 NOTE — PROGRESS NOTES
[address]     Electrophysiology                                     Progress Note    Admission date:  2019    Reason for follow up visit: PAF    HPI/CC: Lilia Downing was admitted on 2019 with palpitations and has been treated for PAF and AT. Converted on IV amiodarone then home sotalol was increased. Rhythm has been sinus with intermittent pacing. Subjective: Had some SOB this am. Denies chest pain, palpitations, and dizziness. Vitals:  Blood pressure (!) 195/98, pulse 62, temperature 97.6 °F (36.4 °C), temperature source Oral, resp. rate 16, height 5' 10\" (1.778 m), weight 227 lb 1.6 oz (103 kg), SpO2 97 %.   Temp  Av.7 °F (36.5 °C)  Min: 96.8 °F (36 °C)  Max: 98.3 °F (36.8 °C)  Pulse  Av.3  Min: 60  Max: 71  BP  Min: 143/67  Max: 195/98  SpO2  Av.3 %  Min: 94 %  Max: 99 %    24 hour I/O    Intake/Output Summary (Last 24 hours) at 6/15/2019 0826  Last data filed at 6/15/2019 3954  Gross per 24 hour   Intake 600 ml   Output 600 ml   Net 0 ml     Current Facility-Administered Medications   Medication Dose Route Frequency Provider Last Rate Last Dose    hydrALAZINE (APRESOLINE) injection 10 mg  10 mg Intravenous Q2H PRN Howard Canchola MD   10 mg at 06/15/19 0536    ipratropium-albuterol (DUONEB) nebulizer solution 1 ampule  1 ampule Inhalation Q4H WA Terrence Echeverria MD   1 ampule at 06/15/19 0818    sotalol (BETAPACE) tablet 120 mg  120 mg Oral BID Jerome Horvath MD   120 mg at 19    apixaban (ELIQUIS) tablet 5 mg  5 mg Oral BID Howard Canchola MD   5 mg at 19    cholestyramine (QUESTRAN) packet 4 g  4 g Oral Daily Howard Canchola MD   4 g at 19 0851    cyclobenzaprine (FLEXERIL) tablet 10 mg  10 mg Oral TID PRN Howard Canchola MD   10 mg at 19 2333    donepezil (ARICEPT) tablet 10 mg  10 mg Oral Nightly Howard Canchola MD   10 mg at 19    ferrous sulfate tablet 325 mg  325 mg Oral Daily with breakfast Galilea Neil MD   325 mg at 06/14/19 0852    mesalamine (DELZICOL) delayed release capsule 400 mg  400 mg Oral TID Galilea Neil MD   400 mg at 06/14/19 2100    montelukast (SINGULAIR) tablet 10 mg  10 mg Oral Nightly Galilea Neil MD   10 mg at 06/14/19 2056    pantoprazole (PROTONIX) tablet 40 mg  40 mg Oral QAM AC Galilea Neil MD   40 mg at 06/15/19 0528    oxyCODONE-acetaminophen (PERCOCET)  MG per tablet 1 tablet  1 tablet Oral Q6H PRN Galilea Neil MD   1 tablet at 06/14/19 1916    pramipexole (MIRAPEX) tablet 1 mg  1 mg Oral Nightly Galilea Neil MD   1 mg at 06/14/19 2056    sertraline (ZOLOFT) tablet 100 mg  100 mg Oral Daily Galilea Neil MD   100 mg at 06/14/19 0852    tamsulosin (FLOMAX) capsule 0.4 mg  0.4 mg Oral Daily Galilea Neil MD   0.4 mg at 06/14/19 0851    Tiotropium Bromide-Olodaterol 2.5-2.5 MCG/ACT AERS 1 spray  1 spray Inhalation Daily Galilea Neil MD        traMADol (ULTRAM) tablet 50 mg  50 mg Oral Q12H PRN Galilea Neil MD   50 mg at 06/12/19 0440    glucose (GLUTOSE) 40 % oral gel 15 g  15 g Oral PRN Shailesh Garrido MD        dextrose 50 % IV solution  12.5 g Intravenous PRN Shailesh Garrido MD        glucagon (rDNA) injection 1 mg  1 mg Intramuscular PRN Shailesh Garrido MD        dextrose 5 % solution  100 mL/hr Intravenous PRN Shailesh Garrido MD        insulin lispro (HUMALOG) injection vial 0-3 Units  0-3 Units Subcutaneous Nightly Shailesh Garrido MD        sodium chloride flush 0.9 % injection 10 mL  10 mL Intravenous 2 times per day Galilea Neil MD   10 mL at 06/14/19 2057    sodium chloride flush 0.9 % injection 10 mL  10 mL Intravenous PRN Galilea Neil MD        magnesium hydroxide (MILK OF MAGNESIA) 400 MG/5ML suspension 30 mL  30 mL Oral Daily PRN Galilea Neil MD        insulin lispro (HUMALOG) injection vial 0-12 Units  0-12 Units 1/29/2019: There is normal isotope uptake at stress and rest. There is no evidence of    myocardial ischemia or scar. LV function is normal with uniform wall motion    and ejection fraction of 65%. Low risk study. All labs and testing reviewed. Lab Review     Renal Profile:   Lab Results   Component Value Date    CREATININE 0.7 06/13/2019    BUN 16 06/13/2019     06/13/2019    K 3.9 06/15/2019    K 4.0 06/11/2019     06/13/2019    CO2 28 06/13/2019     CBC:    Lab Results   Component Value Date    WBC 8.7 06/12/2019    RBC 4.05 06/12/2019    HGB 12.0 06/12/2019    HCT 36.8 06/12/2019    MCV 91.1 06/12/2019    RDW 17.1 06/12/2019     06/12/2019     BNP:  No results found for: BNP  Fasting Lipid Panel:    Lab Results   Component Value Date    CHOL 135 04/29/2019    HDL 47 04/29/2019    TRIG 210 04/29/2019     Cardiac Enzymes:  CK/MbTroponin  Lab Results   Component Value Date    CKTOTAL 205 04/18/2017    TROPONINI <0.01 06/11/2019     PT/ INR   Lab Results   Component Value Date    INR 1.14 06/01/2019    INR 1.16 05/31/2019    INR 0.99 01/19/2019    PROTIME 13.0 06/01/2019    PROTIME 13.2 05/31/2019    PROTIME 11.3 01/19/2019     PTT No results found for: PTT   Lab Results   Component Value Date    MG 1.90 06/15/2019      Lab Results   Component Value Date    TSH 0.98 08/27/2018       Assessment:  Paroxysmal atrial arrhythmias (PAF and AT): stable, improved    -sotalol increased 6/14/2019   -[id number] score 6 (age, HTN, CHF, DM, DVT)  Sinus node dysfunction: stable   -s/p dual chamber pacemaker implant 5/31/2019  HTN: BP elevated  Chronic diastolic CHF: compensated   History of hyperkalemia: resolved off spironolactone  Anemia: chronic, stable  History of diverticulosis and GI bleeding: Xarelto was stopped in 11/2018 but tolerating Eliquis  DM  Chronic hepatitis  COPD    Plan:   1. Continue Eliquis  2. Continue sotalol 120mg po BID  3. EKG 2 hours after each dose of sotalol  4.  Daily mag and potassium (keep K 4 or higher and mag 2 or higher)  5. Start amlodipine 5mg po QD    Dispo: likely 6/15/2019 after 11am EKG if QTc stable and BP improves    Discussed EKG and medications with Dr. Maty Mohamud. Patient was seen outside of global visit for pacemaker due to symptomatic atrial arrhythmias.      Leigha Hurt, APRN-CNP  [facility]  [phone]

## 2019-06-15 NOTE — DISCHARGE SUMMARY
deformity. Skin: Skin color, texture, turgor normal.  No rashes or lesions. Neurologic:  Neurovascularly intact without any focal sensory/motor deficits. Cranial nerves: II-XII intact, grossly non-focal.  Psychiatric:  Alert and oriented, thought content appropriate, normal insight  Capillary Refill: Brisk,< 3 seconds   Peripheral Pulses: +2 palpable, equal bilaterally       Labs: For convenience and continuity at follow-up the following most recent labs are provided:      CBC:    Lab Results   Component Value Date    WBC 8.7 06/12/2019    HGB 12.0 06/12/2019    HCT 36.8 06/12/2019     06/12/2019       Renal:    Lab Results   Component Value Date     06/13/2019    K 3.9 06/15/2019    K 4.0 06/11/2019     06/13/2019    CO2 28 06/13/2019    BUN 16 06/13/2019    CREATININE 0.7 06/13/2019    CALCIUM 9.0 06/13/2019    PHOS 2.5 12/25/2018         Significant Diagnostic Studies    Radiology:   XR CHEST STANDARD (2 VW)   Final Result   Findings favored to reflect atelectasis in the lingula. No evidence of   pulmonary edema. Consults:     IP CONSULT TO HOSPITALIST  IP CONSULT TO CARDIOLOGY  IP CONSULT TO HOME CARE NEEDS    Disposition:  Home     Condition at Discharge: Stable    Discharge Instructions/Follow-up:  PCP, cardiology    Code Status:  Full Code     Activity: activity as tolerated    Diet:  DIET CARB CONTROL;        Discharge Medications:     Current Discharge Medication List           Details   amLODIPine (NORVASC) 5 MG tablet Take 1 tablet by mouth daily  Qty: 30 tablet, Refills: 5              Details   sotalol (BETAPACE) 120 MG tablet Take 1 tablet by mouth 2 times daily  Qty: 60 tablet, Refills: 5    Associated Diagnoses: Paroxysmal atrial fibrillation (HCC)              Details   cyclobenzaprine (FLEXERIL) 10 MG tablet TAKE 1 TABLET BY MOUTH 3 TIMES DAILY AS NEEDED FOR MUSCLE SPASMS  Qty: 270 tablet, Refills: 0      traMADol (ULTRAM) 50 MG tablet Take 50 mg by mouth every 12 hours as needed for Pain. Tiotropium Bromide-Olodaterol (STIOLTO RESPIMAT) 2.5-2.5 MCG/ACT AERS 2 inhalations daily  Qty: 3 Inhaler, Refills: 1      mesalamine (LIALDA) 1.2 g EC tablet TAKE 1 TABLET THREE TIMES DAILY  Qty: 270 tablet, Refills: 1      sertraline (ZOLOFT) 100 MG tablet Take 1 tablet by mouth daily  Qty: 90 tablet, Refills: 3    Associated Diagnoses: Depressed affect      montelukast (SINGULAIR) 10 MG tablet Take 1 tablet by mouth nightly  Qty: 90 tablet, Refills: 3    Associated Diagnoses: COPD, severe ([address] Perennial allergic rhinitis      pramipexole (MIRAPEX) 1 MG tablet Take 1 tablet by mouth nightly  Qty: 90 tablet, Refills: 3    Associated Diagnoses: Restless legs syndrome (RLS)      donepezil (ARICEPT) 10 MG tablet Take 1 tablet by mouth nightly  Qty: 90 tablet, Refills: 1      ferrous sulfate 325 (65 Fe) MG tablet Take 1 tablet by mouth daily (with breakfast)  Qty: 90 tablet, Refills: 2      colesevelam (WELCHOL) 625 MG tablet TAKE 3 TABLETS TWICE DAILY  Qty: 540 tablet, Refills: 1      apixaban (ELIQUIS) 5 MG TABS tablet Take 1 tablet by mouth 2 times daily  Qty: 180 tablet, Refills: 1      tamsulosin (FLOMAX) 0.4 MG capsule TAKE 1 CAPSULE EVERY DAY  Qty: 90 capsule, Refills: 3      omeprazole (PRILOSEC) 40 MG delayed release capsule TAKE 1 CAPSULE EVERY DAY  Qty: 90 capsule, Refills: 3    Associated Diagnoses: Gastroesophageal reflux disease without esophagitis      metFORMIN (GLUCOPHAGE) 500 MG tablet TAKE 1 TABLET TWICE DAILY  Qty: 180 tablet, Refills: 1    Associated Diagnoses: Controlled type 2 diabetes mellitus without complication, without long-term current use of insulin (HCC)      oxyCODONE-acetaminophen (PERCOCET)  MG per tablet Take 1 tablet by mouth every 6 hours as needed for Pain. Indy Pedro glucose blood VI test strips (ASCENSIA AUTODISC VI;ONE TOUCH ULTRA TEST VI) strip [employer] True Metrix Air Test Strips. FSBS daily.  DX E11.9  Qty: 100 strip, Refills: 3

## 2019-06-15 NOTE — CARE COORDINATION
CASE MANAGEMENT DISCHARGE SUMMARY      Discharge to: Home with [facility]    Transportation:    Family/car: yes       Note: Discharging nurse to complete ARTHUR, reconcile AVS, and place final copy with patient's discharge packet. RN to ensure that written prescriptions for  Level II medications are sent with patient to the facility as per protocol.

## 2019-06-15 NOTE — FLOWSHEET NOTE
06/14/19 1938   Assessment   Charting Type Shift assessment   Neurological   Neuro (WDL) WDL   Level of Consciousness 0   Orientation Level Oriented X4   Cognition Appropriate judgement; Appropriate safety awareness; Appropriate attention/concentration; Appropriate for developmental age; Follows commands   Language Clear   Eyad Coma Scale   Eye Opening 4   Best Verbal Response 5   Best Motor Response 6   Eyad Coma Scale Score 15   HEENT   HEENT (WDL) X   Teeth Edentulous   Respiratory   Respiratory (WDL) X   R Breath Sounds Diminished   L Breath Sounds Diminished   Respiratory Quality/Effort Unlabored   Chest Assessment Chest expansion symmetrical;Trachea midline   Respiratory Pattern Regular   Respiratory Depth Normal   Breath Sounds   Right Upper Lobe Diminished   Right Middle Lobe Diminished   Right Lower Lobe Diminished   Left Upper Lobe Diminished   Left Lower Lobe Diminished   Cardiac   Cardiac (WDL) X   Cardiac Rhythm Atrial Paced   Cardiac Regularity Regular   Heart Sounds S1, S2   Rhythm Interpretation   Pulse 63   Cardiac Monitor   Telemetry Monitor On Yes   Telemetry Audible Yes   Telemetry Alarms Set Yes   Telemetry Box Number 43   Pacemaker   Pacemaker Yes   Pacemaker Type Permanent   Gastrointestinal   Abdominal (WDL) WDL   Peripheral Vascular   Peripheral Vascular (WDL) X   Edema Right lower extremity; Left lower extremity   RLE Edema Trace; Non-pitting   LLE Edema Trace; Non-pitting   Skin Color/Condition   Skin Color/Condition (WDL) WDL   Skin Color Appropriate for ethnicity   Skin Condition/Temp Warm   Skin Integrity   Skin Integrity (WDL) WDL   Musculoskeletal   Musculoskeletal (WDL) WDL   RUE Full movement   RL Extremity Full movement   LUE Full movement   LL Extremity Full movement   Genitourinary   Genitourinary (WDL) WDL   Flank Tenderness No   Suprapubic Tenderness No   Dysuria No   Anus/Rectum   Anus/Rectum (WDL) WDL   Psychosocial   Psychosocial (WDL) WDL

## 2019-06-15 NOTE — PROGRESS NOTES
EKG reviewed by Dr. Mulugeta Worley. OK for discharge on sotalol 120mg po BID. Office to arrange follow up.      Ric Fabry, [address]  [phone]

## 2019-06-17 NOTE — TELEPHONE ENCOUNTER
.  Last office visit 9/10/2018     Last written 5-16-18  With 3 refills      Next office visit scheduled 10-30-19    Requested Prescriptions     Pending Prescriptions Disp Refills    blood glucose test strips (ASCENSIA AUTODISC VI;ONE TOUCH ULTRA TEST VI) strip 100 strip 3     Sig: [employer] True Metrix Air Test Strips. FSBS daily.  DX E11.9

## 2019-06-25 NOTE — PROGRESS NOTES
Post-Discharge Transitional Care Management Services or Hospital Follow Up      Elaine Frost   YOB: 1945    Date of Office Visit:  6/25/2019  Date of Hospital Admission: 6/11/19  Date of Hospital Discharge: 6/15/19  Risk of hospital readmission (high >=14%.  Medium >=10%) :Readmission Risk Score: 34      Care management risk score Rising risk (score 2-5) and Complex Care (Scores >=6): 7     Non face to face  following discharge, date last encounter closed (first attempt may have been earlier): *No documented post hospital discharge outreach found in the last 14 days    Call initiated 2 business days of discharge: *No response recorded in the last 14 days    Patient Active Problem List   Diagnosis    DM II (diabetes mellitus, type II), controlled ([address]    HLD (hyperlipidemia)    Diabetic neuropathy ([address]    RLS (restless legs syndrome)    Insomnia    Coronary artery disease involving native coronary artery of native heart without angina pectoris    Seasonal allergic rhinitis    Controlled type 2 diabetes mellitus without complication, without long-term current use of insulin ([address]    HOLLAND (obstructive sleep apnea)    Essential hypertension    COPD, severe (HCC)    Chronic bilateral low back pain without sciatica    Restless legs syndrome (RLS)    Mixed hyperlipidemia    Depression    Fall at home    T12 vertebral burst fracture    Colitis    Former smoker    Typical atrial flutter ([address]    Gastroesophageal reflux disease without esophagitis    Short-term memory loss    Nocturia    BPH (benign prostatic hyperplasia)    PAF (paroxysmal atrial fibrillation) (HCC)    Obesity    Anemia    Symptomatic anemia    Chronic diastolic congestive heart failure ([address]    Community acquired pneumonia    Tracheobronchitis    Elevated PSA    Sinus node dysfunction ([address]    Pacemaker    Respiratory arrest ([address]    S/P cardiac pacemaker procedure    Aspiration into airway    Acute respiratory failure with hypercapnia (HCC)    Atrial tachycardia (HCC)       No Known Allergies    Medications listed as ordered at the time of discharge from hospital   Krystian Rogers   Home Medication Instructions ESTEFANIA:    Printed on:06/25/19 1924   Medication Information                      amLODIPine (NORVASC) 5 MG tablet  Take 1 tablet by mouth daily             apixaban (ELIQUIS) 5 MG TABS tablet  Take 1 tablet by mouth 2 times daily             blood glucose test strips (ASCENSIA AUTODISC VI;ONE TOUCH ULTRA TEST VI) strip  [employer] True [employer] Air Test Strips. FSBS daily. DX E11.9             colesevelam (WELCHOL) 625 MG tablet  TAKE 3 TABLETS TWICE DAILY             cyclobenzaprine (FLEXERIL) 10 MG tablet  TAKE 1 TABLET BY MOUTH 3 TIMES DAILY AS NEEDED FOR MUSCLE SPASMS             donepezil (ARICEPT) 10 MG tablet  Take 1 tablet by mouth nightly             ferrous sulfate 325 (65 Fe) MG tablet  Take 1 tablet by mouth daily (with breakfast)             mesalamine (LIALDA) 1.2 g EC tablet  TAKE 1 TABLET THREE TIMES DAILY             metFORMIN (GLUCOPHAGE) 500 MG tablet  TAKE 1 TABLET TWICE DAILY             montelukast (SINGULAIR) 10 MG tablet  Take 1 tablet by mouth nightly             omeprazole (PRILOSEC) 40 MG delayed release capsule  TAKE 1 CAPSULE EVERY DAY             oxyCODONE-acetaminophen (PERCOCET)  MG per tablet  Take 1 tablet by mouth every 6 hours as needed for Pain. .             pramipexole (MIRAPEX) 1 MG tablet  Take 1 tablet by mouth nightly             Pyridoxine HCl (VITAMIN B-6 PO)  Take by mouth daily             sertraline (ZOLOFT) 100 MG tablet  Take 1 tablet by mouth daily             sotalol (BETAPACE) 120 MG tablet  Take 1 tablet by mouth 2 times daily             tamsulosin (FLOMAX) 0.4 MG capsule  TAKE 1 CAPSULE EVERY DAY             Tiotropium Bromide-Olodaterol (STIOLTO RESPIMAT) 2.5-2.5 MCG/ACT AERS  2 inhalations daily             traMADol (ULTRAM) 50 MG tablet  Take 50 mg by mouth every 12 hours as needed for Pain. TRUEPLUS LANCETS 28G MISC  1 each by Does not apply route daily E11.9 Test FBS daily--NEED 33 GAUGE                   Medications marked \"taking\" at this time  Outpatient Medications Marked as Taking for the 6/25/19 encounter (Office Visit) with Harley Curling, PA   Medication Sig Dispense Refill    Pyridoxine HCl (VITAMIN B-6 PO) Take by mouth daily      Tiotropium Bromide-Olodaterol (STIOLTO RESPIMAT) 2.5-2.5 MCG/ACT AERS 2 inhalations daily 3 Inhaler 1    blood glucose test strips (ASCENSIA AUTODISC VI;ONE TOUCH ULTRA TEST VI) strip [employer] True [employer] Air Test Strips. FSBS daily. DX E11.9 100 strip 3    sotalol (BETAPACE) 120 MG tablet Take 1 tablet by mouth 2 times daily 60 tablet 5    amLODIPine (NORVASC) 5 MG tablet Take 1 tablet by mouth daily 30 tablet 5    cyclobenzaprine (FLEXERIL) 10 MG tablet TAKE 1 TABLET BY MOUTH 3 TIMES DAILY AS NEEDED FOR MUSCLE SPASMS 270 tablet 0    traMADol (ULTRAM) 50 MG tablet Take 50 mg by mouth every 12 hours as needed for Pain.       mesalamine (LIALDA) 1.2 g EC tablet TAKE 1 TABLET THREE TIMES DAILY 270 tablet 1    sertraline (ZOLOFT) 100 MG tablet Take 1 tablet by mouth daily 90 tablet 3    montelukast (SINGULAIR) 10 MG tablet Take 1 tablet by mouth nightly 90 tablet 3    pramipexole (MIRAPEX) 1 MG tablet Take 1 tablet by mouth nightly 90 tablet 3    donepezil (ARICEPT) 10 MG tablet Take 1 tablet by mouth nightly 90 tablet 1    ferrous sulfate 325 (65 Fe) MG tablet Take 1 tablet by mouth daily (with breakfast) 90 tablet 2    colesevelam (WELCHOL) 625 MG tablet TAKE 3 TABLETS TWICE DAILY 540 tablet 1    apixaban (ELIQUIS) 5 MG TABS tablet Take 1 tablet by mouth 2 times daily 180 tablet 1    tamsulosin (FLOMAX) 0.4 MG capsule TAKE 1 CAPSULE EVERY DAY 90 capsule 3    omeprazole (PRILOSEC) 40 MG delayed release capsule TAKE 1 CAPSULE EVERY DAY 90 capsule 3    metFORMIN (GLUCOPHAGE) 500 MG tablet TAKE 1 TABLET TWICE DAILY 180 tablet 1    oxyCODONE-acetaminophen (PERCOCET)  MG per tablet Take 1 tablet by mouth every 6 hours as needed for Pain. Bebeto Loark TRUEPLUS LANCETS 28G MISC 1 each by Does not apply route daily E11.9 Test FBS daily--NEED 33 GAUGE 100 each 3        Medications patient taking as of now reconciled against medications ordered at time of hospital discharge: Yes    Chief Complaint   Patient presents with   [address] from Hospital       History of Present illness - Follow up of Hospital diagnosis(es): Atrial Flutter with RVR, dizziness, paroxysmal A. Fibb    Inpatient course: Discharge summary reviewed- see chart. Interval history/Current status: pt reports that he feels better since discharge from the hospital. He has had some fatigue in the last two days which he attributes to the sotalol. There is mild shortness of breath which appears to be caused by COPD. O2 sat in office is 94% today, wife reports that this has gotten as low as 86% at home. He was scheduled for a 6 min walk test but had to cancel due to recent hospitalization. Pt was encouraged to reschedule test. He has follow up with Dr. Molly Meraz on 08/13/2019 for COPD. Pt does admit to depressed mood. Wife states that he has been very irritable and somewhat isolative at home. She notes that he seems sad. The pt is minimizing some of these symptoms. He is attending to ADLs and still enjoys the time that he spends with his wife. He denies any SI/HI. A comprehensive review of systems was negative except for: Respiratory: positive for shortness of breath  Behavioral/Psych: positive for depression and irritability    Vitals:    06/25/19 1849   BP: 126/62   Site: Right Upper Arm   Position: Sitting   Cuff Size: Large Adult   Pulse: 77   Temp: 97.6 °F (36.4 °C)   TempSrc: Oral   SpO2: 94%   Weight: 229 lb (103.9 kg)   Height: 5' 10\" (1.778 m)     Body mass index is 32.86 kg/m².    Wt Readings from Last 3 Encounters:   06/25/19 229 lb (103.9 kg)   06/15/19 227 lb 1.6 oz (103 kg)   06/11/19 226 lb (102.5 kg)     BP Readings from Last 3 Encounters:   06/25/19 126/62   06/15/19 (!) 143/74   06/11/19 (!) 84/54        Physical Exam:  General Appearance: alert and oriented to person, place and time, well-developed and well-nourished, in no acute distress  Skin: warm and dry, no rash or erythema  Pulmonary/Chest: clear to auscultation bilaterally- no wheezes, rales or rhonchi, normal air movement, no respiratory distress and decreased breath sounds noted- diffusely, slight expiratory wheeze  Cardiovascular: normal rate, normal S1 and S2, no gallops and intact distal pulses  Extremities: no edema  Psychiatry:  Sad, normal behavior, normal judgement, normal memory    Assessment/Plan:  1. PAF (paroxysmal atrial fibrillation) ([facility]  -  Continue with current medication. Will reach out to cardiology about lasix as it was taken off his medication list while in the hospital    2. COPD, severe ([facility]  -  Follow up with pulmonology. Reschedule 6 min walk test to demonstrate need for oxygen. - Tiotropium Bromide-Olodaterol (STIOLTO RESPIMAT) 2.5-2.5 MCG/ACT AERS; 2 inhalations daily  Dispense: 3 Inhaler; Refill: 1    3. Reactive depression  -  Discussed options with the pt. Pt would like to meet with therapist at this time. May consider medication in the future.

## 2019-06-25 NOTE — Clinical Note
Alex Rutledge,You are seeing this pt tomorrow. During one of his recent hospitalizations he was taken off of Lasix. Dr. Emmanuel Gonzales has given him the okay to restart the lasix 40 mg daily.  I told him that we will defer to whatever you guys think is best.Thanks,Farzaneh Montalvo PA-C

## 2019-06-26 NOTE — PROGRESS NOTES
[address]   Cardiac Follow-up    Primary Care Doctor:  Yaz Colin,     No chief complaint on file. History of Present Illness:   I had the pleasure of seeing Meli Fermin in follow up for heart failure. Hx of afib, HTN, anemia, Chronic hepatitis. Follows with Dr. Benuel Angelucci also follows with EP. Since last visit, was sent to the ED 12/2018 for hyperkalemia, FREDI required CRRT. S/p dual chamber pacemaker placed for sinus node dysfunction 5/31/19. Admitted from 6/2/19-6/7/19 with respiratory failure hypercapnia, required intubation, treated for copd exacerbation. Admitted 6/11/19-6/15/19 for afib with RVR treated with IV amiodraone and home sotalol dose increased. Breathing seems to be worse since discharge, + wheezing + coughing, but tells me his breathing has been this bad for months. He isn't taking any lasix. He is off of steroids. He denies any chest pain. Feels like he can't get in a deep enough breath. EKG today shows normal sinus rhythm. Meli Fermin describes symptoms including dyspnea, fatigue, edema but denies chest pain, palpitations, syncope. Past Medical History:   has a past medical history of Cancer ([address] CHF (congestive heart failure) ([address] Chronic pancreatitis ([address] Colitis, COPD (chronic obstructive pulmonary disease) ([address] Diabetes mellitus ([address] Esophagitis, Gastritis, Hyperlipidemia, and Hypertension. Surgical History:   has a past surgical history that includes Testicle removal (Left, 2012); shoulder surgery (Left, 2001); Upper gastrointestinal endoscopy (05/08/2015); Colonoscopy (5/08/2015); hernia repair (Right, 2012); Skin cancer excision (Left, 5/2016); Cataract removal with implant (Right, 05/2016); Upper gastrointestinal endoscopy (N/A, 09/08/2016); Cystoscopy (09/21/2018); pr office/outpt visit,procedure only (N/A, 9/21/2018); Colonoscopy (N/A, 10/25/2018); Upper gastrointestinal endoscopy (11/06/2018);  Upper gastrointestinal endoscopy (N/A, 11/6/2018); Cardiac pacemaker placement (05/31/2019); Pacemaker insertion (05/31/2019); and pacemaker placement (Left, 05/31/2019). Social History:   reports that he quit smoking about 19 years ago. His smoking use included cigarettes. He has a 30.00 pack-year smoking history. He has never used smokeless tobacco. He reports that he does not drink alcohol or use drugs. Family History:   Family History   Problem Relation Age of Onset    Diabetes Mother     Early Death Mother     Cancer Mother         stomach    Other Father     Diabetes Son        Home Medications:  Prior to Admission medications    Medication Sig Start Date End Date Taking? Authorizing Provider   Pyridoxine HCl (VITAMIN B-6 PO) Take by mouth daily    Historical Provider, MD   Tiotropium Bromide-Olodaterol (STIOLTO RESPIMAT) 2.5-2.5 MCG/ACT AERS 2 inhalations daily 6/25/19   GAVIN Bhardwaj   blood glucose test strips (ASCENSIA AUTODISC VI;ONE TOUCH ULTRA TEST VI) strip [employer] True [employer] Air Test Strips. FSBS daily. DX E11.9 6/25/19   GAVIN Bhardwaj   sotalol (BETAPACE) 120 MG tablet Take 1 tablet by mouth 2 times daily 6/15/19   SHAYY Alcantara CNP   amLODIPine (NORVASC) 5 MG tablet Take 1 tablet by mouth daily 6/16/19   SHAYY Alcantara CNP   cyclobenzaprine (FLEXERIL) 10 MG tablet TAKE 1 TABLET BY MOUTH 3 TIMES DAILY AS NEEDED FOR MUSCLE SPASMS 5/2/19   Ryan Love DO   traMADol (ULTRAM) 50 MG tablet Take 50 mg by mouth every 12 hours as needed for Pain.     Historical Provider, MD   mesalamine (LIALDA) 1.2 g EC tablet TAKE 1 TABLET THREE TIMES DAILY 3/26/19   Ryan Love DO   sertraline (ZOLOFT) 100 MG tablet Take 1 tablet by mouth daily 3/26/19   Ryan Love DO   montelukast (SINGULAIR) 10 MG tablet Take 1 tablet by mouth nightly 3/26/19   Ryan Love DO   pramipexole (MIRAPEX) 1 MG tablet Take 1 tablet by mouth nightly 3/26/19   Ryan Love DO   donepezil (ARICEPT) 10 MG tablet Take 1 tablet by mouth nightly 3/26/19   Ryan Love DO   ferrous sulfate 325 (65 Fe) MG tablet Take 1 tablet by mouth daily (with breakfast) 3/26/19   SHAYY Mosher - CNP   [facility]) 625 MG tablet TAKE 3 TABLETS TWICE DAILY 2/19/19   Ryan Love DO   apixaban (ELIQUIS) 5 MG TABS tablet Take 1 tablet by mouth 2 times daily 2/11/19   Ryan Love DO   tamsulosin (FLOMAX) 0.4 MG capsule TAKE 1 CAPSULE EVERY DAY 2/5/19   Ryan Love DO   omeprazole (PRILOSEC) 40 MG delayed release capsule TAKE 1 CAPSULE EVERY DAY 2/5/19   Ryan Love DO   metFORMIN (GLUCOPHAGE) 500 MG tablet TAKE 1 TABLET TWICE DAILY 2/5/19   Ryan Love DO   oxyCODONE-acetaminophen (PERCOCET)  MG per tablet Take 1 tablet by mouth every 6 hours as needed for Pain. Kelsea Nenita Historical Provider, MD   TRUEPLUS LANCETS 28G MISC 1 each by Does not apply route daily E11.9 Test FBS daily--NEED 33 GAUGE 5/1/17   Ryan Love DO        Allergies:  Patient has no known allergies. Review of Systems:   · Constitutional: there has been no unanticipated weight loss. · Eyes: No vision changes  · ENT: No Headaches, no nasal congestion. No mouth sores or sore throat. · Cardiovascular: Reviewed in HPI  · Respiratory: + cough or wheezing, + sputum production. · Gastrointestinal: No abdominal pain, + constipation no  diarrhea  · Genitourinary: No dysuria, no hematuria. · Musculoskeletal:  + weakness or joint complaints. · Integumentary: No rash or pruritis. · Neurological: No numbness or tingling. No weakness. No tremor. · Psychiatric: No anxiety, no depression. · Endocrine:  No excessive thirst or urination. · Hematologic/Lymphatic: No abnormal bruising or bleeding, blood clots or swollen lymph nodes.     Physical Examination:    Vitals:    06/26/19 1132 06/26/19 1137   BP: (!) 112/58 (!) 112/58   Pulse: 81    SpO2: 97%    Weight: 229 lb (103.9 kg)    Height: 5' 10\" (1.778 m)         Constitutional and General Appearance: diaphoretic but not in any acute distress, mild increase work of breathing with walking.    HEENT: non-icteric sclera, oropharynx without exudate, oral mucosa moist  Neck: JVP less than 9-10 cm H20  Respiratory:  · No use of accessory muscles at rest  , ++ wheezing, no crackles, no rhonchi  Cardiovascular:  · The apical impulses not displaced  · no murmur/rub/gallop  · Regular rate and rhythm, S1,S2 normal  · Radial pulses 2+ and equal bilaterally  · No edema BLE  · Pedal Pulses: 2+ and equal   Abdomen:  · No masses or tenderness  · Liver: No Abnormalities Noted  Musculoskeletal/Skin:  · Gait slow  · There is no clubbing, cyanosis of the extremities  · Skin is warm and dry  · Moves all extremities well  Neurological/Psychiatric:  · Alert and oriented in all spheres  · No abnormalities of mood, affect, memory, mentation, or behavior are noted    Lab Data:  CBC:   Lab Results   Component Value Date    WBC 8.7 06/12/2019    WBC 7.5 06/11/2019    WBC 7.0 06/07/2019    RBC 4.05 06/12/2019    RBC 4.52 06/11/2019    RBC 4.18 06/07/2019    HGB 12.0 06/12/2019    HGB 13.3 06/11/2019    HGB 12.2 06/07/2019    HCT 36.8 06/12/2019    HCT 40.7 06/11/2019    HCT 38.1 06/07/2019    MCV 91.1 06/12/2019    MCV 90.1 06/11/2019    MCV 91.0 06/07/2019    RDW 17.1 06/12/2019    RDW 17.2 06/11/2019    RDW 16.1 06/07/2019     06/12/2019     06/11/2019     06/07/2019     Iron:   Lab Results   Component Value Date    IRON 31 (L) 12/20/2018    TIBC 578 (H) 12/20/2018    FERRITIN 18.8 (L) 12/20/2018     BMP:   Lab Results   Component Value Date     06/13/2019     06/11/2019     06/07/2019    K 3.9 06/15/2019    K 3.7 06/14/2019    K 4.1 06/13/2019    K 4.0 06/11/2019    K 3.4 06/07/2019    K 3.5 06/06/2019     06/13/2019    CL 99 06/11/2019     06/07/2019    CO2 28 06/13/2019    CO2 23 06/11/2019    CO2 26 06/07/2019    PHOS 2.5 12/25/2018    PHOS 1.8 12/24/2018    PHOS 2.3 12/24/2018    BUN 16 06/13/2019    BUN 13 06/11/2019    BUN 23 06/07/2019    CREATININE 0.7 06/13/2019    CREATININE 0.9 06/11/2019    CREATININE 0.8 06/07/2019     BNP:   Lab Results   Component Value Date    PROBNP 614 06/11/2019    PROBNP 331 06/01/2019    PROBNP 404 04/09/2019     Lipids:   Lab Results   Component Value Date    CHOL 135 04/29/2019        Lab Results   Component Value Date    TRIG 210 (H) 04/29/2019        Lab Results   Component Value Date    HDL 47 04/29/2019        Lab Results   Component Value Date    LDLCALC 46 04/29/2019        Lab Results   Component Value Date    LABVLDL 42 04/29/2019      No results found for: CHOLHDLRATIO    EF:   Lab Results   Component Value Date    LVEF 58 05/22/2019       Recent Testing:  Echo 4/20/2017   Technically difficult examination. Pt supine secondary to back fracture.   Left ventricular systolic function is normal with the ejection fraction   estimated at 55%.   No regional wall motion abnormalities.   Normal left ventricular diastolic filling pressure.   There is mild concentric left ventricular hypertrophy.   No significant valvular heart disease is identified.   Definity contrast was used but images are not adequate for interpretation.     Echo 10/22/2018   Summary   Atrial fibrillation throughout the study.   LV systolic function is hyperdynamic with EF estimated 65-70%.   No regional wall motion abnormalities are noted.   There is moderate concentric left ventricular hypertrophy.   The right atrium is mildly dilated.   Patient noted to be in rapid atrial fibrillation during the study.     Echo: 5/22/2019   Normal left ventricular systolic function with ejection fraction of 55-60%.   No regional wall motion abnormalites are seen.   Mild concentric left ventricular hypertrophy.   Grade I diastolic dysfunction with normal filling pressure.   Mild mitral regurgitation.     Stress Test:   Stress test 1/29/2019:   There is normal isotope uptake at stress and rest. There is no evidence of    myocardial ischemia or scar. LV function is normal with uniform wall motion    and ejection fraction of 65%. Low risk study.        Cardiac cath: 04/27/15  IMPRESSION:  1.  Mild coronary artery disease of the mid left anterior descending and  luminal irregularities of circumflex and right coronary artery. 2.  Normal left ventricular systolic function with an ejection fraction of  55% to 60%. 3.  Normal left ventricular end-diastolic pressure, 6 mmHg.       NYHA:   III  ACC/ AHA Stage:    C    Pertinent Problems:  · Chronic diastolic heart failure  · Shortness of Breath  · PAF, atrial flutter on sotalol per EP  · Sinus node dysfunction s/p PPM 5/31/19  · HTN  · Anemia  · COPD    Visit Diagnosis:    1. Chronic diastolic heart failure (HCC)    2. Other congestive [facility] failure ([facility]    3. Pacemaker    4. Paroxysmal atrial fibrillation (HCC)    5. Sinus node dysfunction (HCC)    6. Essential hypertension    7. COPD exacerbation ([facility]      Plan:   1. Restart the lasix 40mg daily   2. Order for prednisone taper for COPD   3. Continue daily weights  4. Recheck bmp, Iron labs in 1 week; has constipation already; likely would benefit from IV Iron   5. Follow up 2-3 weeks       QUALITY MEASURES  1. Tobacco Cessation Counseling: NA  2. Retake of BP if >140/90:   NA  3. Documentation to PCP/referring for new patient:  Sent to PCP at close of office visit  4. CAD patient on anti-platelet: NA  5. CAD patient on STATIN therapy:  NA  6. Patient with CHF and aFib on anticoagulation:  Yes     I appreciate the opportunity for caring for this patient.      Miladys Carver CNP, 6/26/2019, 2:04 PM

## 2019-06-26 NOTE — PATIENT INSTRUCTIONS
1. Restart the lasix 40mg daily   2. Order for prednisone taper for COPD   3. Continue daily weights  4. Recheck bmp, Iron labs in 1 week  5.  Follow up 2-3 weeks

## 2019-06-26 NOTE — LETTER
[address]   Cardiac Follow-up    Primary Care Doctor:  Simone Santa DO    No chief complaint on file. History of Present Illness:   I had the pleasure of seeing Monica Millan in follow up for heart failure. Hx of afib, HTN, anemia, Chronic hepatitis. Follows with Dr. Elmira Kumari also follows with EP. Since last visit, was sent to the ED 12/2018 for hyperkalemia, FREDI required CRRT. S/p dual chamber pacemaker placed for sinus node dysfunction 5/31/19. Admitted from 6/2/19-6/7/19 with respiratory failure hypercapnia, required intubation, treated for copd exacerbation. Admitted 6/11/19-6/15/19 for afib with RVR treated with IV amiodraone and home sotalol dose increased. Breathing seems to be worse since discharge, + wheezing + coughing, but tells me his breathing has been this bad for months. He isn't taking any lasix. He is off of steroids. He denies any chest pain. Feels like he can't get in a deep enough breath. EKG today shows normal sinus rhythm. Monica Millan describes symptoms including dyspnea, fatigue, edema but denies chest pain, palpitations, syncope. Past Medical History:   has a past medical history of Cancer ([address] CHF (congestive heart failure) ([address] Chronic pancreatitis ([address] Colitis, COPD (chronic obstructive pulmonary disease) ([address] Diabetes mellitus ([address] Esophagitis, Gastritis, Hyperlipidemia, and Hypertension. Surgical History:   has a past surgical history that includes Testicle removal (Left, 2012); shoulder surgery (Left, 2001); Upper gastrointestinal endoscopy (05/08/2015); Colonoscopy (5/08/2015); hernia repair (Right, 2012); Skin cancer excision (Left, 5/2016); Cataract removal with implant (Right, 05/2016); Upper gastrointestinal endoscopy (N/A, 09/08/2016); Cystoscopy (09/21/2018); pr office/outpt visit,procedure only (N/A, 9/21/2018); Colonoscopy (N/A, 10/25/2018);  Upper gastrointestinal endoscopy donepezil (ARICEPT) 10 MG tablet Take 1 tablet by mouth nightly 3/26/19   Ryan Love DO   ferrous sulfate 325 (65 Fe) MG tablet Take 1 tablet by mouth daily (with breakfast) 3/26/19   SHAYY Rubio - CNP   [facility]) 625 MG tablet TAKE 3 TABLETS TWICE DAILY 2/19/19   Ryan Love DO   apixaban (ELIQUIS) 5 MG TABS tablet Take 1 tablet by mouth 2 times daily 2/11/19   Ryan Love DO   tamsulosin (FLOMAX) 0.4 MG capsule TAKE 1 CAPSULE EVERY DAY 2/5/19   Ryan Love DO   omeprazole (PRILOSEC) 40 MG delayed release capsule TAKE 1 CAPSULE EVERY DAY 2/5/19   Ryan Love DO   metFORMIN (GLUCOPHAGE) 500 MG tablet TAKE 1 TABLET TWICE DAILY 2/5/19   Ryan Love DO   oxyCODONE-acetaminophen (PERCOCET)  MG per tablet Take 1 tablet by mouth every 6 hours as needed for Pain. Bernie Baker Historical Provider, MD   TRUEPLUS LANCETS 28G MISC 1 each by Does not apply route daily E11.9 Test FBS daily--NEED 33 GAUGE 5/1/17   Ryan Love DO        Allergies:  Patient has no known allergies. Review of Systems:   · Constitutional: there has been no unanticipated weight loss. · Eyes: No vision changes  · ENT: No Headaches, no nasal congestion. No mouth sores or sore throat. · Cardiovascular: Reviewed in HPI  · Respiratory: + cough or wheezing, + sputum production. · Gastrointestinal: No abdominal pain, + constipation no  diarrhea  · Genitourinary: No dysuria, no hematuria. · Musculoskeletal:  + weakness or joint complaints. · Integumentary: No rash or pruritis. · Neurological: No numbness or tingling. No weakness. No tremor. · Psychiatric: No anxiety, no depression. · Endocrine:  No excessive thirst or urination. · Hematologic/Lymphatic: No abnormal bruising or bleeding, blood clots or swollen lymph nodes.     Physical Examination:    Vitals:    06/26/19 1132 06/26/19 1137   BP: (!) 112/58 (!) 112/58   Pulse: 81    SpO2: 97%    Weight: 229 lb (103.9 kg)    Height: 5' 10\" (1.778 m) PHOS 1.8 12/24/2018    PHOS 2.3 12/24/2018    BUN 16 06/13/2019    BUN 13 06/11/2019    BUN 23 06/07/2019    CREATININE 0.7 06/13/2019    CREATININE 0.9 06/11/2019    CREATININE 0.8 06/07/2019     BNP:   Lab Results   Component Value Date    PROBNP 614 06/11/2019    PROBNP 331 06/01/2019    PROBNP 404 04/09/2019     Lipids:   Lab Results   Component Value Date    CHOL 135 04/29/2019        Lab Results   Component Value Date    TRIG 210 (H) 04/29/2019        Lab Results   Component Value Date    HDL 47 04/29/2019        Lab Results   Component Value Date    LDLCALC 46 04/29/2019        Lab Results   Component Value Date    LABVLDL 42 04/29/2019      No results found for: CHOLHDLRATIO    EF:   Lab Results   Component Value Date    LVEF 58 05/22/2019       Recent Testing:  Echo 4/20/2017   Technically difficult examination.  Pt supine secondary to back fracture.   Left ventricular systolic function is normal with the ejection fraction   estimated at 55%.   No regional wall motion abnormalities.   Normal left ventricular diastolic filling pressure.   There is mild concentric left ventricular hypertrophy.   No significant valvular heart disease is identified.   Definity contrast was used but images are not adequate for interpretation.     Echo 10/22/2018   Summary   Atrial fibrillation throughout the study.   LV systolic function is hyperdynamic with EF estimated 65-70%.   No regional wall motion abnormalities are noted.   There is moderate concentric left ventricular hypertrophy.   The right atrium is mildly dilated.   Patient noted to be in rapid atrial fibrillation during the study.     Echo: 5/22/2019   Normal left ventricular systolic function with ejection fraction of 55-60%.   No regional wall motion abnormalites are seen.   Mild concentric left ventricular hypertrophy.   Grade I diastolic dysfunction with normal filling pressure.   Mild mitral regurgitation.     Stress Test:   Stress test 1/29/2019: There is normal isotope uptake at stress and rest. There is no evidence of    myocardial ischemia or scar. LV function is normal with uniform wall motion    and ejection fraction of 65%. Low risk study.        Cardiac cath: 04/27/15  IMPRESSION:  1.  Mild coronary artery disease of the mid left anterior descending and  luminal irregularities of circumflex and right coronary artery. 2.  Normal left ventricular systolic function with an ejection fraction of  55% to 60%. 3.  Normal left ventricular end-diastolic pressure, 6 mmHg.       NYHA:   III  ACC/ AHA Stage:    C    Pertinent Problems:  · Chronic diastolic heart failure  · Shortness of Breath  · PAF, atrial flutter on sotalol per EP  · Sinus node dysfunction s/p PPM 5/31/19  · HTN  · Anemia  · COPD    Visit Diagnosis:    1. Chronic diastolic heart failure (HCC)    2. Other congestive heart failure ([facility]    3. Pacemaker    4. Paroxysmal atrial fibrillation (HCC)    5. Sinus node dysfunction (HCC)    6. Essential hypertension    7. COPD exacerbation ([facility]      Plan:   1. Restart the lasix 40mg daily   2. Order for prednisone taper for COPD   3. Continue daily weights  4. Recheck bmp, Iron labs in 1 week; has constipation already; likely would benefit from IV Iron   5. Follow up 2-3 weeks       QUALITY MEASURES  1. Tobacco Cessation Counseling: NA  2. Retake of BP if >140/90:   NA  3. Documentation to PCP/referring for new patient:  Sent to PCP at close of office visit  4. CAD patient on anti-platelet: NA  5. CAD patient on STATIN therapy:  NA  6. Patient with CHF and aFib on anticoagulation:  Yes     I appreciate the opportunity for caring for this patient.      Vanessa Borden CNP, 6/26/2019, 2:04 PM

## 2019-07-21 PROBLEM — J44.1 COPD EXACERBATION (HCC): Status: ACTIVE | Noted: 2019-01-01

## 2019-07-21 NOTE — ED PROVIDER NOTES
[facility] Emergency Department    CHIEF COMPLAINT  Chest Pain (x3-4 days. Does not radiate anywhere. has pacemaker and CHF. Feels more SOB. )      HISTORY OF PRESENT ILLNESS  Oni Wells is a 76 y.o. male who presents to the ED complaining of shortness of breath and chest pain. Patient reports symptoms started a few days ago. Patient reports the shortness of breath is constant and the chest pain is intermittent with exertion. Patient reports he has a history of congestive heart failure, COPD, and a DVT. Patient is currently on Eliquis for DVT. Patient reports he was recently admitted for a high CO2 level. Patient does not wear supplemental oxygen at home. Patient does not currently smoke cigarettes. Patient denies fever, chills, body aches, dizziness, syncope, cough, abdominal pain, nausea, vomiting, diarrhea, constipation, dysuria, hematuria, flank pain, leg swelling, calf pain. Patient reports chest pain is in the mid upper chest and does not radiate to jaw or arms. Patient denies any identifiable aggravating or alleviating factors. Patient does report using his nebulizer 3-4 times a day with little to no relief. No other complaints, modifying factors or associated symptoms. Nursing notes reviewed.    Past Medical History:   Diagnosis Date    Cancer [facility])     skin    CHF (congestive heart failure) (HCC)     [facility]     Colitis 5/08/2015    [facility]     Diabetes mellitus (HCC)     Esophagitis     Gastritis     Hyperlipidemia     Hypertension      Past Surgical History:   Procedure Laterality Date    CARDIAC PACEMAKER PLACEMENT  05/31/2019    Dr Juan Pablo Fang, [employer] Model: [place]    CATARACT REMOVAL WITH IMPLANT Right 05/2016    COLONOSCOPY  5/08/2015    colitis-mild gastritis    COLONOSCOPY N/A 10/25/2018    COLONOSCOPY POLYPECTOMY SNARE/COLD BIOPSY performed by Zuleyma Renteria MD at

## 2019-07-21 NOTE — ED PROVIDER NOTES
I independently performed a history and physical on Rcia Breaux. All diagnostic, treatment, and disposition decisions were made by myself in conjunction with the advanced practice provider.     -Rica Breaux is a 76 y.o. male presents to ED for chest pain and shortness of breath x 3 days. Chest pain substernal, intermittent, dull, nonradiating, no aggravating or alleviating factors. Patient reports shortness of breath x 4 days and has been progressively getting worse. Does breathing treatments at home which helped it. Patient reports its worse when he stands up or walking. Currently chest pain free. -of note patient had pacemaker placed 1 month ago. -PE: well appearing, nontoxic, not in acute distress. RRR, bilateral wheezing, no w/r/r, abdomen soft, ND, NTTP, + BS x 4, no rigidity, rebound, guarding  -lab workup significant for: elevated glucose of 159  -was given duoneb treatment with symptomatic relief  -The Ekg interpreted by me shows  normal pacemaker rhythm with a rate of 64  Axis is   Normal  QTc is  427  Intervals and Durations are unremarkable. ST Segments: no acute change  -During ED stay, patient was noted to desaturate 85-89% on RA while sitting in bed   -Plan for admission for further workup and treatment for COPD exacerbation discussed with patient and family. Patient and family in agreement with plan and have no further questions/concerns    For further details of Daniimari Floyd emergency department encounter, please see [facility] documentation.         Eloise Hammonds MD  07/22/19 [id number]

## 2019-07-22 NOTE — CONSULTS
Diabetes Mother     Early Death Mother     Cancer Mother         stomach    Other Father     Diabetes Son         Social History     Tobacco Use    Smoking status: Former Smoker     Packs/day: 1.50     Years: 20.00     Pack years: 30.00     Types: Cigarettes     Last attempt to quit: 1/10/2000     Years since quittin.5    Smokeless tobacco: Never Used   Substance Use Topics    Alcohol use: No     Alcohol/week: 0.0 standard drinks        No Known Allergies            Physical Exam:  Blood pressure 114/60, pulse 63, temperature 98.1 °F (36.7 °C), temperature source Oral, resp. rate 16, height 5' 10\" (1.778 m), weight 220 lb (99.8 kg), SpO2 92 %.'   Constitutional: Very pleasant, overweight, mildly tachypneic with prolonged expiration. HENT:  Oropharynx is clear and moist.  Class III airway, dentures eyes:  Conjunctivae are normal. Pupils equal, round, and reactive to light. No scleral icterus. Neck: Short and large neck, no JVD. No tracheal deviation present. No obvious thyroid mass. Cardiovascular: Atrial fibrillation, distant heart sounds. No right ventricular heave. Trace lower extremity edema. Pulmonary/Chest: Diminished air entry, prolonged expiration, minimal expiratory  wheezes. No rales. No accessory muscle usage or stridor. Abdominal: Soft, fatty, protuberant. Bowel sounds present. No distension or tenderness. Musculoskeletal: No cyanosis. No clubbing. No obvious joint deformity. Lymphadenopathy: No cervical or supraclavicular adenopathy. Skin: Skin is warm and dry. No rash or nodules on the exposed extremities. Psychiatric: Normal mood and affect. Behavior is normal.  No anxiety. Neurologic: Alert, awake and oriented. PERRL. Speech fluent, no obvious focal deficit. Detailed neurological exam not performed.         Results:  CBC:   Recent Labs     19  1622 19  0628   WBC 5.7 4.3   HGB 13.0* 12.4*   HCT 39.6* 37.7*   MCV 89.9 90.4    260     BMP:   Recent Labs     07/21/19  1622 07/22/19  0628    137   K 4.2 4.9   CL 97* 99   CO2 26 28   BUN 8 11   CREATININE 0.6* 0.8     LIVER PROFILE:   Recent Labs     07/21/19  1622   AST 42*   ALT 10   BILITOT 0.6   ALKPHOS 99     PT/INR: No results for input(s): PROTIME, INR in the last 72 hours. APTT: No results for input(s): APTT in the last 72 hours. UA:No results for input(s): NITRITE, COLORU, PHUR, LABCAST, WBCUA, RBCUA, MUCUS, TRICHOMONAS, YEAST, BACTERIA, CLARITYU, SPECGRAV, LEUKOCYTESUR, UROBILINOGEN, BILIRUBINUR, BLOODU, GLUCOSEU, AMORPHOUS in the last 72 hours. Invalid input(s): KETONESU    Imaging:  I have reviewed radiology images personally. CT CHEST PULMONARY EMBOLISM W CONTRAST   Preliminary Result   1. No evidence of pulmonary embolism or acute pulmonary abnormality. 2. Platelike atelectasis in the lung bases. 3. Persistent bronchial wall thickening which may be related to bronchitis,   smoking or sequela of aspiration. There is small mucus plugging in the   dependent lower lobes and small debris in the tracheobronchial tree. 4. Enlarged main pulmonary artery can be seen in the setting of pulmonary   hypertension. 5. Coronary artery disease with suspected left ventricular hypertrophy. 6. Pancreatic calcifications indicate sequela of chronic pancreatitis. XR CHEST STANDARD (2 VW)   Final Result   No acute process. Xr Chest Standard (2 Vw)    Result Date: 7/21/2019  EXAMINATION: TWO XRAY VIEWS OF THE CHEST 7/21/2019 4:46 pm COMPARISON: 06/11/2019 HISTORY: ORDERING SYSTEM PROVIDED HISTORY: chest pain TECHNOLOGIST PROVIDED HISTORY: Reason for exam:->chest pain Reason for exam:->SOB Reason for Exam: CHEST PAIN, SOB Acuity: Acute Type of Exam: Initial FINDINGS: Transvenous pacer remains in place. The lungs are without acute focal process. There is no effusion or pneumothorax. The cardiomediastinal silhouette is stable. The osseous structures are stable. No acute process.

## 2019-07-22 NOTE — PROGRESS NOTES
Hospitalist Progress Note      PCP: Hao Lr DO    Date of Admission: 7/21/2019    Chief Complaint: dyspnea    Hospital Course: The patient is a 76 Y M with a h/o HTN, DM2, COPD, CHF, CAD, and DVT. In 12/2018 he was admitted with cardiogenic shock which required a pacemaker. In 4/2019 he was hospitalized with AECOPD and possible pna. Most recently he was hospitalized with MAF and Afib. Now he has presented with dyspnea, cough, and chest tightness. He was found to be hypoxic, no baseline O2 requirement. Subjective:  He feels better than when he came in. Ongoing dyspnea above baseline, however.        Medications:  Reviewed    Infusion Medications    dextrose       Scheduled Medications    ipratropium-albuterol  1 ampule Inhalation Q4H WA    methylPREDNISolone  40 mg Intravenous Q8H    amLODIPine  5 mg Oral Daily    apixaban  5 mg Oral BID    cholestyramine  4 g Oral Daily    donepezil  10 mg Oral Nightly    ferrous sulfate  325 mg Oral Daily with breakfast    furosemide  40 mg Oral Daily    montelukast  10 mg Oral Nightly    pramipexole  1 mg Oral Nightly    sertraline  100 mg Oral Daily    sotalol  120 mg Oral BID    tamsulosin  0.4 mg Oral Daily    sodium chloride flush  10 mL Intravenous 2 times per day    insulin lispro  0-6 Units Subcutaneous TID WC    insulin lispro  0-3 Units Subcutaneous Nightly     PRN Meds: cyclobenzaprine, oxyCODONE-acetaminophen, sodium chloride flush, magnesium hydroxide, ondansetron, glucose, dextrose, glucagon (rDNA), dextrose, melatonin      Intake/Output Summary (Last 24 hours) at 7/22/2019 1038  Last data filed at 7/22/2019 0926  Gross per 24 hour   Intake 1090 ml   Output 250 ml   Net 840 ml       Physical Exam Performed:    /80   Pulse 71   Temp 98 °F (36.7 °C) (Oral)   Resp 20   Ht 5' 10\" (1.778 m)   Wt 220 lb (99.8 kg)   SpO2 90%   BMI 31.57 kg/m²     General appearance: No apparent distress, appears stated age and donepezil, supportive care      DVT Prophylaxis: anticoagulation as above  Diet: DIET CARB CONTROL;  Code Status: Full Code    PT/OT Eval Status: not indicated    Dispo - perhaps 7/23, pending stabilization of respiratory status, weaning to RA, and stress test results.       Prem Macedo MD

## 2019-07-22 NOTE — ED NOTES
Patients oxygen is 85-89% RA while sitting. MD aware. 2L NC applied.      [place], RN  07/21/19 2004

## 2019-07-23 NOTE — PROGRESS NOTES
Hospitalist Progress Note      PCP: Sam Cooper DO    Date of Admission: 7/21/2019    Chief Complaint: dyspnea    Hospital Course: The patient is a 76 Y M with a h/o HTN, DM2, COPD, CHF, CAD, and DVT. In 12/2018 he was admitted with cardiogenic shock which required a pacemaker. In 4/2019 he was hospitalized with AECOPD and possible pna. Most recently he was hospitalized with MAF and Afib. Now he has presented with dyspnea, cough, and chest tightness. He was found to be hypoxic, no baseline O2 requirement. Subjective:  Ongoing dyspnea above baseline. End-expiratory wheezing. Lingering O2 requirement. Needs another day of steroids and nebs.         Medications:  Reviewed    Infusion Medications    dextrose       Scheduled Medications    ipratropium-albuterol  1 ampule Inhalation Q4H WA    methylPREDNISolone  40 mg Intravenous Q8H    aspirin  81 mg Oral Daily    amLODIPine  5 mg Oral Daily    apixaban  5 mg Oral BID    cholestyramine  4 g Oral Daily    donepezil  10 mg Oral Nightly    ferrous sulfate  325 mg Oral Daily with breakfast    furosemide  40 mg Oral Daily    montelukast  10 mg Oral Nightly    pramipexole  1 mg Oral Nightly    sertraline  100 mg Oral Daily    sotalol  120 mg Oral BID    tamsulosin  0.4 mg Oral Daily    sodium chloride flush  10 mL Intravenous 2 times per day    insulin lispro  0-6 Units Subcutaneous TID WC    insulin lispro  0-3 Units Subcutaneous Nightly     PRN Meds: hydrALAZINE, labetalol, perflutren lipid microspheres, cyclobenzaprine, oxyCODONE-acetaminophen, sodium chloride flush, magnesium hydroxide, ondansetron, glucose, dextrose, glucagon (rDNA), dextrose, melatonin      Intake/Output Summary (Last 24 hours) at 7/23/2019 1026  Last data filed at 7/23/2019 1025  Gross per 24 hour   Intake 670 ml   Output 400 ml   Net 270 ml       Physical Exam Performed:    BP (!) 181/91   Pulse 78   Temp 98 °F (36.7 °C) (Oral)   Resp 18   Ht 5' 10\" (1.778 m) Wt 220 lb (99.8 kg)   SpO2 93%   BMI 31.57 kg/m²     General appearance: No apparent distress, appears stated age and cooperative. HEENT: Pupils equal, round, and reactive to light. Conjunctivae/corneas clear. Neck: Supple, with full range of motion. No jugular venous distention. Trachea midline. Respiratory:  Normal respiratory effort. Clear to auscultation, bilaterally without Rales/Rhonchi. End-expiratory wheezing bilaterally. Cardiovascular: Regular rate and rhythm with normal S1/S2 without murmurs, rubs or gallops. Abdomen: Soft, non-tender, non-distended with normal bowel sounds. Musculoskeletal: No clubbing, cyanosis or edema bilaterally. Full range of motion without deformity. Skin: Skin color, texture, turgor normal.  No rashes or lesions. Neurologic:  Neurovascularly intact without any focal sensory/motor deficits. Cranial nerves: II-XII intact, grossly non-focal.  Psychiatric: Alert and oriented, thought content appropriate, normal insight  Capillary Refill: Brisk,< 3 seconds   Peripheral Pulses: +2 palpable, equal bilaterally       Labs:   Recent Labs     07/21/19  1622 07/22/19  0628   WBC 5.7 4.3   HGB 13.0* 12.4*   HCT 39.6* 37.7*    260     Recent Labs     07/21/19  1622 07/22/19  0628    137   K 4.2 4.9   CL 97* 99   CO2 26 28   BUN 8 11   CREATININE 0.6* 0.8   CALCIUM 9.4 9.6     Recent Labs     07/21/19  1622   AST 42*   ALT 10   BILITOT 0.6   ALKPHOS 99     No results for input(s): INR in the last 72 hours. Recent Labs     07/21/19  1622 07/22/19  0628   TROPONINI <0.01 <0.01       Urinalysis:      Lab Results   Component Value Date    NITRU Negative 04/24/2019    WBCUA 3-5 04/24/2019    BACTERIA Rare 04/10/2019    RBCUA None seen 04/24/2019    BLOODU Negative 04/24/2019    SPECGRAV 1.020 04/24/2019    GLUCOSEU Negative 04/24/2019       Radiology:  CT CHEST PULMONARY EMBOLISM W CONTRAST   Final Result   1.  No evidence of pulmonary embolism or acute pulmonary HTN, chronic diastolic CHF  - amlodipine  - sotalol   - his BP is usually controlled at outpatient clinic appointment, sometimes is it even a little low. BPs were high here while on steroids and nebs, used PRN hydralazine and labetalol while here. Afib  - apixaban    DM2  - SSI while here. A1c recently 6.2. H/o DVT  - apixaban. CTPA negative for PE here. Dementia  - donepezil, supportive care      DVT Prophylaxis: anticoagulation as above  Diet: DIET CARB CONTROL;  Code Status: Full Code    PT/OT Eval Status: not indicated    Dispo - perhaps 7/24, ideally when weaned to RA. He lives at home.        Usha Morris MD

## 2019-07-23 NOTE — PLAN OF CARE
Pt is rating pain 8/10. Pain goal is 4/10. Pt medicated with PRN Percocet as ordered. Will continue to monitor.

## 2019-07-24 NOTE — PROGRESS NOTES
Hospitalist Progress Note      PCP: Santo Jensen DO    Date of Admission: 7/21/2019    Chief Complaint: dyspnea    Hospital Course: The patient is a 76 Y M with a h/o HTN, DM2, COPD, CHF, CAD, and DVT. In 12/2018 he was admitted with cardiogenic shock which required a pacemaker. In 4/2019 he was hospitalized with AECOPD and possible pna. Most recently he was hospitalized with MAF and Afib. Now he has presented with dyspnea, cough, and chest tightness. He was found to be hypoxic, no baseline O2 requirement. Subjective:  Ongoing dyspnea above baseline. Lingering O2 requirement. He was 97% on RA a month ago at cardiology clinic.         Medications:  Reviewed    Infusion Medications    dextrose       Scheduled Medications    ipratropium-albuterol  1 ampule Inhalation Q4H WA    methylPREDNISolone  40 mg Intravenous Q8H    aspirin  81 mg Oral Daily    amLODIPine  5 mg Oral Daily    apixaban  5 mg Oral BID    cholestyramine  4 g Oral Daily    donepezil  10 mg Oral Nightly    ferrous sulfate  325 mg Oral Daily with breakfast    furosemide  40 mg Oral Daily    montelukast  10 mg Oral Nightly    pramipexole  1 mg Oral Nightly    sertraline  100 mg Oral Daily    sotalol  120 mg Oral BID    tamsulosin  0.4 mg Oral Daily    sodium chloride flush  10 mL Intravenous 2 times per day    insulin lispro  0-6 Units Subcutaneous TID WC    insulin lispro  0-3 Units Subcutaneous Nightly     PRN Meds: hydrALAZINE, labetalol, perflutren lipid microspheres, cyclobenzaprine, oxyCODONE-acetaminophen, sodium chloride flush, magnesium hydroxide, ondansetron, glucose, dextrose, glucagon (rDNA), dextrose, melatonin      Intake/Output Summary (Last 24 hours) at 7/24/2019 1601  Last data filed at 7/24/2019 1517  Gross per 24 hour   Intake 1860 ml   Output 1625 ml   Net 235 ml       Physical Exam Performed:    BP (!) 151/78   Pulse 67   Temp 98 °F (36.7 °C) (Oral)   Resp 18   Ht 5' 10\" (1.778 m)   Wt 220 lb 1. No evidence of pulmonary embolism or acute pulmonary abnormality. 2. Platelike atelectasis in the lung bases. 3. Persistent bronchial wall thickening which may be related to bronchitis,   smoking or sequela of aspiration. There is small mucus plugging in the   dependent lower lobes and small debris in the tracheobronchial tree. 4. Enlarged main pulmonary artery can be seen in the setting of pulmonary   hypertension. 5. Coronary artery disease with suspected left ventricular hypertrophy. 6. Pancreatic calcifications indicate sequela of chronic pancreatitis. XR CHEST STANDARD (2 VW)   Final Result   No acute process. Assessment/Plan:    Active Hospital Problems    Diagnosis    Coronary artery disease involving native coronary artery of native heart without angina pectoris [I25.10]     Priority: Medium    HLD (hyperlipidemia) [E78.5]     Priority: Medium    COPD exacerbation (HCC) [J44.1]    Obesity [E66.9]    COPD (chronic obstructive pulmonary disease) ([facility] [address] [J44.9]    Essential hypertension [I10]    Controlled type 2 diabetes mellitus without complication, without long-term current use of insulin (HCC) [E11.9]       The patient is a 76 Y M with a h/o HTN, DM2, COPD, CHF, CAD, and DVT. In 12/2018 he was admitted with cardiogenic shock which required a pacemaker. In 4/2019 he was hospitalized with AECOPD and possible pna. Most recently he was hospitalized with MAF and Afib. Now he has presented with dyspnea, cough, and chest tightness. He was found to be hypoxic, no baseline O2 requirement. AECOPD, with acute hypoxic respiratory failure  - steroids, inhaled bronchodilators  - negative procalcitonin  - wean to RA as tolerated. The patient has no supplemental O2 requirement at baseline. He was 97% on RA a month ago at cardiology clinic. He would actually like oxygen at home because he says that he has good days and bad days.       Chest tightness  - negative

## 2019-07-25 NOTE — PROGRESS NOTES
Pt discharged in stable condition via wheelchair with oxygen tank. Discharge instructions given. To  prescriptions from outpatient pharmacy.

## 2019-07-25 NOTE — DISCHARGE SUMMARY
months ago. No further workup anticipated.       HTN, acute on chronic diastolic CHF  - improved with intermittent furosemide IV, then continued his usual furosemide PO dose. Encouraged adherence to dietary restrictions. His dyspnea was mostly due to AECOPD. - amlodipine  - sotalol   - his BP is usually controlled at outpatient clinic appointment, sometimes is it even a little low. BPs were high here while on steroids and nebs, used PRN hydralazine and labetalol while here.       Afib  - apixaban, sotalol      DM2  - SSI while here. A1c recently 6.2.     H/o DVT  - apixaban. CTPA negative for PE here.      Dementia  - donepezil, supportive care      Physical Exam Performed:     /79   Pulse 66   Temp 98.4 °F (36.9 °C) (Oral)   Resp 18   Ht 5' 10\" (1.778 m)   Wt 220 lb (99.8 kg)   SpO2 93%   BMI 31.57 kg/m²       General appearance:  No apparent distress, appears stated age and cooperative. HEENT:  Normal cephalic, atraumatic without obvious deformity. Pupils equal, round, and reactive to light. Extra ocular muscles intact. Conjunctivae/corneas clear. Neck: Supple, with full range of motion. No jugular venous distention. Trachea midline. Respiratory:  Normal respiratory effort. Clear to auscultation, bilaterally without Rales/Wheezes/Rhonchi. Cardiovascular:  Regular rate and rhythm with normal S1/S2 without murmurs, rubs or gallops. Abdomen: Soft, non-tender, non-distended with normal bowel sounds. Musculoskeletal:  No clubbing, cyanosis or edema bilaterally. Full range of motion without deformity. Skin: Skin color, texture, turgor normal.  No rashes or lesions. Neurologic:  Neurovascularly intact without any focal sensory/motor deficits. Cranial nerves: II-XII intact, grossly non-focal.  Psychiatric:  Alert and oriented, thought content appropriate, normal insight  Capillary Refill: Brisk,< 3 seconds   Peripheral Pulses: +2 palpable, equal bilaterally       Labs:  For convenience and omeprazole (PRILOSEC) 40 MG delayed release capsule TAKE 1 CAPSULE EVERY DAY  Qty: 90 capsule, Refills: 3    Associated Diagnoses: Gastroesophageal reflux disease without esophagitis      metFORMIN (GLUCOPHAGE) 500 MG tablet TAKE 1 TABLET TWICE DAILY  Qty: 180 tablet, Refills: 1    Associated Diagnoses: Controlled type 2 diabetes mellitus without complication, without long-term current use of insulin (HCC)      oxyCODONE-acetaminophen (PERCOCET)  MG per tablet Take 1 tablet by mouth every 6 hours as needed for Pain. .      TRUEPLUS LANCETS 28G MISC 1 each by Does not apply route daily E11.9 Test FBS daily--NEED 33 GAUGE  Qty: 100 each, Refills: 3      blood glucose test strips (ASCENSIA AUTODISC VI;ONE TOUCH ULTRA TEST VI) strip [employer] True Metrix Air Test Strips. FSBS daily. DX E11.9  Qty: 100 strip, Refills: 3               Time Spent on discharge is more than 30 minutes in the examination, evaluation, counseling and review of medications and discharge plan. Signed:    Floyd Stafford MD   7/25/2019      Thank you Matthew Casey DO for the opportunity to be involved in this patient's care. If you have any questions or concerns please feel free to contact me at [phone].

## 2019-08-05 PROBLEM — R07.9 CHEST PAIN: Status: ACTIVE | Noted: 2019-01-01

## 2019-08-05 NOTE — ED PROVIDER NOTES
Genitourinary: Negative for difficulty urinating and dysuria. Musculoskeletal: Negative for back pain, gait problem and neck pain. Skin: Negative for color change and wound. Neurological: Negative for seizures, syncope, speech difficulty, weakness and numbness. Psychiatric/Behavioral: Negative for self-injury and suicidal ideas. Except as noted above the remainder of the review of systems was reviewed and negative.        PAST MEDICAL HISTORY     Past Medical History:   Diagnosis Date    Cancer [facility])     skin    [facility]     Chronic pancreatitis ([facility] [address]     Colitis 5/08/2015    COPD (chronic obstructive pulmonary disease) ([facility] [address]     Diabetes mellitus ([facility] [address]     Esophagitis     Gastritis     Hyperlipidemia     Hypertension          SURGICAL HISTORY       Past Surgical History:   Procedure Laterality Date    CARDIAC PACEMAKER PLACEMENT  05/31/2019    Dr Don Menon, [employer]: Livia    CATARACT REMOVAL WITH IMPLANT Right 05/2016    COLONOSCOPY  5/08/2015    colitis-mild gastritis    COLONOSCOPY N/A 10/25/2018    COLONOSCOPY POLYPECTOMY SNARE/COLD BIOPSY performed by Marcelina Sanchez MD at [address] [place] [address]  09/21/2018    CYSTOSCOPY, DIRECT VISION INTERAL URETHROTOMY     HERNIA REPAIR Right 2012    inguinal    PACEMAKER INSERTION  05/31/2019    PACEMAKER PLACEMENT Left 05/31/2019    MD OFFICE/OUTPT VISIT,PROCEDURE ONLY N/A 9/21/2018    CYSTOSCOPY, DIRECT VISION INTERAL URETHROTOMY performed by Nella Granger MD at [facility] Left 2001    Rotator cuff    SKIN CANCER EXCISION Left 5/2016    melanoma    TESTICLE REMOVAL Left 2012    UPPER GASTROINTESTINAL ENDOSCOPY  05/08/2015    Gastritis    UPPER GASTROINTESTINAL ENDOSCOPY N/A 09/08/2016    gastritis-Bx pending    UPPER GASTROINTESTINAL ENDOSCOPY  11/06/2018    UPPER GASTROINTESTINAL ENDOSCOPY N/A 11/6/2018    EGD BIOPSY performed by Marcelina Sanchez, Normocephalic and atraumatic. Right Ear: External ear normal.   Left Ear: External ear normal.   Eyes: Conjunctivae and EOM are normal.   Neck: Neck supple. No JVD present. No tracheal deviation present. Cardiovascular: Normal rate and intact distal pulses. Pulmonary/Chest: Effort normal and breath sounds normal. No respiratory distress. He has no wheezes. Abdominal: Soft. He exhibits no distension. There is no tenderness (epigastric tenderness). There is no rebound and no guarding. Musculoskeletal: Normal range of motion. He exhibits no tenderness. Neurological: He is alert. No cranial nerve deficit. Skin: Skin is warm and dry. Nursing note and vitals reviewed. DIAGNOSTIC RESULTS     EKG:All EKG's are interpreted by the Emergency Department Physician who either signs or Co-signs this chart in the absence of a cardiologist.    The Ekg interpreted by me shows  sinus arrhythmia with a rate of 72  Axis is   Normal  QTc is  444  Intervals and Durations are unremarkable. ST Segments: no acute change  Sinus arrhythmia has replaced electronic atrial pacemaker rhythm from previous EKG on 7/21/19      RADIOLOGY:     Interpretation per the Radiologist below, if available at the time of this note:    XR CHEST STANDARD (2 VW)   Final Result   No evidence for acute cardiopulmonary pathology.                LABS:  Labs Reviewed   CBC WITH AUTO DIFFERENTIAL - Abnormal; Notable for the following components:       Result Value    Hemoglobin 13.4 (*)     RDW 15.9 (*)     All other components within normal limits    Narrative:     Performed at:  [address], [address] [place] [address]   Phone [phone]   COMPREHENSIVE METABOLIC PANEL - Abnormal; Notable for the following components:    Sodium 132 (*)     Chloride 94 (*)     Glucose 148 (*)     CREATININE 0.7 (*)     Albumin/Globulin Ratio 1.0 (*)     AST 53 (*)     All other components within normal limits [address] [facility]  [phone]            DISCHARGE MEDICATIONS:  New Prescriptions    No medications on file          (Please note that portions of this note were completed with a voice recognition program.  Efforts were made to edit the dictations but occasionally words aremis-transcribed. )    Trino Bello MD (electronically signed)  Attending Emergency Physician           Trino Bello MD  08/05/19 3844

## 2019-08-06 NOTE — PROGRESS NOTES
 CYSTOSCOPY  09/21/2018    CYSTOSCOPY, DIRECT VISION INTERAL URETHROTOMY     HERNIA REPAIR Right 2012    inguinal    PACEMAKER INSERTION  05/31/2019    PACEMAKER PLACEMENT Left 05/31/2019    [place] OFFICE/OUTPT VISIT,PROCEDURE ONLY N/A 9/21/2018    CYSTOSCOPY, DIRECT VISION INTERAL URETHROTOMY performed by Denisse Zapata MD at [address], [place] Left 2001    Rotator cuff    SKIN CANCER EXCISION Left 5/2016    melanoma    TESTICLE REMOVAL Left 2012    UPPER GASTROINTESTINAL ENDOSCOPY  05/08/2015    Gastritis    UPPER GASTROINTESTINAL ENDOSCOPY N/A 09/08/2016    gastritis-Bx pending    UPPER GASTROINTESTINAL ENDOSCOPY  11/06/2018    UPPER GASTROINTESTINAL ENDOSCOPY N/A 11/6/2018    EGD BIOPSY performed by Jemima Sims MD at [address]       Level of Consciousness: Alert, Oriented, and Cooperative = 0    Level of Activity: Walking with assistance = 1    Respiratory Pattern: Regular Pattern; RR 8-20 = 0    Breath Sounds: Absent bilaterally and/or with wheezes = 3    Sputum   ,  ,    Cough: Strong, spontaneous, non-productive = 0    Vital Signs   BP (!) 163/94   Pulse 67   Temp 97.8 °F (36.6 °C) (Oral)   Resp 16   Ht 5' 10\" (1.778 m)   Wt 220 lb 11.2 oz (100.1 kg)   SpO2 96%   BMI 31.67 kg/m²   SPO2 (COPD values may differ): 90-91% on room air or greater than 92% on FiO2 24- 28% = 1    Peak Flow (asthma only): not applicable = 0    RSI: 0-4 = See once and convert to home regimen or discontinue        Plan       Goals: medication delivery, mobilize retained secretions, volume expansion and improve oxygenation    Patient/caregiver was educated on the proper method of use for Respiratory Care Devices:  Yes      Level of patient/caregiver understanding able to:   ? Verbalize understanding   ? Demonstrate understanding       ? Teach back        ? Needs reinforcement       ? No available caregiver               ?   Other:     Response to education:  Good     Is patient being placed on Home Treatment Regimen? Yes     Does the patient have everything they need prior to discharge? NA     Comments: meds and chart reviewed    Plan of Care: Patient is on home treatment regimen. No further assessment needed unless patient condition changes. Electronically signed by Álvaro Lainez RCP on 8/6/2019 at 11:46 AM    Respiratory Protocol Guidelines     1. Assessment and treatment by Respiratory Therapy will be initiated for medication and therapeutic interventions upon initiation of aerosolized medication. 2. Physician will be contacted for respiratory rate (RR) greater than 35 breaths per minute. Therapy will be held for heart rate (HR) greater than 140 beats per minute, pending direction from physician. 3. Bronchodilators will be administered via Metered Dose Inhaler (MDI) with spacer when the following criteria are met:  a. Alert and cooperative     b. HR < 140 bpm  c. RR < 30 bpm                d. Can demonstrate a 2-3 second inspiratory hold  4. Bronchodilators will be administered via Hand Held Nebulizer [facility]) to patients when ANY of the following criteria are met  a. Incognizant or uncooperative          b. Patients treated with HHN at Home        c. Unable to demonstrate proper use of MDI with spacer     d. RR > 30 bpm   5. Bronchodilators will be delivered via Metered Dose Inhaler (MDI), HHN, Aerogen to intubated patients on mechanical ventilation. 6. Inhalation medication orders will be delivered and/or substituted as outlined below. Aerosolized Medications Ordering and Administration Guidelines:    1. All Medications will be ordered by a physician, and their frequency and/or modality will be adjusted as defined by the patients Respiratory Severity Index (RSI) score.   2. If the patient does not have documented COPD, consider discontinuing anticholinergics when RSI is less than 9.  3. If the bronchospasm worsens (increased RSI), then the bronchodilator frequency can be

## 2019-08-07 NOTE — PLAN OF CARE
Problem: Pain:  Goal: Control of chronic pain  Description  Control of chronic pain  Outcome: Ongoing   Administered PRN pain medications as ordered. Patient able to verbalize pain on a 0-10 scale. Will continue to monitor.

## 2019-08-07 NOTE — PROGRESS NOTES
Pt d/c'd home. Removed peripheral IV and stopped bleeding. Catheter intact. Pt tolerated well. No redness noted at site. Notified [facility] and removed tele box. Reviewed d/c instructions, home meds, and  f/u information utilizing teach-back method. Scripts for patient to  at outpatient pharmacy. Patient verbalized understanding.

## 2019-08-08 NOTE — DISCHARGE SUMMARY
Hospital Medicine Discharge Summary    Patient ID: Oni Wells      Patient's PCP: Roger Love DO    Admit Date: 8/5/2019     Discharge Date: 8/7/2019      Admitting Physician: Vilma Miramontes MD     Discharge Physician: SHAYY De CNP     Discharge Diagnoses: Active Hospital Problems    Diagnosis    Chest pain [R07.9]       The patient was seen and examined on day of discharge and this discharge summary is in conjunction with any daily progress note from day of discharge. Hospital Course:     76 y. o. male who presented to [facility] with increasing shortness of breath.  Patient describes 3 to 4 days history of chest tightness which started several days ago along with progressive shortness of breath.  Patient denies relation to exertion.  He denies nausea vomiting or diaphoresis.     Per review of electronic health record patient was just discharged from the hospital on 7/25/2019 after being treated for COPD exacerbation and acute on chronic congestive heart failure.  Patient has a history of recent pacemaker placement.  Family states he has had multiple admissions to the hospital over the last several months    Chest pain - concerning for ACS. - recent stress testing negative. - serial troponin negative and EKG without ischemia.  - ECHO - EF 55% with grade II DD. No RWMAs noted. - cardiology consulted  - s/p left heart cath 8/6 - non-obstructive CAD. - continue ASA, statin.     PAF - currently paced rhythm  - continue sotalol and Eliquis.     Chronic diastolic CHF - stable. - Last ECHO 5/2019 - EF 55-60% with grade I DD.  - repeat ECHO as above.       COPD   - continue Duoneb and singulair.  - add Prednisone 40 mg daily x 5 days at dc.     BPH - stable. Continue Flomax.     HTN - controlled. Continue home amlodipine, Sotalol.     S/p PPM 5/2019 for bradycardia.           Physical Exam Performed:     BP (!) 158/75   Pulse 79   Temp 97.6 °F (36.4 °C) (Oral)   Resp 18   Ht

## 2019-08-13 NOTE — PROGRESS NOTES
[facility] Pulmonary, Critical Care, and Sleep    Outpatient Follow Up Note    Chief Complaint: Sick visit. COPD  Consulting provider: Dr Eduarda Dobbs    Interval History: 76 y.o. male the patient was hospitalized last week for chest pain. His cardiac work-up was negative. He was hospitalized for both  COPD and CHF 1 month ago. Wheezes intermittently. Denies a productive cough. Recently finished prednisone. Initial HPI:The patient has a history of DM2, diastolic heart failure, dementia, COPD. The patient does have SOB and VILLARREAL. Exercise tolerance on level ground is limited by back pain more than SOB. Probably 1-2 blocks. Can go up a flight of stairs. No cough. +wheezes intermittently. The patient has smoked 1.5 PPD for 30 years. Quit in 2000. Current Outpatient Medications:     Ferrous Sulfate (IRON) 325 (65 Fe) MG TABS, Take 325 mg by mouth daily, Disp: , Rfl:     Fluticasone-Umeclidin-Vilant (TRELEGY ELLIPTA) 100-62.5-25 MCG/INH AEPB, Inhale 1 puff into the lungs daily, Disp: 3 each, Rfl: 1    mometasone-formoterol (DULERA) 200-5 MCG/ACT inhaler, Inhale 2 puffs into the lungs every 12 hours, Disp: 1 Inhaler, Rfl: 2    lidocaine 4 % external patch, Place 1 patch onto the skin every 24 hours, Disp: 30 patch, Rfl: 0    mesalamine (LIALDA) 1.2 g EC tablet, TAKE 1 TABLET THREE TIMES DAILY, Disp: 270 tablet, Rfl: 1    furosemide (LASIX) 40 MG tablet, Take 1 tablet by mouth daily, Disp: 90 tablet, Rfl: 0    Pyridoxine HCl (VITAMIN B-6 PO), Take by mouth daily, Disp: , Rfl:     blood glucose test strips (ASCENSIA AUTODISC VI;ONE TOUCH ULTRA TEST VI) strip, [employer] True Metrix Air Test Strips. FSBS daily.  DX E11.9, Disp: 100 strip, Rfl: 3    sotalol (BETAPACE) 120 MG tablet, Take 1 tablet by mouth 2 times daily, Disp: 60 tablet, Rfl: 5    amLODIPine (NORVASC) 5 MG tablet, Take 1 tablet by mouth daily, Disp: 30 tablet, Rfl: 5    cyclobenzaprine (FLEXERIL) 10 MG tablet, TAKE 1 TABLET BY MOUTH 3 TIMES DAILY AS NEEDED FOR MUSCLE SPASMS, Disp: 270 tablet, Rfl: 0    sertraline (ZOLOFT) 100 MG tablet, Take 1 tablet by mouth daily, Disp: 90 tablet, Rfl: 3    montelukast (SINGULAIR) 10 MG tablet, Take 1 tablet by mouth nightly, Disp: 90 tablet, Rfl: 3    pramipexole (MIRAPEX) 1 MG tablet, Take 1 tablet by mouth nightly, Disp: 90 tablet, Rfl: 3    donepezil (ARICEPT) 10 MG tablet, Take 1 tablet by mouth nightly, Disp: 90 tablet, Rfl: 1    colesevelam (WELCHOL) 625 MG tablet, TAKE 3 TABLETS TWICE DAILY, Disp: 540 tablet, Rfl: 1    apixaban (ELIQUIS) 5 MG TABS tablet, Take 1 tablet by mouth 2 times daily, Disp: 180 tablet, Rfl: 1    tamsulosin (FLOMAX) 0.4 MG capsule, TAKE 1 CAPSULE EVERY DAY, Disp: 90 capsule, Rfl: 3    omeprazole (PRILOSEC) 40 MG delayed release capsule, TAKE 1 CAPSULE EVERY DAY, Disp: 90 capsule, Rfl: 3    metFORMIN (GLUCOPHAGE) 500 MG tablet, TAKE 1 TABLET TWICE DAILY, Disp: 180 tablet, Rfl: 1    oxyCODONE-acetaminophen (PERCOCET)  MG per tablet, Take 1 tablet by mouth every 6 hours as needed for Pain. ., Disp: , Rfl:     TRUEPLUS LANCETS 28G MISC, 1 each by Does not apply route daily E11.9 Test FBS daily--NEED 33 GAUGE, Disp: 100 each, Rfl: 3    Objective:   PHYSICAL EXAM: /72 (Site: Left Upper Arm, Position: Sitting)   Pulse 65   Temp 97.7 °F (36.5 °C) (Oral)   Resp 17   Ht 5' 10\" (1.778 m)   Wt 227 lb (103 kg)   SpO2 92%   BMI 32.57 kg/m²    Constitutional:  No acute distress. Eyes: PERRL. Conjunctivae anicteric. ENT: Normal nose. Normal tongue. Oropharynx clear. Neck:  Trachea is midline. No thyroid tenderness. Respiratory: No accessory muscle usage. decreased breath sounds. No wheezes. No rales. No Rhonchi. Cardiovascular: Normal S1S2. No digit clubbing. No digit cyanosis. No LE edema. Psychiatric: No anxiety or Agitation. Alert and Oriented to person, place and time. LABS:  Reviewed any pertinent new labs that are available.     PFTs   8/16/17  FVC  (%) FEV1 1.35 (42%)

## 2019-08-19 NOTE — ED NOTES
Back pain. Pt states \"middle of back sharp pain hurts to breathe in it started hurting x3 days ago I was working in the garage moving stuff around I think that's when it started hurting/I do not have a cough\".      Arianna Santamaria, JENNIFERN  58[birth year] [id number]

## 2019-08-19 NOTE — ED PROVIDER NOTES
CYSTOSCOPY, DIRECT VISION INTERAL URETHROTOMY performed by Ana Cristina Johnson MD at [address] Left     Rotator cuff    SKIN CANCER EXCISION Left 2016    melanoma    TESTICLE REMOVAL Left     UPPER GASTROINTESTINAL ENDOSCOPY  2015    Gastritis    UPPER GASTROINTESTINAL ENDOSCOPY N/A 2016    gastritis-Bx pending    UPPER GASTROINTESTINAL ENDOSCOPY  2018    UPPER GASTROINTESTINAL ENDOSCOPY N/A 2018    EGD BIOPSY performed by Derek Levy MD at [address]     Family History   Problem Relation Age of Onset    Diabetes Mother     Early Death Mother     Cancer Mother         stomach    Other Father     Diabetes Son      Social History     Socioeconomic History    Marital status:      Spouse name: Not on file    Number of children: Not on file    Years of education: Not on file    Highest education level: Not on file   Occupational History    Not on file   Social Needs    Financial resource strain: Not on file    Food insecurity:     Worry: Not on file     Inability: Not on file    Transportation needs:     Medical: Not on file     Non-medical: Not on file   Tobacco Use    Smoking status: Former Smoker     Packs/day: 1.50     Years: 20.00     Pack years: 30.00     Types: Cigarettes     Last attempt to quit: 1/10/2000     Years since quittin.6    Smokeless tobacco: Never Used   Substance and Sexual Activity    Alcohol use: No     Alcohol/week: 0.0 standard drinks    Drug use: No    Sexual activity: Yes     Partners: Female   Lifestyle    Physical activity:     Days per week: Not on file     Minutes per session: Not on file    Stress: Not on file   Relationships    Social connections:     Talks on phone: Not on file     Gets together: Not on file     Attends Orthodoxy service: Not on file     Active member of club or organization: Not on file     Attends meetings of clubs or organizations: Not on file

## 2019-11-26 ENCOUNTER — TELEPHONE (OUTPATIENT)
Dept: FAMILY MEDICINE CLINIC | Age: 74
End: 2019-11-26

## 2022-08-24 NOTE — PROGRESS NOTES
2019  Georgina Lei (: 1945)  76 y.o. HPI   The patient is here for ED follow up of acute exacerbation of chronic low back pain. He is currently taking robaxin, prednisone and percocet with very minimal relief in pain. He has not done PT for the back and wife states that he will not follow through with PT if orders are placed. Pain is currently thoracic region down to the low back area. There is no involvement of the legs. He denies having any bowel or bladder incontinence, perianal numbness, or weakness of his extremities, including shoulders. Review of Systems   Constitutional: Negative for fever and unexpected weight change. Eyes: Negative for visual disturbance. Respiratory: Negative for shortness of breath and wheezing. Musculoskeletal: Positive for arthralgias and back pain. Negative for gait problem, joint swelling, myalgias, neck pain and neck stiffness. Neurological: Negative for weakness. Hematological: Negative for adenopathy. Allergies, past medical history, family history, and social history reviewed and unchanged from previous encounter.      Current Outpatient Medications   Medication Sig Dispense Refill    methocarbamol (ROBAXIN-750) 750 MG tablet Take 1 tablet by mouth 4 times daily for 10 days 40 tablet 0    Ferrous Sulfate (IRON) 325 (65 Fe) MG TABS Take 325 mg by mouth daily      Fluticasone-Umeclidin-Vilant (TRELEGY ELLIPTA) 100-62.5-25 MCG/INH AEPB Inhale 1 puff into the lungs daily 3 each 1    mometasone-formoterol (DULERA) 200-5 MCG/ACT inhaler Inhale 2 puffs into the lungs every 12 hours 1 Inhaler 2    lidocaine 4 % external patch Place 1 patch onto the skin every 24 hours 30 patch 0    mesalamine (LIALDA) 1.2 g EC tablet TAKE 1 TABLET THREE TIMES DAILY 270 tablet 1    furosemide (LASIX) 40 MG tablet Take 1 tablet by mouth daily 90 tablet 0    Pyridoxine HCl (VITAMIN B-6 PO) Take by mouth daily      blood glucose test strips (ASCENSIA AUTODISC Consent: Written consent obtained. Risks include but not limited to lid/brow ptosis, bruising, swelling, diplopia, temporary effect, incomplete chemical denervation.

## 2023-02-08 NOTE — CONSULTS
Medications:  Were reviewed and are listed in nursing record and/or below  Prior to Admission medications    Medication Sig Start Date End Date Taking? Authorizing Provider   lidocaine 4 % external patch Place 1 patch onto the skin every 24 hours 7/26/19   Salena Montanez MD   mesalamine (LIALDA) 1.2 g EC tablet TAKE 1 TABLET THREE TIMES DAILY 7/15/19   Ryan Love DO   ferrous sulfate 325 (65 Fe) MG tablet Take 1 tablet by mouth daily (with breakfast) 7/15/19   SHAYY Cook CNP   furosemide (LASIX) 40 MG tablet Take 1 tablet by mouth daily 7/15/19   SHAYY Cook CNP   Pyridoxine HCl (VITAMIN B-6 PO) Take by mouth daily    Historical Provider, MD   Tiotropium Bromide-Olodaterol (STIOLTO RESPIMAT) 2.5-2.5 MCG/ACT AERS 2 inhalations daily 6/25/19   GAVIN Wilson   blood glucose test strips (ASCENSIA AUTODISC VI;ONE TOUCH ULTRA TEST VI) strip [employer] True Metrix Air Test Strips. FSBS daily. DX E11.9 6/25/19   GAVIN Wilson   sotalol (BETAPACE) 120 MG tablet Take 1 tablet by mouth 2 times daily 6/15/19   SHAYY Washburn CNP   amLODIPine (NORVASC) 5 MG tablet Take 1 tablet by mouth daily 6/16/19   SHAYY Washburn CNP   cyclobenzaprine (FLEXERIL) 10 MG tablet TAKE 1 TABLET BY MOUTH 3 TIMES DAILY AS NEEDED FOR MUSCLE SPASMS 5/2/19   Ryan Love DO   traMADol (ULTRAM) 50 MG tablet Take 50 mg by mouth every 12 hours as needed for Pain.     Historical Provider, MD   sertraline (ZOLOFT) 100 MG tablet Take 1 tablet by mouth daily 3/26/19   Ryan Love DO   montelukast (SINGULAIR) 10 MG tablet Take 1 tablet by mouth nightly 3/26/19   Ryan Love DO   pramipexole (MIRAPEX) 1 MG tablet Take 1 tablet by mouth nightly 3/26/19   Ryan Love DO   donepezil (ARICEPT) 10 MG tablet Take 1 tablet by mouth nightly 3/26/19   Ryan Love DO   colesevelam (WELCHOL) 625 MG tablet TAKE 3 TABLETS TWICE DAILY 2/19/19   Ryan Love DO   apixaban (ELIQUIS) 5 MG TABS tablet Patient is a 49 y/o male PMH DM2, Bilateral BKA, ESRD (on HD MWF), last dialyzed yesterday transferred from [facility] emergently for evaluation of evaluation of right finger swelling/pain. Patient reports history of right finger pain after he had a fall one year ago. Patient had a wound on her second/middle finger who he was seeing a hand surgeon for outpatient but unable ultimately to continue seeing due to insurance issues. Patient reports his middle finger developed increased swelling and became black in color about two weeks ago. Denies fevers. Patient transferred to [facility] for further evaluation and emergent OR. Patient received broad spectrum Abx of Zosyn/vanco/clindamycin at [place]. Interpretor phone used for translation with patient. ID# [id number]        f/u  blood and urine cx,serial lactate levels,monitor vitals closley,ivfs hydration,monitor urine output and renal profile,iv abx as per id cons  ampicillin/sulbactam  IVPB 3 Gram(s) IV Intermittent every 24 hours    esrd on hd mwf   Excess fluids and waste products will be removed from your blood; your electrolytes will be balanced; your blood pressure will be controlled.    BP monitoring,continue current antihypertensive meds, low salt diet,followup with PMD in 1-2 weeks      ANEMIA PLAN:  Anemia of chronic disease:  Well controlled by epo  H and H subtherapeutic .  We will continue epo aiming for a HCT of 32-36 %.   We will monitor Iron stores, B12 and RBC folate .  epoetin deidre-epbx (RETACRIT) Injectable 50612 Unit(s) IV Push     Accuchecks monitoring and insulin sliding scale coverage, no concentrated sweets diet, serial labs and f/up with PMD, monitor HB A 1 C every 3-4 mnth  piperacillin/tazobactam IVPB.. 3.375 Gram(s) IV Intermittent every 12 hours     Take 1 tablet by mouth 2 times daily 2/11/19   Ryan Love DO   tamsulosin (FLOMAX) 0.4 MG capsule TAKE 1 CAPSULE EVERY DAY 2/5/19   Ryan Love DO   omeprazole (PRILOSEC) 40 MG delayed release capsule TAKE 1 CAPSULE EVERY DAY 2/5/19   Ryan Love DO   metFORMIN (GLUCOPHAGE) 500 MG tablet TAKE 1 TABLET TWICE DAILY 2/5/19   Ryan Love DO   oxyCODONE-acetaminophen (PERCOCET)  MG per tablet Take 1 tablet by mouth every 6 hours as needed for Pain. Veronica Valencia MD   TRUEPLUS LANCETS 28G MISC 1 each by Does not apply route daily E11.9 Test FBS daily--NEED 33 GAUGE 5/1/17   Ryan Love DO        CURRENT Medications:    perflutren lipid microspheres (DEFINITY) injection 1.65 mg ONCE PRN   amLODIPine (NORVASC) tablet 5 mg Daily   apixaban (ELIQUIS) tablet 5 mg BID   cyclobenzaprine (FLEXERIL) tablet 10 mg TID PRN   donepezil (ARICEPT) tablet 10 mg Nightly   mesalamine (DELZICOL) delayed release capsule 400 mg TID   montelukast (SINGULAIR) tablet 10 mg Nightly   pantoprazole (PROTONIX) tablet 40 mg QAM AC   oxyCODONE-acetaminophen (PERCOCET)  MG per tablet 1 tablet Q6H PRN   pramipexole (MIRAPEX) tablet 1 mg Nightly   sertraline (ZOLOFT) tablet 100 mg Daily   sotalol (BETAPACE) tablet 120 mg BID   tamsulosin (FLOMAX) capsule 0.4 mg Daily   sodium chloride flush 0.9 % injection 10 mL 2 times per day   sodium chloride flush 0.9 % injection 10 mL PRN   magnesium hydroxide (MILK OF MAGNESIA) 400 MG/5ML suspension 30 mL Daily PRN   ondansetron (ZOFRAN) injection 4 mg Q6H PRN   atorvastatin (LIPITOR) tablet 40 mg Nightly   aspirin chewable tablet 81 mg Daily   nitroGLYCERIN (NITROSTAT) SL tablet 0.4 mg Q5 Min PRN   regadenoson (LEXISCAN) injection 0.4 mg ONCE PRN       Allergies:  Patient has no known allergies. Review of Systems:   A 14 point review of symptoms completed. Pertinent positives identified in the HPI, all other review of symptoms negative as below.       Objective   PHYSICAL

## 2023-02-14 NOTE — CARE COORDINATION
Pt did not end up dischargin in the previous day. Sw informed the home care agency and will fax orders at discharge.      - [phone]  Fax - [phone] Detail Level: Detailed

## 2023-03-03 NOTE — PROGRESS NOTES
Thank you for allowing us to care for you today. You may receive a survey about the care we provided. Your feedback is valuable and helps us provide excellent care throughout the community.     Home Care Instructions for Pain Management:    1. DIET:   You may resume your normal diet today.   2. BATHING:   You may shower with luke warm water. No tub baths or anything that will soak injection sites under water for the next 24 hours.  3. DRESSING:   You may remove your bandage today.   4. ACTIVITY LEVEL:   You may resume your normal activities 24 hrs after your procedure. Nothing strenuous today.  5. MEDICATIONS:   You may resume your normal medications today. To restart blood thinners, ask your doctor.  6. DRIVING    If you have received any sedatives by mouth today, you may not drive for 12 hours.    If you have received any sedation through your IV, you may not drive for 24 hrs.   7. SPECIAL INSTRUCTIONS:   No heat to the injection site for 24 hrs including, hot bath or shower, heating pad, moist heat, or hot tubs.    Use ice pack to injection site for any pain or discomfort.  Apply ice packs for 20 minute intervals as needed.    IF you have diabetes, be sure to monitor your blood sugar more closely. IF your injection contained steroids your blood sugar levels may become higher than normal.    If you are still having pain upon discharge:  Your pain may improve over the next 48 hours. The anesthetic (numbing medication) works immediately to 48 hours. IF your injection contained a steroid (anti-inflammatory medication), it takes approximately 3 days to start feeling relief and 7-10 days to see your greatest results from the medication. It is possible you may need subsequent injections. This would be discussed at your follow up appointment with pain management or your referring doctor.    Please call the PAIN MANAGEMENT office at [phone] or ON CALL pager at [phone] if you experienced any:   -Weakness or  06/03/19 0405   Vent Information   Vent Type 840   Vent Mode AC/VC   Vt Ordered 500 mL   Rate Set 20 bmp   Peak Flow 65 L/min   Pressure Support 0 cmH20   FiO2  50 %   Sensitivity 4   PEEP/CPAP 5   Humidification Source Heated wire   Humidification Temp 36.8   Vent Patient Data   Peak Inspiratory Pressure 21 cmH2O   Mean Airway Pressure 12 cmH20   Rate Measured 21 br/min   Vt Exhaled 698 mL   Minute Volume 12.7 Liters   I:E Ratio 1:2.20   Cough/Sputum   Sputum How Obtained Endotracheal   Cough Productive   Sputum Amount Small   Sputum Color Yellow   Tenacity Thick   Spontaneous Breathing Trial (SBT) RT Doc   Pulse 71   SpO2 96 %   Breath Sounds   Right Upper Lobe Diminished   Right Middle Lobe Diminished   Right Lower Lobe Diminished   Left Upper Lobe Diminished   Left Lower Lobe Diminished   Additional Respiratory  Assessments   Resp 16   End Tidal CO2 30 (%)   Alarm Settings   High Pressure Alarm 50 cmH2O   Low Minute Volume Alarm 3 L/min   High Respiratory Rate 45 br/min   Low Exhaled Vt  200 mL   Non-Surgical Airway Endo Tracheal Tube   Placement Date/Time: 06/01/19 2124   Timeout: Patient  Mask Ventilation: Ventilated by mask (1)  Placed By: In ED  Inserted by: MD Montse Pickering  Insertion attempts: 1  Airway Device: Endo Tracheal Tube  Size: 7.5  Placement Verified By[[email or web address]] Chest X-ray; [facility]. ..    Secured at 22 cm   Measured From [address] [employer] [address] [employer]   Site Condition Dry loss of sensation  -Fever > 101.5  -Pain uncontrolled with oral medications   -Persistent nausea, vomiting, or diarrhea  -Redness or drainage from the injection sites, or any other worrisome concerns.   If physician on call was not reached or could not communicate with our office for any reason please go to the nearest emergency department.

## 2023-10-16 NOTE — ED NOTES
BNP added.   Called lab to run per Dr. Mora Warren @ [facility]  06/01/19 [address]
[facility] calling for MICU transport   [address] calling back with transport ETA;  [facility] [id number]  [facility] MICU not available        Kip Nur  06/02/19 1853
Call placed to [facility] for cards consult. Requested for Dr. Joseph Alexander to speak with Dr. Regenia Schirmer.   Dr. Gualberto Phelan is on call @ [id number]  06/01/19 2419
Call placed to [facility] for status update on admission to [address] [place] [address] @ [facility] states they are still waiting on admission orders from Dr. Angelica Dorantes re-paged.   [facility] will update on bed planning status after orders placed      Carlos Langford  06/02/19 0054
Call placed to [facility] to check the status of transfer @ [address] has paged Dr. Jay Jay Huertas twice since [address] [facility] states that Dr. Jay Jay Huertas perfect served back.  [facility] will call back with bed assignment @ [address]  06/02/19 0114
Call placed to [facility] to initiate transfer to [facility] @ [phone]  Dr. Adria Velazquez returning page to Dr. Eaton Forward   Call placed to [facility] to clarify that pt was accepted to [facility] @ [address] [facility]  06/02/19 0032
MD Anastacia Crespo administered 60mg of Propofol IVP.       Ira Cobb RN  06/01/19 5136
MD Carey Darling at bedside with patient in CT at this time. Patient extremely agitated and restless in CT. Pt. Unable to follow commands. MD Carey Darling administered 70 mg propofol IVP at this time.       Sudhir Manley RN  06/02/19 0605
[facility] with bed assignment and number for nursing report @ 0126  C2 -Bed 8 [phone]     Tracy Arriaga  06/02/19 0128
Patient to CT via stretcher by RN and respiratory. Patient back into room at this time. Patient family remains at bedside. Patient family deny any wants or needs at this time. VSS. Will continue to monitor.       Ana Daniel RN  06/01/19 8369
Perfect serve sent to [facility] hospitalist/Dr. Jami Grimaldo @ 5284  Dr. Jami Grimaldo returning page.   States he will speak with Dr. Saray Kent in the ED @ [address]  [address]  06/01/19 8793
Received culture call back from Micro lab    1 bottle positive for coag negative staph. Results called to [facility].       Radha Adler RN  06/03/19 7305
Yes

## (undated) DEVICE — BAG URIN STRL FOR URO CTCH SYS

## (undated) DEVICE — ELECTRODE ECG MONITR FOAM TEAR DROP ADLT RED

## (undated) DEVICE — CABLE ENDOSCP L10FT ACT DISP

## (undated) DEVICE — BOWL MED L 32OZ PLAS W/ MOLD GRAD EZ OPN PEEL PCH

## (undated) DEVICE — ENDO CARRY-ON PROCEDURE KIT INCLUDES SUCTION TUBING, LUBRICANT, GAUZE, BIOHAZARD STICKER, TRANSPORT PAD AND INTERCEPT BEDSIDE KIT.: Brand: ENDO CARRY-ON PROCEDURE KIT

## (undated) DEVICE — GLOVE ORANGE PI 7 1/2   MSG9075

## (undated) DEVICE — CIRCUIT ANES L72IN 3L BACT AND VIR FLTR EL CONN SGL LIMB

## (undated) DEVICE — FORCEPS BX L240CM DIA2.4MM L NDL RAD JAW 4 133340

## (undated) DEVICE — CATHETER URETH 22FR BLLN 30CC 3 W F SPEC INF CTRL BARDX

## (undated) DEVICE — CONMED SCOPE SAVER BITE BLOCK, 20X27 MM: Brand: SCOPE SAVER

## (undated) DEVICE — Z INACTIVE USE 2635504 SOLUTION IRRIG 3000ML 1.5% GL USP UROMATIC CONT

## (undated) DEVICE — Z CONVERTED USE 2271043 CONTAINER SPEC COLL 4OZ SCR ON LID PEEL PCH

## (undated) DEVICE — PREP SOL PVP IODINE 4%  4 OZ/BTL

## (undated) DEVICE — DBD-PACK,CYSTOSCOPY,PK VI,AURORA: Brand: MEDLINE

## (undated) DEVICE — MEDI-VAC NON-CONDUCTIVE SUCTION TUBING: Brand: CARDINAL HEALTH

## (undated) DEVICE — ELECTRODE PT RET AD L9FT HI MOIST COND ADH HYDRGEL CORDED

## (undated) DEVICE — GAUZE,SPONGE,4"X4",8PLY,STRL,LF,10/TRAY: Brand: MEDLINE

## (undated) DEVICE — Y-TYPE TUR/BLADDER IRRIGATION SET, REGULATING CLAMP

## (undated) DEVICE — GOWN SIRUS NONREIN LG W/TWL: Brand: MEDLINE INDUSTRIES, INC.

## (undated) DEVICE — 35 ML SYRINGE REGULAR TIP: Brand: MONOJECT

## (undated) DEVICE — ELECTRODE LOOP 24FR R ANG MPLR CUT

## (undated) DEVICE — SOLUTION IV IRRIG 500ML 0.9% SODIUM CHL 2F7123

## (undated) DEVICE — BAG DRNGE 2000ML UROLOGY ANTI REFLX CHMBR SAMP PRT

## (undated) DEVICE — COVER BOOT THK2MIL UNIV POLYETH IMPERMEABLE FLD RESIST DISP

## (undated) DEVICE — SYRINGE 60ML IRRIG PISTON TOMEY

## (undated) DEVICE — Device: Brand: DISPOSABLE ELECTROSURGICAL SNARE

## (undated) DEVICE — GOWN SIRUS NONREIN XL W/TWL: Brand: MEDLINE INDUSTRIES, INC.

## (undated) DEVICE — TRAP POLYP ETRAP

## (undated) DEVICE — CYSTO/BLADDER IRRIGATION SET, REGULATING CLAMP